# Patient Record
Sex: FEMALE | Race: WHITE | NOT HISPANIC OR LATINO | Employment: PART TIME | URBAN - METROPOLITAN AREA
[De-identification: names, ages, dates, MRNs, and addresses within clinical notes are randomized per-mention and may not be internally consistent; named-entity substitution may affect disease eponyms.]

---

## 2018-07-18 ENCOUNTER — TRANSCRIBE ORDERS (OUTPATIENT)
Dept: ADMINISTRATIVE | Facility: HOSPITAL | Age: 83
End: 2018-07-18

## 2018-07-18 DIAGNOSIS — M79.89 LEFT LEG SWELLING: Primary | ICD-10-CM

## 2018-07-20 ENCOUNTER — HOSPITAL ENCOUNTER (OUTPATIENT)
Dept: CT IMAGING | Facility: HOSPITAL | Age: 83
Discharge: HOME | End: 2018-07-20

## 2018-07-20 ENCOUNTER — HOSPITAL ENCOUNTER (OUTPATIENT)
Dept: CARDIOLOGY | Facility: HOSPITAL | Age: 83
Discharge: HOME | End: 2018-07-20
Admitting: INTERNAL MEDICINE

## 2018-07-20 DIAGNOSIS — M79.89 LEFT LEG SWELLING: ICD-10-CM

## 2018-07-20 LAB
BH CV LOWER VASCULAR LEFT COMMON FEMORAL AUGMENT: NORMAL
BH CV LOWER VASCULAR LEFT COMMON FEMORAL COMPETENT: NORMAL
BH CV LOWER VASCULAR LEFT COMMON FEMORAL COMPRESS: NORMAL
BH CV LOWER VASCULAR LEFT COMMON FEMORAL PHASIC: NORMAL
BH CV LOWER VASCULAR LEFT COMMON FEMORAL SPONT: NORMAL
BH CV LOWER VASCULAR LEFT DISTAL FEMORAL COMPRESS: NORMAL
BH CV LOWER VASCULAR LEFT GASTRONEMIUS COMPRESS: NORMAL
BH CV LOWER VASCULAR LEFT GREATER SAPH AK COMPRESS: NORMAL
BH CV LOWER VASCULAR LEFT GREATER SAPH BK COMPRESS: NORMAL
BH CV LOWER VASCULAR LEFT LESSER SAPH COMPRESS: NORMAL
BH CV LOWER VASCULAR LEFT MID FEMORAL AUGMENT: NORMAL
BH CV LOWER VASCULAR LEFT MID FEMORAL COMPETENT: NORMAL
BH CV LOWER VASCULAR LEFT MID FEMORAL COMPRESS: NORMAL
BH CV LOWER VASCULAR LEFT MID FEMORAL PHASIC: NORMAL
BH CV LOWER VASCULAR LEFT MID FEMORAL SPONT: NORMAL
BH CV LOWER VASCULAR LEFT PERONEAL COMPRESS: NORMAL
BH CV LOWER VASCULAR LEFT POPLITEAL AUGMENT: NORMAL
BH CV LOWER VASCULAR LEFT POPLITEAL COMPETENT: NORMAL
BH CV LOWER VASCULAR LEFT POPLITEAL COMPRESS: NORMAL
BH CV LOWER VASCULAR LEFT POPLITEAL PHASIC: NORMAL
BH CV LOWER VASCULAR LEFT POPLITEAL SPONT: NORMAL
BH CV LOWER VASCULAR LEFT POSTERIOR TIBIAL COMPRESS: NORMAL
BH CV LOWER VASCULAR LEFT PROXIMAL FEMORAL COMPRESS: NORMAL
BH CV LOWER VASCULAR LEFT SAPHENOFEMORAL JUNCTION AUGMENT: NORMAL
BH CV LOWER VASCULAR LEFT SAPHENOFEMORAL JUNCTION COMPETENT: NORMAL
BH CV LOWER VASCULAR LEFT SAPHENOFEMORAL JUNCTION COMPRESS: NORMAL
BH CV LOWER VASCULAR LEFT SAPHENOFEMORAL JUNCTION PHASIC: NORMAL
BH CV LOWER VASCULAR LEFT SAPHENOFEMORAL JUNCTION SPONT: NORMAL
BH CV LOWER VASCULAR RIGHT COMMON FEMORAL AUGMENT: NORMAL
BH CV LOWER VASCULAR RIGHT COMMON FEMORAL COMPETENT: NORMAL
BH CV LOWER VASCULAR RIGHT COMMON FEMORAL COMPRESS: NORMAL
BH CV LOWER VASCULAR RIGHT COMMON FEMORAL PHASIC: NORMAL
BH CV LOWER VASCULAR RIGHT COMMON FEMORAL SPONT: NORMAL
CREAT BLDA-MCNC: 0.8 MG/DL (ref 0.6–1.3)

## 2018-07-20 PROCEDURE — 74177 CT ABD & PELVIS W/CONTRAST: CPT

## 2018-07-20 PROCEDURE — 93971 EXTREMITY STUDY: CPT

## 2018-07-20 PROCEDURE — 82565 ASSAY OF CREATININE: CPT

## 2018-07-20 PROCEDURE — 25010000002 IOPAMIDOL 61 % SOLUTION: Performed by: INTERNAL MEDICINE

## 2018-07-20 RX ADMIN — IOPAMIDOL 85 ML: 612 INJECTION, SOLUTION INTRAVENOUS at 15:24

## 2018-07-26 ENCOUNTER — TRANSCRIBE ORDERS (OUTPATIENT)
Dept: ADMINISTRATIVE | Facility: HOSPITAL | Age: 83
End: 2018-07-26

## 2018-07-26 DIAGNOSIS — N94.9 ADNEXAL CYST: Primary | ICD-10-CM

## 2018-08-01 ENCOUNTER — HOSPITAL ENCOUNTER (OUTPATIENT)
Dept: ULTRASOUND IMAGING | Facility: HOSPITAL | Age: 83
Discharge: HOME | End: 2018-08-01
Admitting: INTERNAL MEDICINE

## 2018-08-01 DIAGNOSIS — N94.9 ADNEXAL CYST: ICD-10-CM

## 2018-08-01 PROCEDURE — 76830 TRANSVAGINAL US NON-OB: CPT

## 2018-08-01 PROCEDURE — 76856 US EXAM PELVIC COMPLETE: CPT

## 2018-08-01 PROCEDURE — 76857 US EXAM PELVIC LIMITED: CPT

## 2018-08-13 ENCOUNTER — TELEPHONE (OUTPATIENT)
Dept: ORTHOPEDIC SURGERY | Facility: CLINIC | Age: 83
End: 2018-08-13

## 2018-08-13 NOTE — TELEPHONE ENCOUNTER
Prev RBB patient (had R Meniscus repair 10/24/14) has had L Leg swelling from ankle up leg x 6-7mo ago (up to a year). Patient saw PCP Carmen Chacon who ordered Venous Doppler (7/20/18), CT (7/20/18) & Pelvic US (8/01/18) - all in [facility].     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     Patient thinks her L ankle / leg swelling is ortho related, but unsure where to go / who to see / what to do... Will RBB see patient or refer to [facility] / ANTOINE? Thanks /srh

## 2018-08-29 ENCOUNTER — OFFICE VISIT (OUTPATIENT)
Dept: ORTHOPEDIC SURGERY | Facility: CLINIC | Age: 83
End: 2018-08-29

## 2018-08-29 VITALS — WEIGHT: 197.2 LBS | BODY MASS INDEX: 33.67 KG/M2 | TEMPERATURE: 99.1 F | HEIGHT: 64 IN

## 2018-08-29 DIAGNOSIS — M25.572 PAIN AND SWELLING OF ANKLE, LEFT: Primary | ICD-10-CM

## 2018-08-29 DIAGNOSIS — M25.472 PAIN AND SWELLING OF ANKLE, LEFT: Primary | ICD-10-CM

## 2018-08-29 DIAGNOSIS — I87.302 STASIS EDEMA OF LEFT LOWER EXTREMITY: ICD-10-CM

## 2018-08-29 PROCEDURE — 99203 OFFICE O/P NEW LOW 30 MIN: CPT | Performed by: ORTHOPAEDIC SURGERY

## 2018-08-29 PROCEDURE — 73610 X-RAY EXAM OF ANKLE: CPT | Performed by: ORTHOPAEDIC SURGERY

## 2018-08-29 RX ORDER — LISINOPRIL 10 MG/1
10 TABLET ORAL DAILY
COMMUNITY
Start: 2018-08-17 | End: 2022-02-10 | Stop reason: SDUPTHER

## 2018-08-29 RX ORDER — METOPROLOL SUCCINATE 25 MG/1
25 TABLET, EXTENDED RELEASE ORAL DAILY
COMMUNITY
Start: 2018-08-23 | End: 2022-02-10 | Stop reason: ALTCHOICE

## 2018-08-29 RX ORDER — SIMVASTATIN 10 MG
10 TABLET ORAL NIGHTLY
COMMUNITY
Start: 2018-07-09

## 2018-08-29 NOTE — PROGRESS NOTES
Patient:  Dex Thakkar is a 84 y.o. female    Chief Complaint/ Reason for Visit:    Chief Complaint   Patient presents with   • Left Ankle - Establish Care, Pain       HPI:  This pleasant lady presents today complaining of a one to 2 year history of burning and stinging pain associated with swelling of her left lower leg, ankle, and foot.  There is no specific injury.  The pain is not focally located in the ankle or in the left knee.  It is diffuse, and is worse after standing and walking.  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    She saw her primary care physician, Dr. Chacon, who ordered a Doppler ultrasound on the left lower extremity on August 1 that was negative for blood clots.      PMH:    Past Medical History:   Diagnosis Date   • Melanoma (CMS/HCC)        PSH:  History reviewed. No pertinent surgical history.    Social Hx:    Social History     Social History   • Marital status:      Spouse name: N/A   • Number of children: N/A   • Years of education: N/A     Occupational History   • Not on file.     Social History Main Topics   • Smoking status: Never Smoker   • Smokeless tobacco: Never Used   • Alcohol use No   • Drug use: No   • Sexual activity: Defer     Other Topics Concern   • Not on file     Social History Narrative   • No narrative on file       Family Hx:    Family History   Problem Relation Age of Onset   • Cancer Mother    • Cancer Father    • Cancer Daughter    • Heart valve disorder Daughter        Meds:    Current Outpatient Prescriptions:   •  lisinopril (PRINIVIL,ZESTRIL) 10 MG tablet, , Disp: , Rfl:   •  metoprolol succinate XL (TOPROL-XL) 25 MG 24 hr tablet, , Disp: , Rfl:   •  simvastatin (ZOCOR) 10 MG tablet, , Disp: , Rfl:     Allergies:  No Known Allergies    ROS:  Review of Systems   Musculoskeletal: Positive for arthralgias.   All other systems reviewed  "and are negative.      Vitals:    08/29/18 1442   Temp: 99.1 °F (37.3 °C)   TempSrc: Temporal Artery    Weight: 89.4 kg (197 lb 3.2 oz)   Height: 161.3 cm (63.5\")     Body mass index is 34.38 kg/m².    Physical Exam    The patient is awake, alert, and oriented ×3.  The patient is in no acute distress.  Breathing is regular and unlabored with a respiratory rate of 12/m.  Extraocular movements and pupillary responses are symmetrically intact. Sclerae are anicteric.   Hearing is within normal limits.  Speech is within normal limits.  There is no jugular venous distention.    She walks with somewhat of a lumbering gait but no pronounced antalgia.    Left lower extremity: The patient does have diffuse edema from the knee, distally.  Sensory exams grossly intact.  She has a 1+ dorsalis pedis pulse in the left foot with a current regular heart rate around 72 bpm.  Her skin is somewhat shiny entirely due to what appears to be a chronic edema or chronic lymphedema.  I don't see any cellulitis, nor can I feel any popliteal lymphadenopathy.  Incidental note is made of a well-healed longitudinal scar on the medial aspect of the proximal left leg, just below the knee, and the patient tells me this was from excision of a melanoma in 1993.  She says she has not had any recurrence or other problems from that, that also noticed that Dr. Chacon \"checks her\" periodically.        Radiology: X-rays: 3 views the patient's left ankle were ordered and reviewed today do assess patient's complaints of left lower leg and ankle discomfort and swelling.  Comparison images were not available.  These images reveal diffuse soft tissue swelling, but I do not see any obvious osseous or articular abnormalities acutely.  Specifically, I don't see any evidence of stress fracture.      Assessment:     Diagnosis Plan   1. Pain and swelling of ankle, left  XR Ankle 3+ View Left    Ambulatory Referral to Vascular Surgery   2. Stasis edema of left lower " extremity  Ambulatory Referral to Vascular Surgery           Plan: I discussed everything with the patient at length.  I explained that I did not think she had a primary orthopedic surgical problem.  She says that she didn't think so either.  I explained that I felt she should see a vascular specialist given the unilateral nature of her swelling.  She agrees.  I will make that referral for her.       Orders Placed This Encounter   Procedures   • XR Ankle 3+ View Left     Order Specific Question:   Reason for Exam:     Answer:   OPNC PER RBB LT. ANKLE   • Ambulatory Referral to Vascular Surgery     Referral Priority:   Routine     Referral Type:   Consultation     Referral Reason:   Specialty Services Required     Referred to Provider:   Frandy Keen MD     Requested Specialty:   Vascular Surgery     Number of Visits Requested:   1

## 2019-01-23 ENCOUNTER — TRANSCRIBE ORDERS (OUTPATIENT)
Dept: ADMINISTRATIVE | Facility: HOSPITAL | Age: 84
End: 2019-01-23

## 2019-01-23 DIAGNOSIS — R10.32 ABDOMINAL PAIN, LEFT LOWER QUADRANT: Primary | ICD-10-CM

## 2019-01-30 ENCOUNTER — HOSPITAL ENCOUNTER (OUTPATIENT)
Dept: CT IMAGING | Facility: HOSPITAL | Age: 84
Discharge: HOME | End: 2019-01-30
Admitting: INTERNAL MEDICINE

## 2019-01-30 DIAGNOSIS — R10.32 ABDOMINAL PAIN, LEFT LOWER QUADRANT: ICD-10-CM

## 2019-01-30 LAB — CREAT BLDA-MCNC: 0.9 MG/DL (ref 0.6–1.3)

## 2019-01-30 PROCEDURE — 82565 ASSAY OF CREATININE: CPT

## 2019-01-30 PROCEDURE — 74177 CT ABD & PELVIS W/CONTRAST: CPT

## 2019-01-30 PROCEDURE — 25010000002 IOPAMIDOL 61 % SOLUTION: Performed by: INTERNAL MEDICINE

## 2019-01-30 RX ADMIN — IOPAMIDOL 85 ML: 612 INJECTION, SOLUTION INTRAVENOUS at 16:16

## 2019-04-09 ENCOUNTER — TRANSCRIBE ORDERS (OUTPATIENT)
Dept: LAB | Facility: HOSPITAL | Age: 84
End: 2019-04-09

## 2019-04-09 ENCOUNTER — HOSPITAL ENCOUNTER (INPATIENT)
Facility: HOSPITAL | Age: 84
LOS: 3 days | Discharge: HOME | End: 2019-04-12
Attending: INTERNAL MEDICINE | Admitting: INTERNAL MEDICINE

## 2019-04-09 ENCOUNTER — HOSPITAL ENCOUNTER (OUTPATIENT)
Dept: CT IMAGING | Facility: HOSPITAL | Age: 84
Discharge: HOME | End: 2019-04-09

## 2019-04-09 ENCOUNTER — LAB (OUTPATIENT)
Dept: LAB | Facility: HOSPITAL | Age: 84
End: 2019-04-09

## 2019-04-09 ENCOUNTER — TRANSCRIBE ORDERS (OUTPATIENT)
Dept: ADMINISTRATIVE | Facility: HOSPITAL | Age: 84
End: 2019-04-09

## 2019-04-09 DIAGNOSIS — R10.32 ABDOMINAL PAIN, LLQ: ICD-10-CM

## 2019-04-09 DIAGNOSIS — R10.32 ABDOMINAL PAIN, LEFT LOWER QUADRANT: Primary | ICD-10-CM

## 2019-04-09 DIAGNOSIS — R10.32 ABDOMINAL PAIN, LLQ: Primary | ICD-10-CM

## 2019-04-09 DIAGNOSIS — R10.32 ABDOMINAL PAIN, LEFT LOWER QUADRANT: ICD-10-CM

## 2019-04-09 PROBLEM — Z98.890 H/O CARDIAC RADIOFREQUENCY ABLATION: Status: ACTIVE | Noted: 2019-04-09

## 2019-04-09 PROBLEM — K57.20 COLONIC DIVERTICULAR ABSCESS: Status: ACTIVE | Noted: 2019-04-09

## 2019-04-09 PROBLEM — K57.92 ACUTE DIVERTICULITIS: Status: ACTIVE | Noted: 2019-04-09

## 2019-04-09 PROBLEM — I10 HYPERTENSION: Status: ACTIVE | Noted: 2019-04-09

## 2019-04-09 LAB
ALBUMIN SERPL-MCNC: 4.2 G/DL (ref 3.5–5.2)
ALBUMIN/GLOB SERPL: 1.2 G/DL
ALP SERPL-CCNC: 88 U/L (ref 39–117)
ALT SERPL W P-5'-P-CCNC: 19 U/L (ref 1–33)
ANION GAP SERPL CALCULATED.3IONS-SCNC: 10.7 MMOL/L
AST SERPL-CCNC: 18 U/L (ref 1–32)
BACTERIA UR QL AUTO: ABNORMAL /HPF
BILIRUB SERPL-MCNC: 0.6 MG/DL (ref 0.2–1.2)
BILIRUB UR QL STRIP: NEGATIVE
BUN BLD-MCNC: 19 MG/DL (ref 8–23)
BUN/CREAT SERPL: 25.3 (ref 7–25)
CALCIUM SPEC-SCNC: 10.2 MG/DL (ref 8.6–10.5)
CHLORIDE SERPL-SCNC: 101 MMOL/L (ref 98–107)
CLARITY UR: CLEAR
CO2 SERPL-SCNC: 28.3 MMOL/L (ref 22–29)
COLOR UR: YELLOW
CREAT BLD-MCNC: 0.75 MG/DL (ref 0.57–1)
DEPRECATED RDW RBC AUTO: 44.8 FL (ref 37–54)
ERYTHROCYTE [DISTWIDTH] IN BLOOD BY AUTOMATED COUNT: 12.9 % (ref 12.3–15.4)
ERYTHROCYTE [SEDIMENTATION RATE] IN BLOOD: 40 MM/HR (ref 0–30)
GFR SERPL CREATININE-BSD FRML MDRD: 73 ML/MIN/1.73
GLOBULIN UR ELPH-MCNC: 3.6 GM/DL
GLUCOSE BLD-MCNC: 116 MG/DL (ref 65–99)
GLUCOSE UR STRIP-MCNC: NEGATIVE MG/DL
HCT VFR BLD AUTO: 42.7 % (ref 34–46.6)
HGB BLD-MCNC: 13.5 G/DL (ref 12–15.9)
HGB UR QL STRIP.AUTO: NEGATIVE
HYALINE CASTS UR QL AUTO: ABNORMAL /LPF
KETONES UR QL STRIP: NEGATIVE
LEUKOCYTE ESTERASE UR QL STRIP.AUTO: ABNORMAL
MCH RBC QN AUTO: 29.9 PG (ref 26.6–33)
MCHC RBC AUTO-ENTMCNC: 31.6 G/DL (ref 31.5–35.7)
MCV RBC AUTO: 94.5 FL (ref 79–97)
NITRITE UR QL STRIP: NEGATIVE
PH UR STRIP.AUTO: 6.5 [PH] (ref 5–8)
PLATELET # BLD AUTO: 235 10*3/MM3 (ref 140–450)
PMV BLD AUTO: 11.9 FL (ref 6–12)
POTASSIUM BLD-SCNC: 4.9 MMOL/L (ref 3.5–5.2)
PROT SERPL-MCNC: 7.8 G/DL (ref 6–8.5)
PROT UR QL STRIP: NEGATIVE
RBC # BLD AUTO: 4.52 10*6/MM3 (ref 3.77–5.28)
RBC # UR: ABNORMAL /HPF
REF LAB TEST METHOD: ABNORMAL
SODIUM BLD-SCNC: 140 MMOL/L (ref 136–145)
SP GR UR STRIP: 1.01 (ref 1–1.03)
SQUAMOUS #/AREA URNS HPF: ABNORMAL /HPF
UROBILINOGEN UR QL STRIP: ABNORMAL
WBC NRBC COR # BLD: 11.69 10*3/MM3 (ref 3.4–10.8)
WBC UR QL AUTO: ABNORMAL /HPF

## 2019-04-09 PROCEDURE — 85652 RBC SED RATE AUTOMATED: CPT

## 2019-04-09 PROCEDURE — 25010000002 IOPAMIDOL 61 % SOLUTION: Performed by: INTERNAL MEDICINE

## 2019-04-09 PROCEDURE — 25010000002 CEFTRIAXONE PER 250 MG: Performed by: INTERNAL MEDICINE

## 2019-04-09 PROCEDURE — 85027 COMPLETE CBC AUTOMATED: CPT

## 2019-04-09 PROCEDURE — 74177 CT ABD & PELVIS W/CONTRAST: CPT

## 2019-04-09 PROCEDURE — 80053 COMPREHEN METABOLIC PANEL: CPT

## 2019-04-09 PROCEDURE — 81001 URINALYSIS AUTO W/SCOPE: CPT

## 2019-04-09 PROCEDURE — 36415 COLL VENOUS BLD VENIPUNCTURE: CPT

## 2019-04-09 RX ORDER — ATORVASTATIN CALCIUM 10 MG/1
10 TABLET, FILM COATED ORAL DAILY
Status: DISCONTINUED | OUTPATIENT
Start: 2019-04-10 | End: 2019-04-12 | Stop reason: HOSPADM

## 2019-04-09 RX ORDER — METOPROLOL SUCCINATE 25 MG/1
25 TABLET, EXTENDED RELEASE ORAL
Status: DISCONTINUED | OUTPATIENT
Start: 2019-04-10 | End: 2019-04-12 | Stop reason: HOSPADM

## 2019-04-09 RX ORDER — SODIUM CHLORIDE 0.9 % (FLUSH) 0.9 %
3 SYRINGE (ML) INJECTION EVERY 12 HOURS SCHEDULED
Status: DISCONTINUED | OUTPATIENT
Start: 2019-04-09 | End: 2019-04-12 | Stop reason: HOSPADM

## 2019-04-09 RX ORDER — CEFTRIAXONE SODIUM 1 G/50ML
1 INJECTION, SOLUTION INTRAVENOUS EVERY 24 HOURS
Status: DISCONTINUED | OUTPATIENT
Start: 2019-04-09 | End: 2019-04-12 | Stop reason: HOSPADM

## 2019-04-09 RX ORDER — SODIUM CHLORIDE 9 MG/ML
75 INJECTION, SOLUTION INTRAVENOUS CONTINUOUS
Status: DISCONTINUED | OUTPATIENT
Start: 2019-04-09 | End: 2019-04-11

## 2019-04-09 RX ORDER — ACETAMINOPHEN 325 MG/1
650 TABLET ORAL EVERY 4 HOURS PRN
Status: DISCONTINUED | OUTPATIENT
Start: 2019-04-09 | End: 2019-04-12 | Stop reason: HOSPADM

## 2019-04-09 RX ORDER — ONDANSETRON 2 MG/ML
4 INJECTION INTRAMUSCULAR; INTRAVENOUS EVERY 6 HOURS PRN
Status: DISCONTINUED | OUTPATIENT
Start: 2019-04-09 | End: 2019-04-12 | Stop reason: HOSPADM

## 2019-04-09 RX ORDER — LISINOPRIL 10 MG/1
10 TABLET ORAL
Status: DISCONTINUED | OUTPATIENT
Start: 2019-04-10 | End: 2019-04-12 | Stop reason: HOSPADM

## 2019-04-09 RX ORDER — SODIUM CHLORIDE 0.9 % (FLUSH) 0.9 %
3-10 SYRINGE (ML) INJECTION AS NEEDED
Status: DISCONTINUED | OUTPATIENT
Start: 2019-04-09 | End: 2019-04-12 | Stop reason: HOSPADM

## 2019-04-09 RX ORDER — MELOXICAM 7.5 MG/1
7.5 TABLET ORAL DAILY
COMMUNITY
End: 2019-12-09

## 2019-04-09 RX ORDER — MORPHINE SULFATE 2 MG/ML
2 INJECTION, SOLUTION INTRAMUSCULAR; INTRAVENOUS EVERY 4 HOURS PRN
Status: DISCONTINUED | OUTPATIENT
Start: 2019-04-09 | End: 2019-04-12 | Stop reason: HOSPADM

## 2019-04-09 RX ADMIN — CEFTRIAXONE SODIUM 1 G: 1 INJECTION, SOLUTION INTRAVENOUS at 23:04

## 2019-04-09 RX ADMIN — ACETAMINOPHEN 650 MG: 325 TABLET, FILM COATED ORAL at 23:09

## 2019-04-09 RX ADMIN — METRONIDAZOLE 500 MG: 500 INJECTION, SOLUTION INTRAVENOUS at 21:47

## 2019-04-09 RX ADMIN — SODIUM CHLORIDE 100 ML/HR: 9 INJECTION, SOLUTION INTRAVENOUS at 21:47

## 2019-04-09 RX ADMIN — IOPAMIDOL 85 ML: 612 INJECTION, SOLUTION INTRAVENOUS at 16:22

## 2019-04-09 NOTE — NURSING NOTE
Patient was in bay 6 Radiology Triage since 1625 today for hold and call CT of Abd and Pelvis.  Dr Chacon phoned here and informed the patient of the need to admit her today.  Room [facility] was called.  Patient was assigned to Room P694.  I phoned report now to Kelly White RN.  Patient taken by wheelchair with transporter to P694 for further care.  Saline lock IV remains in place at left AC.

## 2019-04-10 ENCOUNTER — APPOINTMENT (OUTPATIENT)
Dept: CT IMAGING | Facility: HOSPITAL | Age: 84
End: 2019-04-10

## 2019-04-10 LAB
ALBUMIN SERPL-MCNC: 3.5 G/DL (ref 3.5–5.2)
ALBUMIN/GLOB SERPL: 1.1 G/DL
ALP SERPL-CCNC: 78 U/L (ref 39–117)
ALT SERPL W P-5'-P-CCNC: 15 U/L (ref 1–33)
ANION GAP SERPL CALCULATED.3IONS-SCNC: 9.6 MMOL/L
APTT PPP: 31.7 SECONDS (ref 22.7–35.4)
AST SERPL-CCNC: 14 U/L (ref 1–32)
BASOPHILS # BLD AUTO: 0.05 10*3/MM3 (ref 0–0.2)
BASOPHILS NFR BLD AUTO: 0.9 % (ref 0–1.5)
BILIRUB SERPL-MCNC: 0.6 MG/DL (ref 0.2–1.2)
BUN BLD-MCNC: 16 MG/DL (ref 8–23)
BUN/CREAT SERPL: 23.2 (ref 7–25)
C DIFF TOX GENS STL QL NAA+PROBE: NEGATIVE
CALCIUM SPEC-SCNC: 8.8 MG/DL (ref 8.6–10.5)
CHLORIDE SERPL-SCNC: 105 MMOL/L (ref 98–107)
CO2 SERPL-SCNC: 27.4 MMOL/L (ref 22–29)
CREAT BLD-MCNC: 0.69 MG/DL (ref 0.57–1)
DEPRECATED RDW RBC AUTO: 45 FL (ref 37–54)
EOSINOPHIL # BLD AUTO: 0.17 10*3/MM3 (ref 0–0.4)
EOSINOPHIL NFR BLD AUTO: 2.9 % (ref 0.3–6.2)
ERYTHROCYTE [DISTWIDTH] IN BLOOD BY AUTOMATED COUNT: 13 % (ref 12.3–15.4)
GFR SERPL CREATININE-BSD FRML MDRD: 81 ML/MIN/1.73
GLOBULIN UR ELPH-MCNC: 3.1 GM/DL
GLUCOSE BLD-MCNC: 92 MG/DL (ref 65–99)
HCT VFR BLD AUTO: 37.3 % (ref 34–46.6)
HGB BLD-MCNC: 11.7 G/DL (ref 12–15.9)
IMM GRANULOCYTES # BLD AUTO: 0.03 10*3/MM3 (ref 0–0.05)
IMM GRANULOCYTES NFR BLD AUTO: 0.5 % (ref 0–0.5)
INR PPP: 0.99 (ref 0.9–1.1)
LYMPHOCYTES # BLD AUTO: 1.44 10*3/MM3 (ref 0.7–3.1)
LYMPHOCYTES NFR BLD AUTO: 24.5 % (ref 19.6–45.3)
MCH RBC QN AUTO: 29.5 PG (ref 26.6–33)
MCHC RBC AUTO-ENTMCNC: 31.4 G/DL (ref 31.5–35.7)
MCV RBC AUTO: 94.2 FL (ref 79–97)
MONOCYTES # BLD AUTO: 0.64 10*3/MM3 (ref 0.1–0.9)
MONOCYTES NFR BLD AUTO: 10.9 % (ref 5–12)
NEUTROPHILS # BLD AUTO: 3.54 10*3/MM3 (ref 1.4–7)
NEUTROPHILS NFR BLD AUTO: 60.3 % (ref 42.7–76)
NRBC BLD AUTO-RTO: 0 /100 WBC (ref 0–0)
PLATELET # BLD AUTO: 208 10*3/MM3 (ref 140–450)
PMV BLD AUTO: 12 FL (ref 6–12)
POTASSIUM BLD-SCNC: 4 MMOL/L (ref 3.5–5.2)
PROT SERPL-MCNC: 6.6 G/DL (ref 6–8.5)
PROTHROMBIN TIME: 12.8 SECONDS (ref 11.7–14.2)
RBC # BLD AUTO: 3.96 10*6/MM3 (ref 3.77–5.28)
SODIUM BLD-SCNC: 142 MMOL/L (ref 136–145)
WBC NRBC COR # BLD: 5.87 10*3/MM3 (ref 3.4–10.8)

## 2019-04-10 PROCEDURE — 25010000002 CEFTRIAXONE PER 250 MG: Performed by: INTERNAL MEDICINE

## 2019-04-10 PROCEDURE — 87015 SPECIMEN INFECT AGNT CONCNTJ: CPT | Performed by: SURGERY

## 2019-04-10 PROCEDURE — 85610 PROTHROMBIN TIME: CPT | Performed by: SURGERY

## 2019-04-10 PROCEDURE — 80053 COMPREHEN METABOLIC PANEL: CPT | Performed by: INTERNAL MEDICINE

## 2019-04-10 PROCEDURE — 99222 1ST HOSP IP/OBS MODERATE 55: CPT | Performed by: SURGERY

## 2019-04-10 PROCEDURE — 85730 THROMBOPLASTIN TIME PARTIAL: CPT | Performed by: SURGERY

## 2019-04-10 PROCEDURE — 87070 CULTURE OTHR SPECIMN AEROBIC: CPT | Performed by: SURGERY

## 2019-04-10 PROCEDURE — 87493 C DIFF AMPLIFIED PROBE: CPT | Performed by: INTERNAL MEDICINE

## 2019-04-10 PROCEDURE — 75989 ABSCESS DRAINAGE UNDER X-RAY: CPT

## 2019-04-10 PROCEDURE — 85025 COMPLETE CBC W/AUTO DIFF WBC: CPT | Performed by: INTERNAL MEDICINE

## 2019-04-10 PROCEDURE — 87205 SMEAR GRAM STAIN: CPT | Performed by: SURGERY

## 2019-04-10 PROCEDURE — 0W9G3ZX DRAINAGE OF PERITONEAL CAVITY, PERCUTANEOUS APPROACH, DIAGNOSTIC: ICD-10-PCS | Performed by: RADIOLOGY

## 2019-04-10 RX ORDER — LIDOCAINE HYDROCHLORIDE 10 MG/ML
20 INJECTION, SOLUTION INFILTRATION; PERINEURAL ONCE
Status: COMPLETED | OUTPATIENT
Start: 2019-04-10 | End: 2019-04-10

## 2019-04-10 RX ADMIN — CEFTRIAXONE SODIUM 1 G: 1 INJECTION, SOLUTION INTRAVENOUS at 21:52

## 2019-04-10 RX ADMIN — METRONIDAZOLE 500 MG: 500 INJECTION, SOLUTION INTRAVENOUS at 05:27

## 2019-04-10 RX ADMIN — METRONIDAZOLE 500 MG: 500 INJECTION, SOLUTION INTRAVENOUS at 20:31

## 2019-04-10 RX ADMIN — LIDOCAINE HYDROCHLORIDE 20 ML: 10 INJECTION, SOLUTION INFILTRATION; PERINEURAL at 15:15

## 2019-04-10 RX ADMIN — ATORVASTATIN CALCIUM 10 MG: 10 TABLET, FILM COATED ORAL at 08:32

## 2019-04-10 RX ADMIN — METOPROLOL SUCCINATE 25 MG: 25 TABLET, FILM COATED, EXTENDED RELEASE ORAL at 08:32

## 2019-04-10 RX ADMIN — LISINOPRIL 10 MG: 10 TABLET ORAL at 08:32

## 2019-04-10 RX ADMIN — SODIUM CHLORIDE 100 ML/HR: 9 INJECTION, SOLUTION INTRAVENOUS at 12:31

## 2019-04-10 RX ADMIN — METRONIDAZOLE 500 MG: 500 INJECTION, SOLUTION INTRAVENOUS at 15:32

## 2019-04-10 RX ADMIN — ACETAMINOPHEN 650 MG: 325 TABLET, FILM COATED ORAL at 08:44

## 2019-04-10 RX ADMIN — ACETAMINOPHEN 650 MG: 325 TABLET, FILM COATED ORAL at 18:05

## 2019-04-10 NOTE — CONSULTS
"SURGERY  Date of Visit: 04/10/19  Date of Admission:4/9/2019  6:43 PM     Reason for Consult/Admission: Diverticulitis with abscess    Patient's Chief Complaint: Left lower quadrant pain    HPI    Patient is a delightful, very functional 85 y.o. 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  She is been on 3 rounds of antibiotics, seemingly Cipro and Flagyl as well as Augmentin.  Each time she will get better for short while and then she will have resurgence.  Is not accompanied by fever, nausea, or diarrhea.  She is having \"squiggly\" stools that are worsened with each episode.  She denies any bloody stools.  She has had 3 CT scans, one actually back in last July, one in January, and one yesterday 4/9/2019 as she had one done as an outpatient with call back.  That showed a loculated fluid collection in the left lower quadrant felt to be an abscess and involvement with the inflammatory process in the left ovary, consistent with diverticulitis.    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  She recalls that her most recent colonoscopy was 5 years ago, but I opened the old record system, and it appears that her last one was actually 10 years ago when she had sigmoid diverticulosis without a polyp.  She did have an EGD 6 years ago.        She has been started on ceftriaxone and Flagyl since admission, with her white blood cell count going from 11.69 yesterday to 5.87 this morning, but with a concomitant dilution with hemoglobin changing from 13.5-11.7.    Past Medical History:   Diagnosis Date   • H/O cardiac radiofrequency ablation    • Hypertension    • Melanoma (CMS/HCC)    • Murmur      Past Surgical History:   Procedure Laterality Date   • CHOLECYSTECTOMY     • COLONOSCOPY      5 YRS AGO   • HYSTERECTOMY       Family History   Problem Relation Age of Onset   • Cancer " Mother    • Cancer Father    • Cancer Daughter    • Heart valve disorder Daughter      Social History     Socioeconomic History   • Marital status:      Spouse name: Not on file   • Number of children: Not on file   • Years of education: Not on file   • Highest education level: Not on file   Tobacco Use   • Smoking status: Former Smoker   • Smokeless tobacco: Never Used   • Tobacco comment: QUIT 30  YRS AGO   Substance and Sexual Activity   • Alcohol use: Yes     Comment: OCCASIONAL   • Drug use: No   • Sexual activity: Defer         Current Facility-Administered Medications:   •  acetaminophen (TYLENOL) tablet 650 mg, 650 mg, Oral, Q4H PRN, Adolfo Schultz MD, 650 mg at 04/10/19 0844  •  atorvastatin (LIPITOR) tablet 10 mg, 10 mg, Oral, Daily, Adolfo Schultz MD, 10 mg at 04/10/19 0832  •  cefTRIAXone (ROCEPHIN) IVPB 1 g, 1 g, Intravenous, Q24H, Adolfo Schultz MD, Last Rate: 100 mL/hr at 04/09/19 2304, 1 g at 04/09/19 2304  •  lisinopril (PRINIVIL,ZESTRIL) tablet 10 mg, 10 mg, Oral, Q24H, Adolfo Schultz MD, 10 mg at 04/10/19 0832  •  metoprolol succinate XL (TOPROL-XL) 24 hr tablet 25 mg, 25 mg, Oral, Q24H, Adolfo Schultz MD, 25 mg at 04/10/19 0832  •  metroNIDAZOLE (FLAGYL) IVPB 500 mg, 500 mg, Intravenous, Q8H, Adolfo Schultz MD, 500 mg at 04/10/19 0527  •  morphine injection 2 mg, 2 mg, Intravenous, Q4H PRN, Adolfo Schultz MD  •  ondansetron (ZOFRAN) injection 4 mg, 4 mg, Intravenous, Q6H PRN, Adolfo Schultz MD  •  sodium chloride 0.9 % flush 3 mL, 3 mL, Intravenous, Q12H, Adolfo Schultz MD  •  sodium chloride 0.9 % flush 3-10 mL, 3-10 mL, Intravenous, PRN, Adolfo Schultz MD  •  sodium chloride 0.9 % infusion, 100 mL/hr, Intravenous, Continuous, Adolfo Schultz MD, Last Rate: 100 mL/hr at 04/09/19 2147, 100 mL/hr at 04/09/19 2147    No Known Allergies    Preventative Medicine  Colonoscopy: 2009    Review of Systems   Constitutional: Positive for appetite change. Negative for fever.   Gastrointestinal: Positive for  "abdominal distention and abdominal pain. Negative for anal bleeding, blood in stool, diarrhea, nausea, rectal pain and vomiting. Constipation: change in stools.   All other systems reviewed and are negative.      Vitals:    04/09/19 1845 04/09/19 2154 04/10/19 0545   BP: 164/80 (!) 181/75 132/59   BP Location: Left arm Right arm Right arm   Patient Position: Lying Lying Lying   Pulse: 67 65 71   Resp: 18 18 18   Temp: 97.5 °F (36.4 °C) 97.1 °F (36.2 °C) 97 °F (36.1 °C)   TempSrc: Oral Oral Oral   SpO2: 100% 98% 94%   Weight: 86.6 kg (191 lb)     Height: 162.6 cm (64\")         PHYSICAL EXAM:    /59 (BP Location: Right arm, Patient Position: Lying)   Pulse 71   Temp 97 °F (36.1 °C) (Oral)   Resp 18   Ht 162.6 cm (64\")   Wt 86.6 kg (191 lb)   SpO2 94%   BMI 32.79 kg/m²   Body mass index is 32.79 kg/m².    Constitutional: well developed, well nourished, located on [address]  Eyes: sclerae nonicteric, conjunctivae not injected   ENMT: Hearing intact, trachea midline, thyroid without masses  CVS: RRR, no murmur, peripheral edema not present  Respiratory: CTA, normal respiratory effort   Gastrointestinal: no hepatosplenomegaly, abdomen soft, nontender except in the left lower quadrant, with mild guarding, abdominal hernia not detected, incisional scar lower midline from OBGYN surgery  Genitourinary: inguinal hernia not detected  Musculoskeletal: gait normal, muscle mass normal, certainly more agile than expected at 85 years old  Skin: warm and dry, no rashes visible  Neurological: awake and alert, seems to have reasonable capacity for understanding for medical decision making  Psychiatric: appears to have reasonable judgement, pleasant, more alert and engaging in routine 85-year-old  Lymphatics: no cervical adenopathy    Radiographic Findings: CT with an abscess that appears to have a small window but not large enough for drain.  I phoned Dr. Phillips the interventional radiologist and we discussed and I think as " he does that it suitable for aspiration but not drain placement    Lab Findings: As listed above    IMPRESSION:  · Recalcitrant diverticulitis now with an abscess.  She has had 3 rounds without success and I think that she is going to have to have surgery  · Advanced age but with very functional status  · Involvement of the left ovary with the diverticular process    PLAN:  · CT-guided aspiration of the fluid collection on the left.  I like to make sure that we do not have organisms that we are not treating effectively.  I would like to proceed this is an elective resection.  · Full liquid diet after CT scan today.  If she tolerates this and improves then I would plan to send her home on a low residue diet for 1 week, regular diet for 1 week, then high-fiber diet indefinitely  · With last colonoscopy 10 years ago, unfortunately I think we will have to repeat that prior to surgery  · Can delay surgery until the end of the school year which would be 4-6 weeks and ideal timing for an elective surgery  · ERAS protocol at the time of surgery, with reduced narcotic use, entereg use, quick feeding, with anticipated discharge on postoperative day 2.  Would annalise for a diverting ostomy as a possibility, but indicated to her that I thought there was little possibility of the need for this.    Renata Ray MD  04/10/19  12:47 PM

## 2019-04-10 NOTE — PLAN OF CARE
Problem: Patient Care Overview  Goal: Plan of Care Review  Outcome: Ongoing (interventions implemented as appropriate)   04/10/19 1574   Coping/Psychosocial   Plan of Care Reviewed With patient   Plan of Care Review   Progress no change   OTHER   Outcome Summary Was not vomiting or having diarrhea. She was having worsening pain on admission but only wanted tylenol. She did seem comfortable after that. Steady on her feet. IVFs started and flagyl and rocephin started. We do need a stool specimen and pt is aware but no stools since admission.B/P sl elevated but afebrile and VS stable.      Goal: Individualization and Mutuality  Outcome: Ongoing (interventions implemented as appropriate)    Goal: Discharge Needs Assessment  Outcome: Ongoing (interventions implemented as appropriate)    Goal: Interprofessional Rounds/Family Conf  Outcome: Ongoing (interventions implemented as appropriate)      Problem: Infection, Risk/Actual (Adult)  Goal: Identify Related Risk Factors and Signs and Symptoms  Outcome: Outcome(s) achieved Date Met: 04/10/19    Goal: Infection Prevention/Resolution  Outcome: Ongoing (interventions implemented as appropriate)      Problem: Pain, Acute (Adult)  Goal: Identify Related Risk Factors and Signs and Symptoms  Outcome: Outcome(s) achieved Date Met: 04/10/19    Goal: Acceptable Pain Control/Comfort Level  Outcome: Ongoing (interventions implemented as appropriate)

## 2019-04-10 NOTE — H&P
Internal medicine history and physical  INTERNAL MEDICINE   [facility]       Patient Identification:  Name: Dex Thakkar  Age: 85 y.o.  Sex: female  :  1934  MRN: [id number]                   Primary Care Physician: Carmen Chacon MD                                   Chief Complaint: Woke up this morning with severe lower abdominal left-sided abdominal pain reminded her as if she is developing diverticulitis again.    History of Present Illness:   Patient is 85-year-old female who has been battling with urinary tract infection and acute diverticulitis since last November.  She had a prolonged episode of diverticulitis that required 3 rounds of antibiotic therapy that she finished in mid March.  Each round of antibiotics for was for 10 days and consisted of 2 antibiotics one she was taking twice a day and when she is taking 3 times a day.  She does not know the name of these antibiotics but does remember getting metallic taste in her mouth.  According to her whole month of March she was symptom-free and was not having lower abdominal pain along with urinary symptoms or any change in her bowel habits.  In that background she also recalls having 3 CT scans done since last November including the one that was done earlier today.  Patient called her primary care physician Dr. Carmen Chacon about symptoms of significant abdominal pain that she woke up with and was promptly seen at her primary care physician's office.  According to Dr. Chacon her examination was very concerning for recurrence of diverticulitis and given her history she is sent her for CT scan of the abdomen and pelvis which was performed later in the evening.  Reports were called to Dr. Chacon that patient has acute diverticulitis in the sigmoid area where she had the diverticulitis before but the possibility of abscess adjacent to the sigmoid diverticulitis and under the left ovary.  A concern for diverticular abscess was raised.   Patient was requested to be admitted directly from the radiology department for IV antibiotics and further care.  Patient recalls having regular colonoscopies for screening by Dr. Ray with the last procedure was performed 5 years ago.  Patient denies much change in her appetite denies any fever or chills.  She also does not recall any changes in her bowel movements until today when she had 4 bowel movements which were not really loose but not regular bowel movements that she was accustomed to.  She denies any blood in the stool.      Past Medical History:  Past Medical History:   Diagnosis Date   • H/O cardiac radiofrequency ablation    • Hypertension    • Melanoma (CMS/HCC)    • Murmur      Past Surgical History:  Past Surgical History:   Procedure Laterality Date   • CHOLECYSTECTOMY     • COLONOSCOPY      5 YRS AGO   • HYSTERECTOMY        Home Meds:  Medications Prior to Admission   Medication Sig Dispense Refill Last Dose   • lisinopril (PRINIVIL,ZESTRIL) 10 MG tablet    4/9/2019 at Unknown time   • meloxicam (MOBIC) 7.5 MG tablet Take 7.5 mg by mouth Daily.   4/8/2019 at Unknown time   • metoprolol succinate XL (TOPROL-XL) 25 MG 24 hr tablet    4/9/2019 at Unknown time   • simvastatin (ZOCOR) 10 MG tablet    4/8/2019 at Unknown time     Current Meds:     Current Facility-Administered Medications:   •  cefTRIAXone (ROCEPHIN) IVPB 1 g, 1 g, Intravenous, Q24H, Adolfo Schultz MD  •  metroNIDAZOLE (FLAGYL) IVPB 500 mg, 500 mg, Intravenous, Q8H, Adolfo Schultz MD  •  morphine injection 2 mg, 2 mg, Intravenous, Q4H PRN, Adolfo Schultz MD  •  ondansetron (ZOFRAN) injection 4 mg, 4 mg, Intravenous, Q6H PRN, Adolfo Schultz MD  •  sodium chloride 0.9 % infusion, 100 mL/hr, Intravenous, Continuous, Adolfo Schultz MD  Allergies:  No Known Allergies  Social History:   Social History     Tobacco Use   • Smoking status: Former Smoker   • Smokeless tobacco: Never Used   • Tobacco comment: QUIT 30  YRS AGO   Substance Use Topics  "  • Alcohol use: Yes     Comment: OCCASIONAL      Family History:  Family History   Problem Relation Age of Onset   • Cancer Mother    • Cancer Father    • Cancer Daughter    • Heart valve disorder Daughter           Review of Systems  See history of present illness and past medical history.   Constitutional: Remarkable for no fever or chills  Cardiovascular: Remarkable for no shortness of breath orthopnea or PND  Respiratory: Remarkable for no cough or congestion  GI: Remarkable for abdominal pain and multiple bowel movements.  She has preserved appetite.  Denies any nausea or vomiting.  : Remarkable for history of urinary tract infection in November last year but currently denies any specific frequency or urgency.  Musculoskeletal: Remarkable for no new joint aches and pain  Neurological: Remarkable for no loss of consciousness or continence.      Vitals:   /80 (BP Location: Left arm, Patient Position: Lying)   Pulse 67   Temp 97.5 °F (36.4 °C) (Oral)   Resp 18   Ht 162.6 cm (64\")   Wt 86.6 kg (191 lb)   SpO2 100%   BMI 32.79 kg/m²   I/O: No intake or output data in the 24 hours ending 04/09/19 2121  Exam:  General Appearance:    Alert, cooperative, no distress, appears stated age   Head:    Normocephalic, without obvious abnormality, atraumatic   Eyes:    PERRL, conjunctiva/corneas clear, EOM's intact, both eyes   Ears:    Normal external ear canals, both ears   Nose:   Nares normal, septum midline, mucosa normal, no drainage    or sinus tenderness   Throat:   Lips, tongue, gums normal; oral mucosa pink and moist   Neck:   Supple, symmetrical, trachea midline, no adenopathy;     thyroid:  no enlargement/tenderness/nodules; no carotid    bruit or JVD   Back:     Symmetric, no curvature, ROM normal, no CVA tenderness   Lungs:     Clear to auscultation bilaterally, respirations unlabored   Chest Wall:    No tenderness or deformity    Heart:    Regular rate and rhythm, S1 and S2 normal, no murmur, rub  "  or gallop   Abdomen:    Overall soft abdomen with no obvious guarding and rigidity.  Her tenderness is more in the suprapubic and slightly left of it rather than classical left lower quadrant tenderness.  There is no rebound noted.   Extremities:   Extremities normal, atraumatic, no cyanosis or edema   Pulses:   Pulses palpable in all extremities; symmetric all extremities   Skin:   Skin color normal, Skin is warm and dry,  no rashes or palpable lesions   Neurologic:   CNII-XII intact, motor strength grossly intact, sensation grossly intact to light touch, no focal deficits noted       Data Review:      I reviewed the patient's new clinical results.  Results from last 7 days   Lab Units 04/09/19  1424   WBC 10*3/mm3 11.69*   HEMOGLOBIN g/dL 13.5   PLATELETS 10*3/mm3 235     Results from last 7 days   Lab Units 04/09/19  1424   SODIUM mmol/L 140   POTASSIUM mmol/L 4.9   CHLORIDE mmol/L 101   CO2 mmol/L 28.3   BUN mg/dL 19   CREATININE mg/dL 0.75   CALCIUM mg/dL 10.2   GLUCOSE mg/dL 116*     Ct Abdomen Pelvis With Contrast    Result Date: 4/9/2019  1.  Acute sigmoid diverticulitis in a similar distribution to that seen on 01/30/2019. Of note, the left ovary abuts this segment of inflamed sigmoid colon with a bilobed cystic lesion, in appearance which has remained grossly stable since 07/20/2018. However, there has been interval development of a new partially loculated fluid collection along the inferior aspect of the left ovary which also abuts the inflamed portion of the sigmoid colon as well concerning for developing abscess. A few foci of air along the inferior aspect of the sigmoid colon may be extraluminal as well and concerning for perforation. 2.  Other chronic findings as above.  The above findings were discussed with Dr. Chacon by telephone by Groge Howell MD at 5:10 PM on 04/09/2019   .         Assessment:  Active Hospital Problems    Diagnosis POA   • **Colonic diverticular abscess [K57.20] Unknown   •  Acute diverticulitis [K57.92] Unknown   • Hypertension [I10] Unknown   • H/O cardiac radiofrequency ablation [Z98.890] Not Applicable   • Stasis edema of left lower extremity [I87.302] Yes       Medical decision making:  Acute recurrent sigmoid diverticulitis with possible diverticular abscess versus ovarian abscess-plan is to admit the patient keep her n.p.o. except for clears and medications and start her on IV Rocephin and Flagyl.  Consult general surgery in this case Dr. Ray who has seen her before.  Decision for percutaneous drainage of the abscess would be deferred to general surgery service.  Hypertension-continue her antihypertensive regimen and avoid hypotensive episodes.  Dyslipidemia-continue her statins.  History of cardiac radiofrequency ablation for SVT currently does not show any evidence of cardiac decompensation plan is to monitor.  History of recurrent urinary tract infection-check urinalysis.    Adolfo Schultz MD   4/9/2019  9:21 PM  Much of this encounter note is an electronic transcription/translation of spoken language to printed text. The electronic translation of spoken language may permit erroneous, or at times, nonsensical words or phrases to be inadvertently transcribed; Although I have reviewed the note for such errors, some may still exist

## 2019-04-10 NOTE — PROGRESS NOTES
Discharge Planning Assessment  [facility]     Patient Name: Dex Thakkar  MRN: [id number]  Today's Date: 4/10/2019    Admit Date: 4/9/2019    Discharge Needs Assessment     Row Name 04/10/19 7584       Living Environment    Lives With  spouse    Current Living Arrangements  home/apartment/condo    Primary Care Provided by  self    Provides Primary Care For  no one    Family Caregiver if Needed  spouse;child(kenny), adult    Family Caregiver Names  --  Reed, son Dave    Quality of Family Relationships  supportive    Able to Return to Prior Arrangements  yes       Resource/Environmental Concerns    Resource/Environmental Concerns  none    Transportation Concerns  car, none       Transition Planning    Patient/Family Anticipates Transition to  home with family    Patient/Family Anticipated Services at Transition  none    Transportation Anticipated  family or friend will provide       Discharge Needs Assessment    Readmission Within the Last 30 Days  no previous admission in last 30 days    Concerns to be Addressed  no discharge needs identified;denies needs/concerns at this time;adjustment to diagnosis/illness    Equipment Currently Used at Home  none    Anticipated Changes Related to Illness  none    Equipment Needed After Discharge  none        Discharge Plan     Row Name 04/10/19 4271       Plan    Plan  Home w/o needs     Patient/Family in Agreement with Plan  yes    Plan Comments  I met with pt and her son Dave Thakkar ([phone]) just as he was leaving.  Pt confirmed the address, PCP and pharmacy are correct.  Pt said she can afford her Rx but said she does not have a good drug plan and is concerned about the cost of the Rx at discharge, I gave her a list of the free meds at [facility], pt said one of the medication Lipitor is a med she takes. 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  Pt said she uses no DME.  Pt said she does not have a HCS or Medical POA.  Pt said she plans to return home at discharge and does not anticipate any needs.       Alley Anne RN     Demographic Summary     Row Name 04/10/19 1604       General Information    Admission Type  inpatient    Arrived From  home    Referral Source  admission list    Reason for Consult  discharge planning    Preferred Language  English     Used During This Interaction  no        Functional Status     Row Name 04/10/19 1603       Functional Status, IADL    Medications  independent    Meal Preparation  independent    Housekeeping  independent    Laundry  independent    Shopping  independent     Patient Forms     Row Name 04/10/19 1603       Patient Forms    Provider Choice List  Delivered    Delivered to  Patient    Method of delivery  In person            Alley Anne, RN

## 2019-04-10 NOTE — PLAN OF CARE
Problem: Patient Care Overview  Goal: Plan of Care Review  Outcome: Ongoing (interventions implemented as appropriate)   04/10/19 1912   Coping/Psychosocial   Plan of Care Reviewed With patient   Plan of Care Review   Progress improving   OTHER   Outcome Summary No nausea or vomitting this shift, soft formed stool collected, negative for c-diff, decreased abdominal pain, abcess drainage by radiology, vss, afebrile, IV antibiotics given, tolerating full liquid diet.     Goal: Individualization and Mutuality  Outcome: Ongoing (interventions implemented as appropriate)    Goal: Discharge Needs Assessment  Outcome: Ongoing (interventions implemented as appropriate)    Goal: Interprofessional Rounds/Family Conf  Outcome: Ongoing (interventions implemented as appropriate)      Problem: Infection, Risk/Actual (Adult)  Goal: Infection Prevention/Resolution  Outcome: Ongoing (interventions implemented as appropriate)      Problem: Pain, Acute (Adult)  Goal: Acceptable Pain Control/Comfort Level  Outcome: Ongoing (interventions implemented as appropriate)

## 2019-04-11 ENCOUNTER — PREP FOR SURGERY (OUTPATIENT)
Dept: OTHER | Facility: HOSPITAL | Age: 84
End: 2019-04-11

## 2019-04-11 ENCOUNTER — TELEPHONE (OUTPATIENT)
Dept: SURGERY | Facility: CLINIC | Age: 84
End: 2019-04-11

## 2019-04-11 ENCOUNTER — HOSPITAL ENCOUNTER (OUTPATIENT)
Facility: HOSPITAL | Age: 84
Setting detail: HOSPITAL OUTPATIENT SURGERY
End: 2019-04-11
Attending: SURGERY | Admitting: SURGERY

## 2019-04-11 DIAGNOSIS — R10.32 LEFT LOWER QUADRANT PAIN: Primary | ICD-10-CM

## 2019-04-11 PROCEDURE — 25010000002 CEFTRIAXONE PER 250 MG: Performed by: INTERNAL MEDICINE

## 2019-04-11 PROCEDURE — 99232 SBSQ HOSP IP/OBS MODERATE 35: CPT | Performed by: SURGERY

## 2019-04-11 RX ORDER — METRONIDAZOLE 500 MG/1
500 TABLET ORAL EVERY 8 HOURS SCHEDULED
Status: DISCONTINUED | OUTPATIENT
Start: 2019-04-11 | End: 2019-04-12 | Stop reason: HOSPADM

## 2019-04-11 RX ORDER — SODIUM CHLORIDE 0.9 % (FLUSH) 0.9 %
3 SYRINGE (ML) INJECTION EVERY 12 HOURS SCHEDULED
Status: CANCELLED | OUTPATIENT
Start: 2019-05-07

## 2019-04-11 RX ORDER — SODIUM CHLORIDE 0.9 % (FLUSH) 0.9 %
1-10 SYRINGE (ML) INJECTION AS NEEDED
Status: CANCELLED | OUTPATIENT
Start: 2019-05-07

## 2019-04-11 RX ADMIN — METRONIDAZOLE 500 MG: 500 TABLET, FILM COATED ORAL at 21:58

## 2019-04-11 RX ADMIN — METRONIDAZOLE 500 MG: 500 INJECTION, SOLUTION INTRAVENOUS at 04:59

## 2019-04-11 RX ADMIN — METRONIDAZOLE 500 MG: 500 TABLET, FILM COATED ORAL at 16:43

## 2019-04-11 RX ADMIN — LISINOPRIL 10 MG: 10 TABLET ORAL at 08:58

## 2019-04-11 RX ADMIN — CEFTRIAXONE SODIUM 1 G: 1 INJECTION, SOLUTION INTRAVENOUS at 20:25

## 2019-04-11 RX ADMIN — SODIUM CHLORIDE 75 ML/HR: 9 INJECTION, SOLUTION INTRAVENOUS at 05:00

## 2019-04-11 RX ADMIN — ACETAMINOPHEN 650 MG: 325 TABLET, FILM COATED ORAL at 02:13

## 2019-04-11 RX ADMIN — SODIUM CHLORIDE, PRESERVATIVE FREE 3 ML: 5 INJECTION INTRAVENOUS at 20:28

## 2019-04-11 RX ADMIN — METOPROLOL SUCCINATE 25 MG: 25 TABLET, FILM COATED, EXTENDED RELEASE ORAL at 08:58

## 2019-04-11 RX ADMIN — ATORVASTATIN CALCIUM 10 MG: 10 TABLET, FILM COATED ORAL at 08:58

## 2019-04-11 NOTE — PROGRESS NOTES
"General Surgery  Dex Thakkar  1934  04/11/19    CC:  \"i'm better\"      Reason for consult/admit: recalcitrant diverticulitis    HPI:  Had a alexandra but with an empty feeling and sensing that she needs some food with substance.  She is starting to get worried that this could be something bad, recalling her daughter's diagnosis of gallbladder cancer in an advanced stage with death last year.  She is anxious now to proceed with at least, the c scope.  I clarified with her today that it has been 10 years, having seen that after i reviewed in the old system.  She thought she had one 5 years ago.    No fevers.  Bowel movement yesterday \"good one\" and c diff negative.    Diet:  Full liquid    Temp:  [97.2 °F (36.2 °C)-98.3 °F (36.8 °C)] 97.3 °F (36.3 °C)  Heart Rate:  [66-73] 69  Resp:  [16-20] 16  BP: (140-167)/(63-71) 151/71    Physical Exam   Constitutional: She appears well-developed and well-nourished.   HENT:   Head: Normocephalic.   Neck: Neck supple.   Cardiovascular: Normal rate.   Pulmonary/Chest: Effort normal. No stridor. She has no wheezes.   Abdominal: Soft. She exhibits distension. She exhibits no mass. Tenderness: mild. There is no rebound and no guarding. No hernia.   Neurological: She is alert.   Skin: Skin is warm.   Psychiatric: She has a normal mood and affect.     Results from last 7 days   Lab Units 04/10/19  0646   WBC 10*3/mm3 5.87   HEMOGLOBIN g/dL 11.7*   HEMATOCRIT % 37.3   PLATELETS 10*3/mm3 208         Results from last 7 days   Lab Units 04/10/19  0646 04/09/19  1424   WBC 10*3/mm3 5.87 11.69*   HEMOGLOBIN g/dL 11.7* 13.5   HEMATOCRIT % 37.3 42.7   PLATELETS 10*3/mm3 208 235     Results from last 7 days   Lab Units 04/10/19  0646 04/09/19  1424   SODIUM mmol/L 142 140   POTASSIUM mmol/L 4.0 4.9   CHLORIDE mmol/L 105 101   CO2 mmol/L 27.4 28.3   BUN mg/dL 16 19   CREATININE mg/dL 0.69 0.75   CALCIUM mg/dL 8.8 10.2   BILIRUBIN mg/dL 0.6 0.6   ALK PHOS U/L 78 88   ALT (SGPT) U/L 15 19 "   AST (SGOT) U/L 14 18   GLUCOSE mg/dL 92 116*     C diff negative.    Assessment:  · Recalcitrant diverticulitis now with cystic mass aspirated with improvement of pain.  Now i'm beginning to wonder if some of her symptoms were from the cyst.    · Left lower quadrant collection, S/P CT guided drainage yesterday, with gross appearance serous, gram stain no organisms.  Perhaps related to ovarian cyst.  · Advanced age but with very functional status  · Involvement of the left ovary with the diverticular process    Plan  · Get labs from Dr Chacon to see if WBC was elevated at prior episodes., particularly since pain is gone now after aspiration.  · Advance diet  · Anticipate discharge tomorrow with abx, since we won't know (augmentin), on low residue.  · C scope in 3-4 weeks.  · Decision on surgery later.  Will change our plans, now with improvement in symptoms and may await another episode prior to considering surgery.      Renata Ray MD  04/11/19

## 2019-04-11 NOTE — TELEPHONE ENCOUNTER
----- Message from Renata Ray MD sent at 4/11/2019 11:32 AM EDT -----  Regarding: OR scheduling  Call her cell (she's in the hospital) to schedule a cscope in about 3 - 4 weeks.

## 2019-04-11 NOTE — PLAN OF CARE
Problem: Patient Care Overview  Goal: Plan of Care Review  Outcome: Ongoing (interventions implemented as appropriate)   04/11/19 0806   Coping/Psychosocial   Plan of Care Reviewed With patient   Plan of Care Review   Progress improving   OTHER   Outcome Summary Having some loose stools tonight. H/Atreated with tylenol and it did help her h/a tonight. No c/o nausea. Bandaid dry and intact to abdomen     Goal: Individualization and Mutuality  Outcome: Ongoing (interventions implemented as appropriate)    Goal: Discharge Needs Assessment  Outcome: Ongoing (interventions implemented as appropriate)    Goal: Interprofessional Rounds/Family Conf  Outcome: Ongoing (interventions implemented as appropriate)      Problem: Infection, Risk/Actual (Adult)  Goal: Infection Prevention/Resolution  Outcome: Ongoing (interventions implemented as appropriate)      Problem: Pain, Acute (Adult)  Goal: Acceptable Pain Control/Comfort Level  Outcome: Ongoing (interventions implemented as appropriate)

## 2019-04-11 NOTE — PROGRESS NOTES
" LOS: 2 days     Name: Dex Thakkar  Age: 85 y.o.  Sex: female  :  1934  MRN: [id number]         Primary Care Physician: Carmen Chacon MD    Subjective   Subjective  No new complaints.  Denies abdominal pain but does admit to a mild amount of cramping.  Bowel movements are loose but not watery.  No nausea or vomiting.  Denies fevers or chills.    Objective   Vital Signs  Temp:  [97.2 °F (36.2 °C)-98.3 °F (36.8 °C)] 97.3 °F (36.3 °C)  Heart Rate:  [66-73] 69  Resp:  [16-20] 16  BP: (140-167)/(63-71) 151/71  Body mass index is 32.79 kg/m².    Objective:  General Appearance:  Comfortable and in no acute distress.    Vital signs: (most recent): Blood pressure 151/71, pulse 69, temperature 97.3 °F (36.3 °C), temperature source Oral, resp. rate 16, height 162.6 cm (64\"), weight 86.6 kg (191 lb), SpO2 97 %.    Lungs:  Normal effort and normal respiratory rate.    Heart: Normal rate.  Regular rhythm.    Abdomen: Abdomen is soft.  Bowel sounds are normal.   There is no abdominal tenderness.     Extremities: There is no dependent edema or local swelling.    Neurological: Patient is alert and oriented to person, place and time.    Skin:  Warm and dry.              Results Review:       I reviewed the patient's new clinical results.    Results from last 7 days   Lab Units 04/10/19  0646 19  1424   WBC 10*3/mm3 5.87 11.69*   HEMOGLOBIN g/dL 11.7* 13.5   PLATELETS 10*3/mm3 208 235     Results from last 7 days   Lab Units 04/10/19  0646 19  1424   SODIUM mmol/L 142 140   POTASSIUM mmol/L 4.0 4.9   CHLORIDE mmol/L 105 101   CO2 mmol/L 27.4 28.3   BUN mg/dL 16 19   CREATININE mg/dL 0.69 0.75   CALCIUM mg/dL 8.8 10.2   GLUCOSE mg/dL 92 116*     Results from last 7 days   Lab Units 04/10/19  1248   INR  0.99     Scheduled Meds:     atorvastatin 10 mg Oral Daily   ceftriaxone 1 g Intravenous Q24H   lisinopril 10 mg Oral Q24H   metoprolol succinate XL 25 mg Oral Q24H   metroNIDAZOLE 500 mg Intravenous Q8H   sodium " chloride 3 mL Intravenous Q12H     PRN Meds:   •  acetaminophen  •  Morphine  •  ondansetron  •  sodium chloride  Continuous Infusions:    sodium chloride 75 mL/hr Last Rate: 75 mL/hr (04/11/19 0500)       Assessment/Plan   Active Hospital Problems    Diagnosis  POA   • **Colonic diverticular abscess [K57.20]  Unknown   • Acute diverticulitis [K57.92]  Unknown   • Hypertension [I10]  Unknown   • H/O cardiac radiofrequency ablation [Z98.890]  Not Applicable   • Stasis edema of left lower extremity [I87.302]  Yes      Resolved Hospital Problems   No resolved problems to display.       Assessment & Plan    85 y.o. female recurrent diverticulitis with possible abscess     · Continue antibiotics.  Await fluid culture results  · Appreciate input from general surgery.  · C. difficile negative  · disposition to be determined  · discussed with patient and nursing staff.      Sergio Thurman MD  [facility]  04/11/19  9:41 AM

## 2019-04-11 NOTE — PLAN OF CARE
Problem: Patient Care Overview  Goal: Plan of Care Review  Outcome: Ongoing (interventions implemented as appropriate)   04/11/19 1848   Coping/Psychosocial   Plan of Care Reviewed With patient   OTHER   Outcome Summary Pt denies pain, no c/o nausea, loose stool x4, advanced to regular diet and tolerated well, saline locked, bandaid dry and intact to abdomen.       04/11/19 1848   Coping/Psychosocial   Plan of Care Reviewed With patient   Plan of Care Review   Progress improving   OTHER   Outcome Summary Pt denies pain, no c/o nausea, loose stool x4, advanced to regular diet and tolerated well, saline locked, bandaid dry and intact to abdomen.      Goal: Individualization and Mutuality  Outcome: Ongoing (interventions implemented as appropriate)    Goal: Discharge Needs Assessment  Outcome: Ongoing (interventions implemented as appropriate)    Goal: Interprofessional Rounds/Family Conf  Outcome: Ongoing (interventions implemented as appropriate)      Problem: Infection, Risk/Actual (Adult)  Goal: Infection Prevention/Resolution  Outcome: Ongoing (interventions implemented as appropriate)      Problem: Pain, Acute (Adult)  Goal: Acceptable Pain Control/Comfort Level  Outcome: Ongoing (interventions implemented as appropriate)

## 2019-04-12 VITALS
OXYGEN SATURATION: 93 % | SYSTOLIC BLOOD PRESSURE: 147 MMHG | HEIGHT: 64 IN | DIASTOLIC BLOOD PRESSURE: 66 MMHG | WEIGHT: 190.92 LBS | RESPIRATION RATE: 16 BRPM | BODY MASS INDEX: 32.59 KG/M2 | HEART RATE: 83 BPM | TEMPERATURE: 97.5 F

## 2019-04-12 PROCEDURE — 99231 SBSQ HOSP IP/OBS SF/LOW 25: CPT | Performed by: SURGERY

## 2019-04-12 RX ORDER — AMOXICILLIN AND CLAVULANATE POTASSIUM 500; 125 MG/1; MG/1
1 TABLET, FILM COATED ORAL 2 TIMES DAILY
Qty: 14 TABLET | Refills: 0 | Status: SHIPPED | OUTPATIENT
Start: 2019-04-12 | End: 2019-04-24

## 2019-04-12 RX ADMIN — METOPROLOL SUCCINATE 25 MG: 25 TABLET, FILM COATED, EXTENDED RELEASE ORAL at 08:05

## 2019-04-12 RX ADMIN — ATORVASTATIN CALCIUM 10 MG: 10 TABLET, FILM COATED ORAL at 08:05

## 2019-04-12 RX ADMIN — LISINOPRIL 10 MG: 10 TABLET ORAL at 08:05

## 2019-04-12 RX ADMIN — SODIUM CHLORIDE, PRESERVATIVE FREE 3 ML: 5 INJECTION INTRAVENOUS at 08:06

## 2019-04-12 RX ADMIN — METRONIDAZOLE 500 MG: 500 TABLET, FILM COATED ORAL at 13:32

## 2019-04-12 RX ADMIN — METRONIDAZOLE 500 MG: 500 TABLET, FILM COATED ORAL at 05:42

## 2019-04-12 NOTE — PROGRESS NOTES
"General Surgery  Dex DOZIER Thakkar  1934  04/12/19    CC:  \"i'm better\"      Reason for consult/admit: recalcitrant diverticulitis    HPI:  Had a alexandra but with a good bowel movement, tolerance of diet and general well feeling.  Ready for discharge.  Nursing still hasn't given her the diet info i requested 2 days ago.  Apparently consulted nutrition to do rather than just printing out a dietary example.      She is begninning to get worried that this could be something bad, recalling her daughter's diagnosis of gallbladder cancer in an advanced stage with death last year.  She is anxious now to proceed with at least, the c scope.  I clarified with her today that it has been 10 years, having seen that after i reviewed in the old system.  She thought she had one 5 years ago.    No fevers.  Bowel movement yesterday \"good one\" and c diff negative.    Diet:  regular    Temp:  [97.2 °F (36.2 °C)-98.4 °F (36.9 °C)] 97.5 °F (36.4 °C)  Heart Rate:  [67-97] 83  Resp:  [16] 16  BP: (141-154)/(66-86) 147/66    Physical Exam   Constitutional: She appears well-developed and well-nourished.   HENT:   Head: Normocephalic.   Neck: Neck supple.   Cardiovascular: Normal rate.   Pulmonary/Chest: Effort normal. No stridor. She has no wheezes.   Abdominal: Soft. She exhibits distension. She exhibits no mass. Tenderness: mild. There is no rebound and no guarding. No hernia.   Neurological: She is alert.   Skin: Skin is warm.   Psychiatric: She has a normal mood and affect.     Results from last 7 days   Lab Units 04/10/19  0646   WBC 10*3/mm3 5.87   HEMOGLOBIN g/dL 11.7*   HEMATOCRIT % 37.3   PLATELETS 10*3/mm3 208         Results from last 7 days   Lab Units 04/10/19  0646 04/09/19  1424   WBC 10*3/mm3 5.87 11.69*   HEMOGLOBIN g/dL 11.7* 13.5   HEMATOCRIT % 37.3 42.7   PLATELETS 10*3/mm3 208 235     Results from last 7 days   Lab Units 04/10/19  0646 04/09/19  1424   SODIUM mmol/L 142 140   POTASSIUM mmol/L 4.0 4.9   CHLORIDE mmol/L " 105 101   CO2 mmol/L 27.4 28.3   BUN mg/dL 16 19   CREATININE mg/dL 0.69 0.75   CALCIUM mg/dL 8.8 10.2   BILIRUBIN mg/dL 0.6 0.6   ALK PHOS U/L 78 88   ALT (SGPT) U/L 15 19   AST (SGOT) U/L 14 18   GLUCOSE mg/dL 92 116*     C diff negative.  Culture from aspiration negative.    Assessment:  · Recalcitrant diverticulitis now with cystic mass aspirated with improvement of pain.  Now i'm beginning to wonder if some of her symptoms were from the cyst.    · Left lower quadrant collection, S/P CT guided drainage yesterday, with gross appearance serous, gram stain no organisms.  Perhaps related to ovarian cyst.  · Advanced age but with very functional status  · Involvement of the left ovary with the diverticular process    Plan  · Labs from Dr Chacon showed mildly elevated WBC .  · Anticipate discharge today with abx, (augmentin), on low residue.  · C scope in 3-4 weeks.  Set up for May 6  · Decision on surgery later.  Will change our plans, now with improvement in symptoms and may await another episode prior to considering surgery.      Renata Ray MD  04/12/19

## 2019-04-12 NOTE — CONSULTS
"Adult Nutrition  Assessment/PES    Patient Name:  Dex Thakkar  YOB: 1934  MRN: [id number]  Admit Date:  4/9/2019    Assessment Date:  4/12/2019    Comments:  D/E consult. Provided pt with handout for GI soft/Low residue diet as well as handout for IBD nutrition. Discussed some things to keep in mind when following the diet and to continue diet until physician advices to begin incorporating higher foods back in. Pt receptive and understanding. Will continue to follow.     Reason for Assessment     Row Name 04/12/19 1332          Reason for Assessment    Reason For Assessment  physician consult     Diagnosis  gastrointestinal disease     Identified At Risk by Screening Criteria  need for education         Nutrition/Diet History     Row Name 04/12/19 1335          Nutrition/Diet History    Typical Food/Fluid Intake  Diet education with handout provided for GI soft diet along with nutrition for IBD     Factors Affecting Nutritional Intake  abdominal distension;abdominal pain;altered gastrointestinal function;diarrhea         Anthropometrics     Row Name 04/12/19 1337          Anthropometrics    Height Method  measured     Height  162.6 cm (64.02\")     Current Weight Method  measured     Weight  86.6 kg (190 lb 14.7 oz)        Ideal Body Weight (IBW)    Ideal Body Weight (IBW) (kg)  55.04     % Ideal Body Weight  157.34        Body Mass Index (BMI)    BMI (kg/m2)  32.82     BMI Assessment  BMI 30-34.9: obesity grade I                                        Labs/Tests/Procedures/Meds     Row Name 04/12/19 1337          Labs/Procedures/Meds    Lab Results Reviewed  reviewed        Diagnostic Tests/Procedures    Diagnostic Test/Procedure Reviewed  reviewed, pertinent     Diagnostic Test/Procedures Comments  abd. CT         Medications    Pertinent Medications Comments  IV Recephin, Flagyl         Physical Findings     Row Name 04/12/19 1340          Physical Findings    Overall Physical Appearance  " "overweight     Gastrointestinal  abdominal distension     Skin  other (see comments) L abd. puncture site         Estimated/Assessed Needs     Row Name 04/12/19 1340 04/12/19 1337       Calculation Measurements    Weight Used For Calculations  86.6 kg (190 lb 14.7 oz)  --    Height  --  162.6 cm (64.02\")       Estimated/Assessed Needs    Additional Documentation  Caswell-St. Jeor Equation (Group)  --       KCAL/KG                      20 Kcal/Kg (kcal)  1732  --    25 Kcal/Kg (kcal)  2165  --    30 Kcal/Kg (kcal)  2598  --    35 Kcal/Kg (kcal)  3031  --                         Caswell-St. Jeor Equation    RMR (Caswell-St. Jeor Equation)  1296.25  --       Fluid Requirements    Johanna-Hailey Method (over 20 kg)  3232  --        Nutrition Prescription Ordered     Row Name 04/12/19 1340          Nutrition Prescription PO    Current PO Diet  Regular     Fluid Consistency  Thin     Common Modifiers  GI Soft/[place]         Evaluation of Received Nutrient/Fluid Intake     Row Name 04/12/19 1340 04/12/19 1337       Calculation Measurements    Weight Used For Calculations  86.6 kg (190 lb 14.7 oz)  --    Height  --  162.6 cm (64.02\")        Evaluation of Prescribed Nutrient/Fluid Intake     Row Name 04/12/19 1340 04/12/19 1337       Calculation Measurements    Weight Used For Calculations  86.6 kg (190 lb 14.7 oz)  --    Height  --  162.6 cm (64.02\")            Problem/Interventions:  Problem 1     Row Name 04/12/19 1341          Nutrition Diagnoses Problem 1    Problem 1  Altered GI Function     Etiology (related to)  Medical Diagnosis     Gastrointestinal  Diverticulitis;Other (comment) Bowel abcess     Signs/Symptoms (evidenced by)  Report/Observation     Reported GI Symptoms  GI distress;Diarrhea                 Intervention Goal     Row Name 04/12/19 1344          Intervention Goal    General  Maintain nutrition;Provide information regarding MNT for treatment/condition     PO  Maintain intake     Weight  No significant " weight loss         Nutrition Intervention     Row Name 04/12/19 1345          Nutrition Intervention    RD/Tech Action  Interview for preference;Follow Tx progress;Care plan reviewd           Education/Evaluation     Row Name 04/12/19 1345          Education    Education  Provided education regarding     Provided education regarding  Diet rationale        Monitor/Evaluation    Monitor  PO intake;Weight;Skin status;Symptoms     Education Follow-up  Reinforce PRN           Electronically signed by:  Mary Ceja MS,RD,LD  04/12/19 1:46 PM

## 2019-04-12 NOTE — PROGRESS NOTES
Discharge Planning Assessment  [facility]     Patient Name: Dex Thakkar  MRN: [id number]  Today's Date: 4/12/2019    Admit Date: 4/9/2019    Discharge Needs Assessment    No documentation.       Discharge Plan     Row Name 04/12/19 1251       Plan    Final Discharge Disposition Code  01 - home or self-care    Row Name 04/12/19 0197       Plan    Plan  Home w/o needs    Plan Comments  IMM Letter reviewed with pt, she signed and a copy was given to her.  Pt said she feels much better and is going home today, she said she does not anticipate any discharge needs.    Alley Anne RN       Expected Discharge Date and Time     Expected Discharge Date Expected Discharge Time    Apr 12, 2019        Alley Anne, RN

## 2019-04-12 NOTE — PROGRESS NOTES
Discharge Planning Assessment  [facility]     Patient Name: Dex Thakkar  MRN: [id number]  Today's Date: 4/12/2019    Admit Date: 4/9/2019    Discharge Needs Assessment    No documentation.       Discharge Plan     Row Name 04/12/19 1158       Plan    Plan  Home w/o needs    Plan Comments  IMM Letter reviewed with pt, she signed and a copy was given to her.  Pt said she feels much better and is going home today, she said she does not anticipate any discharge needs.    Alley Anne, RN

## 2019-04-12 NOTE — DISCHARGE SUMMARY
Date of Admission: 4/9/2019  Date of Discharge:  4/12/2019  Primary Care Physician: Carmen Chacon MD     Discharge Diagnosis:  Active Hospital Problems    Diagnosis  POA   • **Colonic diverticular abscess [K57.20]  Unknown   • Acute diverticulitis [K57.92]  Unknown   • Hypertension [I10]  Unknown   • H/O cardiac radiofrequency ablation [Z98.890]  Not Applicable   • Stasis edema of left lower extremity [I87.302]  Yes      Resolved Hospital Problems   No resolved problems to display.       DETAILS OF HOSPITAL STAY     Pertinent Test Results and Procedures Performed  CT scan of the abdomen and pelvis:  1.  Acute sigmoid diverticulitis in a similar distribution to that seen  on 01/30/2019. Of note, the left ovary abuts this segment of inflamed  sigmoid colon with a bilobed cystic lesion, in appearance which has  remained grossly stable since 07/20/2018. However, there has been  interval development of a new partially loculated fluid collection along  the inferior aspect of the left ovary which also abuts the inflamed  portion of the sigmoid colon as well concerning for developing abscess.  A few foci of air along the inferior aspect of the sigmoid colon may be  extraluminal as well and concerning for perforation.  2.  Other chronic findings as above.    The patient underwent CT-guided aspiration of diverticular abscess with culture negative.    C. difficile toxin PCR negative    Hospital Course  This is a 85-year-old female who presented to the emergency room with left lower quadrant abdominal pain similar to previous episodes of diverticulitis.  Please see H&P for full details of admission.  She had a CT scan that showed diverticulitis with abscess.  General surgery was consulted and the patient was placed on Rocephin and Flagyl.  She underwent CT-guided drainage of the abscess.  Cultures from this were negative.  She has responded well to the IV antibiotics with rapid improvement.  General surgery will follow her  up in 2 weeks and are planning to perform a colonoscopy on May 6.  No immediate plans for surgery noted at this time and given her rapid improvement it is likely that we would wait for another recurrence of diverticulitis prior to subjecting her to surgery.  General surgery has requested the patient be placed on Augmentin upon discharge as described below.  At this point she is medically stable and feels much better.  She is anxious for discharge and will be released home today.    Physical Exam at Discharge:  General: No acute distress, AAOx3  HEENT: EOMI, PERRL  Cardiovascular: +s1 and s2, RRR  Lungs: No rhonchi or wheezing  Abdomen: soft, nontender    Consults:   Consults     Date and Time Order Name Status Description    4/10/2019 0230 Inpatient General Surgery Consult Completed             Condition on Discharge: Stable, improved    Discharge Disposition  Home or Self Care    Discharge Medications     Discharge Medications      New Medications      Instructions Start Date   amoxicillin-clavulanate 500-125 MG per tablet  Commonly known as:  AUGMENTIN   1 tablet, Oral, 2 Times Daily         Continue These Medications      Instructions Start Date   lisinopril 10 MG tablet  Commonly known as:  PRINIVIL,ZESTRIL   No dose, route, or frequency recorded.      meloxicam 7.5 MG tablet  Commonly known as:  MOBIC   7.5 mg, Oral, Daily      metoprolol succinate XL 25 MG 24 hr tablet  Commonly known as:  TOPROL-XL   No dose, route, or frequency recorded.      simvastatin 10 MG tablet  Commonly known as:  ZOCOR   No dose, route, or frequency recorded.             Discharge Diet:   Diet Instructions     Diet: Regular; Thin      Discharge Diet:  Regular    Fluid Consistency:  Thin          Activity at Discharge:   Activity Instructions     Activity as Tolerated            Follow-up Appointments  Future Appointments   Date Time Provider Department Center   4/18/2019  2:20 PM Bud Muhammad MD [facility] [address] None     Additional  Instructions for the Follow-ups that You Need to Schedule     Discharge Follow-up with PCP   As directed       Currently Documented PCP:    Carmen Chacon MD    PCP Phone Number:    [phone]     Follow Up Details:  1 week         Discharge Follow-up with Specified Provider: Dr. Ray in 2 weeks   As directed      To:  Dr. Ray in 2 weeks               Test Results Pending at Discharge   Order Current Status    Body Fluid Culture - Body Fluid, Peritoneum Preliminary result          I have examined and discussed discharge planning with the patient today.     Sergio Thurman MD  04/12/19  12:44 PM    Time: Discharge greater than 30 min

## 2019-04-12 NOTE — PLAN OF CARE
Problem: Patient Care Overview  Goal: Plan of Care Review  Outcome: Ongoing (interventions implemented as appropriate)   04/12/19 0609   Plan of Care Review   Progress improving   OTHER   Outcome Summary no c/o, walked, vdg, abd bandaid intact, possible d/c today     Goal: Discharge Needs Assessment  Outcome: Ongoing (interventions implemented as appropriate)    Goal: Interprofessional Rounds/Family Conf  Outcome: Ongoing (interventions implemented as appropriate)      Problem: Infection, Risk/Actual (Adult)  Goal: Infection Prevention/Resolution  Outcome: Ongoing (interventions implemented as appropriate)      Problem: Pain, Acute (Adult)  Goal: Acceptable Pain Control/Comfort Level  Outcome: Ongoing (interventions implemented as appropriate)

## 2019-04-13 LAB
BACTERIA FLD CULT: NORMAL
GRAM STN SPEC: NORMAL
GRAM STN SPEC: NORMAL

## 2019-04-16 ENCOUNTER — TELEPHONE (OUTPATIENT)
Dept: SURGERY | Facility: CLINIC | Age: 84
End: 2019-04-16

## 2019-04-16 NOTE — TELEPHONE ENCOUNTER
Pt was instructed to call if she had any bowel issues. She states that every time she eats she has very loose bowel movements. She did say that she doesn't feel bad but does have a slight left quad pain(very light pain though) and her stomach grumbles a lot through out the day.

## 2019-04-18 ENCOUNTER — OFFICE VISIT (OUTPATIENT)
Dept: ORTHOPEDIC SURGERY | Facility: CLINIC | Age: 84
End: 2019-04-18

## 2019-04-18 VITALS — WEIGHT: 190.6 LBS | TEMPERATURE: 98.6 F | HEIGHT: 62 IN | BODY MASS INDEX: 35.07 KG/M2

## 2019-04-18 DIAGNOSIS — M25.561 ACUTE PAIN OF RIGHT KNEE: Primary | ICD-10-CM

## 2019-04-18 PROCEDURE — 73562 X-RAY EXAM OF KNEE 3: CPT | Performed by: ORTHOPAEDIC SURGERY

## 2019-04-18 PROCEDURE — 99213 OFFICE O/P EST LOW 20 MIN: CPT | Performed by: ORTHOPAEDIC SURGERY

## 2019-04-18 NOTE — PROGRESS NOTES
"Patient: Dex Thakkar  YOB: 1934 85 y.o. female  Medical Record Number: [id number]    Chief Complaints:   Chief Complaint   Patient presents with   • Right Knee - Pain, Establish Care       History of Present Illness:Dex Thakkar is a 85 y.o. female who presents for follow-up of right knee pain intermittent in nature she was recently hospitalized for some GI issues she was in bed for a week or so and her knee actually feels somewhat better since swelling down her activities.  In general she has a moderate ache about the medial aspect of the knee severe at times.    Allergies: No Known Allergies    Medications:   Current Outpatient Medications   Medication Sig Dispense Refill   • amoxicillin-clavulanate (AUGMENTIN) 500-125 MG per tablet Take 1 tablet by mouth 2 (Two) Times a Day. 14 tablet 0   • lisinopril (PRINIVIL,ZESTRIL) 10 MG tablet      • meloxicam (MOBIC) 7.5 MG tablet Take 7.5 mg by mouth Daily.     • metoprolol succinate XL (TOPROL-XL) 25 MG 24 hr tablet      • simvastatin (ZOCOR) 10 MG tablet        No current facility-administered medications for this visit.          The following portions of the patient's history were reviewed and updated as appropriate: allergies, current medications, past family history, past medical history, past social history, past surgical history and problem list.    Review of Systems:   A 14 point review of systems was performed. All systems negative except pertinent positives/negative listed in HPI above    Physical Exam:   Vitals:    04/18/19 1510   Temp: 98.6 °F (37 °C)   TempSrc: Temporal   Weight: 86.5 kg (190 lb 9.6 oz)   Height: 157.5 cm (62\")       General: A and O x 3, ASA, NAD    SCLERA:    Normal    DENTITION:   Normal  Knee:  right    ALIGNMENT:     Varus  ,   Patella  tracks  midline    GAIT:    Antalgic    SKIN:    No abnormality    RANGE OF MOTION:   5  -  120   DEG    STRENGTH:   4  / 5    LIGAMENTS:    No varus / valgus instability.   Negative  " Lachman.    MENISCUS:     Negative   Nanda       DISTAL PULSES:    Paplable    DISTAL SENSATION :   Intact    LYMPHATICS:     No   lymphadenopathy    OTHER:          - Positive   effusion      - Crepitance with ROM       Radiology:  Xrays 3views right knee (ap,lateral, sunrise) were ordered and reviewed for evaluation of knee pain demonstratingmoderate joint space narrowing - medial joint with cyst formation  todays xrays were compared to previous xrays and show new findings as described above    Assessment/Plan:  Right knee varus osteoarthritis moderately severe.  She is on antibiotics for GI issues and said to have a colonoscopy soon.  After she has recovered appropriately from that she would likely call back for cortisone injection into the knee.  For now she will continue on quad strengthening exercises

## 2019-04-24 ENCOUNTER — OFFICE VISIT (OUTPATIENT)
Dept: SURGERY | Facility: CLINIC | Age: 84
End: 2019-04-24

## 2019-04-24 VITALS — HEIGHT: 62 IN | WEIGHT: 191.8 LBS | OXYGEN SATURATION: 98 % | HEART RATE: 67 BPM | BODY MASS INDEX: 35.3 KG/M2

## 2019-04-24 DIAGNOSIS — K57.92 ACUTE DIVERTICULITIS: Primary | ICD-10-CM

## 2019-04-24 PROCEDURE — 99213 OFFICE O/P EST LOW 20 MIN: CPT | Performed by: SURGERY

## 2019-04-24 NOTE — PROGRESS NOTES
SURGERY     Patient's Chief Complaint: Left lower quadrant pain     HPI     Patient is a delightful, very functional 85 y.o. 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  She had been on 3 rounds of antibiotics, seemingly Cipro and Flagyl as well as Augmentin each time with only a temporal response.    She had 3 CT scans, one actually back last July, one in January, and one 4/9/2019 with a loculated fluid collection in the left lower quadrant felt to be an abscess and involvement with the inflammatory process in the left ovary, consistent with diverticulitis.  She was admitted and ultimately, the fluid aspirated by CT with serous appearance, culture negative, with resolution of symptoms.  Thus i began to wonder if her symptoms were not based on an ovarian cyst, which had been seen on prior CTs.     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  Her last one was 10 years ago when she had sigmoid diverticulosis without a polyp.  She did have an EGD 6 years ago.         She was treated with ceftriaxone and flagyl with  Discharge on augmentin, which was completed last week.  she is having no symptoms whatsoever, without pain or nausea and tolerance of food.  She reiterates that her pain has been gone since the aspiration.     Medical History        Past Medical History:   Diagnosis Date   • H/O cardiac radiofrequency ablation     • Hypertension     • Melanoma (CMS/HCC)     • Murmur           Surgical History         Past Surgical History:   Procedure Laterality Date   • CHOLECYSTECTOMY       • COLONOSCOPY         5 YRS AGO   • HYSTERECTOMY                   Family History   Problem Relation Age of Onset   • Cancer Mother     • Cancer Father     • Cancer Daughter     • Heart valve disorder Daughter        Social History   Social History             Socioeconomic History   • Marital status:        Spouse name: Not on file   • Number of children: Not on file   • Years of education: Not on file   • Highest education level: Not on file   Tobacco Use   • Smoking status: Former Smoker   • Smokeless tobacco: Never Used   • Tobacco comment: QUIT 30  YRS AGO   Substance and Sexual Activity   • Alcohol use: Yes       Comment: OCCASIONAL   • Drug use: No   • Sexual activity: Defer               Current Facility-Administered Medications:   •  acetaminophen (TYLENOL) tablet 650 mg, 650 mg, Oral, Q4H PRN, Adolfo Schultz MD, 650 mg at 04/10/19 0844  •  atorvastatin (LIPITOR) tablet 10 mg, 10 mg, Oral, Daily, Adolfo Schultz MD, 10 mg at 04/10/19 0832  •  cefTRIAXone (ROCEPHIN) IVPB 1 g, 1 g, Intravenous, Q24H, Adolfo Schultz MD, Last Rate: 100 mL/hr at 04/09/19 2304, 1 g at 04/09/19 2304  •  lisinopril (PRINIVIL,ZESTRIL) tablet 10 mg, 10 mg, Oral, Q24H, Adolfo Schultz MD, 10 mg at 04/10/19 0832  •  metoprolol succinate XL (TOPROL-XL) 24 hr tablet 25 mg, 25 mg, Oral, Q24H, Adolfo Schultz MD, 25 mg at 04/10/19 0832  •  metroNIDAZOLE (FLAGYL) IVPB 500 mg, 500 mg, Intravenous, Q8H, Adolfo Schultz MD, 500 mg at 04/10/19 0527  •  morphine injection 2 mg, 2 mg, Intravenous, Q4H PRN, Adolfo Schultz MD  •  ondansetron (ZOFRAN) injection 4 mg, 4 mg, Intravenous, Q6H PRN, Adolfo Schultz MD  •  sodium chloride 0.9 % flush 3 mL, 3 mL, Intravenous, Q12H, Adolfo Schultz MD  •  sodium chloride 0.9 % flush 3-10 mL, 3-10 mL, Intravenous, PRN, Adolfo Schultz MD  •  sodium chloride 0.9 % infusion, 100 mL/hr, Intravenous, Continuous, Adolfo Schultz MD, Last Rate: 100 mL/hr at 04/09/19 2147, 100 mL/hr at 04/09/19 2147     No Known Allergies     Preventative Medicine  Colonoscopy: 2009     Review of Systems   Constitutional: Positive for appetite change. Negative for fever.   Gastrointestinal: Positive for abdominal distention and abdominal pain. Negative for anal bleeding, blood in stool, diarrhea,  "nausea, rectal pain and vomiting. Constipation: change in stools.   All other systems reviewed and are negative.        Vitals         Vitals:     04/09/19 1845 04/09/19 2154 04/10/19 0545   BP: 164/80 (!) 181/75 132/59   BP Location: Left arm Right arm Right arm   Patient Position: Lying Lying Lying   Pulse: 67 65 71   Resp: 18 18 18   Temp: 97.5 °F (36.4 °C) 97.1 °F (36.2 °C) 97 °F (36.1 °C)   TempSrc: Oral Oral Oral   SpO2: 100% 98% 94%   Weight: 86.6 kg (191 lb)       Height: 162.6 cm (64\")                PHYSICAL EXAM:     /59 (BP Location: Right arm, Patient Position: Lying)   Pulse 71   Temp 97 °F (36.1 °C) (Oral)   Resp 18   Ht 162.6 cm (64\")   Wt 86.6 kg (191 lb)   SpO2 94%   BMI 32.79 kg/m²   Body mass index is 32.79 kg/m².     Constitutional: well developed, well nourished, located on [address]  Eyes: sclerae nonicteric, conjunctivae not injected   ENMT: Hearing intact, trachea midline, thyroid without masses  CVS: RRR, no murmur, peripheral edema not present  Respiratory: CTA, normal respiratory effort   Gastrointestinal: no hepatosplenomegaly, abdomen soft, nontender except in the left lower quadrant, with mild guarding, abdominal hernia not detected, incisional scar lower midline from OBGYN surgery  Genitourinary: inguinal hernia not detected  Musculoskeletal: gait normal, muscle mass normal, certainly more agile than expected at 85 years old  Skin: warm and dry, no rashes visible  Neurological: awake and alert, seems to have reasonable capacity for understanding for medical decision making  Psychiatric: appears to have reasonable judgement, pleasant, more alert and engaging in routine 85-year-old  Lymphatics: no cervical adenopathy     Radiographic Findings: CT with an abscess that appears to have a small window but not large enough for drain.  I phoned Dr. Phillips the interventional radiologist and we discussed and I think as he does that it suitable for aspiration but not drain " placement     Lab Findings: As listed above     IMPRESSION:  · Recalcitrant diverticulitis vs ovarian cyst as source of left lower quadrant pain, now resolved.    · Advanced age but with very functional status  · Involvement of the left ovary with the diverticular process  · History of tubulovillous adenoma     PLAN:  · C scope May 6.  She is due.  · Hold on consideration of surgery, particularly in the context of her improvement with aspiration, and the absence of pus.  · Should we proceed, will still consider ERAS, etc.  Would annalise for a diverting ostomy as a possibility, but indicated to her that I thought there was little possibility of the need for this  · High fiber diet now indefinitely.     Renata Ray MD  4:10 PM

## 2019-05-01 ENCOUNTER — TELEPHONE (OUTPATIENT)
Dept: SURGERY | Facility: CLINIC | Age: 84
End: 2019-05-01

## 2019-05-01 ENCOUNTER — HOSPITAL ENCOUNTER (EMERGENCY)
Facility: HOSPITAL | Age: 84
Discharge: HOME | End: 2019-05-01
Attending: EMERGENCY MEDICINE | Admitting: EMERGENCY MEDICINE

## 2019-05-01 VITALS
BODY MASS INDEX: 33.66 KG/M2 | TEMPERATURE: 99 F | HEIGHT: 63 IN | DIASTOLIC BLOOD PRESSURE: 83 MMHG | OXYGEN SATURATION: 99 % | HEART RATE: 69 BPM | WEIGHT: 190 LBS | RESPIRATION RATE: 16 BRPM | SYSTOLIC BLOOD PRESSURE: 188 MMHG

## 2019-05-01 DIAGNOSIS — M54.42 LEFT-SIDED LOW BACK PAIN WITH LEFT-SIDED SCIATICA, UNSPECIFIED CHRONICITY: Primary | ICD-10-CM

## 2019-05-01 PROCEDURE — 99283 EMERGENCY DEPT VISIT LOW MDM: CPT

## 2019-05-01 RX ORDER — ACETAMINOPHEN AND CODEINE PHOSPHATE 300; 30 MG/1; MG/1
1 TABLET ORAL EVERY 6 HOURS PRN
Qty: 20 TABLET | Refills: 0 | Status: SHIPPED | OUTPATIENT
Start: 2019-05-01 | End: 2019-12-09

## 2019-05-01 RX ORDER — CYCLOBENZAPRINE HCL 10 MG
10 TABLET ORAL 3 TIMES DAILY PRN
Qty: 30 TABLET | Refills: 0 | Status: SHIPPED | OUTPATIENT
Start: 2019-05-01 | End: 2019-12-09

## 2019-05-01 NOTE — TELEPHONE ENCOUNTER
Pt having very bad hip problems and would like to post pone c-scope for 5/6. She has r/s to 6/10 with hopes she will feel better by then

## 2019-05-01 NOTE — ED PROVIDER NOTES
EMERGENCY DEPARTMENT ENCOUNTER    CHIEF COMPLAINT  Chief Complaint: back pain  History given by: patient  History limited by: nothing  Room Number: 01/01  PMD: Carmen Chacon MD      HPI:  Pt is a 85 y.o. female who presents complaining of left lower back pain that began three days ago. It radiates into her left buttock and posterior upper thigh. Pt has a h/x of sciatica and states that the pain feels similar. She has been taking tylenol,  icing the area, and using a tens unit after work with minimal relief.She denies injury or trauma to the area.     Duration: three days  Onset: gradual  Timing: constant  Location: left lower back  Radiation: to buttock and upper left posterior thigh.  Quality: pain  Intensity/Severity: moderate  Progression: worsening  Associated Symptoms: none  Aggravating Factors: exertion, movement  Alleviating Factors: rest  Previous Episodes: Pt has a h/x of sciatica and states that the pain feels similar.   Treatment before arrival: She has been taking tylenol,  icing the area, and using a tens unit after work with minimal relief.    PAST MEDICAL HISTORY  Active Ambulatory Problems     Diagnosis Date Noted   • Stasis edema of left lower extremity 08/29/2018   • Pain and swelling of ankle, left 08/29/2018   • Acute diverticulitis 04/09/2019   • Colonic diverticular abscess 04/09/2019   • Hypertension 04/09/2019   • H/O cardiac radiofrequency ablation 04/09/2019   • Left lower quadrant pain 04/11/2019     Resolved Ambulatory Problems     Diagnosis Date Noted   • No Resolved Ambulatory Problems     Past Medical History:   Diagnosis Date   • Diverticulitis    • H/O cardiac radiofrequency ablation    • Hyperlipidemia    • Hypertension    • Melanoma (CMS/HCC)    • Murmur        PAST SURGICAL HISTORY  Past Surgical History:   Procedure Laterality Date   • CARDIAC ABLATION N/A 07/17/2017    Dr. Leigh   • CHOLECYSTECTOMY N/A    • COLONOSCOPY N/A 10/15/2004    Sigmoid diverticulosis, smal rectal  polyp, internal hemorrhoids-Dr. Renata Ray   • COLONOSCOPY N/A 12/14/2009    Sigmoid diverticulosis-Dr. Renata Ray   • ENDOSCOPY N/A 10/07/2013    Gastric ulcerations x10, ulcerative esophagitis, small hiatal hernia-Dr. Renata Ray   • HYSTERECTOMY     • KNEE ARTHROSCOPY W/ PARTIAL MEDIAL MENISCECTOMY Right 10/24/2014    Dr. Bud Muhammad       FAMILY HISTORY  Family History   Problem Relation Age of Onset   • Brain cancer Mother    • Cancer Father    • Cancer Daughter    • Heart valve disorder Daughter    • Breast cancer Sister        SOCIAL HISTORY  Social History     Socioeconomic History   • Marital status:      Spouse name: Not on file   • Number of children: Not on file   • Years of education: Not on file   • Highest education level: Not on file   Tobacco Use   • Smoking status: Former Smoker   • Smokeless tobacco: Never Used   • Tobacco comment: QUIT 30  YRS AGO   Substance and Sexual Activity   • Alcohol use: Yes     Comment: OCCASIONAL   • Drug use: No   • Sexual activity: Defer       ALLERGIES  Patient has no known allergies.    REVIEW OF SYSTEMS  Review of Systems   Constitutional: Negative for fever.   Respiratory: Negative for shortness of breath.    Cardiovascular: Negative for chest pain.   Musculoskeletal: Positive for back pain (left lower back pain).   All other systems reviewed and are negative.      PHYSICAL EXAM  ED Triage Vitals [05/01/19 1308]   Temp Heart Rate Resp BP SpO2   99 °F (37.2 °C) 80 18 -- 98 %      Temp src Heart Rate Source Patient Position BP Location FiO2 (%)   Tympanic Monitor -- -- --       Physical Exam   Constitutional: She is oriented to person, place, and time. No distress.   Eyes: EOM are normal.   Neck: Normal range of motion.   Cardiovascular: Normal rate and regular rhythm.   Pulmonary/Chest: Effort normal and breath sounds normal. No respiratory distress.   Musculoskeletal:   Mild tenderness over left SI joint. Plantar flexion/extension, knee  flexion/extension, hip flexion/extension intact.   Neurological: She is alert and oriented to person, place, and time. She has normal sensation and normal strength.   Skin: Skin is warm and dry.   Psychiatric: Affect normal.   Nursing note and vitals reviewed.          PROCEDURES  Procedures      PROGRESS AND CONSULTS     1336  Informed Pt that I do not feel imaging is warranted at this time. Discussed plans to discharge Pt with an rx for tylenol 3. She states that she has done well with flexeril in the past and I will also write her for that. Instructed her to follow up with her PCP if symptoms persist. She may need steroids in the future. Pt understands and agrees to all plans. All questions answered.       MEDICAL DECISION MAKING  Results were reviewed/discussed with the patient and they were also made aware of online access. Pt also made aware that some labs, such as cultures, will not be resulted during ER visit and follow up with PMD is necessary.     MDM  Number of Diagnoses or Management Options     Amount and/or Complexity of Data Reviewed  Decide to obtain previous medical records or to obtain history from someone other than the patient: yes  Review and summarize past medical records: yes (Pt was admitted 4/9/19-4/12/19 for a diverticular abscess.)           DIAGNOSIS  Final diagnoses:   Left-sided low back pain with left-sided sciatica, unspecified chronicity       DISPOSITION  DISCHARGE    Patient discharged in stable condition.    Reviewed implications of results, diagnosis, meds, responsibility to follow up, warning signs and symptoms of possible worsening, potential complications and reasons to return to ER, including new or worsening symptoms.    Patient/Family voiced understanding of above instructions.    Discussed plan for discharge, as there is no emergent indication for admission. Patient referred to primary care provider for BP management due to today's BP. Pt/family is agreeable and  understands need for follow up and repeat testing.  Pt is aware that discharge does not mean that nothing is wrong but it indicates no emergency is present that requires admission and they must continue care with follow-up as given below or physician of their choice.     FOLLOW-UP  Carmen Chacon MD  [address]  [facility] [id number] [phone]    Schedule an appointment as soon as possible for a visit       [facility]  [address] [address] [phone]    As needed, If symptoms worsen         Medication List      New Prescriptions    acetaminophen-codeine 300-30 MG per tablet  Commonly known as:  TYLENOL #3  Take 1 tablet by mouth Every 6 (Six) Hours As Needed for Moderate Pain .     cyclobenzaprine 10 MG tablet  Commonly known as:  FLEXERIL  Take 1 tablet by mouth 3 (Three) Times a Day As Needed for Muscle Spasms.              Latest Documented Vital Signs:  As of 1:38 PM  BP- (!) 205/96 HR- 80 Temp- 99 °F (37.2 °C) (Tympanic) O2 sat- 98%    --  Documentation assistance provided by raven Gillespie for Dr. Adams.  Information recorded by the scribe was done at my direction and has been verified and validated by me.        Melinda Gillespie  05/01/19 9422       Jon Adams MD  05/01/19 4740

## 2019-05-06 ENCOUNTER — APPOINTMENT (OUTPATIENT)
Dept: CT IMAGING | Facility: HOSPITAL | Age: 84
End: 2019-05-06

## 2019-05-06 ENCOUNTER — HOSPITAL ENCOUNTER (INPATIENT)
Facility: HOSPITAL | Age: 84
LOS: 4 days | Discharge: HOME | End: 2019-05-10
Attending: EMERGENCY MEDICINE | Admitting: SURGERY

## 2019-05-06 ENCOUNTER — TELEPHONE (OUTPATIENT)
Dept: SURGERY | Facility: CLINIC | Age: 84
End: 2019-05-06

## 2019-05-06 DIAGNOSIS — K62.5 RECTAL BLEEDING: ICD-10-CM

## 2019-05-06 DIAGNOSIS — R10.32 LEFT LOWER QUADRANT PAIN: ICD-10-CM

## 2019-05-06 DIAGNOSIS — K57.21 DIVERTICULITIS OF LARGE INTESTINE WITH ABSCESS WITH BLEEDING: Primary | ICD-10-CM

## 2019-05-06 DIAGNOSIS — K57.20 COLONIC DIVERTICULAR ABSCESS: ICD-10-CM

## 2019-05-06 DIAGNOSIS — K59.00 CONSTIPATION, UNSPECIFIED CONSTIPATION TYPE: ICD-10-CM

## 2019-05-06 PROBLEM — R33.9 URINARY RETENTION: Status: ACTIVE | Noted: 2019-05-06

## 2019-05-06 LAB
ALBUMIN SERPL-MCNC: 3.9 G/DL (ref 3.5–5.2)
ALBUMIN/GLOB SERPL: 1.2 G/DL
ALP SERPL-CCNC: 104 U/L (ref 39–117)
ALT SERPL W P-5'-P-CCNC: 76 U/L (ref 1–33)
ANION GAP SERPL CALCULATED.3IONS-SCNC: 9.2 MMOL/L
AST SERPL-CCNC: 65 U/L (ref 1–32)
BACTERIA UR QL AUTO: ABNORMAL /HPF
BASOPHILS # BLD AUTO: 0.05 10*3/MM3 (ref 0–0.2)
BASOPHILS NFR BLD AUTO: 0.5 % (ref 0–1.5)
BILIRUB SERPL-MCNC: 0.5 MG/DL (ref 0.2–1.2)
BILIRUB UR QL STRIP: NEGATIVE
BUN BLD-MCNC: 20 MG/DL (ref 8–23)
BUN/CREAT SERPL: 24.7 (ref 7–25)
CALCIUM SPEC-SCNC: 9.5 MG/DL (ref 8.6–10.5)
CHLORIDE SERPL-SCNC: 98 MMOL/L (ref 98–107)
CLARITY UR: CLEAR
CO2 SERPL-SCNC: 24.8 MMOL/L (ref 22–29)
COLOR UR: YELLOW
CREAT BLD-MCNC: 0.81 MG/DL (ref 0.57–1)
DEPRECATED RDW RBC AUTO: 45 FL (ref 37–54)
EOSINOPHIL # BLD AUTO: 0.03 10*3/MM3 (ref 0–0.4)
EOSINOPHIL NFR BLD AUTO: 0.3 % (ref 0.3–6.2)
ERYTHROCYTE [DISTWIDTH] IN BLOOD BY AUTOMATED COUNT: 13.2 % (ref 12.3–15.4)
GFR SERPL CREATININE-BSD FRML MDRD: 67 ML/MIN/1.73
GLOBULIN UR ELPH-MCNC: 3.2 GM/DL
GLUCOSE BLD-MCNC: 105 MG/DL (ref 65–99)
GLUCOSE UR STRIP-MCNC: NEGATIVE MG/DL
HCT VFR BLD AUTO: 42.9 % (ref 34–46.6)
HEMOCCULT STL QL: POSITIVE
HGB BLD-MCNC: 13.5 G/DL (ref 12–15.9)
HGB UR QL STRIP.AUTO: NEGATIVE
HOLD SPECIMEN: NORMAL
HOLD SPECIMEN: NORMAL
HYALINE CASTS UR QL AUTO: ABNORMAL /LPF
IMM GRANULOCYTES # BLD AUTO: 0.04 10*3/MM3 (ref 0–0.05)
IMM GRANULOCYTES NFR BLD AUTO: 0.4 % (ref 0–0.5)
KETONES UR QL STRIP: NEGATIVE
LEUKOCYTE ESTERASE UR QL STRIP.AUTO: ABNORMAL
LYMPHOCYTES # BLD AUTO: 0.86 10*3/MM3 (ref 0.7–3.1)
LYMPHOCYTES NFR BLD AUTO: 8.2 % (ref 19.6–45.3)
MCH RBC QN AUTO: 29.4 PG (ref 26.6–33)
MCHC RBC AUTO-ENTMCNC: 31.5 G/DL (ref 31.5–35.7)
MCV RBC AUTO: 93.5 FL (ref 79–97)
MONOCYTES # BLD AUTO: 0.55 10*3/MM3 (ref 0.1–0.9)
MONOCYTES NFR BLD AUTO: 5.2 % (ref 5–12)
NEUTROPHILS # BLD AUTO: 9.01 10*3/MM3 (ref 1.7–7)
NEUTROPHILS NFR BLD AUTO: 85.4 % (ref 42.7–76)
NITRITE UR QL STRIP: NEGATIVE
NRBC BLD AUTO-RTO: 0 /100 WBC (ref 0–0.2)
PH UR STRIP.AUTO: 6.5 [PH] (ref 5–8)
PLATELET # BLD AUTO: 201 10*3/MM3 (ref 140–450)
PMV BLD AUTO: 11.6 FL (ref 6–12)
POTASSIUM BLD-SCNC: 4.3 MMOL/L (ref 3.5–5.2)
PROT SERPL-MCNC: 7.1 G/DL (ref 6–8.5)
PROT UR QL STRIP: NEGATIVE
RBC # BLD AUTO: 4.59 10*6/MM3 (ref 3.77–5.28)
RBC # UR: ABNORMAL /HPF
REF LAB TEST METHOD: ABNORMAL
SODIUM BLD-SCNC: 132 MMOL/L (ref 136–145)
SP GR UR STRIP: 1.01 (ref 1–1.03)
SQUAMOUS #/AREA URNS HPF: ABNORMAL /HPF
UROBILINOGEN UR QL STRIP: ABNORMAL
WBC NRBC COR # BLD: 10.54 10*3/MM3 (ref 3.4–10.8)
WBC UR QL AUTO: ABNORMAL /HPF
WHOLE BLOOD HOLD SPECIMEN: NORMAL
WHOLE BLOOD HOLD SPECIMEN: NORMAL

## 2019-05-06 PROCEDURE — 25010000002 PIPERACILLIN SOD-TAZOBACTAM PER 1 G: Performed by: INTERNAL MEDICINE

## 2019-05-06 PROCEDURE — 25010000002 ONDANSETRON PER 1 MG: Performed by: NURSE PRACTITIONER

## 2019-05-06 PROCEDURE — 25010000002 PIPERACILLIN SOD-TAZOBACTAM PER 1 G: Performed by: NURSE PRACTITIONER

## 2019-05-06 PROCEDURE — 99222 1ST HOSP IP/OBS MODERATE 55: CPT | Performed by: SURGERY

## 2019-05-06 PROCEDURE — 51702 INSERT TEMP BLADDER CATH: CPT

## 2019-05-06 PROCEDURE — 85025 COMPLETE CBC W/AUTO DIFF WBC: CPT | Performed by: NURSE PRACTITIONER

## 2019-05-06 PROCEDURE — 80053 COMPREHEN METABOLIC PANEL: CPT | Performed by: NURSE PRACTITIONER

## 2019-05-06 PROCEDURE — 81001 URINALYSIS AUTO W/SCOPE: CPT | Performed by: NURSE PRACTITIONER

## 2019-05-06 PROCEDURE — 74177 CT ABD & PELVIS W/CONTRAST: CPT

## 2019-05-06 PROCEDURE — 99285 EMERGENCY DEPT VISIT HI MDM: CPT

## 2019-05-06 PROCEDURE — 82272 OCCULT BLD FECES 1-3 TESTS: CPT | Performed by: NURSE PRACTITIONER

## 2019-05-06 PROCEDURE — 25010000002 MORPHINE PER 10 MG: Performed by: NURSE PRACTITIONER

## 2019-05-06 PROCEDURE — 0 IOPAMIDOL PER 1 ML: Performed by: NURSE PRACTITIONER

## 2019-05-06 RX ORDER — ACETAMINOPHEN 325 MG/1
650 TABLET ORAL EVERY 4 HOURS PRN
Status: DISCONTINUED | OUTPATIENT
Start: 2019-05-06 | End: 2019-05-10 | Stop reason: HOSPADM

## 2019-05-06 RX ORDER — MORPHINE SULFATE 2 MG/ML
2 INJECTION, SOLUTION INTRAMUSCULAR; INTRAVENOUS ONCE
Status: COMPLETED | OUTPATIENT
Start: 2019-05-06 | End: 2019-05-06

## 2019-05-06 RX ORDER — NITROGLYCERIN 0.4 MG/1
0.4 TABLET SUBLINGUAL
Status: DISCONTINUED | OUTPATIENT
Start: 2019-05-06 | End: 2019-05-10 | Stop reason: HOSPADM

## 2019-05-06 RX ORDER — ONDANSETRON 2 MG/ML
4 INJECTION INTRAMUSCULAR; INTRAVENOUS EVERY 6 HOURS PRN
Status: DISCONTINUED | OUTPATIENT
Start: 2019-05-06 | End: 2019-05-10 | Stop reason: HOSPADM

## 2019-05-06 RX ORDER — NALOXONE HCL 0.4 MG/ML
0.4 VIAL (ML) INJECTION
Status: DISCONTINUED | OUTPATIENT
Start: 2019-05-06 | End: 2019-05-10 | Stop reason: HOSPADM

## 2019-05-06 RX ORDER — ONDANSETRON 2 MG/ML
4 INJECTION INTRAMUSCULAR; INTRAVENOUS ONCE
Status: COMPLETED | OUTPATIENT
Start: 2019-05-06 | End: 2019-05-06

## 2019-05-06 RX ORDER — CYCLOBENZAPRINE HCL 10 MG
10 TABLET ORAL 3 TIMES DAILY PRN
Status: DISCONTINUED | OUTPATIENT
Start: 2019-05-06 | End: 2019-05-10 | Stop reason: HOSPADM

## 2019-05-06 RX ORDER — SODIUM CHLORIDE 0.9 % (FLUSH) 0.9 %
3 SYRINGE (ML) INJECTION EVERY 12 HOURS SCHEDULED
Status: DISCONTINUED | OUTPATIENT
Start: 2019-05-06 | End: 2019-05-10 | Stop reason: HOSPADM

## 2019-05-06 RX ORDER — SODIUM CHLORIDE 0.9 % (FLUSH) 0.9 %
10 SYRINGE (ML) INJECTION AS NEEDED
Status: DISCONTINUED | OUTPATIENT
Start: 2019-05-06 | End: 2019-05-10 | Stop reason: HOSPADM

## 2019-05-06 RX ORDER — POLYETHYLENE GLYCOL 3350 17 G/17G
17 POWDER, FOR SOLUTION ORAL 2 TIMES DAILY
Status: DISCONTINUED | OUTPATIENT
Start: 2019-05-06 | End: 2019-05-10 | Stop reason: HOSPADM

## 2019-05-06 RX ORDER — ATORVASTATIN CALCIUM 10 MG/1
10 TABLET, FILM COATED ORAL DAILY
Status: DISCONTINUED | OUTPATIENT
Start: 2019-05-07 | End: 2019-05-10 | Stop reason: HOSPADM

## 2019-05-06 RX ORDER — LISINOPRIL 10 MG/1
10 TABLET ORAL DAILY
Status: DISCONTINUED | OUTPATIENT
Start: 2019-05-07 | End: 2019-05-10 | Stop reason: HOSPADM

## 2019-05-06 RX ORDER — ACETAMINOPHEN AND CODEINE PHOSPHATE 300; 30 MG/1; MG/1
1 TABLET ORAL EVERY 6 HOURS PRN
Status: DISCONTINUED | OUTPATIENT
Start: 2019-05-06 | End: 2019-05-10 | Stop reason: HOSPADM

## 2019-05-06 RX ORDER — MELOXICAM 7.5 MG/1
7.5 TABLET ORAL DAILY
Status: DISCONTINUED | OUTPATIENT
Start: 2019-05-07 | End: 2019-05-10 | Stop reason: HOSPADM

## 2019-05-06 RX ORDER — MORPHINE SULFATE 2 MG/ML
1 INJECTION, SOLUTION INTRAMUSCULAR; INTRAVENOUS EVERY 4 HOURS PRN
Status: DISCONTINUED | OUTPATIENT
Start: 2019-05-06 | End: 2019-05-10 | Stop reason: HOSPADM

## 2019-05-06 RX ORDER — SODIUM CHLORIDE 0.9 % (FLUSH) 0.9 %
3-10 SYRINGE (ML) INJECTION AS NEEDED
Status: DISCONTINUED | OUTPATIENT
Start: 2019-05-06 | End: 2019-05-10 | Stop reason: HOSPADM

## 2019-05-06 RX ORDER — METOPROLOL SUCCINATE 25 MG/1
25 TABLET, EXTENDED RELEASE ORAL DAILY
Status: DISCONTINUED | OUTPATIENT
Start: 2019-05-07 | End: 2019-05-10 | Stop reason: HOSPADM

## 2019-05-06 RX ADMIN — TAZOBACTAM SODIUM AND PIPERACILLIN SODIUM 3.38 G: 375; 3 INJECTION, SOLUTION INTRAVENOUS at 16:07

## 2019-05-06 RX ADMIN — SODIUM CHLORIDE, PRESERVATIVE FREE 3 ML: 5 INJECTION INTRAVENOUS at 22:42

## 2019-05-06 RX ADMIN — CYCLOBENZAPRINE 10 MG: 10 TABLET, FILM COATED ORAL at 22:47

## 2019-05-06 RX ADMIN — TAZOBACTAM SODIUM AND PIPERACILLIN SODIUM 3.38 G: 375; 3 INJECTION, SOLUTION INTRAVENOUS at 22:41

## 2019-05-06 RX ADMIN — SODIUM CHLORIDE 500 ML: 9 INJECTION, SOLUTION INTRAVENOUS at 12:38

## 2019-05-06 RX ADMIN — MORPHINE SULFATE 2 MG: 2 INJECTION, SOLUTION INTRAMUSCULAR; INTRAVENOUS at 12:43

## 2019-05-06 RX ADMIN — POLYETHYLENE GLYCOL 3350 17 G: 17 POWDER, FOR SOLUTION ORAL at 22:40

## 2019-05-06 RX ADMIN — IOPAMIDOL 85 ML: 755 INJECTION, SOLUTION INTRAVENOUS at 13:56

## 2019-05-06 RX ADMIN — ACETAMINOPHEN 650 MG: 325 TABLET, FILM COATED ORAL at 22:41

## 2019-05-06 RX ADMIN — ONDANSETRON 4 MG: 2 INJECTION INTRAMUSCULAR; INTRAVENOUS at 12:43

## 2019-05-06 NOTE — TELEPHONE ENCOUNTER
ADDEND  It's difficult to tell if this is from a primary GI issue, or has developed, maybe because of the pain that she is experiencing from her back pain.  She was seen in the ER on 5/1 for back pain and that can constipate people.  If her pain worsens, she may have to come to the ER, just to make sure.  Renata Ray MD  05/06/19  Pt had delayed her c-scope for today to June due to other health issues. She was put on muscle relaxer to help with her pain(for other health issues) She now states that she has not had bowel movement in 3 days. Has tried dulcolax to help but it has not. Also says her last bowel movement did have blood in in it. Thinks she may need to go to ER but wanted me to send you a message first.

## 2019-05-06 NOTE — ED PROVIDER NOTES
Pt is a 85 y.o. female who presents to the ED complaining of constipation for 3 days. Pt states today she noticed some rectal bleeding with loose brown liquid-like stool this morning. Pt states she was started on Flexeril and Acetaminophen-codeine 5 days ago for sciatic pain.    On exam,  Constitutional: NAD  Cardiovascular: heart is RRR  Pulmonary: lungs CTAB  Abdomen: tenderness to LLQ abd. No guarding or rebound  Neurological: Awake, alert    Labs and imaging reviewed.   Hemoccult positive; CT abd/pelvis shows diverticulitis of colon with adjacent fluid collection.  Possible fistula.    Plan: Admit for further monitoring and treatment      MD ATTESTATION NOTE    The MARVIN and I have discussed this patient's history, physical exam, and treatment plan.  I have reviewed the documentation and personally had a face to face interaction with the patient. I affirm the documentation and agree with the treatment and plan.  The attached note describes my personal findings.      Documentation assistance provided by raven Mark for Dr. Rader. Information recorded by the scribe was done at my direction and has been verified and validated by me.             Luis Mark  05/06/19 1509       Tyrone Rader MD  05/06/19 2019

## 2019-05-06 NOTE — ED NOTES
Pt currently on bedside commode trying to defecate. Call light in reach.     Dickson Lyon  05/06/19 8492

## 2019-05-06 NOTE — ED PROVIDER NOTES
EMERGENCY DEPARTMENT ENCOUNTER    Room Number:  E664/1  Date seen:  5/6/2019  Time seen: 12:15 PM  PCP: Carmen Chacon MD    HPI:  Chief complaint: Constipation  Context:Dex Thakkar is a 85 y.o. female who presents to the ED with c/o constipation for 3 days. Pt states noticing some rectal bleeding today with some loose, leaking brown liquid. Pt's last normal BM was 3 days ago. Pt denies fever, vomiting, dizziness and passing clots.She states she tried a suppository with no relief. Pt states she has had recent sciatica pain for which she was placed on pain medication. She thinks that is what initiated the constipation.    Onset: gradual  Location:rectum  Radiation: none stated  Duration: 3 days   Timing: constant  Character:constipation  Aggravating Factors: none stated  Alleviating Factors: none stated  Severity: moderate    ALLERGIES  Patient has no known allergies.    PAST MEDICAL HISTORY  Active Ambulatory Problems     Diagnosis Date Noted   • Stasis edema of left lower extremity 08/29/2018   • Pain and swelling of ankle, left 08/29/2018   • Acute diverticulitis 04/09/2019   • Colonic diverticular abscess 04/09/2019   • Hypertension 04/09/2019   • H/O cardiac radiofrequency ablation 04/09/2019   • Left lower quadrant pain 04/11/2019     Resolved Ambulatory Problems     Diagnosis Date Noted   • No Resolved Ambulatory Problems     Past Medical History:   Diagnosis Date   • Diverticulitis    • H/O cardiac radiofrequency ablation    • Hyperlipidemia    • Hypertension    • Melanoma (CMS/HCC)    • Murmur        PAST SURGICAL HISTORY  Past Surgical History:   Procedure Laterality Date   • CARDIAC ABLATION N/A 07/17/2017    Dr. Leigh   • CHOLECYSTECTOMY N/A    • COLONOSCOPY N/A 10/15/2004    Sigmoid diverticulosis, smal rectal polyp, internal hemorrhoids-Dr. Renata Ray   • COLONOSCOPY N/A 12/14/2009    Sigmoid diverticulosis-Dr. Renata Ray   • ENDOSCOPY N/A 10/07/2013    Gastric ulcerations x10, ulcerative  esophagitis, small hiatal hernia-Dr. Renata Ray   • HYSTERECTOMY     • KNEE ARTHROSCOPY W/ PARTIAL MEDIAL MENISCECTOMY Right 10/24/2014    Dr. Bud Muhammad       FAMILY HISTORY  Family History   Problem Relation Age of Onset   • Brain cancer Mother    • Cancer Father    • Cancer Daughter    • Heart valve disorder Daughter    • Breast cancer Sister        SOCIAL HISTORY  Social History     Socioeconomic History   • Marital status:      Spouse name: Not on file   • Number of children: Not on file   • Years of education: Not on file   • Highest education level: Not on file   Tobacco Use   • Smoking status: Former Smoker   • Smokeless tobacco: Never Used   • Tobacco comment: QUIT 30  YRS AGO   Substance and Sexual Activity   • Alcohol use: Yes     Comment: OCCASIONAL   • Drug use: No   • Sexual activity: Defer       REVIEW OF SYSTEMS  Review of Systems   Constitutional: Negative for fever.   HENT: Negative for sore throat.    Eyes: Negative.    Respiratory: Negative for cough and shortness of breath.    Cardiovascular: Negative for chest pain.   Gastrointestinal: Positive for blood in stool and constipation. Negative for abdominal pain, diarrhea and vomiting.   Genitourinary: Negative for dysuria.   Musculoskeletal: Negative for neck pain.   Skin: Negative for rash.   Neurological: Negative for dizziness, weakness, numbness and headaches.   Hematological: Negative.    Psychiatric/Behavioral: Negative.    All other systems reviewed and are negative.      PHYSICAL EXAM  ED Triage Vitals [05/06/19 1202]   Temp Heart Rate Resp BP SpO2   98.7 °F (37.1 °C) 102 18 -- 97 %      Temp src Heart Rate Source Patient Position BP Location FiO2 (%)   Tympanic Monitor -- -- --     Physical Exam   Constitutional: She is oriented to person, place, and time. No distress.   HENT:   Head: Normocephalic and atraumatic.   Eyes: EOM are normal. Pupils are equal, round, and reactive to light.   Neck: Normal range of motion. Neck supple.    Cardiovascular: Normal rate, regular rhythm and normal heart sounds.   Pulmonary/Chest: Effort normal and breath sounds normal. No respiratory distress.   Abdominal: Soft. There is tenderness (lower). There is no rebound and no guarding.   Large amount of hard formed stool in distal rectum with liquid brown stool that seems to be leaking around the impaction.     Genitourinary: Rectal exam shows external hemorrhoid (w/out thrombosis).   Musculoskeletal: Normal range of motion. She exhibits no edema.   Neurological: She is alert and oriented to person, place, and time. She has normal sensation and normal strength.   Skin: Skin is warm and dry. No rash noted.   Psychiatric: Mood and affect normal.   Nursing note and vitals reviewed.      LAB RESULTS  Recent Results (from the past 24 hour(s))   Comprehensive Metabolic Panel    Collection Time: 05/06/19 12:35 PM   Result Value Ref Range    Glucose 105 (H) 65 - 99 mg/dL    BUN 20 8 - 23 mg/dL    Creatinine 0.81 0.57 - 1.00 mg/dL    Sodium 132 (L) 136 - 145 mmol/L    Potassium 4.3 3.5 - 5.2 mmol/L    Chloride 98 98 - 107 mmol/L    CO2 24.8 22.0 - 29.0 mmol/L    Calcium 9.5 8.6 - 10.5 mg/dL    Total Protein 7.1 6.0 - 8.5 g/dL    Albumin 3.90 3.50 - 5.20 g/dL    ALT (SGPT) 76 (H) 1 - 33 U/L    AST (SGOT) 65 (H) 1 - 32 U/L    Alkaline Phosphatase 104 39 - 117 U/L    Total Bilirubin 0.5 0.2 - 1.2 mg/dL    eGFR Non African Amer 67 >60 mL/min/1.73    Globulin 3.2 gm/dL    A/G Ratio 1.2 g/dL    BUN/Creatinine Ratio 24.7 7.0 - 25.0    Anion Gap 9.2 mmol/L   CBC Auto Differential    Collection Time: 05/06/19 12:35 PM   Result Value Ref Range    WBC 10.54 3.40 - 10.80 10*3/mm3    RBC 4.59 3.77 - 5.28 10*6/mm3    Hemoglobin 13.5 12.0 - 15.9 g/dL    Hematocrit 42.9 34.0 - 46.6 %    MCV 93.5 79.0 - 97.0 fL    MCH 29.4 26.6 - 33.0 pg    MCHC 31.5 31.5 - 35.7 g/dL    RDW 13.2 12.3 - 15.4 %    RDW-SD 45.0 37.0 - 54.0 fl    MPV 11.6 6.0 - 12.0 fL    Platelets 201 140 - 450 10*3/mm3     Neutrophil % 85.4 (H) 42.7 - 76.0 %    Lymphocyte % 8.2 (L) 19.6 - 45.3 %    Monocyte % 5.2 5.0 - 12.0 %    Eosinophil % 0.3 0.3 - 6.2 %    Basophil % 0.5 0.0 - 1.5 %    Immature Grans % 0.4 0.0 - 0.5 %    Neutrophils, Absolute 9.01 (H) 1.70 - 7.00 10*3/mm3    Lymphocytes, Absolute 0.86 0.70 - 3.10 10*3/mm3    Monocytes, Absolute 0.55 0.10 - 0.90 10*3/mm3    Eosinophils, Absolute 0.03 0.00 - 0.40 10*3/mm3    Basophils, Absolute 0.05 0.00 - 0.20 10*3/mm3    Immature Grans, Absolute 0.04 0.00 - 0.05 10*3/mm3    nRBC 0.0 0.0 - 0.2 /100 WBC   Light Blue Top    Collection Time: 05/06/19 12:35 PM   Result Value Ref Range    Extra Tube hold for add-on    Green Top (Gel)    Collection Time: 05/06/19 12:35 PM   Result Value Ref Range    Extra Tube Hold for add-ons.    Lavender Top    Collection Time: 05/06/19 12:35 PM   Result Value Ref Range    Extra Tube hold for add-on    Gold Top - SST    Collection Time: 05/06/19 12:35 PM   Result Value Ref Range    Extra Tube Hold for add-ons.    Occult Blood X 1, Stool - Stool, Per Rectum    Collection Time: 05/06/19 12:36 PM   Result Value Ref Range    Fecal Occult Blood Positive (A) Negative   Urinalysis With Microscopic If Indicated (No Culture) - Urine, Catheter    Collection Time: 05/06/19  1:19 PM   Result Value Ref Range    Color, UA Yellow Yellow, Straw    Appearance, UA Clear Clear    pH, UA 6.5 5.0 - 8.0    Specific Gravity, UA 1.015 1.005 - 1.030    Glucose, UA Negative Negative    Ketones, UA Negative Negative    Bilirubin, UA Negative Negative    Blood, UA Negative Negative    Protein, UA Negative Negative    Leuk Esterase, UA Trace (A) Negative    Nitrite, UA Negative Negative    Urobilinogen, UA 0.2 E.U./dL 0.2 - 1.0 E.U./dL   Urinalysis, Microscopic Only - Urine, Catheter    Collection Time: 05/06/19  1:19 PM   Result Value Ref Range    RBC, UA 0-2 None Seen, 0-2 /HPF    WBC, UA 6-12 (A) None Seen, 0-2 /HPF    Bacteria, UA 1+ (A) None Seen /HPF    Squamous Epithelial  Cells, UA 0-2 None Seen, 0-2 /HPF    Hyaline Casts, UA None Seen None Seen /LPF    Methodology Automated Microscopy        I ordered the above labs and reviewed the results    RADIOLOGY  CT Abdomen Pelvis With Contrast   Final Result   Interval recurrence of a fluid collection adjacent to the   sigmoid colon with numerous diverticula, some colon wall thickening, and   what appears to be some new gas in the segment of colon wall measuring   about 24 mm long. Lastly, there appears to be a fistulous tract from the   colon to the abscess cavity. Other chronic changes appear stable.       I called the findings to Patricia Velasquez in the emergency department at   2:37 PM.       This report was finalized on 5/6/2019 4:03 PM by Dr. Fabian Amaral M.D.              I ordered the above noted radiological studies and reviewed the images on the PACS system.     MEDICATIONS GIVEN IN ER  Medications   sodium chloride 0.9 % flush 10 mL (not administered)   polyethylene glycol (MIRALAX) powder 17 g (not administered)   sodium chloride 0.9 % bolus 500 mL (0 mL Intravenous Stopped 5/6/19 1419)   morphine injection 2 mg (2 mg Intravenous Given 5/6/19 1243)   ondansetron (ZOFRAN) injection 4 mg (4 mg Intravenous Given 5/6/19 1243)   iopamidol (ISOVUE-370) 76 % injection 100 mL (85 mL Intravenous Given 5/6/19 1356)   piperacillin-tazobactam (ZOSYN) 3.375 g in iso-osmotic dextrose 50 ml (premix) (0 g Intravenous Stopped 5/6/19 1639)       PROGRESS AND CONSULTS    Progress Notes:       1223 Morphine ordered. Zofran ordered. CT Abd Pelvis ordered. Labs ordered. Soap suds enema ordered.    1236 Reviewed pt's history and workup with Dr. Rader.  After a bedside evaluation; Dr Rader agrees with the plan of care.    1329 UA ordered.    1438 Call made to [facility]. Call made to Surgery.    1536 Zosyn ordered.    1547 Discussed pt w/Dr. Ray (Surgery) who agrees to consult pt.     1601 Discussed pt w/ Dr. Schultz who agrees to admit pt to med  "surg.     1612 Rechecked pt. Pt is resting and vitals are stable. Discussed w/pt results of CT Abd Pelvis showed abnormal findings. Plan is to admit pt for further evaluation. Pt understands and agrees with the plan. All questions were answered.    Disposition vitals:  BP (!) 189/88   Pulse 87   Temp 98.7 °F (37.1 °C) (Tympanic)   Resp 18   Ht 160 cm (63\")   SpO2 99%   BMI 33.66 kg/m²       DIAGNOSIS  Final diagnoses:   Constipation, unspecified constipation type   Rectal bleeding   Diverticulitis of large intestine with abscess with bleeding     ADMISSION    Discussed treatment plan and reason for admission with pt/family and admitting physician.  Pt/family voiced understanding of the plan for admission for further testing/treatment as needed.     Documentation assistance provided by raven Baxter for SHAZIA Morton.  Information recorded by the raven was done at my direction and has been verified and validated by me.         Sahil Mills  05/06/19 1632       Patricia Velasquez APRN  05/06/19 1956    "

## 2019-05-06 NOTE — CONSULTS
SURGERY     Patient's Chief Complaint: Left lower quadrant pain     HPI     Patient is a delightful, very functional 85 y.o. female who represents with left lower quadrant pain.  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  She originally came to the hospital with what appeared to be recalcitrant diverticulitis last month, going on for 4 months.  She had been on 3 rounds of antibiotics, seemingly Cipro and Flagyl as well as Augmentin each time with only a temporal response.    She had 3 CT scans, one actually back last July, one in January, and one 4/9/2019 with a loculated fluid collection in the left lower quadrant felt to be an abscess and involvement with the inflammatory process in the left ovary, consistent with diverticulitis.  She was admitted and ultimately, the fluid aspirated by CT with serous appearance, culture negative, with resolution of symptoms.  Thus i began to wonder if her symptoms were not based on an ovarian cyst, which had been seen on prior CTs.     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  Her last one was 10 years ago when she had sigmoid diverticulosis without a polyp.  She did have an EGD 6 years ago.         She was treated with ceftriaxone and flagyl with discharge on augmentin, which was resulted in complete resolution of her symtpoms.  Thus we opted not to proceed with surgery, which we had agreed would be needed while she was in the hospital.    Last week she developed left buttocks pain and came to the ER with diagnosis of sciatica. She began with narcotics and muscle relaxants and of course, was fairly inactive/immobile.   Since then she had become constipated and thus came to the ER.  CT showed a fecal impaction, which has begun to resolve in the ER, with liquid coming out and now stool oozing out.  She also has urinary retention and has required a raphael, that being secondary to the fecal impaction.  i've been asked to  see due to another fluid collection and concern for a fistula.      Her pain is better but her abdomen  and she has some mild nausea.     Medical History           Past Medical History:   Diagnosis Date   • H/O cardiac radiofrequency ablation     • Hypertension     • Melanoma (CMS/HCC)     • Murmur           Surgical History             Past Surgical History:   Procedure Laterality Date   • CHOLECYSTECTOMY       • COLONOSCOPY         5 YRS AGO   • HYSTERECTOMY                       Family History   Problem Relation Age of Onset   • Cancer Mother     • Cancer Father     • Cancer Daughter     • Heart valve disorder Daughter        Social History   Social History                Socioeconomic History   • Marital status:        Spouse name: Not on file   • Number of children: Not on file   • Years of education: Not on file   • Highest education level: Not on file   Tobacco Use   • Smoking status: Former Smoker   • Smokeless tobacco: Never Used   • Tobacco comment: QUIT 30  YRS AGO   Substance and Sexual Activity   • Alcohol use: Yes       Comment: OCCASIONAL   • Drug use: No   • Sexual activity: Defer               Current Facility-Administered Medications:   •  acetaminophen (TYLENOL) tablet 650 mg, 650 mg, Oral, Q4H PRN, Adolfo Schultz MD, 650 mg at 04/10/19 0844  •  atorvastatin (LIPITOR) tablet 10 mg, 10 mg, Oral, Daily, Adolfo Schultz MD, 10 mg at 04/10/19 0832  •  cefTRIAXone (ROCEPHIN) IVPB 1 g, 1 g, Intravenous, Q24H, Adolfo Schultz MD, Last Rate: 100 mL/hr at 04/09/19 2304, 1 g at 04/09/19 2304  •  lisinopril (PRINIVIL,ZESTRIL) tablet 10 mg, 10 mg, Oral, Q24H, Adolfo Schultz MD, 10 mg at 04/10/19 0832  •  metoprolol succinate XL (TOPROL-XL) 24 hr tablet 25 mg, 25 mg, Oral, Q24H, Adolfo Schultz MD, 25 mg at 04/10/19 0832  •  metroNIDAZOLE (FLAGYL) IVPB 500 mg, 500 mg, Intravenous, Q8H, Adolfo Schultz MD, 500 mg at 04/10/19 0527  •  morphine injection 2 mg, 2 mg, Intravenous, Q4H PRN, Adolfo Schultz  "MD  •  ondansetron (ZOFRAN) injection 4 mg, 4 mg, Intravenous, Q6H PRN, Adolfo Schultz MD  •  sodium chloride 0.9 % flush 3 mL, 3 mL, Intravenous, Q12H, Adolfo Schultz MD  •  sodium chloride 0.9 % flush 3-10 mL, 3-10 mL, Intravenous, PRN, Adolfo Schultz MD  •  sodium chloride 0.9 % infusion, 100 mL/hr, Intravenous, Continuous, Adolfo Schultz MD, Last Rate: 100 mL/hr at 04/09/19 2147, 100 mL/hr at 04/09/19 2147     No Known Allergies     Preventative Medicine  Colonoscopy: 2009     Review of Systems   Constitutional: Positive for appetite change. Negative for fever.   Gastrointestinal: Positive for abdominal distention and abdominal pain. Negative for anal bleeding, blood in stool, diarrhea, nausea, rectal pain and vomiting. Constipation: change in stools.   All other systems reviewed and are negative.        Vitals             Vitals:     04/09/19 1845 04/09/19 2154 04/10/19 0545   BP: 164/80 (!) 181/75 132/59   BP Location: Left arm Right arm Right arm   Patient Position: Lying Lying Lying   Pulse: 67 65 71   Resp: 18 18 18   Temp: 97.5 °F (36.4 °C) 97.1 °F (36.2 °C) 97 °F (36.1 °C)   TempSrc: Oral Oral Oral   SpO2: 100% 98% 94%   Weight: 86.6 kg (191 lb)       Height: 162.6 cm (64\")                PHYSICAL EXAM:     /59 (BP Location: Right arm, Patient Position: Lying)   Pulse 71   Temp 97 °F (36.1 °C) (Oral)   Resp 18   Ht 162.6 cm (64\")   Wt 86.6 kg (191 lb)   SpO2 94%   BMI 32.79 kg/m²   Body mass index is 32.79 kg/m².     Constitutional: well developed, well nourished, located in ER  Eyes: sclerae nonicteric, conjunctivae not injected   ENMT: Hearing intact, trachea midline, thyroid without masses  CVS: RRR, no murmur, peripheral edema not present  Respiratory: CTA, normal respiratory effort   Gastrointestinal: no hepatosplenomegaly, abdomen soft, nontender except in the left lower quadrant, with mild guarding, abdominal hernia not detected, incisional scar lower midline from OBGYN " surgery  Genitourinary: inguinal hernia not detected  Musculoskeletal: gait normal, muscle mass normal, certainly more agile than expected at 85 years old  Skin: warm and dry, no rashes visible  Neurological: awake and alert, seems to have reasonable capacity for understanding for medical decision making  Psychiatric: appears to have reasonable judgement, pleasant, more alert and engaging in routine 85-year-old  Lymphatics: no cervical adenopathy     Radiographic Findings: CT with a collection and a lot of stool.  I think this could be drained percutaneously again if needed.     Lab Findings: WBC 10     IMPRESSION:  · Fecal impaction secondary to pain, immobility and narcotics.  This is resolving now.  · Urinary retention secondary to the fecal impaction.  Wu placed.  · Recalcitrant diverticulitis ?, S/P drainage last month with serous fluid, culture negative.  Now with recurrent pain.      · Advanced age but with very functional status  · Involvement of the left ovary with the diverticular process  · History of tubulovillous adenoma     PLAN:  · Treatment of impaction and retention first.  · Consideration of repeat aspiration, based on her symptoms.   · Reconsideration of surgery electively, after this clears..  Previously, we had hoped to be able to do so on an elective basis to allow ERAS, etc.   · IV abx advisable since we are uncertain  · Will allow clears.     Renata Ray MD  05/06/19  7:02 PM

## 2019-05-06 NOTE — PROGRESS NOTES
"Clinical Pharmacy Services: Medication History    Dex Thakkar is a 85 y.o. female presenting to [facility] for   Chief Complaint   Patient presents with   • Constipation     pt reports \"i've had an impactment for 3 days\".   • Rectal Bleeding     this morning.       She  has a past medical history of Diverticulitis, H/O cardiac radiofrequency ablation, Hyperlipidemia, Hypertension, Melanoma (CMS/HCC), and Murmur.    Allergies as of 05/06/2019   • (No Known Allergies)       Medication information was obtained from: Patient  Pharmacy and Phone Number: [facility] [phone]    Prior to Admission Medications     Prescriptions Last Dose Informant Patient Reported? Taking?    acetaminophen-codeine (TYLENOL #3) 300-30 MG per tablet 5/5/2019 Self No Yes    Take 1 tablet by mouth Every 6 (Six) Hours As Needed for Moderate Pain .    cyclobenzaprine (FLEXERIL) 10 MG tablet 5/5/2019 Self No Yes    Take 1 tablet by mouth 3 (Three) Times a Day As Needed for Muscle Spasms.    lisinopril (PRINIVIL,ZESTRIL) 10 MG tablet 5/5/2019 Self Yes Yes    Take 10 mg by mouth Daily.    meloxicam (MOBIC) 7.5 MG tablet 5/5/2019 Self Yes Yes    Take 7.5 mg by mouth Daily.    metoprolol succinate XL (TOPROL-XL) 25 MG 24 hr tablet 5/5/2019 Self Yes Yes    Take 25 mg by mouth Daily.    simvastatin (ZOCOR) 10 MG tablet 5/5/2019 Self Yes Yes    Take 10 mg by mouth Every Night.            Medication notes: None    This medication list is complete to the best of my knowledge as of 5/6/2019    Please call if questions.    Uyen Momin, Medication History Technician  5/6/2019 4:39 PM      "

## 2019-05-07 LAB
ALBUMIN SERPL-MCNC: 3.4 G/DL (ref 3.5–5.2)
ALBUMIN/GLOB SERPL: 1.3 G/DL
ALP SERPL-CCNC: 98 U/L (ref 39–117)
ALT SERPL W P-5'-P-CCNC: 117 U/L (ref 1–33)
ANION GAP SERPL CALCULATED.3IONS-SCNC: 12.2 MMOL/L
AST SERPL-CCNC: 89 U/L (ref 1–32)
BASOPHILS # BLD AUTO: 0.03 10*3/MM3 (ref 0–0.2)
BASOPHILS NFR BLD AUTO: 0.3 % (ref 0–1.5)
BILIRUB SERPL-MCNC: 1 MG/DL (ref 0.2–1.2)
BUN BLD-MCNC: 16 MG/DL (ref 8–23)
BUN/CREAT SERPL: 21.3 (ref 7–25)
CALCIUM SPEC-SCNC: 9 MG/DL (ref 8.6–10.5)
CHLORIDE SERPL-SCNC: 104 MMOL/L (ref 98–107)
CO2 SERPL-SCNC: 24.8 MMOL/L (ref 22–29)
CREAT BLD-MCNC: 0.75 MG/DL (ref 0.57–1)
D-LACTATE SERPL-SCNC: 0.8 MMOL/L (ref 0.5–2)
DEPRECATED RDW RBC AUTO: 44.6 FL (ref 37–54)
EOSINOPHIL # BLD AUTO: 0.05 10*3/MM3 (ref 0–0.4)
EOSINOPHIL NFR BLD AUTO: 0.5 % (ref 0.3–6.2)
ERYTHROCYTE [DISTWIDTH] IN BLOOD BY AUTOMATED COUNT: 13.2 % (ref 12.3–15.4)
GFR SERPL CREATININE-BSD FRML MDRD: 73 ML/MIN/1.73
GLOBULIN UR ELPH-MCNC: 2.7 GM/DL
GLUCOSE BLD-MCNC: 93 MG/DL (ref 65–99)
HCT VFR BLD AUTO: 39.8 % (ref 34–46.6)
HGB BLD-MCNC: 13 G/DL (ref 12–15.9)
IMM GRANULOCYTES # BLD AUTO: 0.05 10*3/MM3 (ref 0–0.05)
IMM GRANULOCYTES NFR BLD AUTO: 0.5 % (ref 0–0.5)
LYMPHOCYTES # BLD AUTO: 1.56 10*3/MM3 (ref 0.7–3.1)
LYMPHOCYTES NFR BLD AUTO: 15.5 % (ref 19.6–45.3)
MCH RBC QN AUTO: 29.9 PG (ref 26.6–33)
MCHC RBC AUTO-ENTMCNC: 32.7 G/DL (ref 31.5–35.7)
MCV RBC AUTO: 91.5 FL (ref 79–97)
MONOCYTES # BLD AUTO: 0.85 10*3/MM3 (ref 0.1–0.9)
MONOCYTES NFR BLD AUTO: 8.4 % (ref 5–12)
NEUTROPHILS # BLD AUTO: 7.52 10*3/MM3 (ref 1.7–7)
NEUTROPHILS NFR BLD AUTO: 74.8 % (ref 42.7–76)
NRBC BLD AUTO-RTO: 0 /100 WBC (ref 0–0.2)
PLATELET # BLD AUTO: 202 10*3/MM3 (ref 140–450)
PMV BLD AUTO: 11.9 FL (ref 6–12)
POTASSIUM BLD-SCNC: 3.5 MMOL/L (ref 3.5–5.2)
PROT SERPL-MCNC: 6.1 G/DL (ref 6–8.5)
RBC # BLD AUTO: 4.35 10*6/MM3 (ref 3.77–5.28)
SODIUM BLD-SCNC: 141 MMOL/L (ref 136–145)
WBC NRBC COR # BLD: 10.06 10*3/MM3 (ref 3.4–10.8)

## 2019-05-07 PROCEDURE — 25010000002 PIPERACILLIN SOD-TAZOBACTAM PER 1 G: Performed by: INTERNAL MEDICINE

## 2019-05-07 PROCEDURE — 80053 COMPREHEN METABOLIC PANEL: CPT | Performed by: INTERNAL MEDICINE

## 2019-05-07 PROCEDURE — 85025 COMPLETE CBC W/AUTO DIFF WBC: CPT | Performed by: INTERNAL MEDICINE

## 2019-05-07 PROCEDURE — 83605 ASSAY OF LACTIC ACID: CPT | Performed by: INTERNAL MEDICINE

## 2019-05-07 PROCEDURE — 99232 SBSQ HOSP IP/OBS MODERATE 35: CPT | Performed by: SURGERY

## 2019-05-07 RX ORDER — POLYETHYLENE GLYCOL 3350, SODIUM CHLORIDE, POTASSIUM CHLORIDE, SODIUM BICARBONATE, AND SODIUM SULFATE 240; 5.84; 2.98; 6.72; 22.72 G/4L; G/4L; G/4L; G/4L; G/4L
2000 POWDER, FOR SOLUTION ORAL EVERY 8 HOURS SCHEDULED
Status: DISPENSED | OUTPATIENT
Start: 2019-05-08 | End: 2019-05-08

## 2019-05-07 RX ADMIN — ATORVASTATIN CALCIUM 10 MG: 10 TABLET, FILM COATED ORAL at 08:52

## 2019-05-07 RX ADMIN — POLYETHYLENE GLYCOL 3350 17 G: 17 POWDER, FOR SOLUTION ORAL at 08:52

## 2019-05-07 RX ADMIN — TAZOBACTAM SODIUM AND PIPERACILLIN SODIUM 3.38 G: 375; 3 INJECTION, SOLUTION INTRAVENOUS at 12:10

## 2019-05-07 RX ADMIN — POLYETHYLENE GLYCOL 3350 17 G: 17 POWDER, FOR SOLUTION ORAL at 20:14

## 2019-05-07 RX ADMIN — TAZOBACTAM SODIUM AND PIPERACILLIN SODIUM 3.38 G: 375; 3 INJECTION, SOLUTION INTRAVENOUS at 04:11

## 2019-05-07 RX ADMIN — LISINOPRIL 10 MG: 10 TABLET ORAL at 08:52

## 2019-05-07 RX ADMIN — TAZOBACTAM SODIUM AND PIPERACILLIN SODIUM 3.38 G: 375; 3 INJECTION, SOLUTION INTRAVENOUS at 20:14

## 2019-05-07 RX ADMIN — MELOXICAM 7.5 MG: 7.5 TABLET ORAL at 08:52

## 2019-05-07 RX ADMIN — METOPROLOL SUCCINATE 25 MG: 25 TABLET, FILM COATED, EXTENDED RELEASE ORAL at 08:52

## 2019-05-07 RX ADMIN — SODIUM CHLORIDE, PRESERVATIVE FREE 3 ML: 5 INJECTION INTRAVENOUS at 10:11

## 2019-05-07 NOTE — PROGRESS NOTES
Continued Stay Note  [facility]     Patient Name: Dex Thakkar  MRN: [id number]  Today's Date: 5/7/2019    Admit Date: 5/6/2019    Discharge Plan     Row Name 05/07/19 1011       Plan    Plan  Plan home with spouse.   HORTENICA Ledbetter RN    Patient/Family in Agreement with Plan  yes    Plan Comments  FACE SHEET VERIFIED/ IM LETTER SIGNED.  Spoke to pt at bedside.  Pt's PCP is Dr. Carmen Chacon.  Pt lives in a single story house with spouse   ( Reed Thakkar [phone]) and son  ( Dave Thakkar  [phone]).   Pt is independent with ADL's.  Pt gets prescriptions at [facility]  ([facility]  Pt denies any issues affording medications.  Pt is not current with HH.  Pt has not been in SNF.  Pt denies any discharge needs.  Plan home.  HORTENCIA Ledbetter RN        Discharge Codes    No documentation.             Makenna Ledbetter RN

## 2019-05-07 NOTE — PROGRESS NOTES
"General Surgery  Dex Thakkar  1934  05/07/19    CC:  \"i'm finally feeling better\"      Reason for consult/admit: abdominal pain    HPI:  Patient still with back pain.  Medicine managing that.  It is in her left hip and buttock.  She denies any abdominal pain whatsoever until she had the initiation of the narcotics.  Now with no abdominal pain.  She is having both oozing of stool and \"an explosion\" this morning.    Diet:  Clear liquids    Temp:  [97.5 °F (36.4 °C)-98.7 °F (37.1 °C)] 97.5 °F (36.4 °C)  Heart Rate:  [] 80  Resp:  [16-18] 16  BP: (117-189)/(69-93) 117/86    Physical Exam   Constitutional: She appears well-developed and well-nourished.   Eyes: No scleral icterus.   Cardiovascular: Normal rate.   Pulmonary/Chest: Effort normal and breath sounds normal.   Abdominal: Soft. She exhibits distension. Tenderness: minimal.   Neurological: She is alert.   Skin: Skin is warm.   Psychiatric: She has a normal mood and affect.     Results from last 7 days   Lab Units 05/07/19  0558   WBC 10*3/mm3 10.06   HEMOGLOBIN g/dL 13.0   HEMATOCRIT % 39.8   PLATELETS 10*3/mm3 202         Results from last 7 days   Lab Units 05/07/19  0558 05/06/19  1235   WBC 10*3/mm3 10.06 10.54   HEMOGLOBIN g/dL 13.0 13.5   HEMATOCRIT % 39.8 42.9   PLATELETS 10*3/mm3 202 201     Results from last 7 days   Lab Units 05/06/19  1235   SODIUM mmol/L 132*   POTASSIUM mmol/L 4.3   CHLORIDE mmol/L 98   CO2 mmol/L 24.8   BUN mg/dL 20   CREATININE mg/dL 0.81   CALCIUM mg/dL 9.5   BILIRUBIN mg/dL 0.5   ALK PHOS U/L 104   ALT (SGPT) U/L 76*   AST (SGOT) U/L 65*   GLUCOSE mg/dL 105*     IMPRESSION:  · Fecal impaction secondary to pain, immobility and narcotics.  This is resolving now.  · Urinary retention secondary to the fecal impaction.  Wu placed.  · Recalcitrant diverticulitis ?, S/P drainage last month with serous fluid, culture negative.  Most recent CT worrisome for a fistula, but i'm not convinced.        · Advanced age but with " very functional status  · Involvement of the left ovary with the diverticular process  · History of tubulovillous adenoma.  C scope was cancelled.     PLAN:  · Treatment of impaction and retention first.  · C scope while here if we can get on the schedule.  · Consideration of repeat aspiration, based on her symptoms.   · Reconsideration of surgery electively, after this clears..  Previously, we had hoped to be able to do so on an elective basis to allow ERAS, etc.   Couldn't do so now with recent narcotic use.  Proceeding with surgery at this admission would increase her risk of DVT, pneumonia, UTI (especially with recent urinary retention), wound infection.  · IV abx advisable since we are uncertain as to the etiology  · Allow clears.  Bid miralax.    Renata Ray MD  05/07/19

## 2019-05-07 NOTE — NURSING NOTE
"Pt arrived in stretcher with daughter present, pt anxious, states, \"im going\". Pt transferred to bsc immediately with brown watery stool running down her legs, incontinent of bm. Pt sat in bsc, pushing , trying to have BM, attempted to help pt break stool digitally as shes not able to pass it , reports has hemorrhoids and it hurts.  Noted thick watery bm in pot with some drops of blood before checking digitally, , finally after 5-6mins sitting in commode pt, finally pass a large soft brown bm, and pt was very relieved. transsferred to bed and positioned for comfort, vs obtained.  "

## 2019-05-07 NOTE — PLAN OF CARE
Problem: Patient Care Overview  Goal: Plan of Care Review  Outcome: Ongoing (interventions implemented as appropriate)   05/07/19 6900   Coping/Psychosocial   Plan of Care Reviewed With patient   Plan of Care Review   Progress no change   OTHER   Outcome Summary Pt sleeping comfortably at present, received flexeril and tylenol for c/o sciatic pain to left hip,both are effective. Pt had large soft brown bm  few minutes after arriving to the room , abd soft and nontender, reports feeling much releif after having bm. inital IV zosyn given tolerated well. NAD, VSS.      Goal: Individualization and Mutuality  Outcome: Ongoing (interventions implemented as appropriate)    Goal: Discharge Needs Assessment  Outcome: Ongoing (interventions implemented as appropriate)    Goal: Interprofessional Rounds/Family Conf  Outcome: Ongoing (interventions implemented as appropriate)      Problem: Constipation (Adult)  Goal: Identify Related Risk Factors and Signs and Symptoms  Outcome: Ongoing (interventions implemented as appropriate)    Goal: Effective Bowel Elimination  Outcome: Ongoing (interventions implemented as appropriate)    Goal: Comfort  Outcome: Ongoing (interventions implemented as appropriate)      Problem: Pain, Chronic (Adult)  Goal: Identify Related Risk Factors and Signs and Symptoms  Outcome: Ongoing (interventions implemented as appropriate)    Goal: Acceptable Pain/Comfort Level and Functional Ability  Outcome: Ongoing (interventions implemented as appropriate)

## 2019-05-07 NOTE — PROGRESS NOTES
Discharge Planning Assessment  [facility]     Patient Name: Dex Thakkar  MRN: [id number]  Today's Date: 5/7/2019    Admit Date: 5/6/2019    Discharge Needs Assessment     Row Name 05/07/19 1007       Living Environment    Lives With  child(kenny), adult;spouse    Name(s) of Who Lives With Patient  Spouse   ( Reed Thakkar [phone]) and son  ( Dave Thakkar  [phone])    Current Living Arrangements  home/apartment/condo    Primary Care Provided by  self    Provides Primary Care For  no one    Family Caregiver if Needed  spouse    Family Caregiver Names  Spouse   ( Reed Thakkar [phone])     Quality of Family Relationships  involved;supportive    Able to Return to Prior Arrangements  yes    Living Arrangement Comments  Pt lives in a single story house with spouse   ( Reed Thakkar [phone]) and son  ( Dave Thakkar  [phone]).       Resource/Environmental Concerns    Resource/Environmental Concerns  none    Transportation Concerns  car, none       Transition Planning    Patient/Family Anticipates Transition to  home with family    Patient/Family Anticipated Services at Transition  none    Transportation Anticipated  car, drives self;family or friend will provide       Discharge Needs Assessment    Concerns to be Addressed  denies needs/concerns at this time    Equipment Currently Used at Home  none    Anticipated Changes Related to Illness  none    Equipment Needed After Discharge  none    Offered/Gave Vendor List  no        Discharge Plan     Row Name 05/07/19 1011       Plan    Plan  Plan home with spouse.   HORTENCIA Ledbetter RN    Patient/Family in Agreement with Plan  yes    Plan Comments  FACE SHEET VERIFIED/ IM LETTER SIGNED.  Spoke to pt at bedside.  Pt's PCP is Dr. Carmen Chacon.  Pt lives in a single story house with spouse   ( Reed Thakkar [phone]) and son  ( Dave Thakkar  [phone]).   Pt is independent with ADL's.  [facility]  Pt denies any issues  affording medications.  Pt is not current with HH.  Pt has not been in SNF.  Pt denies any discharge needs.  Plan home.  HORTENCIA Ledbetter RN        Destination      No service coordination in this encounter.      Durable Medical Equipment      No service coordination in this encounter.      Dialysis/Infusion      No service coordination in this encounter.      Home Medical Care      No service coordination in this encounter.      Therapy      No service coordination in this encounter.      Community Resources      No service coordination in this encounter.          Demographic Summary     Row Name 05/07/19 1007       General Information    Admission Type  inpatient    Arrived From  emergency department    Required Notices Provided  Important Message from Medicare    Referral Source  admission list    Reason for Consult  discharge planning    Preferred Language  English     Used During This Interaction  no        Functional Status     Row Name 05/07/19 1007       Functional Status    Usual Activity Tolerance  good    Current Activity Tolerance  good       Functional Status, IADL    Medications  independent    Meal Preparation  independent    Housekeeping  independent    Laundry  independent    Shopping  independent       Mental Status    General Appearance WDL  WDL       Mental Status Summary    Recent Changes in Mental Status/Cognitive Functioning  no changes        Psychosocial    No documentation.       Abuse/Neglect    No documentation.       Legal    No documentation.       Substance Abuse    No documentation.       Patient Forms    No documentation.           Makenna Ledbetter, RN

## 2019-05-07 NOTE — PROGRESS NOTES
"    DAILY PROGRESS NOTE  [facility]    Patient Identification:  Name: Dex Thakkar  Age: 85 y.o.  Sex: female  :  1934  MRN: [id number]         Primary Care Physician: Carmen Chacon MD    Subjective:  Interval History: No new complaints per patient.  She states she has had multiple BMs and is passing gas with no issues of nausea and vomiting or having any abdominal pains today    Objective: No family at bedside.  Case discussed in multidisciplinary rounds    Scheduled Meds:  atorvastatin 10 mg Oral Daily   lisinopril 10 mg Oral Daily   meloxicam 7.5 mg Oral Daily   metoprolol succinate XL 25 mg Oral Daily   piperacillin-tazobactam 3.375 g Intravenous Q8H   polyethylene glycol 17 g Oral BID   [START ON 2019] polyethylene glycol with electrolytes 2,000 mL Oral Q8H   sodium chloride 3 mL Intravenous Q12H     Continuous Infusions:     Vital signs in last 24 hours:  Temp:  [97.5 °F (36.4 °C)-98.2 °F (36.8 °C)] 98.2 °F (36.8 °C)  Heart Rate:  [80-96] 82  Resp:  [16-18] 16  BP: (117-189)/(69-93) 162/69    Intake/Output:    Intake/Output Summary (Last 24 hours) at 2019 1503  Last data filed at 2019 1337  Gross per 24 hour   Intake 960 ml   Output 2800 ml   Net -1840 ml       Exam:  /69 (BP Location: Left arm, Patient Position: Lying)   Pulse 82   Temp 98.2 °F (36.8 °C) (Oral)   Resp 16   Ht 160 cm (63\")   Wt 85.5 kg (188 lb 8 oz)   SpO2 94%   BMI 33.39 kg/m²     General Appearance:    Alert, cooperative, no distress, AAOx3, nontoxic and does not appear to be in any pain, interactive and conversational                         Throat:   Lips, tongue, gums normal; oral mucosa pink and moist                           Neck:   Supple, symmetrical, trachea midline, no JVD                         Lungs:    Clear to auscultation bilaterally, respirations unlabored                          Heart:    Regular rate and rhythm, S1 and S2 normal                  Abdomen:     Soft, " non-tender, bowel sounds active, no masses                 Extremities:   No cyanosis or edema                             Data Review:  Labs in chart were reviewed.    Assessment:  Active Hospital Problems    Diagnosis  POA   • **Diverticulitis of large intestine with abscess with bleeding [K57.21]  Yes   • Urinary retention [R33.9]  Unknown   • Left lower quadrant pain [R10.32]  Yes   • Colonic diverticular abscess [K57.20]  Yes   • Hypertension [I10]  Yes      Resolved Hospital Problems   No resolved problems to display.       Plan:  Urinary retention should resolve with resolution of fecal impaction   -DC Wu and monitor bladder scans    Hypertension controlled    Do not like the idea of an 85-year-old patient taken NSAIDs on a daily basis -we will ultimately defer to prescribing physician in outpatient setting to address for the long-term effect    Colonoscopy pending per Dr. Ray -discussed the case with her and thoroughly appreciate her assuming care as attending physician.      Edi Chery MD  5/7/2019  3:03 PM

## 2019-05-07 NOTE — PLAN OF CARE
Problem: Patient Care Overview  Goal: Plan of Care Review  Outcome: Ongoing (interventions implemented as appropriate)   05/07/19 9487   Coping/Psychosocial   Plan of Care Reviewed With patient   Plan of Care Review   Progress improving   OTHER   Outcome Summary Pt continues to improve, due to void, bm x2 no other complications       Problem: Constipation (Adult)  Goal: Comfort  Outcome: Ongoing (interventions implemented as appropriate)      Problem: Pain, Chronic (Adult)  Goal: Acceptable Pain/Comfort Level and Functional Ability  Outcome: Ongoing (interventions implemented as appropriate)

## 2019-05-07 NOTE — H&P
Internal medicine history and physical  INTERNAL MEDICINE   [facility]       Patient Identification:  Name: Dex Thakakr  Age: 85 y.o.  Sex: female  :  1934  MRN: [id number]                   Primary Care Physician: Carmen Chacon MD                                   Chief Complaint: Abdominal discomfort with constipation for the last 3 days associated with rectal bleeding    History of Present Illness:   Patient is 85-year-old female who was hospitalized from 2019 through 2019 from her primary care physician's office when CT scan revealed chronic diverticular abscess with acute diverticulitis.  Patient was treated with IV antibiotics and was discharged on oral Augmentin.  Patient was seen by general surgery service.  Patient underwent CT-guided drainage of the abscess.  Drainage fluid cultures were negative.  Patient was eventually discharged on oral Augmentin with improvement in her symptoms.  Since her discharge on 2019 she had follow-up visits with orthopedic surgeon for acute arthritis of the right knee as well as with Dr. Ray on 2019 and assessment was made whether she had a recalcitrant diverticulitis or ruptured ovarian cyst as a source of left lower quadrant pain and it was considered to be resolved at the time of the visit on 2019.  She was scheduled to have a colonoscopy on May 6 and it was discussed that holding on surgery would be the way to go as she has improved with aspiration and lack of pus found on the aspirated fluid.  Patient then subsequently presented to the emergency room with severe back pain that started on 2019.  The pain was in her back and radiating into her left buttock and posterior upper thigh area.  She  was diagnosed with left-sided low back pain with left-sided sciatica unspecified duration and was given Tylenol with codeine and Flexeril.  She was given instruction to follow-up with her primary care physician.  According  to patient since her initiating the Tylenol 3 and Flexeril she started developing constipation and lack of bowel movement.  She says that she had a last bowel movement 3 days ago and except for tiredness she feels otherwise okay.  Past Medical History:  Past Medical History:   Diagnosis Date   • Diverticulitis    • H/O cardiac radiofrequency ablation    • Hyperlipidemia    • Hypertension    • Melanoma (CMS/HCC)    • Murmur      Past Surgical History:  Past Surgical History:   Procedure Laterality Date   • CARDIAC ABLATION N/A 07/17/2017    Dr. Leigh   • CHOLECYSTECTOMY N/A    • COLONOSCOPY N/A 10/15/2004    Sigmoid diverticulosis, smal rectal polyp, internal hemorrhoids-Dr. Renata Ray   • COLONOSCOPY N/A 12/14/2009    Sigmoid diverticulosis-Dr. Renata Ray   • ENDOSCOPY N/A 10/07/2013    Gastric ulcerations x10, ulcerative esophagitis, small hiatal hernia-Dr. Renata Ray   • HYSTERECTOMY     • KNEE ARTHROSCOPY W/ PARTIAL MEDIAL MENISCECTOMY Right 10/24/2014    Dr. Bud Muhammad      Home Meds:  Medications Prior to Admission   Medication Sig Dispense Refill Last Dose   • acetaminophen-codeine (TYLENOL #3) 300-30 MG per tablet Take 1 tablet by mouth Every 6 (Six) Hours As Needed for Moderate Pain . 20 tablet 0 5/5/2019   • cyclobenzaprine (FLEXERIL) 10 MG tablet Take 1 tablet by mouth 3 (Three) Times a Day As Needed for Muscle Spasms. 30 tablet 0 5/5/2019   • lisinopril (PRINIVIL,ZESTRIL) 10 MG tablet Take 10 mg by mouth Daily.   5/5/2019   • meloxicam (MOBIC) 7.5 MG tablet Take 7.5 mg by mouth Daily.   5/5/2019   • metoprolol succinate XL (TOPROL-XL) 25 MG 24 hr tablet Take 25 mg by mouth Daily.   5/5/2019   • simvastatin (ZOCOR) 10 MG tablet Take 10 mg by mouth Every Night.   5/5/2019     Current Meds:     Current Facility-Administered Medications:   •  polyethylene glycol (MIRALAX) powder 17 g, 17 g, Oral, BID, Renata Ray MD  •  [COMPLETED] Insert peripheral IV, , , Once **AND** sodium chloride 0.9 %  "flush 10 mL, 10 mL, Intravenous, PRN, Patricia Velasquez, SHAZIA  Allergies:  No Known Allergies  Social History:   Social History     Tobacco Use   • Smoking status: Former Smoker   • Smokeless tobacco: Never Used   • Tobacco comment: QUIT 30  YRS AGO   Substance Use Topics   • Alcohol use: Yes     Comment: OCCASIONAL      Family History:  Family History   Problem Relation Age of Onset   • Brain cancer Mother    • Cancer Father    • Cancer Daughter    • Heart valve disorder Daughter    • Breast cancer Sister           Review of Systems  See history of present illness and past medical history.   Constitutional: Negative for fever.   HENT: Negative for sore throat.    Eyes: Negative.    Respiratory: Negative for cough and shortness of breath.    Cardiovascular: Negative for chest pain.   Gastrointestinal: Positive for blood in stool and constipation. Negative for abdominal pain, diarrhea and vomiting.   Genitourinary: Negative for dysuria.   Musculoskeletal: Negative for neck pain.   Skin: Negative for rash.   Neurological: Negative for dizziness, weakness, numbness and headaches.   Hematological: Negative.    Psychiatric/Behavioral: Negative.    All other systems reviewed and are negative.        Vitals:   BP (!) 189/88   Pulse 89   Temp 98.1 °F (36.7 °C) (Oral)   Resp 18   Ht 160 cm (63\")   Wt 85.5 kg (188 lb 8 oz)   SpO2 98%   BMI 33.39 kg/m²   I/O:     Intake/Output Summary (Last 24 hours) at 5/6/2019 2113  Last data filed at 5/6/2019 2107  Gross per 24 hour   Intake 600 ml   Output 2100 ml   Net -1500 ml     Exam:  General Appearance:    Alert, cooperative, no distress, appears stated age   Head:    Normocephalic, without obvious abnormality, atraumatic   Eyes:    PERRL, conjunctiva/corneas clear, EOM's intact, both eyes   Ears:    Normal external ear canals, both ears   Nose:   Nares normal, septum midline, mucosa normal, no drainage    or sinus tenderness   Throat:   Lips, tongue, gums normal; oral " mucosa pink and moist   Neck:   Supple, symmetrical, trachea midline, no adenopathy;     thyroid:  no enlargement/tenderness/nodules; no carotid    bruit or JVD   Back:     Symmetric, no curvature, ROM normal, no CVA tenderness   Lungs:     Clear to auscultation bilaterally, respirations unlabored   Chest Wall:    No tenderness or deformity    Heart:    Regular rate and rhythm, S1 and S2 normal, no murmur, rub   or gallop   Abdomen:     Soft, non-tender, bowel sounds active all four quadrants,        Extremities:   Extremities normal, atraumatic, no cyanosis or edema   Pulses:   Pulses palpable in all extremities; symmetric all extremities   Skin:   Skin color normal, Skin is warm and dry,  no rashes or palpable lesions   Neurologic:   CNII-XII intact, motor strength grossly intact, sensation grossly intact to light touch, no focal deficits noted       Data Review:      I reviewed the patient's new clinical results.  Results from last 7 days   Lab Units 05/06/19  1235   WBC 10*3/mm3 10.54   HEMOGLOBIN g/dL 13.5   PLATELETS 10*3/mm3 201     Results from last 7 days   Lab Units 05/06/19  1235   SODIUM mmol/L 132*   POTASSIUM mmol/L 4.3   CHLORIDE mmol/L 98   CO2 mmol/L 24.8   BUN mg/dL 20   CREATININE mg/dL 0.81   CALCIUM mg/dL 9.5   GLUCOSE mg/dL 105*     Xr Knee 3 View Right    Result Date: 4/18/2019  Ordering physician's impression is located in the Encounter Note dated 04/18/19. X-ray performed in the DR room.     Ct Abdomen Pelvis With Contrast    Result Date: 5/6/2019  Interval recurrence of a fluid collection adjacent to the sigmoid colon with numerous diverticula, some colon wall thickening, and what appears to be some new gas in the segment of colon wall measuring about 24 mm long. Lastly, there appears to be a fistulous tract from the colon to the abscess cavity. Other chronic changes appear stable.  I called the findings to Patricia Velasquez in the emergency department at 2:37 PM.  This report was finalized on  5/6/2019 4:03 PM by Dr. Fabian Amaral M.D.      Ct Abdomen Pelvis With Contrast    Result Date: 4/10/2019  1.  Acute sigmoid diverticulitis in a similar distribution to that seen on 01/30/2019. Of note, the left ovary abuts this segment of inflamed sigmoid colon with a bilobed cystic lesion, in appearance which has remained grossly stable since 07/20/2018. However, there has been interval development of a new partially loculated fluid collection along the inferior aspect of the left ovary which also abuts the inflamed portion of the sigmoid colon as well concerning for developing abscess. A few foci of air along the inferior aspect of the sigmoid colon may be extraluminal as well and concerning for perforation. 2.  Other chronic findings as above.  The above findings were discussed with Dr. Chacon by telephone by Gorge Howell MD at 5:10 PM on 04/09/2019   .  This report was finalized on 4/10/2019 5:16 PM by Dr. Gorge Howell M.D.      Ct Guided Abscess Drain Peritoneal    Result Date: 4/10/2019  Satisfactory uneventful CT-guided fluid collection aspiration.   Radiation dose reduction techniques were utilized, including automated exposure control and exposure modulation based on body size.  This report was finalized on 4/10/2019 3:03 PM by Dr. Bradley Cole MD.        Assessment:  Active Hospital Problems    Diagnosis POA   • **Diverticulitis of large intestine with abscess with bleeding [K57.21] Yes   • Urinary retention [R33.9] Unknown   • Left lower quadrant pain [R10.32] Yes     Added automatically from request for surgery [id number]     • Colonic diverticular abscess [K57.20] Yes   • Hypertension [I10] Yes       Medical decision making:  · Recalcitrant diverticulitis history of abscess status post percutaneous drainage with likely recurrence recurrence.  Plan is to admit the patient continue with IV antibiotics as recommended by general surgery service and general surgery evaluation.  Decision for  intervention including percutaneous drainage versus exploratory laparotomy and eventual surgical intervention versus colonoscopy per general surgery service.  · Hypertension-continue antihypertensive regimen  · History of urinary retention likely reactive-plan is to check serial bladder scans and straight catheterization as needed.    Adolfo Schultz MD   5/6/2019  9:13 PM  Much of this encounter note is an electronic transcription/translation of spoken language to printed text. The electronic translation of spoken language may permit erroneous, or at times, nonsensical words or phrases to be inadvertently transcribed; Although I have reviewed the note for such errors, some may still exist

## 2019-05-08 ENCOUNTER — APPOINTMENT (OUTPATIENT)
Dept: CT IMAGING | Facility: HOSPITAL | Age: 84
End: 2019-05-08

## 2019-05-08 ENCOUNTER — PREP FOR SURGERY (OUTPATIENT)
Dept: OTHER | Facility: HOSPITAL | Age: 84
End: 2019-05-08

## 2019-05-08 DIAGNOSIS — K57.21 DIVERTICULITIS OF LARGE INTESTINE WITH ABSCESS WITH BLEEDING: Primary | ICD-10-CM

## 2019-05-08 LAB
APTT PPP: 31.6 SECONDS (ref 22.7–35.4)
INR PPP: 1.07 (ref 0.9–1.1)
PLATELET # BLD AUTO: 180 10*3/MM3 (ref 140–450)
PROTHROMBIN TIME: 13.6 SECONDS (ref 11.7–14.2)

## 2019-05-08 PROCEDURE — 87205 SMEAR GRAM STAIN: CPT | Performed by: SURGERY

## 2019-05-08 PROCEDURE — 87070 CULTURE OTHR SPECIMN AEROBIC: CPT | Performed by: SURGERY

## 2019-05-08 PROCEDURE — 99232 SBSQ HOSP IP/OBS MODERATE 35: CPT | Performed by: SURGERY

## 2019-05-08 PROCEDURE — 87015 SPECIMEN INFECT AGNT CONCNTJ: CPT | Performed by: SURGERY

## 2019-05-08 PROCEDURE — 85730 THROMBOPLASTIN TIME PARTIAL: CPT | Performed by: SURGERY

## 2019-05-08 PROCEDURE — 75989 ABSCESS DRAINAGE UNDER X-RAY: CPT

## 2019-05-08 PROCEDURE — 0D9N3ZZ DRAINAGE OF SIGMOID COLON, PERCUTANEOUS APPROACH: ICD-10-PCS | Performed by: RADIOLOGY

## 2019-05-08 PROCEDURE — 85049 AUTOMATED PLATELET COUNT: CPT | Performed by: SURGERY

## 2019-05-08 PROCEDURE — 25010000002 PIPERACILLIN SOD-TAZOBACTAM PER 1 G: Performed by: INTERNAL MEDICINE

## 2019-05-08 PROCEDURE — 85610 PROTHROMBIN TIME: CPT | Performed by: SURGERY

## 2019-05-08 PROCEDURE — 25010000002 CEFTRIAXONE PER 250 MG: Performed by: SURGERY

## 2019-05-08 RX ORDER — ALVIMOPAN 12 MG/1
12 CAPSULE ORAL ONCE
Status: CANCELLED | OUTPATIENT
Start: 2019-05-15 | End: 2019-05-15

## 2019-05-08 RX ORDER — SODIUM CHLORIDE 0.9 % (FLUSH) 0.9 %
1-10 SYRINGE (ML) INJECTION AS NEEDED
Status: DISCONTINUED | OUTPATIENT
Start: 2019-05-08 | End: 2019-05-09

## 2019-05-08 RX ORDER — CHLORHEXIDINE GLUCONATE 4 G/100ML
SOLUTION TOPICAL 2 TIMES DAILY
Qty: 236 ML | Refills: 0 | Status: SHIPPED | OUTPATIENT
Start: 2019-05-08 | End: 2019-06-03

## 2019-05-08 RX ORDER — SODIUM CHLORIDE 0.9 % (FLUSH) 0.9 %
3 SYRINGE (ML) INJECTION EVERY 12 HOURS SCHEDULED
Status: DISCONTINUED | OUTPATIENT
Start: 2019-05-08 | End: 2019-05-09

## 2019-05-08 RX ORDER — CEFTRIAXONE SODIUM 1 G/50ML
1 INJECTION, SOLUTION INTRAVENOUS EVERY 24 HOURS
Status: COMPLETED | OUTPATIENT
Start: 2019-05-08 | End: 2019-05-10

## 2019-05-08 RX ORDER — GABAPENTIN 300 MG/1
300 CAPSULE ORAL 3 TIMES DAILY
Status: CANCELLED | OUTPATIENT
Start: 2019-05-15

## 2019-05-08 RX ORDER — ACETAMINOPHEN 325 MG/1
325 TABLET ORAL EVERY 6 HOURS PRN
Status: DISCONTINUED | OUTPATIENT
Start: 2019-05-08 | End: 2019-05-10 | Stop reason: HOSPADM

## 2019-05-08 RX ORDER — ACETAMINOPHEN 500 MG
1000 TABLET ORAL EVERY 6 HOURS
Status: CANCELLED | OUTPATIENT
Start: 2019-05-15

## 2019-05-08 RX ORDER — SCOLOPAMINE TRANSDERMAL SYSTEM 1 MG/1
1 PATCH, EXTENDED RELEASE TRANSDERMAL CONTINUOUS
Status: CANCELLED | OUTPATIENT
Start: 2019-05-15 | End: 2019-05-18

## 2019-05-08 RX ORDER — LIDOCAINE HYDROCHLORIDE 10 MG/ML
20 INJECTION, SOLUTION INFILTRATION; PERINEURAL ONCE
Status: COMPLETED | OUTPATIENT
Start: 2019-05-08 | End: 2019-05-08

## 2019-05-08 RX ADMIN — SODIUM CHLORIDE, PRESERVATIVE FREE 3 ML: 5 INJECTION INTRAVENOUS at 21:00

## 2019-05-08 RX ADMIN — ATORVASTATIN CALCIUM 10 MG: 10 TABLET, FILM COATED ORAL at 08:51

## 2019-05-08 RX ADMIN — ACETAMINOPHEN 650 MG: 325 TABLET, FILM COATED ORAL at 14:27

## 2019-05-08 RX ADMIN — MINERAL OIL, PETROLATUM, PHENYLEPHRINE HCL: 2.5; 140; 749 OINTMENT TOPICAL at 21:24

## 2019-05-08 RX ADMIN — MELOXICAM 7.5 MG: 7.5 TABLET ORAL at 08:51

## 2019-05-08 RX ADMIN — LIDOCAINE HYDROCHLORIDE 20 ML: 10 INJECTION, SOLUTION INFILTRATION; PERINEURAL at 12:23

## 2019-05-08 RX ADMIN — TAZOBACTAM SODIUM AND PIPERACILLIN SODIUM 3.38 G: 375; 3 INJECTION, SOLUTION INTRAVENOUS at 03:38

## 2019-05-08 RX ADMIN — POLYETHYLENE GLYCOL 3350 17 G: 17 POWDER, FOR SOLUTION ORAL at 22:34

## 2019-05-08 RX ADMIN — METOPROLOL SUCCINATE 25 MG: 25 TABLET, FILM COATED, EXTENDED RELEASE ORAL at 08:51

## 2019-05-08 RX ADMIN — SODIUM CHLORIDE, PRESERVATIVE FREE 3 ML: 5 INJECTION INTRAVENOUS at 08:15

## 2019-05-08 RX ADMIN — SODIUM CHLORIDE, PRESERVATIVE FREE 3 ML: 5 INJECTION INTRAVENOUS at 14:45

## 2019-05-08 RX ADMIN — LISINOPRIL 10 MG: 10 TABLET ORAL at 08:51

## 2019-05-08 RX ADMIN — POLYETHYLENE GLYCOL-3350 AND ELECTROLYTES WITH FLAVOR PACK 2000 ML: 240; 5.84; 2.98; 6.72; 22.72 POWDER, FOR SOLUTION ORAL at 05:46

## 2019-05-08 RX ADMIN — CEFTRIAXONE SODIUM 1 G: 1 INJECTION, SOLUTION INTRAVENOUS at 10:51

## 2019-05-08 NOTE — PROGRESS NOTES
"General Surgery  Dex Thakkar  1934  05/08/19    CC:  \"i'm feeling better but still pretty sore.\"      Reason for consult/admit: abdominal pain    HPI:  Patient still with hemorrhoid pain.  Back pain much better.  She is having lower abdominal soreness.  She is having bowel movements, now watery and squirty since cathartics.      Diet:  Clear liquids    Temp:  [97.5 °F (36.4 °C)-98.2 °F (36.8 °C)] 97.9 °F (36.6 °C)  Heart Rate:  [72-82] 72  Resp:  [16] 16  BP: (151-176)/(69-83) 157/76    Physical Exam   Constitutional: She appears well-developed and well-nourished.   Eyes: No scleral icterus.   Cardiovascular: Normal rate.   Pulmonary/Chest: Effort normal and breath sounds normal.   Abdominal: Soft. She exhibits distension. Tenderness: minimal.   Neurological: She is alert.   Skin: Skin is warm.   Psychiatric: She has a normal mood and affect.     Results from last 7 days   Lab Units 05/07/19  0558   WBC 10*3/mm3 10.06   HEMOGLOBIN g/dL 13.0   HEMATOCRIT % 39.8   PLATELETS 10*3/mm3 202         Results from last 7 days   Lab Units 05/07/19  0558 05/06/19  1235   WBC 10*3/mm3 10.06 10.54   HEMOGLOBIN g/dL 13.0 13.5   HEMATOCRIT % 39.8 42.9   PLATELETS 10*3/mm3 202 201     Results from last 7 days   Lab Units 05/07/19  1025 05/06/19  1235   SODIUM mmol/L 141 132*   POTASSIUM mmol/L 3.5 4.3   CHLORIDE mmol/L 104 98   CO2 mmol/L 24.8 24.8   BUN mg/dL 16 20   CREATININE mg/dL 0.75 0.81   CALCIUM mg/dL 9.0 9.5   BILIRUBIN mg/dL 1.0 0.5   ALK PHOS U/L 98 104   ALT (SGPT) U/L 117* 76*   AST (SGOT) U/L 89* 65*   GLUCOSE mg/dL 93 105*     IMPRESSION:  · Fecal impaction secondary to pain, immobility and narcotics.  This is resolving now.  · Urinary retention secondary to the fecal impaction.  Wu placed.  · Recalcitrant diverticulitis ?, S/P drainage last month with serous fluid, culture negative.  Most recent CT worrisome for a fistula, but i'm not convinced.        · Advanced age but with very functional " status  · Involvement of the left ovary with the diverticular process  · History of tubulovillous adenoma.  C scope was cancelled.  · LFT's elevating     PLAN:  · C scope tomorrow.  · Change abx to ceftriaxone daily.  DC zosyn.  · Repeat aspiration via CT..   · Reconsideration of surgery electively, after this clears..  Previously, we had hoped to be able to do so on an elective basis to allow ERAS, etc.   Couldn't do so now with recent narcotic use.  Proceeding with surgery at this admission would increase her risk of DVT, pneumonia, UTI (especially with recent urinary retention), wound infection.  Have tentatively reserved next Wednesday.  · IV abx advisable since we are uncertain as to the etiology  · Allow clears.  Bid miralax.    Renata Ray MD  05/08/19

## 2019-05-08 NOTE — PLAN OF CARE
Problem: Patient Care Overview  Goal: Plan of Care Review  Outcome: Ongoing (interventions implemented as appropriate)   05/08/19 0251   Coping/Psychosocial   Plan of Care Reviewed With patient   Plan of Care Review   Progress improving   OTHER   Outcome Summary pt resting in bed quietly. c/o sciatic nerve pain at times. relief with repositioning and pillows. pt voiding well. multiple bms this shift. will continue to monitor.      Goal: Individualization and Mutuality  Outcome: Ongoing (interventions implemented as appropriate)    Goal: Discharge Needs Assessment  Outcome: Ongoing (interventions implemented as appropriate)      Problem: Constipation (Adult)  Goal: Identify Related Risk Factors and Signs and Symptoms  Outcome: Outcome(s) achieved Date Met: 05/08/19    Goal: Effective Bowel Elimination  Outcome: Ongoing (interventions implemented as appropriate)    Goal: Comfort  Outcome: Ongoing (interventions implemented as appropriate)      Problem: Pain, Chronic (Adult)  Goal: Identify Related Risk Factors and Signs and Symptoms  Outcome: Outcome(s) achieved Date Met: 05/08/19    Goal: Acceptable Pain/Comfort Level and Functional Ability  Outcome: Ongoing (interventions implemented as appropriate)

## 2019-05-09 ENCOUNTER — ANESTHESIA EVENT (OUTPATIENT)
Dept: GASTROENTEROLOGY | Facility: HOSPITAL | Age: 84
End: 2019-05-09

## 2019-05-09 ENCOUNTER — ANESTHESIA (OUTPATIENT)
Dept: GASTROENTEROLOGY | Facility: HOSPITAL | Age: 84
End: 2019-05-09

## 2019-05-09 LAB
ALBUMIN SERPL-MCNC: 3.4 G/DL (ref 3.5–5.2)
ALBUMIN/GLOB SERPL: 1.2 G/DL
ALP SERPL-CCNC: 90 U/L (ref 39–117)
ALT SERPL W P-5'-P-CCNC: 68 U/L (ref 1–33)
ANION GAP SERPL CALCULATED.3IONS-SCNC: 11 MMOL/L
AST SERPL-CCNC: 34 U/L (ref 1–32)
BASOPHILS # BLD AUTO: 0.04 10*3/MM3 (ref 0–0.2)
BASOPHILS NFR BLD AUTO: 0.4 % (ref 0–1.5)
BILIRUB SERPL-MCNC: 0.7 MG/DL (ref 0.2–1.2)
BUN BLD-MCNC: 10 MG/DL (ref 8–23)
BUN/CREAT SERPL: 16.9 (ref 7–25)
CALCIUM SPEC-SCNC: 8.8 MG/DL (ref 8.6–10.5)
CHLORIDE SERPL-SCNC: 105 MMOL/L (ref 98–107)
CO2 SERPL-SCNC: 26 MMOL/L (ref 22–29)
CREAT BLD-MCNC: 0.59 MG/DL (ref 0.57–1)
DEPRECATED RDW RBC AUTO: 45.3 FL (ref 37–54)
EOSINOPHIL # BLD AUTO: 0.22 10*3/MM3 (ref 0–0.4)
EOSINOPHIL NFR BLD AUTO: 2.3 % (ref 0.3–6.2)
ERYTHROCYTE [DISTWIDTH] IN BLOOD BY AUTOMATED COUNT: 13.2 % (ref 12.3–15.4)
GFR SERPL CREATININE-BSD FRML MDRD: 97 ML/MIN/1.73
GLOBULIN UR ELPH-MCNC: 2.9 GM/DL
GLUCOSE BLD-MCNC: 92 MG/DL (ref 65–99)
HCT VFR BLD AUTO: 37.5 % (ref 34–46.6)
HGB BLD-MCNC: 12.1 G/DL (ref 12–15.9)
IMM GRANULOCYTES # BLD AUTO: 0.04 10*3/MM3 (ref 0–0.05)
IMM GRANULOCYTES NFR BLD AUTO: 0.4 % (ref 0–0.5)
LYMPHOCYTES # BLD AUTO: 1.35 10*3/MM3 (ref 0.7–3.1)
LYMPHOCYTES NFR BLD AUTO: 14.2 % (ref 19.6–45.3)
MCH RBC QN AUTO: 30 PG (ref 26.6–33)
MCHC RBC AUTO-ENTMCNC: 32.3 G/DL (ref 31.5–35.7)
MCV RBC AUTO: 93.1 FL (ref 79–97)
MONOCYTES # BLD AUTO: 0.84 10*3/MM3 (ref 0.1–0.9)
MONOCYTES NFR BLD AUTO: 8.8 % (ref 5–12)
NEUTROPHILS # BLD AUTO: 7.05 10*3/MM3 (ref 1.7–7)
NEUTROPHILS NFR BLD AUTO: 73.9 % (ref 42.7–76)
NRBC BLD AUTO-RTO: 0 /100 WBC (ref 0–0.2)
PLATELET # BLD AUTO: 186 10*3/MM3 (ref 140–450)
PMV BLD AUTO: 11.4 FL (ref 6–12)
POTASSIUM BLD-SCNC: 3.6 MMOL/L (ref 3.5–5.2)
PROT SERPL-MCNC: 6.3 G/DL (ref 6–8.5)
RBC # BLD AUTO: 4.03 10*6/MM3 (ref 3.77–5.28)
SODIUM BLD-SCNC: 142 MMOL/L (ref 136–145)
WBC NRBC COR # BLD: 9.54 10*3/MM3 (ref 3.4–10.8)

## 2019-05-09 PROCEDURE — 25010000002 ONDANSETRON PER 1 MG: Performed by: INTERNAL MEDICINE

## 2019-05-09 PROCEDURE — 88305 TISSUE EXAM BY PATHOLOGIST: CPT | Performed by: SURGERY

## 2019-05-09 PROCEDURE — 88341 IMHCHEM/IMCYTCHM EA ADD ANTB: CPT

## 2019-05-09 PROCEDURE — 80053 COMPREHEN METABOLIC PANEL: CPT | Performed by: SURGERY

## 2019-05-09 PROCEDURE — 88342 IMHCHEM/IMCYTCHM 1ST ANTB: CPT | Performed by: SURGERY

## 2019-05-09 PROCEDURE — 45380 COLONOSCOPY AND BIOPSY: CPT | Performed by: SURGERY

## 2019-05-09 PROCEDURE — 0DBE8ZX EXCISION OF LARGE INTESTINE, VIA NATURAL OR ARTIFICIAL OPENING ENDOSCOPIC, DIAGNOSTIC: ICD-10-PCS | Performed by: SURGERY

## 2019-05-09 PROCEDURE — 25010000002 PROPOFOL 10 MG/ML EMULSION: Performed by: ANESTHESIOLOGY

## 2019-05-09 PROCEDURE — 85025 COMPLETE CBC W/AUTO DIFF WBC: CPT | Performed by: SURGERY

## 2019-05-09 PROCEDURE — 25010000002 GLUCAGON (RDNA) PER 1 MG: Performed by: SURGERY

## 2019-05-09 PROCEDURE — 25010000002 CEFTRIAXONE PER 250 MG: Performed by: SURGERY

## 2019-05-09 RX ORDER — SODIUM CHLORIDE 9 MG/ML
30 INJECTION, SOLUTION INTRAVENOUS CONTINUOUS PRN
Status: DISCONTINUED | OUTPATIENT
Start: 2019-05-09 | End: 2019-05-10 | Stop reason: HOSPADM

## 2019-05-09 RX ORDER — LIDOCAINE HYDROCHLORIDE 20 MG/ML
INJECTION, SOLUTION INFILTRATION; PERINEURAL AS NEEDED
Status: DISCONTINUED | OUTPATIENT
Start: 2019-05-09 | End: 2019-05-09 | Stop reason: SURG

## 2019-05-09 RX ORDER — PROPOFOL 10 MG/ML
VIAL (ML) INTRAVENOUS CONTINUOUS PRN
Status: DISCONTINUED | OUTPATIENT
Start: 2019-05-09 | End: 2019-05-09 | Stop reason: SURG

## 2019-05-09 RX ORDER — PROPOFOL 10 MG/ML
VIAL (ML) INTRAVENOUS AS NEEDED
Status: DISCONTINUED | OUTPATIENT
Start: 2019-05-09 | End: 2019-05-09 | Stop reason: SURG

## 2019-05-09 RX ADMIN — POLYETHYLENE GLYCOL 3350 17 G: 17 POWDER, FOR SOLUTION ORAL at 20:46

## 2019-05-09 RX ADMIN — METOPROLOL SUCCINATE 25 MG: 25 TABLET, FILM COATED, EXTENDED RELEASE ORAL at 08:19

## 2019-05-09 RX ADMIN — CEFTRIAXONE SODIUM 1 G: 1 INJECTION, SOLUTION INTRAVENOUS at 08:19

## 2019-05-09 RX ADMIN — PROPOFOL 60 MG: 10 INJECTION, EMULSION INTRAVENOUS at 15:29

## 2019-05-09 RX ADMIN — PROPOFOL 200 MCG/KG/MIN: 10 INJECTION, EMULSION INTRAVENOUS at 15:29

## 2019-05-09 RX ADMIN — SODIUM CHLORIDE: 9 INJECTION, SOLUTION INTRAVENOUS at 15:25

## 2019-05-09 RX ADMIN — ONDANSETRON 4 MG: 2 INJECTION INTRAMUSCULAR; INTRAVENOUS at 01:13

## 2019-05-09 RX ADMIN — LIDOCAINE HYDROCHLORIDE 50 MG: 20 INJECTION, SOLUTION INFILTRATION; PERINEURAL at 15:29

## 2019-05-09 RX ADMIN — MELOXICAM 7.5 MG: 7.5 TABLET ORAL at 08:19

## 2019-05-09 RX ADMIN — ATORVASTATIN CALCIUM 10 MG: 10 TABLET, FILM COATED ORAL at 08:19

## 2019-05-09 RX ADMIN — SODIUM CHLORIDE, PRESERVATIVE FREE 3 ML: 5 INJECTION INTRAVENOUS at 20:53

## 2019-05-09 RX ADMIN — SODIUM CHLORIDE, PRESERVATIVE FREE 3 ML: 5 INJECTION INTRAVENOUS at 18:15

## 2019-05-09 RX ADMIN — POLYETHYLENE GLYCOL 3350 17 G: 17 POWDER, FOR SOLUTION ORAL at 08:19

## 2019-05-09 RX ADMIN — SODIUM CHLORIDE, PRESERVATIVE FREE 3 ML: 5 INJECTION INTRAVENOUS at 09:00

## 2019-05-09 RX ADMIN — LISINOPRIL 10 MG: 10 TABLET ORAL at 08:19

## 2019-05-09 NOTE — PLAN OF CARE
Problem: Patient Care Overview  Goal: Plan of Care Review  Outcome: Ongoing (interventions implemented as appropriate)   05/09/19 0403   Coping/Psychosocial   Plan of Care Reviewed With patient   Plan of Care Review   Progress no change   OTHER   Outcome Summary bowel prep of golytely started this shift, pt tolerated for the most part until about after 2300, pt c/o nausea, iv zofran given and was effective. pt has had x4 bm , last one , water still brown but water is thinning, nad, abd nontender, puncture site to llq with bandiad intact, no c/o voiced.      Goal: Individualization and Mutuality  Outcome: Ongoing (interventions implemented as appropriate)    Goal: Discharge Needs Assessment  Outcome: Ongoing (interventions implemented as appropriate)    Goal: Interprofessional Rounds/Family Conf  Outcome: Ongoing (interventions implemented as appropriate)      Problem: Constipation (Adult)  Goal: Effective Bowel Elimination  Outcome: Ongoing (interventions implemented as appropriate)    Goal: Comfort  Outcome: Ongoing (interventions implemented as appropriate)      Problem: Pain, Chronic (Adult)  Goal: Acceptable Pain/Comfort Level and Functional Ability  Outcome: Ongoing (interventions implemented as appropriate)      Problem: Skin Injury Risk (Adult)  Goal: Identify Related Risk Factors and Signs and Symptoms  Outcome: Ongoing (interventions implemented as appropriate)    Goal: Skin Health and Integrity  Outcome: Ongoing (interventions implemented as appropriate)

## 2019-05-09 NOTE — ANESTHESIA PREPROCEDURE EVALUATION
Anesthesia Evaluation     Patient summary reviewed   NPO Solid Status: > 6 hours  NPO Liquid Status: > 8 hours           Airway   Mallampati: II  TM distance: >3 FB  Neck ROM: full  No difficulty expected  Dental    (+) partials    Pulmonary     breath sounds clear to auscultation  Cardiovascular   Exercise tolerance: good (4-7 METS)    Rhythm: regular  Rate: normal    (+) murmur,       Neuro/Psych  GI/Hepatic/Renal/Endo      Musculoskeletal     Abdominal    Substance History      OB/GYN          Other                        Anesthesia Plan    ASA 2     MAC     intravenous induction   Anesthetic plan, all risks, benefits, and alternatives have been provided, discussed and informed consent has been obtained with: patient.

## 2019-05-09 NOTE — OP NOTE
Colonoscopy Procedure Note  Dex Thakkar  1934  Date of Procedure: 05/09/19    Pre-operative Diagnosis:    · Recurrent diverticulitis  · Heme + stools  · Recent fecal impaction    Post-operative Diagnosis:  · Rectal ulcer oriented circumferentially, about 3 cm wide by 4 mm long, at 10 cm  · Irritation of the rectum with thin ulcerations at the anorectal junction perhaps reflective of fissue  · Stricture at about 18 cm with erythema, suggestive of diverticulitis.  Second stricture, less notable, higher, at about 25-30 cm.  · Otherwise normal.    Procedure: Colonoscopy with biopsy     Findings/Treatments:   · As above  · Need to switch to pediatric scope to negotiate the stricture.       Recommendations:   · Colonoscopy based on operative findings.  · Sitz baths and decrease miralax with patient describing squirts of bowel movements.  · Continue forward with plans for surgery next wednesday  · Keep a copy of the photographs of the procedure given to you today for possible need for reference in the future.    Surgeon: Flor    Anesthetic: MAC per No responsible provider has been recorded for the case.    Indications:  As above     Scope Withdrawal Time:  7 minutes  47 seconds    Procedure Details     MAC anesthesia was induced.  The 180 Colonoscopy was inserted blindly into the rectum and advanced to the cecum, with relative ease after i negotiated the sigmoid.  Here there were strictures and inflammation that required a change to the pediatric scope and some torquing to traverse.  Thereafter, i proceded without need for pressure, lift, or turning.    Cecum was identified by the appendiceal orifice and the ileocecal valve and photographed for documentation.      Prep quality was very good but with still green translucent stool on the right.  A careful inspection was made as the scope was withdrawn, including a retroflexed view of the rectum; there was no suggestion of presence of angiodysplasias, but there was  so much erythema in the area of the stricturing and inflammation that it was difficult to tell if this was a primary colitis vs diverticulitis, favoring the latter.  Her rectum was inflamed as well with a rectal ulcer that i suspect was due to her fecal impaction that began after narcotics were started for his sciatica.  Biopsies were taken with the cold biopsy forceps.    Renata Ray MD  05/09/19  4:09 PM

## 2019-05-09 NOTE — ANESTHESIA POSTPROCEDURE EVALUATION
"Patient: Dex Thakkar    Procedure Summary     Date:  05/09/19 Room / Location:  [facility] ENDOSCOPY 1 /  [facility]    Anesthesia Start:  1528 Anesthesia Stop:  1600    Procedure:  COLONOSCOPY to cecum with cold biopsies (N/A ) Diagnosis:       Left lower quadrant pain      (Left lower quadrant pain [R10.32])    Surgeon:  Renata Ray MD Provider:  Macy Valle MD    Anesthesia Type:  MAC ASA Status:  2          Anesthesia Type: MAC  Last vitals  BP   (!) 186/81 (05/09/19 1444)   Temp   36.8 °C (98.2 °F) (05/09/19 1444)   Pulse   80 (05/09/19 1444)   Resp   11 (05/09/19 1444)     SpO2   96 % (05/09/19 1444)     Post Anesthesia Care and Evaluation    Patient location during evaluation: PHASE II  Patient participation: complete - patient participated  Level of consciousness: awake  Pain management: adequate  Airway patency: patent  Anesthetic complications: No anesthetic complications    Cardiovascular status: acceptable  Respiratory status: acceptable  Hydration status: acceptable    Comments: BP (!) 186/81 (BP Location: Left arm, Patient Position: Lying)   Pulse 80   Temp 36.8 °C (98.2 °F) (Oral)   Resp 11   Ht 160 cm (63\")   Wt 86.2 kg (190 lb)   SpO2 96%   BMI 33.66 kg/m²       "

## 2019-05-10 VITALS
BODY MASS INDEX: 33.66 KG/M2 | HEART RATE: 80 BPM | OXYGEN SATURATION: 93 % | TEMPERATURE: 97.6 F | RESPIRATION RATE: 18 BRPM | SYSTOLIC BLOOD PRESSURE: 151 MMHG | DIASTOLIC BLOOD PRESSURE: 79 MMHG | WEIGHT: 190 LBS | HEIGHT: 63 IN

## 2019-05-10 PROBLEM — K56.699 DIVERTICULAR STRICTURE: Status: ACTIVE | Noted: 2019-04-09

## 2019-05-10 PROBLEM — K62.6 RECTAL ULCER: Status: ACTIVE | Noted: 2019-05-10

## 2019-05-10 PROCEDURE — 99238 HOSP IP/OBS DSCHRG MGMT 30/<: CPT | Performed by: SURGERY

## 2019-05-10 PROCEDURE — 25010000002 CEFTRIAXONE PER 250 MG: Performed by: SURGERY

## 2019-05-10 RX ORDER — AMOXICILLIN AND CLAVULANATE POTASSIUM 500; 125 MG/1; MG/1
1 TABLET, FILM COATED ORAL EVERY 12 HOURS SCHEDULED
Qty: 4 TABLET | Refills: 0 | Status: SHIPPED | OUTPATIENT
Start: 2019-05-10 | End: 2019-05-12

## 2019-05-10 RX ORDER — AMOXICILLIN AND CLAVULANATE POTASSIUM 500; 125 MG/1; MG/1
1 TABLET, FILM COATED ORAL EVERY 12 HOURS SCHEDULED
Status: DISCONTINUED | OUTPATIENT
Start: 2019-05-10 | End: 2019-05-10 | Stop reason: HOSPADM

## 2019-05-10 RX ADMIN — POLYETHYLENE GLYCOL 3350 17 G: 17 POWDER, FOR SOLUTION ORAL at 08:20

## 2019-05-10 RX ADMIN — CEFTRIAXONE SODIUM 1 G: 1 INJECTION, SOLUTION INTRAVENOUS at 08:21

## 2019-05-10 RX ADMIN — SODIUM CHLORIDE, PRESERVATIVE FREE 3 ML: 5 INJECTION INTRAVENOUS at 08:21

## 2019-05-10 RX ADMIN — LISINOPRIL 10 MG: 10 TABLET ORAL at 08:20

## 2019-05-10 RX ADMIN — MELOXICAM 7.5 MG: 7.5 TABLET ORAL at 08:20

## 2019-05-10 RX ADMIN — ATORVASTATIN CALCIUM 10 MG: 10 TABLET, FILM COATED ORAL at 08:20

## 2019-05-10 RX ADMIN — METOPROLOL SUCCINATE 25 MG: 25 TABLET, FILM COATED, EXTENDED RELEASE ORAL at 08:20

## 2019-05-10 NOTE — PROGRESS NOTES
Continued Stay Note  [facility]     Patient Name: Dex Thakkar  MRN: [id number]  Today's Date: 5/10/2019    Admit Date: 5/6/2019    Discharge Plan     Row Name 05/10/19 1214       Plan    Plan  Plan home with spouse.  HORTENCIA Ledbetter RN    Patient/Family in Agreement with Plan  yes    Plan Comments  Spoke with pt at bedside.  Pt denies any discharge needs.  Plan home with spouse.   HORTENCIA Ledbetter RN        Discharge Codes    No documentation.       Expected Discharge Date and Time     Expected Discharge Date Expected Discharge Time    May 10, 2019             Makenna Ledbetter RN

## 2019-05-10 NOTE — PROGRESS NOTES
Case Management Discharge Note    Final Note: Pt discharged home.  HORTENCIA Ledbetter RN    Destination      No service has been selected for the patient.      Durable Medical Equipment      No service has been selected for the patient.      Dialysis/Infusion      No service has been selected for the patient.      Home Medical Care      No service has been selected for the patient.      Therapy      No service has been selected for the patient.      Community Resources      No service has been selected for the patient.        Transportation Services  Other: Other(Private Auto)    Final Discharge Disposition Code: 01 - home or self-care

## 2019-05-10 NOTE — DISCHARGE SUMMARY
Discharge Summary    Patient name: Dex Thakkar    Medical record number: [id number]    Admission date: 5/6/2019  Discharge date:  5/10/85266/10/2019    Attending physician: Dr. Renata Ray    Primary care physician: Carmen Chacon MD    Consulting physician(s):  Dr Chery    Primary Diagnoses:    · Fecal impaction secondary to pain and narcotics    Secondary Diagnoses:     · Serous pelvic collection  · HTN  · Urinary retention  · Diverticulitis with stricture  · Rectal ulcer  · Anal fissure     Procedure/Proc Date:      · C scope: 5/9/2019    Hospital Course:   The patient is a very pleasant 85 y.o. female that was admitted to the hospital on 5/6/2019 with fecal impaction, almost certainly from initiation of narcotics week prior for back pain.  She was started on abx with a history of recurrent diverticulitis with fluid in the pelvis and a suspected fistula on CT.    She improved and c scope was carried out with above findings.  She had a raphael for urinary retention that resolved with elimination of the impaction.  We will plan for surgery next week for the stricture.      Discharge medications:      Your medication list      START taking these medications      Instructions Last Dose Given Next Dose Due   amoxicillin-clavulanate 500-125 MG per tablet  Commonly known as:  AUGMENTIN      Take 1 tablet by mouth Every 12 (Twelve) Hours for 4 doses.       chlorhexidine 4 % external liquid  Commonly known as:  HIBICLENS      Apply  topically to the appropriate area as directed 2 (Two) Times a Day. Shower With Hibiclens Solution Twice The Day Before Surgery          CONTINUE taking these medications      Instructions Last Dose Given Next Dose Due   acetaminophen-codeine 300-30 MG per tablet  Commonly known as:  TYLENOL #3      Take 1 tablet by mouth Every 6 (Six) Hours As Needed for Moderate Pain .       cyclobenzaprine 10 MG tablet  Commonly known as:  FLEXERIL      Take 1 tablet by mouth 3 (Three) Times a Day As  Needed for Muscle Spasms.       lisinopril 10 MG tablet  Commonly known as:  PRINIVIL,ZESTRIL      Take 10 mg by mouth Daily.       meloxicam 7.5 MG tablet  Commonly known as:  MOBIC      Take 7.5 mg by mouth Daily.       metoprolol succinate XL 25 MG 24 hr tablet  Commonly known as:  TOPROL-XL      Take 25 mg by mouth Daily.       simvastatin 10 MG tablet  Commonly known as:  ZOCOR      Take 10 mg by mouth Every Night.             Where to Get Your Medications      [facility] [place], [place] [address] AT [facility]  & [facility] [address] - [phone]  - [phone] [place] [address]    Phone:  [phone]   · amoxicillin-clavulanate 500-125 MG per tablet     You can get these medications from any pharmacy    Bring a paper prescription for each of these medications  · chlorhexidine 4 % external liquid         Discharge diet:  · Low residue    Recommendations:    · surgery        Renata Ray MD  Office Number: [phone].

## 2019-05-10 NOTE — PLAN OF CARE
Problem: Patient Care Overview  Goal: Plan of Care Review  Outcome: Ongoing (interventions implemented as appropriate)   05/10/19 0318   Coping/Psychosocial   Plan of Care Reviewed With patient   Plan of Care Review   Progress improving   OTHER   Outcome Summary No c/o pain. Rested well. Sitz bath complete during shift. Medications administered per orders. VSS. No s/s of distress at this time. Will continue to monitor.     Goal: Individualization and Mutuality  Outcome: Ongoing (interventions implemented as appropriate)    Goal: Discharge Needs Assessment  Outcome: Ongoing (interventions implemented as appropriate)    Goal: Interprofessional Rounds/Family Conf  Outcome: Ongoing (interventions implemented as appropriate)      Problem: Constipation (Adult)  Goal: Effective Bowel Elimination  Outcome: Ongoing (interventions implemented as appropriate)    Goal: Comfort  Outcome: Ongoing (interventions implemented as appropriate)      Problem: Pain, Chronic (Adult)  Goal: Acceptable Pain/Comfort Level and Functional Ability  Outcome: Ongoing (interventions implemented as appropriate)      Problem: Skin Injury Risk (Adult)  Goal: Identify Related Risk Factors and Signs and Symptoms  Outcome: Outcome(s) achieved Date Met: 05/10/19    Goal: Skin Health and Integrity  Outcome: Ongoing (interventions implemented as appropriate)

## 2019-05-10 NOTE — PROGRESS NOTES
"General Surgery  Dex Thakkar  1934  05/10/19    CC:  \"i'm feeling better but still pretty sore.\"      Reason for consult/admit: abdominal pain    HPI:  Patient still with fissure pain, but better with sitz bath  Back pain much better.  She is having lower abdominal soreness.  She is having bowel movements, now watery and squirty since cathartics.      Diet:  Regular diet    Temp:  [97.6 °F (36.4 °C)-98.2 °F (36.8 °C)] 97.6 °F (36.4 °C)  Heart Rate:  [77-97] 80  Resp:  [11-18] 18  BP: (119-186)/(69-85) 151/79    Physical Exam   Constitutional: She appears well-developed and well-nourished.   Eyes: No scleral icterus.   Cardiovascular: Normal rate.   Pulmonary/Chest: Effort normal and breath sounds normal.   Abdominal: Soft. She exhibits distension. Tenderness: minimal.   Neurological: She is alert.   Skin: Skin is warm.   Psychiatric: She has a normal mood and affect.     Results from last 7 days   Lab Units 05/09/19  0559   WBC 10*3/mm3 9.54   HEMOGLOBIN g/dL 12.1   HEMATOCRIT % 37.5   PLATELETS 10*3/mm3 186         Results from last 7 days   Lab Units 05/09/19  0559 05/08/19  1024 05/07/19  0558 05/06/19  1235   WBC 10*3/mm3 9.54  --  10.06 10.54   HEMOGLOBIN g/dL 12.1  --  13.0 13.5   HEMATOCRIT % 37.5  --  39.8 42.9   PLATELETS 10*3/mm3 186 180 202 201     Results from last 7 days   Lab Units 05/09/19  0559 05/07/19  1025 05/06/19  1235   SODIUM mmol/L 142 141 132*   POTASSIUM mmol/L 3.6 3.5 4.3   CHLORIDE mmol/L 105 104 98   CO2 mmol/L 26.0 24.8 24.8   BUN mg/dL 10 16 20   CREATININE mg/dL 0.59 0.75 0.81   CALCIUM mg/dL 8.8 9.0 9.5   BILIRUBIN mg/dL 0.7 1.0 0.5   ALK PHOS U/L 90 98 104   ALT (SGPT) U/L 68* 117* 76*   AST (SGOT) U/L 34* 89* 65*   GLUCOSE mg/dL 92 93 105*     IMPRESSION:  · Fecal impaction secondary to pain, immobility and narcotics.  This has resolved..  · Urinary retention secondary to the fecal impaction. resolved  · Recalcitrant diverticulitis with stricture. S/P drainage last month with " serous fluid, culture negative.  Most recent CT worrisome for a fistula, but i'm not convinced.       · Advanced age but with very functional status  · Involvement of the left ovary with the diverticular process  · History of tubulovillous adenoma.    · LFT's elevating.  Better since zosyn DC'd     PLAN:  · Change abx to augmentin.  2 more days  · Scheduling of surgery electively, after this clears..  Previously, we had hoped to be able to do so on an elective basis to allow ERAS, etc.   Couldn't do so now with recent narcotic use.  Proceeding with surgery at this admission would have put her at ncrease her risk of DVT, pneumonia, UTI (especially with recent urinary retention), wound infection.  Have tentatively reserved next Wednesday.  · Discharge today.    Renata Ray MD  05/10/19

## 2019-05-11 ENCOUNTER — READMISSION MANAGEMENT (OUTPATIENT)
Dept: CALL CENTER | Facility: HOSPITAL | Age: 84
End: 2019-05-11

## 2019-05-11 LAB
BACTERIA FLD CULT: NORMAL
GRAM STN SPEC: NORMAL

## 2019-05-11 NOTE — OUTREACH NOTE
Prep Survey      Responses   Facility patient discharged from?  [facility]   Is patient eligible?  Yes   Discharge diagnosis  Diverticulitis of large intestine with abscess with bleeding   Does the patient have one of the following disease processes/diagnoses(primary or secondary)?  Other   Does the patient have Home health ordered?  No   Is there a DME ordered?  No   Prep survey completed?  Yes          Tess Nesbitt RN

## 2019-05-13 ENCOUNTER — READMISSION MANAGEMENT (OUTPATIENT)
Dept: CALL CENTER | Facility: HOSPITAL | Age: 84
End: 2019-05-13

## 2019-05-13 NOTE — OUTREACH NOTE
Medical Week 1 Survey      Responses   Facility patient discharged from?  [facility]   Does the patient have one of the following disease processes/diagnoses(primary or secondary)?  Other   Is there a successful TCM telephone encounter documented?  No   Week 1 attempt successful?  Yes   Call start time  1145   Call end time  1148   Discharge diagnosis  Diverticulitis of large intestine with abscess with bleeding   Meds reviewed with patient/caregiver?  Yes   Is the patient having any side effects they believe may be caused by any medication additions or changes?  No   Does the patient have all medications ordered at discharge?  Yes   Is the patient taking all medications as directed (includes completed medication regime)?  N/A   Medication comments  Hibiclens ordered for pre-op surgical skin prep   Comments regarding appointments  colon surgery scheduled for 5/15/19   Does the patient have a primary care provider?   Yes   Does the patient have an appointment with their PCP within 7 days of discharge?  N/A   Has the patient kept scheduled appointments due by today?  N/A   Has home health visited the patient within 72 hours of discharge?  N/A   Psychosocial issues?  No   Did the patient receive a copy of their discharge instructions?  Yes   Nursing interventions  Reviewed instructions with patient   What is the patient's perception of their health status since discharge?  Improving   Is the patient/caregiver able to teach back signs and symptoms related to disease process for when to call PCP?  Yes   Is the patient/caregiver able to teach back signs and symptoms related to disease process for when to call 911?  Yes   Is the patient/caregiver able to teach back the hierarchy of who to call/visit for symptoms/problems? PCP, Specialist, Home health nurse, Urgent Care, ED, 911  Yes   Week 1 call completed?  Yes          Dasha Francisco RN

## 2019-05-14 LAB
CYTO UR: NORMAL
LAB AP CASE REPORT: NORMAL
Lab: NORMAL
PATH REPORT.ADDENDUM SPEC: NORMAL
PATH REPORT.FINAL DX SPEC: NORMAL
PATH REPORT.GROSS SPEC: NORMAL

## 2019-05-15 ENCOUNTER — HOSPITAL ENCOUNTER (INPATIENT)
Facility: HOSPITAL | Age: 84
LOS: 3 days | Discharge: HOME | End: 2019-05-18
Attending: SURGERY | Admitting: SURGERY

## 2019-05-15 ENCOUNTER — ANESTHESIA EVENT (OUTPATIENT)
Dept: PERIOP | Facility: HOSPITAL | Age: 84
End: 2019-05-15

## 2019-05-15 ENCOUNTER — ANESTHESIA (OUTPATIENT)
Dept: PERIOP | Facility: HOSPITAL | Age: 84
End: 2019-05-15

## 2019-05-15 DIAGNOSIS — K57.21 DIVERTICULITIS OF LARGE INTESTINE WITH ABSCESS WITH BLEEDING: ICD-10-CM

## 2019-05-15 PROBLEM — K57.92 DIVERTICULITIS: Status: ACTIVE | Noted: 2019-05-15

## 2019-05-15 LAB
ABO GROUP BLD: NORMAL
ANION GAP SERPL CALCULATED.3IONS-SCNC: 13.5 MMOL/L
BASOPHILS # BLD AUTO: 0.05 10*3/MM3 (ref 0–0.2)
BASOPHILS NFR BLD AUTO: 0.3 % (ref 0–1.5)
BLD GP AB SCN SERPL QL: NEGATIVE
BUN BLD-MCNC: 9 MG/DL (ref 8–23)
BUN/CREAT SERPL: 11.1 (ref 7–25)
CALCIUM SPEC-SCNC: 9.2 MG/DL (ref 8.6–10.5)
CHLORIDE SERPL-SCNC: 99 MMOL/L (ref 98–107)
CO2 SERPL-SCNC: 23.5 MMOL/L (ref 22–29)
CREAT BLD-MCNC: 0.81 MG/DL (ref 0.57–1)
DEPRECATED RDW RBC AUTO: 45.1 FL (ref 37–54)
EOSINOPHIL # BLD AUTO: 0 10*3/MM3 (ref 0–0.4)
EOSINOPHIL NFR BLD AUTO: 0 % (ref 0.3–6.2)
ERYTHROCYTE [DISTWIDTH] IN BLOOD BY AUTOMATED COUNT: 13.1 % (ref 12.3–15.4)
GFR SERPL CREATININE-BSD FRML MDRD: 67 ML/MIN/1.73
GLUCOSE BLD-MCNC: 162 MG/DL (ref 65–99)
HCT VFR BLD AUTO: 42.6 % (ref 34–46.6)
HGB BLD-MCNC: 13.4 G/DL (ref 12–15.9)
IMM GRANULOCYTES # BLD AUTO: 0.05 10*3/MM3 (ref 0–0.05)
IMM GRANULOCYTES NFR BLD AUTO: 0.3 % (ref 0–0.5)
LYMPHOCYTES # BLD AUTO: 1.02 10*3/MM3 (ref 0.7–3.1)
LYMPHOCYTES NFR BLD AUTO: 6.4 % (ref 19.6–45.3)
MCH RBC QN AUTO: 29.4 PG (ref 26.6–33)
MCHC RBC AUTO-ENTMCNC: 31.5 G/DL (ref 31.5–35.7)
MCV RBC AUTO: 93.4 FL (ref 79–97)
MONOCYTES # BLD AUTO: 0.94 10*3/MM3 (ref 0.1–0.9)
MONOCYTES NFR BLD AUTO: 5.9 % (ref 5–12)
NEUTROPHILS # BLD AUTO: 13.87 10*3/MM3 (ref 1.7–7)
NEUTROPHILS NFR BLD AUTO: 87.1 % (ref 42.7–76)
NRBC BLD AUTO-RTO: 0 /100 WBC (ref 0–0.2)
PLATELET # BLD AUTO: 252 10*3/MM3 (ref 140–450)
PMV BLD AUTO: 12.2 FL (ref 6–12)
POTASSIUM BLD-SCNC: 3.6 MMOL/L (ref 3.5–5.2)
RBC # BLD AUTO: 4.56 10*6/MM3 (ref 3.77–5.28)
RH BLD: POSITIVE
SODIUM BLD-SCNC: 136 MMOL/L (ref 136–145)
T&S EXPIRATION DATE: NORMAL
WBC NRBC COR # BLD: 15.93 10*3/MM3 (ref 3.4–10.8)

## 2019-05-15 PROCEDURE — 25010000002 LIDOCAINE PER 10 MG: Performed by: NURSE ANESTHETIST, CERTIFIED REGISTERED

## 2019-05-15 PROCEDURE — 0DBN4ZZ EXCISION OF SIGMOID COLON, PERCUTANEOUS ENDOSCOPIC APPROACH: ICD-10-PCS | Performed by: SURGERY

## 2019-05-15 PROCEDURE — 44213 LAP MOBIL SPLENIC FL ADD-ON: CPT | Performed by: SURGERY

## 2019-05-15 PROCEDURE — 85025 COMPLETE CBC W/AUTO DIFF WBC: CPT | Performed by: SURGERY

## 2019-05-15 PROCEDURE — 87205 SMEAR GRAM STAIN: CPT | Performed by: SURGERY

## 2019-05-15 PROCEDURE — 25010000002 MAGNESIUM SULFATE PER 500 MG OF MAGNESIUM: Performed by: NURSE ANESTHETIST, CERTIFIED REGISTERED

## 2019-05-15 PROCEDURE — 86850 RBC ANTIBODY SCREEN: CPT | Performed by: SURGERY

## 2019-05-15 PROCEDURE — 44213 LAP MOBIL SPLENIC FL ADD-ON: CPT | Performed by: PHYSICIAN ASSISTANT

## 2019-05-15 PROCEDURE — 0UT14ZZ RESECTION OF LEFT OVARY, PERCUTANEOUS ENDOSCOPIC APPROACH: ICD-10-PCS | Performed by: SURGERY

## 2019-05-15 PROCEDURE — 25010000002 HYDROMORPHONE PER 4 MG: Performed by: NURSE ANESTHETIST, CERTIFIED REGISTERED

## 2019-05-15 PROCEDURE — 86900 BLOOD TYPING SEROLOGIC ABO: CPT | Performed by: SURGERY

## 2019-05-15 PROCEDURE — 25010000002 ALBUMIN HUMAN 5% PER 50 ML: Performed by: NURSE ANESTHETIST, CERTIFIED REGISTERED

## 2019-05-15 PROCEDURE — 25010000002 HYDRALAZINE PER 20 MG: Performed by: ANESTHESIOLOGY

## 2019-05-15 PROCEDURE — 58661 LAPAROSCOPY REMOVE ADNEXA: CPT | Performed by: SURGERY

## 2019-05-15 PROCEDURE — 58661 LAPAROSCOPY REMOVE ADNEXA: CPT | Performed by: PHYSICIAN ASSISTANT

## 2019-05-15 PROCEDURE — 25010000002 DEXAMETHASONE PER 1 MG: Performed by: NURSE ANESTHETIST, CERTIFIED REGISTERED

## 2019-05-15 PROCEDURE — 25010000003 BUPIVACAINE LIPOSOME 1.3 % SUSPENSION: Performed by: ANESTHESIOLOGY

## 2019-05-15 PROCEDURE — 25010000002 MIDAZOLAM PER 1 MG: Performed by: ANESTHESIOLOGY

## 2019-05-15 PROCEDURE — P9041 ALBUMIN (HUMAN),5%, 50ML: HCPCS | Performed by: NURSE ANESTHETIST, CERTIFIED REGISTERED

## 2019-05-15 PROCEDURE — 0DBP4ZZ EXCISION OF RECTUM, PERCUTANEOUS ENDOSCOPIC APPROACH: ICD-10-PCS | Performed by: SURGERY

## 2019-05-15 PROCEDURE — 25010000002 HYDRALAZINE PER 20 MG

## 2019-05-15 PROCEDURE — 88307 TISSUE EXAM BY PATHOLOGIST: CPT | Performed by: SURGERY

## 2019-05-15 PROCEDURE — 44207 L COLECTOMY/COLOPROCTOSTOMY: CPT | Performed by: SURGERY

## 2019-05-15 PROCEDURE — 25010000003 CEFOXITIN PER 1 G: Performed by: SURGERY

## 2019-05-15 PROCEDURE — 86901 BLOOD TYPING SEROLOGIC RH(D): CPT | Performed by: SURGERY

## 2019-05-15 PROCEDURE — 0DBM4ZZ EXCISION OF DESCENDING COLON, PERCUTANEOUS ENDOSCOPIC APPROACH: ICD-10-PCS | Performed by: SURGERY

## 2019-05-15 PROCEDURE — 80048 BASIC METABOLIC PNL TOTAL CA: CPT | Performed by: SURGERY

## 2019-05-15 PROCEDURE — 0UT64ZZ RESECTION OF LEFT FALLOPIAN TUBE, PERCUTANEOUS ENDOSCOPIC APPROACH: ICD-10-PCS | Performed by: SURGERY

## 2019-05-15 PROCEDURE — 25010000002 PROPOFOL 10 MG/ML EMULSION: Performed by: NURSE ANESTHETIST, CERTIFIED REGISTERED

## 2019-05-15 PROCEDURE — 87070 CULTURE OTHR SPECIMN AEROBIC: CPT | Performed by: SURGERY

## 2019-05-15 PROCEDURE — 0DJD8ZZ INSPECTION OF LOWER INTESTINAL TRACT, VIA NATURAL OR ARTIFICIAL OPENING ENDOSCOPIC: ICD-10-PCS | Performed by: SURGERY

## 2019-05-15 PROCEDURE — 44207 L COLECTOMY/COLOPROCTOSTOMY: CPT | Performed by: PHYSICIAN ASSISTANT

## 2019-05-15 PROCEDURE — 25010000002 NEOSTIGMINE PER 0.5 MG: Performed by: NURSE ANESTHETIST, CERTIFIED REGISTERED

## 2019-05-15 PROCEDURE — 25010000002 PHENYLEPHRINE PER 1 ML: Performed by: NURSE ANESTHETIST, CERTIFIED REGISTERED

## 2019-05-15 PROCEDURE — 25010000002 ONDANSETRON PER 1 MG: Performed by: NURSE ANESTHETIST, CERTIFIED REGISTERED

## 2019-05-15 PROCEDURE — C9290 INJ, BUPIVACAINE LIPOSOME: HCPCS | Performed by: ANESTHESIOLOGY

## 2019-05-15 PROCEDURE — 87075 CULTR BACTERIA EXCEPT BLOOD: CPT | Performed by: SURGERY

## 2019-05-15 DEVICE — CLIP LIGAT VASC HORIZON TI MD/LG GRN 6CT: Type: IMPLANTABLE DEVICE | Site: ABDOMEN | Status: FUNCTIONAL

## 2019-05-15 DEVICE — ENDOSCOPIC LINEAR CUTTER RELOADS GRAY 2.0MM, 6 ROWS
Type: IMPLANTABLE DEVICE | Site: ABDOMEN | Status: FUNCTIONAL
Brand: ECHELON ENDOPATH

## 2019-05-15 DEVICE — ENDOPATH ECHELON ENDOSCOPIC LINEAR CUTTER RELOADS, BLUE, 60MM
Type: IMPLANTABLE DEVICE | Site: ABDOMEN | Status: FUNCTIONAL
Brand: ECHELON ENDOPATH

## 2019-05-15 RX ORDER — HYDROMORPHONE HYDROCHLORIDE 1 MG/ML
0.25 INJECTION, SOLUTION INTRAMUSCULAR; INTRAVENOUS; SUBCUTANEOUS
Status: DISCONTINUED | OUTPATIENT
Start: 2019-05-15 | End: 2019-05-18 | Stop reason: HOSPADM

## 2019-05-15 RX ORDER — SCOLOPAMINE TRANSDERMAL SYSTEM 1 MG/1
1 PATCH, EXTENDED RELEASE TRANSDERMAL CONTINUOUS
Status: DISCONTINUED | OUTPATIENT
Start: 2019-05-15 | End: 2019-05-17

## 2019-05-15 RX ORDER — MIDAZOLAM HYDROCHLORIDE 1 MG/ML
1 INJECTION INTRAMUSCULAR; INTRAVENOUS
Status: DISCONTINUED | OUTPATIENT
Start: 2019-05-15 | End: 2019-05-15 | Stop reason: HOSPADM

## 2019-05-15 RX ORDER — METOPROLOL SUCCINATE 25 MG/1
25 TABLET, EXTENDED RELEASE ORAL DAILY
Status: DISCONTINUED | OUTPATIENT
Start: 2019-05-16 | End: 2019-05-18 | Stop reason: HOSPADM

## 2019-05-15 RX ORDER — SODIUM CHLORIDE, SODIUM LACTATE, POTASSIUM CHLORIDE, CALCIUM CHLORIDE 600; 310; 30; 20 MG/100ML; MG/100ML; MG/100ML; MG/100ML
INJECTION, SOLUTION INTRAVENOUS CONTINUOUS PRN
Status: DISCONTINUED | OUTPATIENT
Start: 2019-05-15 | End: 2019-05-15 | Stop reason: SURG

## 2019-05-15 RX ORDER — ALVIMOPAN 12 MG/1
12 CAPSULE ORAL 2 TIMES DAILY
Status: DISCONTINUED | OUTPATIENT
Start: 2019-05-16 | End: 2019-05-17

## 2019-05-15 RX ORDER — HYDROMORPHONE HYDROCHLORIDE 1 MG/ML
0.25 INJECTION, SOLUTION INTRAMUSCULAR; INTRAVENOUS; SUBCUTANEOUS
Status: DISCONTINUED | OUTPATIENT
Start: 2019-05-15 | End: 2019-05-15 | Stop reason: HOSPADM

## 2019-05-15 RX ORDER — ROCURONIUM BROMIDE 10 MG/ML
INJECTION, SOLUTION INTRAVENOUS AS NEEDED
Status: DISCONTINUED | OUTPATIENT
Start: 2019-05-15 | End: 2019-05-15 | Stop reason: SURG

## 2019-05-15 RX ORDER — ALVIMOPAN 12 MG/1
12 CAPSULE ORAL ONCE
Status: COMPLETED | OUTPATIENT
Start: 2019-05-15 | End: 2019-05-15

## 2019-05-15 RX ORDER — MAGNESIUM SULFATE HEPTAHYDRATE 500 MG/ML
INJECTION, SOLUTION INTRAMUSCULAR; INTRAVENOUS AS NEEDED
Status: DISCONTINUED | OUTPATIENT
Start: 2019-05-15 | End: 2019-05-15 | Stop reason: SURG

## 2019-05-15 RX ORDER — CYCLOBENZAPRINE HCL 10 MG
10 TABLET ORAL 3 TIMES DAILY PRN
Status: DISCONTINUED | OUTPATIENT
Start: 2019-05-15 | End: 2019-05-18 | Stop reason: HOSPADM

## 2019-05-15 RX ORDER — GABAPENTIN 300 MG/1
300 CAPSULE ORAL 3 TIMES DAILY
Status: DISCONTINUED | OUTPATIENT
Start: 2019-05-15 | End: 2019-05-15 | Stop reason: HOSPADM

## 2019-05-15 RX ORDER — DEXAMETHASONE SODIUM PHOSPHATE 4 MG/ML
INJECTION, SOLUTION INTRA-ARTICULAR; INTRALESIONAL; INTRAMUSCULAR; INTRAVENOUS; SOFT TISSUE AS NEEDED
Status: DISCONTINUED | OUTPATIENT
Start: 2019-05-15 | End: 2019-05-15 | Stop reason: SURG

## 2019-05-15 RX ORDER — BUPIVACAINE HYDROCHLORIDE 2.5 MG/ML
INJECTION, SOLUTION EPIDURAL; INFILTRATION; INTRACAUDAL
Status: COMPLETED | OUTPATIENT
Start: 2019-05-15 | End: 2019-05-15

## 2019-05-15 RX ORDER — LIDOCAINE HYDROCHLORIDE 10 MG/ML
0.5 INJECTION, SOLUTION EPIDURAL; INFILTRATION; INTRACAUDAL; PERINEURAL ONCE AS NEEDED
Status: DISCONTINUED | OUTPATIENT
Start: 2019-05-15 | End: 2019-05-15 | Stop reason: HOSPADM

## 2019-05-15 RX ORDER — LIDOCAINE HYDROCHLORIDE ANHYDROUS AND DEXTROSE MONOHYDRATE 5; 400 G/100ML; MG/100ML
INJECTION, SOLUTION INTRAVENOUS CONTINUOUS PRN
Status: DISCONTINUED | OUTPATIENT
Start: 2019-05-15 | End: 2019-05-15 | Stop reason: SURG

## 2019-05-15 RX ORDER — KETAMINE HYDROCHLORIDE 50 MG/ML
INJECTION, SOLUTION, CONCENTRATE INTRAMUSCULAR; INTRAVENOUS AS NEEDED
Status: DISCONTINUED | OUTPATIENT
Start: 2019-05-15 | End: 2019-05-15 | Stop reason: SURG

## 2019-05-15 RX ORDER — MIDAZOLAM HYDROCHLORIDE 1 MG/ML
2 INJECTION INTRAMUSCULAR; INTRAVENOUS
Status: DISCONTINUED | OUTPATIENT
Start: 2019-05-15 | End: 2019-05-15 | Stop reason: HOSPADM

## 2019-05-15 RX ORDER — SODIUM CHLORIDE, SODIUM LACTATE, POTASSIUM CHLORIDE, CALCIUM CHLORIDE 600; 310; 30; 20 MG/100ML; MG/100ML; MG/100ML; MG/100ML
9 INJECTION, SOLUTION INTRAVENOUS CONTINUOUS
Status: DISCONTINUED | OUTPATIENT
Start: 2019-05-15 | End: 2019-05-15

## 2019-05-15 RX ORDER — GABAPENTIN 300 MG/1
300 CAPSULE ORAL 3 TIMES DAILY
Status: DISCONTINUED | OUTPATIENT
Start: 2019-05-15 | End: 2019-05-16

## 2019-05-15 RX ORDER — LIDOCAINE HYDROCHLORIDE 20 MG/ML
INJECTION, SOLUTION INFILTRATION; PERINEURAL AS NEEDED
Status: DISCONTINUED | OUTPATIENT
Start: 2019-05-15 | End: 2019-05-15 | Stop reason: SURG

## 2019-05-15 RX ORDER — ACETAMINOPHEN 500 MG
1000 TABLET ORAL EVERY 6 HOURS
Status: COMPLETED | OUTPATIENT
Start: 2019-05-15 | End: 2019-05-17

## 2019-05-15 RX ORDER — LISINOPRIL 10 MG/1
10 TABLET ORAL DAILY
Status: DISCONTINUED | OUTPATIENT
Start: 2019-05-16 | End: 2019-05-18 | Stop reason: HOSPADM

## 2019-05-15 RX ORDER — SODIUM CHLORIDE 0.9 % (FLUSH) 0.9 %
1-10 SYRINGE (ML) INJECTION AS NEEDED
Status: DISCONTINUED | OUTPATIENT
Start: 2019-05-15 | End: 2019-05-15 | Stop reason: HOSPADM

## 2019-05-15 RX ORDER — NALOXONE HCL 0.4 MG/ML
0.4 VIAL (ML) INJECTION
Status: DISCONTINUED | OUTPATIENT
Start: 2019-05-15 | End: 2019-05-15 | Stop reason: HOSPADM

## 2019-05-15 RX ORDER — PROPOFOL 10 MG/ML
VIAL (ML) INTRAVENOUS AS NEEDED
Status: DISCONTINUED | OUTPATIENT
Start: 2019-05-15 | End: 2019-05-15 | Stop reason: SURG

## 2019-05-15 RX ORDER — HYDRALAZINE HYDROCHLORIDE 20 MG/ML
5 INJECTION INTRAMUSCULAR; INTRAVENOUS
Status: DISCONTINUED | OUTPATIENT
Start: 2019-05-15 | End: 2019-05-15 | Stop reason: HOSPADM

## 2019-05-15 RX ORDER — LIDOCAINE HYDROCHLORIDE 40 MG/ML
SOLUTION TOPICAL AS NEEDED
Status: DISCONTINUED | OUTPATIENT
Start: 2019-05-15 | End: 2019-05-15 | Stop reason: SURG

## 2019-05-15 RX ORDER — ALBUMIN, HUMAN INJ 5% 5 %
SOLUTION INTRAVENOUS CONTINUOUS PRN
Status: DISCONTINUED | OUTPATIENT
Start: 2019-05-15 | End: 2019-05-15 | Stop reason: SURG

## 2019-05-15 RX ORDER — BUPIVACAINE HYDROCHLORIDE AND EPINEPHRINE 5; 5 MG/ML; UG/ML
INJECTION, SOLUTION PERINEURAL AS NEEDED
Status: DISCONTINUED | OUTPATIENT
Start: 2019-05-15 | End: 2019-05-15 | Stop reason: HOSPADM

## 2019-05-15 RX ORDER — FENTANYL CITRATE 50 UG/ML
50 INJECTION, SOLUTION INTRAMUSCULAR; INTRAVENOUS
Status: DISCONTINUED | OUTPATIENT
Start: 2019-05-15 | End: 2019-05-15 | Stop reason: HOSPADM

## 2019-05-15 RX ORDER — ACETAMINOPHEN 500 MG
1000 TABLET ORAL EVERY 6 HOURS
Status: DISCONTINUED | OUTPATIENT
Start: 2019-05-15 | End: 2019-05-15 | Stop reason: HOSPADM

## 2019-05-15 RX ORDER — ONDANSETRON 2 MG/ML
INJECTION INTRAMUSCULAR; INTRAVENOUS AS NEEDED
Status: DISCONTINUED | OUTPATIENT
Start: 2019-05-15 | End: 2019-05-15 | Stop reason: SURG

## 2019-05-15 RX ORDER — NALOXONE HCL 0.4 MG/ML
0.4 VIAL (ML) INJECTION
Status: DISCONTINUED | OUTPATIENT
Start: 2019-05-15 | End: 2019-05-18 | Stop reason: HOSPADM

## 2019-05-15 RX ORDER — MELOXICAM 7.5 MG/1
7.5 TABLET ORAL DAILY
Status: DISCONTINUED | OUTPATIENT
Start: 2019-05-16 | End: 2019-05-18 | Stop reason: HOSPADM

## 2019-05-15 RX ORDER — GLYCOPYRROLATE 0.2 MG/ML
INJECTION INTRAMUSCULAR; INTRAVENOUS AS NEEDED
Status: DISCONTINUED | OUTPATIENT
Start: 2019-05-15 | End: 2019-05-15 | Stop reason: SURG

## 2019-05-15 RX ORDER — SODIUM CHLORIDE 9 MG/ML
INJECTION, SOLUTION INTRAVENOUS AS NEEDED
Status: DISCONTINUED | OUTPATIENT
Start: 2019-05-15 | End: 2019-05-15 | Stop reason: HOSPADM

## 2019-05-15 RX ORDER — FAMOTIDINE 10 MG/ML
20 INJECTION, SOLUTION INTRAVENOUS ONCE
Status: COMPLETED | OUTPATIENT
Start: 2019-05-15 | End: 2019-05-15

## 2019-05-15 RX ORDER — MAGNESIUM HYDROXIDE 1200 MG/15ML
LIQUID ORAL AS NEEDED
Status: DISCONTINUED | OUTPATIENT
Start: 2019-05-15 | End: 2019-05-15 | Stop reason: HOSPADM

## 2019-05-15 RX ORDER — HYDRALAZINE HYDROCHLORIDE 20 MG/ML
INJECTION INTRAMUSCULAR; INTRAVENOUS
Status: COMPLETED
Start: 2019-05-15 | End: 2019-05-15

## 2019-05-15 RX ORDER — OXYCODONE HYDROCHLORIDE 5 MG/1
5 TABLET ORAL EVERY 4 HOURS PRN
Status: DISCONTINUED | OUTPATIENT
Start: 2019-05-15 | End: 2019-05-18 | Stop reason: HOSPADM

## 2019-05-15 RX ORDER — ATORVASTATIN CALCIUM 10 MG/1
10 TABLET, FILM COATED ORAL DAILY
Status: DISCONTINUED | OUTPATIENT
Start: 2019-05-16 | End: 2019-05-18 | Stop reason: HOSPADM

## 2019-05-15 RX ORDER — DEXTROSE MONOHYDRATE, SODIUM CHLORIDE, SODIUM LACTATE, POTASSIUM CHLORIDE, CALCIUM CHLORIDE 5; 600; 310; 179; 20 G/100ML; MG/100ML; MG/100ML; MG/100ML; MG/100ML
75 INJECTION, SOLUTION INTRAVENOUS CONTINUOUS
Status: DISCONTINUED | OUTPATIENT
Start: 2019-05-15 | End: 2019-05-17

## 2019-05-15 RX ADMIN — LIDOCAINE HYDROCHLORIDE 1 EACH: 40 SOLUTION TOPICAL at 12:45

## 2019-05-15 RX ADMIN — DEXAMETHASONE SODIUM PHOSPHATE 6 MG: 4 INJECTION INTRA-ARTICULAR; INTRALESIONAL; INTRAMUSCULAR; INTRAVENOUS; SOFT TISSUE at 12:48

## 2019-05-15 RX ADMIN — SODIUM CHLORIDE, POTASSIUM CHLORIDE, SODIUM LACTATE AND CALCIUM CHLORIDE: 600; 310; 30; 20 INJECTION, SOLUTION INTRAVENOUS at 14:36

## 2019-05-15 RX ADMIN — CEFOXITIN 2 G: 10 INJECTION, POWDER, FOR SOLUTION INTRAVENOUS at 22:06

## 2019-05-15 RX ADMIN — PHENYLEPHRINE HYDROCHLORIDE 100 MCG: 10 INJECTION INTRAVENOUS at 13:11

## 2019-05-15 RX ADMIN — GABAPENTIN 300 MG: 300 CAPSULE ORAL at 11:45

## 2019-05-15 RX ADMIN — PHENYLEPHRINE HYDROCHLORIDE 100 MCG: 10 INJECTION INTRAVENOUS at 13:24

## 2019-05-15 RX ADMIN — PHENYLEPHRINE HYDROCHLORIDE 100 MCG: 10 INJECTION INTRAVENOUS at 14:18

## 2019-05-15 RX ADMIN — PHENYLEPHRINE HYDROCHLORIDE 100 MCG: 10 INJECTION INTRAVENOUS at 16:59

## 2019-05-15 RX ADMIN — POTASSIUM CHLORIDE, SODIUM CHLORIDE, CALCIUM CHLORIDE, SODIUM LACTATE, AND DEXTROSE MONOHYDRATE 75 ML/HR: 1.79; 6; .2; 3.1; 5 INJECTION, SOLUTION INTRAVENOUS at 22:06

## 2019-05-15 RX ADMIN — ACETAMINOPHEN 1000 MG: 500 TABLET, FILM COATED ORAL at 22:06

## 2019-05-15 RX ADMIN — BUPIVACAINE HYDROCHLORIDE 10 ML: 2.5 INJECTION, SOLUTION EPIDURAL; INFILTRATION; INTRACAUDAL; PERINEURAL at 12:45

## 2019-05-15 RX ADMIN — ALVIMOPAN 12 MG: 12 CAPSULE ORAL at 11:45

## 2019-05-15 RX ADMIN — PHENYLEPHRINE HYDROCHLORIDE 100 MCG: 10 INJECTION INTRAVENOUS at 16:56

## 2019-05-15 RX ADMIN — MIDAZOLAM 1 MG: 1 INJECTION INTRAMUSCULAR; INTRAVENOUS at 12:23

## 2019-05-15 RX ADMIN — ONDANSETRON 4 MG: 2 INJECTION INTRAMUSCULAR; INTRAVENOUS at 17:14

## 2019-05-15 RX ADMIN — PHENYLEPHRINE HYDROCHLORIDE 100 MCG: 10 INJECTION INTRAVENOUS at 15:09

## 2019-05-15 RX ADMIN — LIDOCAINE HYDROCHLORIDE ANHYDROUS AND DEXTROSE MONOHYDRATE 2 MG/MIN: .4; 5 INJECTION, SOLUTION INTRAVENOUS at 12:55

## 2019-05-15 RX ADMIN — HYDRALAZINE HYDROCHLORIDE 5 MG: 20 INJECTION INTRAMUSCULAR; INTRAVENOUS at 18:45

## 2019-05-15 RX ADMIN — NEOSTIGMINE METHYLSULFATE 3 MG: 1 INJECTION INTRAMUSCULAR; INTRAVENOUS; SUBCUTANEOUS at 17:10

## 2019-05-15 RX ADMIN — GABAPENTIN 300 MG: 300 CAPSULE ORAL at 22:05

## 2019-05-15 RX ADMIN — KETAMINE HYDROCHLORIDE 10 MG: 50 INJECTION, SOLUTION INTRAMUSCULAR; INTRAVENOUS at 14:47

## 2019-05-15 RX ADMIN — GLYCOPYRROLATE 0.4 MG: 0.2 INJECTION INTRAMUSCULAR; INTRAVENOUS at 17:10

## 2019-05-15 RX ADMIN — ROCURONIUM BROMIDE 20 MG: 10 INJECTION INTRAVENOUS at 14:59

## 2019-05-15 RX ADMIN — HYDROMORPHONE HYDROCHLORIDE 0.25 MG: 1 INJECTION, SOLUTION INTRAMUSCULAR; INTRAVENOUS; SUBCUTANEOUS at 18:15

## 2019-05-15 RX ADMIN — ROCURONIUM BROMIDE 10 MG: 10 INJECTION INTRAVENOUS at 15:59

## 2019-05-15 RX ADMIN — LIDOCAINE HYDROCHLORIDE 80 MG: 20 INJECTION, SOLUTION INFILTRATION; PERINEURAL at 12:43

## 2019-05-15 RX ADMIN — ROCURONIUM BROMIDE 30 MG: 10 INJECTION INTRAVENOUS at 12:43

## 2019-05-15 RX ADMIN — GLYCOPYRROLATE 0.2 MG: 0.2 INJECTION INTRAMUSCULAR; INTRAVENOUS at 13:26

## 2019-05-15 RX ADMIN — SODIUM CHLORIDE, POTASSIUM CHLORIDE, SODIUM LACTATE AND CALCIUM CHLORIDE: 600; 310; 30; 20 INJECTION, SOLUTION INTRAVENOUS at 12:00

## 2019-05-15 RX ADMIN — PROPOFOL 150 MG: 10 INJECTION, EMULSION INTRAVENOUS at 12:43

## 2019-05-15 RX ADMIN — SODIUM CHLORIDE, POTASSIUM CHLORIDE, SODIUM LACTATE AND CALCIUM CHLORIDE 9 ML/HR: 600; 310; 30; 20 INJECTION, SOLUTION INTRAVENOUS at 12:23

## 2019-05-15 RX ADMIN — BUPIVACAINE 20 ML: 13.3 INJECTION, SUSPENSION, LIPOSOMAL INFILTRATION at 12:45

## 2019-05-15 RX ADMIN — FAMOTIDINE 20 MG: 10 INJECTION INTRAVENOUS at 12:23

## 2019-05-15 RX ADMIN — PHENYLEPHRINE HYDROCHLORIDE 100 MCG: 10 INJECTION INTRAVENOUS at 13:00

## 2019-05-15 RX ADMIN — SCOPALAMINE 1 PATCH: 1 PATCH, EXTENDED RELEASE TRANSDERMAL at 11:45

## 2019-05-15 RX ADMIN — HYDRALAZINE HYDROCHLORIDE 5 MG: 20 INJECTION INTRAMUSCULAR; INTRAVENOUS at 18:35

## 2019-05-15 RX ADMIN — KETAMINE HYDROCHLORIDE 10 MG: 50 INJECTION, SOLUTION INTRAMUSCULAR; INTRAVENOUS at 13:46

## 2019-05-15 RX ADMIN — KETAMINE HYDROCHLORIDE 40 MG: 50 INJECTION, SOLUTION INTRAMUSCULAR; INTRAVENOUS at 12:47

## 2019-05-15 RX ADMIN — ALBUMIN HUMAN: 0.05 INJECTION, SOLUTION INTRAVENOUS at 15:31

## 2019-05-15 RX ADMIN — MAGNESIUM SULFATE HEPTAHYDRATE 2 G: 500 INJECTION, SOLUTION INTRAMUSCULAR; INTRAVENOUS at 12:50

## 2019-05-15 RX ADMIN — ACETAMINOPHEN 1000 MG: 500 TABLET, FILM COATED ORAL at 11:45

## 2019-05-15 NOTE — ANESTHESIA PROCEDURE NOTES
Peripheral Block    Pre-sedation assessment completed: 5/15/2019 12:45 PM    Patient reassessed immediately prior to procedure    Patient location during procedure: OR  Start time: 5/15/2019 12:45 PM  Stop time: 5/15/2019 12:55 PM  Reason for block: at surgeon's request and post-op pain management  Performed by  Anesthesiologist: Christopher Lai MD  Preanesthetic Checklist  Completed: patient identified, site marked, surgical consent, pre-op evaluation, timeout performed, IV checked, risks and benefits discussed and monitors and equipment checked  Prep:  Sterile barriers:cap, gloves, gown, mask and sterile barriers  Prep: ChloraPrep  Patient monitoring: blood pressure monitoring, continuous pulse oximetry and EKG  Procedure  Sedation:yes    Guidance:ultrasound guided  ULTRASOUND INTERPRETATION. Using ultrasound guidance a 21 G gauge needle was placed in close proximity to the nerve, at which point, under ultrasound guidance anesthetic was injected in the area of the nerve and spread of the anesthesia was seen on ultrasound in close proximity thereto.  There were no abnormalities seen on ultrasound; a digital image was taken; and the patient tolerated the procedure with no complications. Images:still images obtained    Laterality:Bilateral  Block Type:TAP  Injection Technique:single-shot  Needle Type:echogenic  Needle Gauge:21 G    Medications Used: bupivacaine PF (MARCAINE) 0.25 % injection, 10 mL  bupivacaine liposome (EXPAREL) 1.3 % injection, 20 mL  Post Assessment  Injection Assessment: negative aspiration for heme, no paresthesia on injection and incremental injection  Patient Tolerance:comfortable throughout block  Complications:no

## 2019-05-15 NOTE — ANESTHESIA PROCEDURE NOTES
Airway  Urgency: elective    Date/Time: 5/15/2019 12:45 PM  Airway not difficult    General Information and Staff    Patient location during procedure: OR  Anesthesiologist: Roberto Carlos Fernandez MD  CRNA: Jon Garza CRNA    Indications and Patient Condition  Indications for airway management: airway protection    Preoxygenated: yes  MILS not maintained throughout  Mask difficulty assessment: 1 - vent by mask    Final Airway Details  Final airway type: endotracheal airway      Successful airway: ETT  Cuffed: yes   Successful intubation technique: direct laryngoscopy  Facilitating devices/methods: anterior pressure/BURP  Endotracheal tube insertion site: oral  Blade: Jose F  Blade size: 3  ETT size (mm): 7.0  Cormack-Lehane Classification: grade I - full view of glottis  Placement verified by: chest auscultation   Cuff volume (mL): 6  Measured from: lips  ETT to lips (cm): 20  Number of attempts at approach: 1    Additional Comments  Pre O2, SIAI

## 2019-05-15 NOTE — ANESTHESIA POSTPROCEDURE EVALUATION
Patient: Dex Thakkar    Procedure Summary     Date:  05/15/19 Room / Location:  [facility] OR 06 / [facility] MAIN OR    Anesthesia Start:  1234 Anesthesia Stop:  1731    Procedure:  laparoscopic low anterior colon resection with splenic flexure mobilization, left salpingo oophorectomy, drainage of peritoneal abscess (N/A Abdomen) Diagnosis:       Diverticulitis of large intestine with abscess with bleeding      (Diverticulitis of large intestine with abscess with bleeding [K57.21])    Surgeon:  Renata Ray MD Provider:  Roberto Carlos Fernandez MD    Anesthesia Type:  general ASA Status:  3          Anesthesia Type: general  Last vitals  BP   (!) 183/80 (05/15/19 1845)   Temp   36.6 °C (97.8 °F) (05/15/19 1830)   Pulse   58 (05/15/19 1845)   Resp   18 (05/15/19 1845)     SpO2   100 % (05/15/19 1845)     Post Anesthesia Care and Evaluation    Patient location during evaluation: bedside  Level of consciousness: awake  Pain management: inadequate (more narcotics are ordered)  Airway patency: patent  Anesthetic complications: No anesthetic complications    Cardiovascular status: acceptable  Respiratory status: acceptable  Hydration status: acceptable    Comments: BP (!) 183/80   Pulse 58   Temp 36.6 °C (97.8 °F) (Oral)   Resp 18   Wt 83.9 kg (185 lb)   SpO2 100%   BMI 32.77 kg/m²

## 2019-05-16 LAB
ANION GAP SERPL CALCULATED.3IONS-SCNC: 12.9 MMOL/L
BASOPHILS # BLD AUTO: 0.01 10*3/MM3 (ref 0–0.2)
BASOPHILS NFR BLD AUTO: 0.1 % (ref 0–1.5)
BUN BLD-MCNC: 10 MG/DL (ref 8–23)
BUN/CREAT SERPL: 13 (ref 7–25)
CALCIUM SPEC-SCNC: 8.8 MG/DL (ref 8.6–10.5)
CHLORIDE SERPL-SCNC: 101 MMOL/L (ref 98–107)
CO2 SERPL-SCNC: 22.1 MMOL/L (ref 22–29)
CREAT BLD-MCNC: 0.77 MG/DL (ref 0.57–1)
DEPRECATED RDW RBC AUTO: 42.7 FL (ref 37–54)
EOSINOPHIL # BLD AUTO: 0 10*3/MM3 (ref 0–0.4)
EOSINOPHIL NFR BLD AUTO: 0 % (ref 0.3–6.2)
ERYTHROCYTE [DISTWIDTH] IN BLOOD BY AUTOMATED COUNT: 13 % (ref 12.3–15.4)
GFR SERPL CREATININE-BSD FRML MDRD: 71 ML/MIN/1.73
GLUCOSE BLD-MCNC: 138 MG/DL (ref 65–99)
HCT VFR BLD AUTO: 35.9 % (ref 34–46.6)
HGB BLD-MCNC: 11.8 G/DL (ref 12–15.9)
IMM GRANULOCYTES # BLD AUTO: 0.05 10*3/MM3 (ref 0–0.05)
IMM GRANULOCYTES NFR BLD AUTO: 0.4 % (ref 0–0.5)
LYMPHOCYTES # BLD AUTO: 1.91 10*3/MM3 (ref 0.7–3.1)
LYMPHOCYTES NFR BLD AUTO: 15.3 % (ref 19.6–45.3)
MCH RBC QN AUTO: 30.1 PG (ref 26.6–33)
MCHC RBC AUTO-ENTMCNC: 32.9 G/DL (ref 31.5–35.7)
MCV RBC AUTO: 91.6 FL (ref 79–97)
MONOCYTES # BLD AUTO: 1.14 10*3/MM3 (ref 0.1–0.9)
MONOCYTES NFR BLD AUTO: 9.1 % (ref 5–12)
NEUTROPHILS # BLD AUTO: 9.38 10*3/MM3 (ref 1.7–7)
NEUTROPHILS NFR BLD AUTO: 75.1 % (ref 42.7–76)
NRBC BLD AUTO-RTO: 0 /100 WBC (ref 0–0.2)
PLATELET # BLD AUTO: 248 10*3/MM3 (ref 140–450)
PMV BLD AUTO: 12.5 FL (ref 6–12)
POTASSIUM BLD-SCNC: 3.9 MMOL/L (ref 3.5–5.2)
RBC # BLD AUTO: 3.92 10*6/MM3 (ref 3.77–5.28)
SODIUM BLD-SCNC: 136 MMOL/L (ref 136–145)
WBC NRBC COR # BLD: 12.49 10*3/MM3 (ref 3.4–10.8)

## 2019-05-16 PROCEDURE — 25010000002 ENOXAPARIN PER 10 MG: Performed by: SURGERY

## 2019-05-16 PROCEDURE — 80048 BASIC METABOLIC PNL TOTAL CA: CPT | Performed by: SURGERY

## 2019-05-16 PROCEDURE — 25010000003 CEFOXITIN PER 1 G: Performed by: SURGERY

## 2019-05-16 PROCEDURE — 85025 COMPLETE CBC W/AUTO DIFF WBC: CPT | Performed by: SURGERY

## 2019-05-16 PROCEDURE — 99024 POSTOP FOLLOW-UP VISIT: CPT | Performed by: SURGERY

## 2019-05-16 RX ORDER — GABAPENTIN 100 MG/1
100 CAPSULE ORAL 3 TIMES DAILY
Status: DISCONTINUED | OUTPATIENT
Start: 2019-05-16 | End: 2019-05-17

## 2019-05-16 RX ADMIN — ENOXAPARIN SODIUM 40 MG: 40 INJECTION SUBCUTANEOUS at 08:17

## 2019-05-16 RX ADMIN — ACETAMINOPHEN 1000 MG: 500 TABLET, FILM COATED ORAL at 08:17

## 2019-05-16 RX ADMIN — GABAPENTIN 300 MG: 300 CAPSULE ORAL at 08:17

## 2019-05-16 RX ADMIN — ATORVASTATIN CALCIUM 10 MG: 10 TABLET, FILM COATED ORAL at 08:18

## 2019-05-16 RX ADMIN — LISINOPRIL 10 MG: 10 TABLET ORAL at 08:24

## 2019-05-16 RX ADMIN — MELOXICAM 7.5 MG: 7.5 TABLET ORAL at 08:17

## 2019-05-16 RX ADMIN — ALVIMOPAN 12 MG: 12 CAPSULE ORAL at 08:17

## 2019-05-16 RX ADMIN — ACETAMINOPHEN 1000 MG: 500 TABLET, FILM COATED ORAL at 20:17

## 2019-05-16 RX ADMIN — CEFOXITIN 2 G: 10 INJECTION, POWDER, FOR SOLUTION INTRAVENOUS at 04:11

## 2019-05-16 RX ADMIN — METOPROLOL SUCCINATE 25 MG: 25 TABLET, FILM COATED, EXTENDED RELEASE ORAL at 08:18

## 2019-05-16 RX ADMIN — ACETAMINOPHEN 1000 MG: 500 TABLET, FILM COATED ORAL at 15:33

## 2019-05-16 RX ADMIN — CEFOXITIN 2 G: 10 INJECTION, POWDER, FOR SOLUTION INTRAVENOUS at 20:17

## 2019-05-16 RX ADMIN — GABAPENTIN 100 MG: 100 CAPSULE ORAL at 15:33

## 2019-05-16 RX ADMIN — CYCLOBENZAPRINE 10 MG: 10 TABLET, FILM COATED ORAL at 15:36

## 2019-05-16 RX ADMIN — ACETAMINOPHEN 1000 MG: 500 TABLET, FILM COATED ORAL at 04:11

## 2019-05-16 RX ADMIN — GABAPENTIN 100 MG: 100 CAPSULE ORAL at 20:17

## 2019-05-16 RX ADMIN — POTASSIUM CHLORIDE, SODIUM CHLORIDE, CALCIUM CHLORIDE, SODIUM LACTATE, AND DEXTROSE MONOHYDRATE 75 ML/HR: 1.79; 6; .2; 3.1; 5 INJECTION, SOLUTION INTRAVENOUS at 13:17

## 2019-05-16 RX ADMIN — CEFOXITIN 2 G: 10 INJECTION, POWDER, FOR SOLUTION INTRAVENOUS at 12:02

## 2019-05-16 RX ADMIN — ALVIMOPAN 12 MG: 12 CAPSULE ORAL at 20:17

## 2019-05-17 LAB
ANION GAP SERPL CALCULATED.3IONS-SCNC: 7.9 MMOL/L
BUN BLD-MCNC: 11 MG/DL (ref 8–23)
BUN/CREAT SERPL: 14.5 (ref 7–25)
CALCIUM SPEC-SCNC: 8.7 MG/DL (ref 8.6–10.5)
CHLORIDE SERPL-SCNC: 105 MMOL/L (ref 98–107)
CO2 SERPL-SCNC: 27.1 MMOL/L (ref 22–29)
CREAT BLD-MCNC: 0.76 MG/DL (ref 0.57–1)
CYTO UR: NORMAL
GFR SERPL CREATININE-BSD FRML MDRD: 72 ML/MIN/1.73
GLUCOSE BLD-MCNC: 92 MG/DL (ref 65–99)
LAB AP CASE REPORT: NORMAL
PATH REPORT.FINAL DX SPEC: NORMAL
PATH REPORT.GROSS SPEC: NORMAL
POTASSIUM BLD-SCNC: 4.7 MMOL/L (ref 3.5–5.2)
SODIUM BLD-SCNC: 140 MMOL/L (ref 136–145)

## 2019-05-17 PROCEDURE — 99024 POSTOP FOLLOW-UP VISIT: CPT | Performed by: SURGERY

## 2019-05-17 PROCEDURE — 25010000003 CEFOXITIN PER 1 G: Performed by: SURGERY

## 2019-05-17 PROCEDURE — 80048 BASIC METABOLIC PNL TOTAL CA: CPT | Performed by: SURGERY

## 2019-05-17 PROCEDURE — 25010000002 ENOXAPARIN PER 10 MG: Performed by: SURGERY

## 2019-05-17 RX ADMIN — ENOXAPARIN SODIUM 40 MG: 40 INJECTION SUBCUTANEOUS at 09:02

## 2019-05-17 RX ADMIN — GABAPENTIN 100 MG: 100 CAPSULE ORAL at 09:02

## 2019-05-17 RX ADMIN — ACETAMINOPHEN 1000 MG: 500 TABLET, FILM COATED ORAL at 09:02

## 2019-05-17 RX ADMIN — LISINOPRIL 10 MG: 10 TABLET ORAL at 09:02

## 2019-05-17 RX ADMIN — ACETAMINOPHEN 1000 MG: 500 TABLET, FILM COATED ORAL at 02:32

## 2019-05-17 RX ADMIN — MELOXICAM 7.5 MG: 7.5 TABLET ORAL at 09:16

## 2019-05-17 RX ADMIN — ACETAMINOPHEN 1000 MG: 500 TABLET, FILM COATED ORAL at 17:58

## 2019-05-17 RX ADMIN — CEFOXITIN 2 G: 10 INJECTION, POWDER, FOR SOLUTION INTRAVENOUS at 05:23

## 2019-05-17 RX ADMIN — CEFOXITIN 2 G: 10 INJECTION, POWDER, FOR SOLUTION INTRAVENOUS at 21:01

## 2019-05-17 RX ADMIN — CEFOXITIN 2 G: 10 INJECTION, POWDER, FOR SOLUTION INTRAVENOUS at 12:30

## 2019-05-17 RX ADMIN — METOPROLOL SUCCINATE 25 MG: 25 TABLET, FILM COATED, EXTENDED RELEASE ORAL at 09:02

## 2019-05-17 RX ADMIN — ATORVASTATIN CALCIUM 10 MG: 10 TABLET, FILM COATED ORAL at 09:02

## 2019-05-18 VITALS
DIASTOLIC BLOOD PRESSURE: 75 MMHG | SYSTOLIC BLOOD PRESSURE: 155 MMHG | WEIGHT: 184.97 LBS | HEART RATE: 74 BPM | TEMPERATURE: 97.4 F | RESPIRATION RATE: 18 BRPM | BODY MASS INDEX: 32.77 KG/M2 | OXYGEN SATURATION: 96 % | HEIGHT: 63 IN

## 2019-05-18 LAB
BACTERIA SPEC AEROBE CULT: NORMAL
BACTERIA SPEC ANAEROBE CULT: ABNORMAL
GRAM STN SPEC: NORMAL
GRAM STN SPEC: NORMAL

## 2019-05-18 PROCEDURE — 25010000003 CEFOXITIN PER 1 G: Performed by: SURGERY

## 2019-05-18 PROCEDURE — 99024 POSTOP FOLLOW-UP VISIT: CPT | Performed by: SURGERY

## 2019-05-18 PROCEDURE — 25010000002 ENOXAPARIN PER 10 MG: Performed by: SURGERY

## 2019-05-18 RX ORDER — OXYCODONE HYDROCHLORIDE 5 MG/1
5 TABLET ORAL EVERY 4 HOURS PRN
Qty: 20 TABLET | Refills: 0 | Status: SHIPPED | OUTPATIENT
Start: 2019-05-18 | End: 2019-06-03

## 2019-05-18 RX ADMIN — MELOXICAM 7.5 MG: 7.5 TABLET ORAL at 08:27

## 2019-05-18 RX ADMIN — ENOXAPARIN SODIUM 40 MG: 40 INJECTION SUBCUTANEOUS at 08:28

## 2019-05-18 RX ADMIN — LISINOPRIL 10 MG: 10 TABLET ORAL at 08:27

## 2019-05-18 RX ADMIN — CEFOXITIN 2 G: 10 INJECTION, POWDER, FOR SOLUTION INTRAVENOUS at 04:55

## 2019-05-18 RX ADMIN — ATORVASTATIN CALCIUM 10 MG: 10 TABLET, FILM COATED ORAL at 08:27

## 2019-05-18 RX ADMIN — METOPROLOL SUCCINATE 25 MG: 25 TABLET, FILM COATED, EXTENDED RELEASE ORAL at 08:27

## 2019-05-19 ENCOUNTER — READMISSION MANAGEMENT (OUTPATIENT)
Dept: CALL CENTER | Facility: HOSPITAL | Age: 84
End: 2019-05-19

## 2019-05-19 NOTE — OUTREACH NOTE
Prep Survey      Responses   Facility patient discharged from?  [place]   Is patient eligible?  Yes   Discharge diagnosis  Laparoscopic low anterior colon resection,   Laparoscopic splenic flexure mobilization,    Laparoscopic left salpingo-oopherectomy   Does the patient have one of the following disease processes/diagnoses(primary or secondary)?  General Surgery   Does the patient have Home health ordered?  No   Is there a DME ordered?  No   Prep survey completed?  Yes          Geno Calderon RN

## 2019-05-21 ENCOUNTER — READMISSION MANAGEMENT (OUTPATIENT)
Dept: CALL CENTER | Facility: HOSPITAL | Age: 84
End: 2019-05-21

## 2019-05-21 NOTE — OUTREACH NOTE
General Surgery Week 1 Survey      Responses   Facility patient discharged from?  [place]   Does the patient have one of the following disease processes/diagnoses(primary or secondary)?  General Surgery   Is there a successful TCM telephone encounter documented?  No   Week 1 attempt successful?  No   Unsuccessful attempts  Attempt 1          Annika Harris RN

## 2019-05-23 ENCOUNTER — READMISSION MANAGEMENT (OUTPATIENT)
Dept: CALL CENTER | Facility: HOSPITAL | Age: 84
End: 2019-05-23

## 2019-05-23 NOTE — OUTREACH NOTE
General Surgery Week 1 Survey      Responses   Facility patient discharged from?  [place]   Does the patient have one of the following disease processes/diagnoses(primary or secondary)?  General Surgery   Is there a successful TCM telephone encounter documented?  No   Week 1 attempt successful?  No   Unsuccessful attempts  Attempt 2          Juliane Rudd RN

## 2019-05-25 ENCOUNTER — READMISSION MANAGEMENT (OUTPATIENT)
Dept: CALL CENTER | Facility: HOSPITAL | Age: 84
End: 2019-05-25

## 2019-05-25 NOTE — OUTREACH NOTE
General Surgery Week 2 Survey      Responses   Facility patient discharged from?  [place]   Does the patient have one of the following disease processes/diagnoses(primary or secondary)?  General Surgery   Week 2 attempt successful?  Yes   Call start time  1618   Call end time  1623   Discharge diagnosis  Laparoscopic low anterior colon resection,   Laparoscopic splenic flexure mobilization,    Laparoscopic left salpingo-oopherectomy   Meds reviewed with patient/caregiver?  Yes   Is the patient having any side effects they believe may be caused by any medication additions or changes?  No   Does the patient have all medications related to this admission filled (includes all antibiotics, pain medications, etc.)  Yes   Is the patient taking all medications as directed (includes completed medication regime)?  Yes   Does the patient have a follow up appointment scheduled with their surgeon?  Yes   Has the patient kept scheduled appointments due by today?  Yes   Has home health visited the patient within 72 hours of discharge?  N/A   Psychosocial issues?  No   Did the patient receive a copy of their discharge instructions?  Yes   Nursing interventions  Reviewed instructions with patient   What is the patient's perception of their health status since discharge?  Improving   Nursing interventions  Nurse provided patient education   Is the patient /caregiver able to teach back basic post-op care?  Take showers only when approved by MD-sponge bathe until then, No tub bath, swimming, or hot tub until instructed by MD, Lifting as instructed by MD in discharge instructions, Keep incision areas clean,dry and protected, Drive as instructed by MD in discharge instructions   Is the patient/caregiver able to teach back signs and symptoms of incisional infection?  Increased redness, swelling or pain at the incisonal site, Increased drainage or bleeding   Is the patient/caregiver able to teach back steps to recovery at home?  Set  small, achievable goals for return to baseline health, Rest and rebuild strength, gradually increase activity   Is the patient/caregiver able to teach back the hierarchy of who to call/visit for symptoms/problems? PCP, Specialist, Home health nurse, Urgent Care, ED, 911  Yes   Week 2 call completed?  Yes          Efrem Lawrence RN

## 2019-06-03 ENCOUNTER — OFFICE VISIT (OUTPATIENT)
Dept: SURGERY | Facility: CLINIC | Age: 84
End: 2019-06-03

## 2019-06-03 DIAGNOSIS — K62.6 RECTAL ULCER: ICD-10-CM

## 2019-06-03 DIAGNOSIS — K57.92 ACUTE DIVERTICULITIS: Primary | ICD-10-CM

## 2019-06-03 PROBLEM — K57.21 DIVERTICULITIS OF LARGE INTESTINE WITH ABSCESS WITH BLEEDING: Status: RESOLVED | Noted: 2019-05-06 | Resolved: 2019-06-03

## 2019-06-03 PROBLEM — K56.699 DIVERTICULAR STRICTURE: Status: RESOLVED | Noted: 2019-04-09 | Resolved: 2019-06-03

## 2019-06-03 PROCEDURE — 99024 POSTOP FOLLOW-UP VISIT: CPT | Performed by: SURGERY

## 2019-06-03 NOTE — PROGRESS NOTES
SURGERY: AALIYAH  Post op Visit  Dex Thakkar  06/03/19    Mrs. Thakkar presents today after having undergone Surgery 5/15/2019, of a laparoscopic low anterior colon resection, left salpingo-oophorectomy, drainage of abscess, with splenic flexure mobilization..  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  She also had an absence of the marginal artery which required a more extensive surgery than initially planned.  Her pathology showed a benign ovarian mass, and her diverticulitis.    Postoperatively she did well though she did have some difficulty with peristalsis at first.  She was discharged fairly quickly however and now comes in the office just looking fabulous.  I removed her drain today.  I will see her back in 6 months, as I think we may need to do a flexible sigmoidoscopy to evaluate the rectal ulcer that she had before surgery.    Renata Ray MD  6/3/2019

## 2019-06-04 ENCOUNTER — READMISSION MANAGEMENT (OUTPATIENT)
Dept: CALL CENTER | Facility: HOSPITAL | Age: 84
End: 2019-06-04

## 2019-06-04 NOTE — OUTREACH NOTE
General Surgery Week 3 Survey      Responses   Facility patient discharged from?  [place]   Does the patient have one of the following disease processes/diagnoses(primary or secondary)?  General Surgery   Week 3 attempt successful?  Yes   Call start time  1505   Call end time  1507   Discharge diagnosis  Laparoscopic low anterior colon resection,   Laparoscopic splenic flexure mobilization,    Laparoscopic left salpingo-oopherectomy   Meds reviewed with patient/caregiver?  Yes   Is the patient taking all medications as directed (includes completed medication regime)?  Yes   Does the patient have a follow up appointment scheduled with their surgeon?  Yes   Has the patient kept scheduled appointments due by today?  Yes   Comments  Had Post op appt with Dr. Ray yesterday   Did the patient receive a copy of their discharge instructions?  Yes   Nursing interventions  Reviewed instructions with patient   What is the patient's perception of their health status since discharge?  Improving   Nursing interventions  Nurse provided patient education   Is the patient /caregiver able to teach back basic post-op care?  Keep incision areas clean,dry and protected   Is the patient/caregiver able to teach back signs and symptoms of incisional infection?  Increased redness, swelling or pain at the incisonal site, Increased drainage or bleeding, Pus or odor from incision, Incisional warmth, Fever   Is the patient/caregiver able to teach back steps to recovery at home?  Set small, achievable goals for return to baseline health, Rest and rebuild strength, gradually increase activity, Eat a well-balance diet   Is the patient/caregiver able to teach back the hierarchy of who to call/visit for symptoms/problems? PCP, Specialist, Home health nurse, Urgent Care, ED, 911  Yes   Additional teach back comments  Pt says she is doing really well, no questions or concerns.   Week 3 call completed?  Yes          Enma You RN

## 2019-06-12 ENCOUNTER — READMISSION MANAGEMENT (OUTPATIENT)
Dept: CALL CENTER | Facility: HOSPITAL | Age: 84
End: 2019-06-12

## 2019-06-12 NOTE — OUTREACH NOTE
General Surgery Week 4 Survey      Responses   Facility patient discharged from?  [place]   Does the patient have one of the following disease processes/diagnoses(primary or secondary)?  General Surgery   Week 4 attempt successful?  Yes   Call start time  1653   Call end time  1654   Discharge diagnosis  Laparoscopic low anterior colon resection,   Laparoscopic splenic flexure mobilization,    Laparoscopic left salpingo-oopherectomy   Is patient permission given to speak with other caregiver?  Yes   List who call center can speak with     Is the patient taking all medications as directed (includes completed medication regime)?  Yes   Has the patient kept scheduled appointments due by today?  Yes   Psychosocial issues?  No   Comments  doing great per pt.    What is the patient's perception of their health status since discharge?  Improving   Nursing interventions  Nurse provided patient education   Is the patient/caregiver able to teach back the hierarchy of who to call/visit for symptoms/problems? PCP, Specialist, Home health nurse, Urgent Care, ED, 911  Yes   Week 4 call completed?  Yes   Would the patient like one additional call?  No   Graduated  Yes   Did the patient feel the follow up calls were helpful during their recovery period?  Yes   Was the number of calls appropriate?  Yes          Annika Harris RN

## 2019-07-11 ENCOUNTER — OFFICE VISIT (OUTPATIENT)
Dept: ORTHOPEDIC SURGERY | Facility: CLINIC | Age: 84
End: 2019-07-11

## 2019-07-11 VITALS — HEIGHT: 63 IN | BODY MASS INDEX: 32.78 KG/M2 | WEIGHT: 185 LBS | TEMPERATURE: 97.7 F

## 2019-07-11 DIAGNOSIS — M17.11 PRIMARY OSTEOARTHRITIS OF RIGHT KNEE: Primary | ICD-10-CM

## 2019-07-11 PROCEDURE — 20610 DRAIN/INJ JOINT/BURSA W/O US: CPT | Performed by: ORTHOPAEDIC SURGERY

## 2019-07-11 RX ORDER — METHYLPREDNISOLONE ACETATE 80 MG/ML
80 INJECTION, SUSPENSION INTRA-ARTICULAR; INTRALESIONAL; INTRAMUSCULAR; SOFT TISSUE
Status: COMPLETED | OUTPATIENT
Start: 2019-07-11 | End: 2019-07-11

## 2019-07-11 RX ADMIN — METHYLPREDNISOLONE ACETATE 80 MG: 80 INJECTION, SUSPENSION INTRA-ARTICULAR; INTRALESIONAL; INTRAMUSCULAR; SOFT TISSUE at 16:13

## 2019-07-11 NOTE — PROGRESS NOTES
7/11/2019    Dex Thakkar is here today for worsening knee pain. Pt has undergone injection of the knee in the past with good resolution of symptoms. Pt is requesting a repeat injection.     KNEE Injection Procedure Note:    Large Joint Arthrocentesis: R knee  Date/Time: 7/11/2019 4:13 PM  Consent given by: patient  Site marked: site marked  Timeout: Immediately prior to procedure a time out was called to verify the correct patient, procedure, equipment, support staff and site/side marked as required   Supporting Documentation  Indications: pain and joint swelling   Procedure Details  Location: knee - R knee  Preparation: Patient was prepped and draped in the usual sterile fashion  Needle size: 22 G (21)  Approach: anterolateral  Medications administered: 80 mg methylPREDNISolone acetate 80 MG/ML; 4 mL lidocaine (cardiac)  Patient tolerance: patient tolerated the procedure well with no immediate complications          At the conclusion of the injection I discussed the importance of continued quad strengthening exercises on a daily basis. I will see the patient back if the symptoms should fail to improve or worsen.    Keyonna Dumont MA  7/11/2019

## 2019-12-07 NOTE — PROGRESS NOTES
SURGERY  Dex Thakkar   1934  12/09/19    Chief Complaint:  Follow up of diverticulitis.    HPI    Mrs. Thakkar presents today after having undergone Surgery 5/15/2019, of a laparoscopic low anterior colon resection, left salpingo-oophorectomy, drainage of abscess, with splenic flexure mobilization..  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  She also had an absence of the marginal artery which required a more extensive surgery than initially planned.  Her pathology showed a benign ovarian mass, and her diverticulitis.    Postoperatively she did well though she did have some difficulty with peristalsis at first.  She was discharged fairly quickly however and now comes in the office just looking fabulous.  She is here today because I wanted her to have another flexible sigmoidoscopy at 6 months to evaluate the area of the prior rectal ulcer.  This was seen on her preoperative colonoscopy which was done 5/9/2019, were she was noted to have a rectal oral sore, oriented circumferentially about 3 cm wide by 4 mm long at 10 cm.  There are also ulcerations at the anorectal junction, perhaps reflective of a fissure or recent rectal impaction    Today she says she still has some difficulties with evacuation and has hemorrhoids.  Despite that she is having pretty good bowel movements, 3 times a day about finger size without any blood and no pelvic pain.      Past Medical History:   Diagnosis Date   • Diverticulitis    • H/O cardiac radiofrequency ablation    • Hyperlipidemia    • Hypertension    • Melanoma (CMS/HCC)    • Murmur      Past Surgical History:   Procedure Laterality Date   • CARDIAC ABLATION N/A 07/17/2017    Dr. Leigh   • CHOLECYSTECTOMY N/A    • COLON RESECTION N/A 5/15/2019     Procedure: laparoscopic low anterior colon resection with splenic flexure mobilization, left salpingo oophorectomy, drainage of peritoneal abscess;  Surgeon: Renata Ray MD;  Location: [facility] MAIN OR;  Service: General   • COLONOSCOPY N/A 10/15/2004    Sigmoid diverticulosis, smal rectal polyp, internal hemorrhoids-Dr. Renata Ray   • COLONOSCOPY N/A 12/14/2009    Sigmoid diverticulosis-Dr. Renata Ray   • COLONOSCOPY N/A 5/9/2019    Procedure: COLONOSCOPY to cecum with cold biopsies;  Surgeon: Renata Ray MD;  Location: [facility] ENDOSCOPY;  Service: General   • ENDOSCOPY N/A 10/07/2013    Gastric ulcerations x10, ulcerative esophagitis, small hiatal hernia-Dr. Renata Ray   • HYSTERECTOMY     • KNEE ARTHROSCOPY W/ PARTIAL MEDIAL MENISCECTOMY Right 10/24/2014    Dr. Bud Muhammad     Family History   Problem Relation Age of Onset   • Brain cancer Mother    • Cancer Father    • Cancer Daughter    • Heart valve disorder Daughter    • Breast cancer Sister    • Malig Hyperthermia Neg Hx      Social History     Socioeconomic History   • Marital status:      Spouse name: Not on file   • Number of children: Not on file   • Years of education: Not on file   • Highest education level: Not on file   Tobacco Use   • Smoking status: Former Smoker   • Smokeless tobacco: Never Used   • Tobacco comment: QUIT 30  YRS AGO   Substance and Sexual Activity   • Alcohol use: Yes     Comment: OCCASIONAL   • Drug use: No   • Sexual activity: Defer         Current Outpatient Medications:   •  lisinopril (PRINIVIL,ZESTRIL) 10 MG tablet, Take 10 mg by mouth Daily., Disp: , Rfl:   •  metoprolol succinate XL (TOPROL-XL) 25 MG 24 hr tablet, Take 25 mg by mouth Daily., Disp: , Rfl:   •  simvastatin (ZOCOR) 10 MG tablet, Take 10 mg by mouth Every Night., Disp: , Rfl:     No Known Allergies  Review of Systems  Positive for occasional difficulty with evacuating  All other systems reviewed and negative.    Vitals:    12/09/19 1412  "  Pulse: 67   SpO2: 96%   Weight: 87.1 kg (192 lb)   Height: 160 cm (63\")       PHYSICAL EXAM:    Pulse 67   Ht 160 cm (63\")   Wt 87.1 kg (192 lb)   SpO2 96%   BMI 34.01 kg/m²   Body mass index is 34.01 kg/m².    Constitutional: well developed, well nourished, appears very healthy, younger than stated age  Eyes: sclera nonicteric, conjunctiva not injected   ENMT: Hearing intact, trachea midline, dentitia in good order  CVS: Atrial fibrillation, no murmur, peripheral edema not present  Respiratory: CTA, normal respiratory effort   Gastrointestinal: no hepatosplenomegaly, abdomen soft, nontender, abdominal hernia not detected  Genitourinary: inguinal hernia not detected  Musculoskeletal: gait normal, muscle mass normal  Skin: warm and dry, no rashes visible  Neurological: awake and alert, seems to have reasonable capacity for understanding for medical decision making  Psychiatric: appears to have reasonable judgement, pleasant  Lymphatics: no cervical adenopathy    Radiographic/Lab Findings: None    Pamphlet reviewed: None    IMPRESSION:  · History of diverticulitis with a stricture, status post resection with an abscess  · History of rectal ulcer, with some mild symptoms remaining  · History of urinary retention, suspected due to fecal impaction, which may have contributed to rectal ulcer as well    PLAN:  · Flexible sigmoidoscopy.  I think we need to evaluate this rectal ulcer and make sure that this is healed up.  · 2 enemas in preparation  · We will do after the first of the year    Renata Ray MD  12/09/19  6:40 PM  "

## 2019-12-09 ENCOUNTER — OFFICE VISIT (OUTPATIENT)
Dept: SURGERY | Facility: CLINIC | Age: 84
End: 2019-12-09

## 2019-12-09 VITALS — HEIGHT: 63 IN | HEART RATE: 67 BPM | BODY MASS INDEX: 34.02 KG/M2 | WEIGHT: 192 LBS | OXYGEN SATURATION: 96 %

## 2019-12-09 DIAGNOSIS — K62.6 RECTAL ULCER: Primary | ICD-10-CM

## 2019-12-09 PROCEDURE — 99213 OFFICE O/P EST LOW 20 MIN: CPT | Performed by: SURGERY

## 2020-01-20 ENCOUNTER — ANESTHESIA EVENT (OUTPATIENT)
Dept: GASTROENTEROLOGY | Facility: HOSPITAL | Age: 85
End: 2020-01-20

## 2020-01-20 ENCOUNTER — HOSPITAL ENCOUNTER (OUTPATIENT)
Facility: HOSPITAL | Age: 85
Setting detail: HOSPITAL OUTPATIENT SURGERY
Discharge: HOME | End: 2020-01-20
Attending: SURGERY | Admitting: SURGERY

## 2020-01-20 ENCOUNTER — PREP FOR SURGERY (OUTPATIENT)
Dept: OTHER | Facility: HOSPITAL | Age: 85
End: 2020-01-20

## 2020-01-20 ENCOUNTER — ANESTHESIA (OUTPATIENT)
Dept: GASTROENTEROLOGY | Facility: HOSPITAL | Age: 85
End: 2020-01-20

## 2020-01-20 VITALS
DIASTOLIC BLOOD PRESSURE: 72 MMHG | WEIGHT: 194 LBS | OXYGEN SATURATION: 97 % | TEMPERATURE: 98 F | BODY MASS INDEX: 33.12 KG/M2 | HEIGHT: 64 IN | SYSTOLIC BLOOD PRESSURE: 152 MMHG | HEART RATE: 65 BPM | RESPIRATION RATE: 16 BRPM

## 2020-01-20 DIAGNOSIS — K62.6 RECTAL ULCER: ICD-10-CM

## 2020-01-20 DIAGNOSIS — K91.30 ANASTOMOTIC STRICTURE OF COLORECTAL REGION: Primary | ICD-10-CM

## 2020-01-20 PROCEDURE — 45340 SIG W/TNDSC BALLOON DILATION: CPT | Performed by: SURGERY

## 2020-01-20 PROCEDURE — 88305 TISSUE EXAM BY PATHOLOGIST: CPT | Performed by: SURGERY

## 2020-01-20 PROCEDURE — 25010000002 PROPOFOL 10 MG/ML EMULSION: Performed by: NURSE ANESTHETIST, CERTIFIED REGISTERED

## 2020-01-20 PROCEDURE — C1726 CATH, BAL DIL, NON-VASCULAR: HCPCS | Performed by: SURGERY

## 2020-01-20 RX ORDER — PROPOFOL 10 MG/ML
VIAL (ML) INTRAVENOUS AS NEEDED
Status: DISCONTINUED | OUTPATIENT
Start: 2020-01-20 | End: 2020-01-20 | Stop reason: SURG

## 2020-01-20 RX ORDER — LIDOCAINE HYDROCHLORIDE 20 MG/ML
INJECTION, SOLUTION INFILTRATION; PERINEURAL AS NEEDED
Status: DISCONTINUED | OUTPATIENT
Start: 2020-01-20 | End: 2020-01-20 | Stop reason: SURG

## 2020-01-20 RX ORDER — ONDANSETRON 2 MG/ML
4 INJECTION INTRAMUSCULAR; INTRAVENOUS ONCE AS NEEDED
Status: DISCONTINUED | OUTPATIENT
Start: 2020-01-20 | End: 2020-01-20 | Stop reason: HOSPADM

## 2020-01-20 RX ORDER — PROPOFOL 10 MG/ML
VIAL (ML) INTRAVENOUS CONTINUOUS PRN
Status: DISCONTINUED | OUTPATIENT
Start: 2020-01-20 | End: 2020-01-20 | Stop reason: SURG

## 2020-01-20 RX ORDER — SODIUM CHLORIDE, SODIUM LACTATE, POTASSIUM CHLORIDE, CALCIUM CHLORIDE 600; 310; 30; 20 MG/100ML; MG/100ML; MG/100ML; MG/100ML
1000 INJECTION, SOLUTION INTRAVENOUS CONTINUOUS
Status: DISCONTINUED | OUTPATIENT
Start: 2020-01-20 | End: 2020-01-20 | Stop reason: HOSPADM

## 2020-01-20 RX ADMIN — LIDOCAINE HYDROCHLORIDE 40 MG: 20 INJECTION, SOLUTION INFILTRATION; PERINEURAL at 09:15

## 2020-01-20 RX ADMIN — PROPOFOL 50 MG: 10 INJECTION, EMULSION INTRAVENOUS at 09:16

## 2020-01-20 RX ADMIN — PROPOFOL 200 MCG/KG/MIN: 10 INJECTION, EMULSION INTRAVENOUS at 09:16

## 2020-01-20 RX ADMIN — PROPOFOL 50 MG: 10 INJECTION, EMULSION INTRAVENOUS at 09:18

## 2020-01-20 RX ADMIN — SODIUM CHLORIDE, POTASSIUM CHLORIDE, SODIUM LACTATE AND CALCIUM CHLORIDE 1000 ML: 600; 310; 30; 20 INJECTION, SOLUTION INTRAVENOUS at 08:59

## 2020-01-20 NOTE — ANESTHESIA POSTPROCEDURE EVALUATION
Patient: Dex Thakkar    Procedure Summary     Date:  01/20/20 Room / Location:   [facility] /  [facility]    Anesthesia Start:  0911 Anesthesia Stop:  0930    Procedure:  FLEXIBLE SIGMOIDOSCOPY WITH BIOPSY, balloon dilation (N/A ) Diagnosis:       Rectal ulcer      (Rectal ulcer [K62.6])    Surgeon:  Renata Ray MD Provider:  Michelle Bailey MD    Anesthesia Type:  MAC ASA Status:  3          Anesthesia Type: MAC    Vitals  Vitals Value Taken Time   /72 1/20/2020  9:50 AM   Temp     Pulse 65 1/20/2020  9:50 AM   Resp 16 1/20/2020  9:50 AM   SpO2 97 % 1/20/2020  9:50 AM           Post Anesthesia Care and Evaluation    Patient location during evaluation: PACU  Patient participation: complete - patient participated  Level of consciousness: awake and alert  Pain management: adequate  Airway patency: patent  Anesthetic complications: No anesthetic complications  PONV Status: none  Cardiovascular status: acceptable  Respiratory status: acceptable  Hydration status: acceptable

## 2020-01-20 NOTE — OP NOTE
Sigmoidoscopy Procedure Note  Dex Thakkar  1934  Date of Procedure: 01/20/20    Pre-operative Diagnosis:    · rectal ulcer 5/2019 oriented circumferentially, about 3 cm wide, 4mm long at 10 cm, the thin ulcerations at the anorectal junction.  · History of fecal impaction, likely from her recurrent diverticulitis.    Post-operative Diagnosis:  · Anastomotic stricture, with benign appearing nodule, likely reactive, 10 mm opening     Procedure: Flexible Sigmoidoscopy with biopsy and balloon dilatation     Findings/Treatments:   · Dilated up to 18 mm with balloon       Recommendations:   · Flexible Sigmoidoscopy in 6 months with possible dilatation.  · C scope in 10 years  · Keep a copy of the photographs of the procedure given to you today for possible need for reference in the future.    Surgeon: Flor    Anesthetic: MAC per Michelle Bailey MD    Indications:  As above     Procedure Details     MAC anesthesia was induced.  The 180 Colonoscopy was inserted blindly into the rectum and advanced to the anastomosis, where there was a stricture and nodule, with relative ease.  It was judged to be about 10 mm as it would not allow the scope to traverse.   Area in question was identified and photographed for documentation.      Prep quality was good.  Balloon was inserted thru the opening and increased in size from 15 to 18 mm, held for 1 minute.  Scope could then advance.  The nodule was biopsied with the cold biopsy forceps.  There was no residual ulcer.      Renata Ray MD  01/20/20  9:29 AM

## 2020-01-20 NOTE — DISCHARGE INSTRUCTIONS
For the next 24 hours patient needs to be with a responsible adult.    For 24 hours DO NOT drive, operate machinery, appliances, drink alcohol, make important decisions or sign legal documents.    Start with a light or bland diet and advance to regular diet as tolerated.    Follow recommendations on procedure report provided by your doctor.    Call Dr Ray for problems [phone] and for biopsy results in 7-10 days    Problems may include but not limited to: large amounts of bleeding, trouble breathing, repeated vomiting, severe unrelieved pain, fever or chills.

## 2020-01-20 NOTE — ANESTHESIA PREPROCEDURE EVALUATION
Anesthesia Evaluation     Patient summary reviewed and Nursing notes reviewed   no history of anesthetic complications:  NPO Solid Status: > 8 hours  NPO Liquid Status: > 2 hours           Airway   Mallampati: II  TM distance: >3 FB  Neck ROM: full  No difficulty expected  Dental - normal exam     Pulmonary - negative pulmonary ROS and normal exam    breath sounds clear to auscultation  Cardiovascular     Rhythm: regular  Rate: normal    (+) hypertension 2 medications or greater, valvular problems/murmurs murmur and AS, dysrhythmias Atrial Fib, murmur, hyperlipidemia,  carotid artery disease carotid bilateral    ROS comment: Hx ablation    Neuro/Psych- negative ROS  GI/Hepatic/Renal/Endo - negative ROS     Musculoskeletal (-) negative ROS    Abdominal  - normal exam   Substance History - negative use     OB/GYN negative ob/gyn ROS         Other      history of cancer remission                  Anesthesia Plan    ASA 3     MAC     intravenous induction     Anesthetic plan, all risks, benefits, and alternatives have been provided, discussed and informed consent has been obtained with: patient.

## 2020-01-20 NOTE — H&P
Cc: Endoscopy Visit    HPI: 85 y.o. female here for flex sigmoidoscopy to follow up on a rectal ulcer detected on c scope in 5/2019 at which time the patient had a fecal impaction, likely from her recurrent diverticulitis.  The ulcer was oriented circumferentially, about 3 cm wide, 4mm long at 10 cm, the thin ulcerations at the anorectal junction.      Past Medical History:   Diagnosis Date   • A-fib (CMS/HCC)    • Diverticulitis    • H/O cardiac radiofrequency ablation    • Hyperlipidemia    • Hypertension    • Melanoma (CMS/HCC)    • Murmur        Past Surgical History:   Procedure Laterality Date   • CARDIAC ABLATION N/A 07/17/2017    Dr. Leigh   • CHOLECYSTECTOMY N/A    • COLON RESECTION N/A 5/15/2019    Procedure: laparoscopic low anterior colon resection with splenic flexure mobilization, left salpingo oophorectomy, drainage of peritoneal abscess;  Surgeon: Renata Ray MD;  Location: [facility] MAIN OR;  Service: General   • COLONOSCOPY N/A 10/15/2004    Sigmoid diverticulosis, smal rectal polyp, internal hemorrhoids-Dr. Renata Ray   • COLONOSCOPY N/A 12/14/2009    Sigmoid diverticulosis-Dr. eRnata Ray   • COLONOSCOPY N/A 5/9/2019    Procedure: COLONOSCOPY to cecum with cold biopsies;  Surgeon: Renata Ray MD;  Location: [facility] ENDOSCOPY;  Service: General   • ENDOSCOPY N/A 10/07/2013    Gastric ulcerations x10, ulcerative esophagitis, small hiatal hernia-Dr. Renata Ray   • HYSTERECTOMY     • KNEE ARTHROSCOPY W/ PARTIAL MEDIAL MENISCECTOMY Right 10/24/2014    Dr. Bud Muhammad       has No Known Allergies.       Medication List      ASK your doctor about these medications    lisinopril 10 MG tablet  Commonly known as:  PRINIVIL,ZESTRIL     metoprolol succinate XL 25 MG 24 hr tablet  Commonly known as:  TOPROL-XL     simvastatin 10 MG tablet  Commonly known as:  ZOCOR          Family History   Problem Relation Age of Onset   • Brain cancer Mother    • Cancer Father    • Cancer Daughter    • Heart  valve disorder Daughter    • Breast cancer Sister    • Malig Hyperthermia Neg Hx        Social History     Socioeconomic History   • Marital status:      Spouse name: Not on file   • Number of children: Not on file   • Years of education: Not on file   • Highest education level: Not on file   Tobacco Use   • Smoking status: Former Smoker   • Smokeless tobacco: Never Used   • Tobacco comment: QUIT 30  YRS AGO   Substance and Sexual Activity   • Alcohol use: Yes     Comment: OCCASIONAL   • Drug use: No   • Sexual activity: Defer       Vitals:    01/20/20 0849   BP: 177/76   Pulse: 66   Resp: 16   Temp: 98 °F (36.7 °C)   SpO2: 96%       Body mass index is 33.3 kg/m².    Physical Exam    General: No acute distress  Lungs: No labored breathing, Pulse oximetry on room air is 96%.  Heart: RRR  Abdo: Soft  Mental:  Awake, alert, and oriented    Imp:     · rectal ulcer 5/2019 oriented circumferentially, about 3 cm wide, 4mm long at 10 cm, the thin ulcerations at the anorectal junction.  · History of fecal impaction, likely from her recurrent diverticulitis.       Plan:  · Flex sig    Renata Ray MD  9:15 AM

## 2020-01-21 LAB
CYTO UR: NORMAL
LAB AP CASE REPORT: NORMAL
PATH REPORT.FINAL DX SPEC: NORMAL
PATH REPORT.GROSS SPEC: NORMAL

## 2020-06-04 PROBLEM — K91.30 ANASTOMOTIC STRICTURE OF COLORECTAL REGION: Status: ACTIVE | Noted: 2020-06-04

## 2020-07-13 ENCOUNTER — TRANSCRIBE ORDERS (OUTPATIENT)
Dept: SLEEP MEDICINE | Facility: HOSPITAL | Age: 85
End: 2020-07-13

## 2020-07-13 DIAGNOSIS — Z01.818 OTHER SPECIFIED PRE-OPERATIVE EXAMINATION: Primary | ICD-10-CM

## 2020-07-17 ENCOUNTER — LAB (OUTPATIENT)
Dept: LAB | Facility: HOSPITAL | Age: 85
End: 2020-07-17

## 2020-07-17 DIAGNOSIS — Z01.818 OTHER SPECIFIED PRE-OPERATIVE EXAMINATION: ICD-10-CM

## 2020-07-17 PROCEDURE — C9803 HOPD COVID-19 SPEC COLLECT: HCPCS

## 2020-07-17 PROCEDURE — U0004 COV-19 TEST NON-CDC HGH THRU: HCPCS

## 2020-07-18 LAB
REF LAB TEST METHOD: NORMAL
SARS-COV-2 RNA RESP QL NAA+PROBE: NOT DETECTED

## 2020-07-20 ENCOUNTER — ANESTHESIA (OUTPATIENT)
Dept: GASTROENTEROLOGY | Facility: HOSPITAL | Age: 85
End: 2020-07-20

## 2020-07-20 ENCOUNTER — ANESTHESIA EVENT (OUTPATIENT)
Dept: GASTROENTEROLOGY | Facility: HOSPITAL | Age: 85
End: 2020-07-20

## 2020-07-20 ENCOUNTER — HOSPITAL ENCOUNTER (OUTPATIENT)
Facility: HOSPITAL | Age: 85
Setting detail: HOSPITAL OUTPATIENT SURGERY
Discharge: HOME | End: 2020-07-20
Attending: SURGERY | Admitting: SURGERY

## 2020-07-20 VITALS
RESPIRATION RATE: 12 BRPM | HEART RATE: 58 BPM | OXYGEN SATURATION: 100 % | DIASTOLIC BLOOD PRESSURE: 78 MMHG | HEIGHT: 64 IN | TEMPERATURE: 98.2 F | BODY MASS INDEX: 33.12 KG/M2 | SYSTOLIC BLOOD PRESSURE: 161 MMHG | WEIGHT: 194 LBS

## 2020-07-20 DIAGNOSIS — K91.30 ANASTOMOTIC STRICTURE OF COLORECTAL REGION: ICD-10-CM

## 2020-07-20 PROCEDURE — 88305 TISSUE EXAM BY PATHOLOGIST: CPT | Performed by: SURGERY

## 2020-07-20 PROCEDURE — 45331 SIGMOIDOSCOPY AND BIOPSY: CPT | Performed by: SURGERY

## 2020-07-20 PROCEDURE — 25010000002 PROPOFOL 10 MG/ML EMULSION: Performed by: ANESTHESIOLOGY

## 2020-07-20 RX ORDER — SODIUM CHLORIDE 0.9 % (FLUSH) 0.9 %
10 SYRINGE (ML) INJECTION AS NEEDED
Status: DISCONTINUED | OUTPATIENT
Start: 2020-07-20 | End: 2020-07-20 | Stop reason: HOSPADM

## 2020-07-20 RX ORDER — PROPOFOL 10 MG/ML
VIAL (ML) INTRAVENOUS AS NEEDED
Status: DISCONTINUED | OUTPATIENT
Start: 2020-07-20 | End: 2020-07-20 | Stop reason: SURG

## 2020-07-20 RX ORDER — LIDOCAINE HYDROCHLORIDE 20 MG/ML
INJECTION, SOLUTION INFILTRATION; PERINEURAL AS NEEDED
Status: DISCONTINUED | OUTPATIENT
Start: 2020-07-20 | End: 2020-07-20 | Stop reason: SURG

## 2020-07-20 RX ORDER — SODIUM CHLORIDE, SODIUM LACTATE, POTASSIUM CHLORIDE, CALCIUM CHLORIDE 600; 310; 30; 20 MG/100ML; MG/100ML; MG/100ML; MG/100ML
30 INJECTION, SOLUTION INTRAVENOUS CONTINUOUS PRN
Status: DISCONTINUED | OUTPATIENT
Start: 2020-07-20 | End: 2020-07-20 | Stop reason: HOSPADM

## 2020-07-20 RX ADMIN — LIDOCAINE HYDROCHLORIDE 100 MG: 20 INJECTION, SOLUTION INFILTRATION; PERINEURAL at 09:37

## 2020-07-20 RX ADMIN — PROPOFOL 100 MG: 10 INJECTION, EMULSION INTRAVENOUS at 09:37

## 2020-07-20 RX ADMIN — SODIUM CHLORIDE, POTASSIUM CHLORIDE, SODIUM LACTATE AND CALCIUM CHLORIDE 30 ML/HR: 600; 310; 30; 20 INJECTION, SOLUTION INTRAVENOUS at 09:12

## 2020-07-20 NOTE — H&P
Cc: Endoscopy Visit    HPI: 86 y.o. female here for flex sig with possible dilatation having a history of a rectal ulcer and a resection for diverticulitis, with anastomotic stricture found 1/20/20.  It was dilated to 18 mm with a balloon.    Past Medical History:   Diagnosis Date   • A-fib (CMS/HCC)    • Diverticulitis    • H/O cardiac radiofrequency ablation    • Hyperlipidemia    • Hypertension    • Melanoma (CMS/HCC)    • Murmur        Past Surgical History:   Procedure Laterality Date   • CARDIAC ABLATION N/A 07/17/2017    Dr. Leigh   • CHOLECYSTECTOMY N/A    • COLON RESECTION N/A 5/15/2019    Procedure: laparoscopic low anterior colon resection with splenic flexure mobilization, left salpingo oophorectomy, drainage of peritoneal abscess;  Surgeon: Renata Ray MD;  Location: [facility] MAIN OR;  Service: General   • COLONOSCOPY N/A 10/15/2004    Sigmoid diverticulosis, smal rectal polyp, internal hemorrhoids-Dr. Renata Ray   • COLONOSCOPY N/A 12/14/2009    Sigmoid diverticulosis-Dr. Renata Ray   • COLONOSCOPY N/A 5/9/2019    Procedure: COLONOSCOPY to cecum with cold biopsies;  Surgeon: Renata Ray MD;  Location: [facility] ENDOSCOPY;  Service: General   • ENDOSCOPY N/A 10/07/2013    Gastric ulcerations x10, ulcerative esophagitis, small hiatal hernia-Dr. Renata Ray   • HYSTERECTOMY     • KNEE ARTHROSCOPY W/ PARTIAL MEDIAL MENISCECTOMY Right 10/24/2014    Dr. Bud Muhammad   • SIGMOIDOSCOPY N/A 1/20/2020    Procedure: FLEXIBLE SIGMOIDOSCOPY WITH BIOPSY, balloon dilation;  Surgeon: Renata Ray MD;  [facility];  Service: General   • SKIN CANCER EXCISION Left     Melanoma left leg       has No Known Allergies.       Medication List      ASK your doctor about these medications    lisinopril 10 MG tablet  Commonly known as:  PRINIVIL,ZESTRIL     metoprolol succinate XL 25 MG 24 hr tablet  Commonly known as:  TOPROL-XL     simvastatin 10 MG tablet  Commonly known as:  ZOCOR          Family  History   Problem Relation Age of Onset   • Brain cancer Mother    • Cancer Father    • Cancer Daughter    • Heart valve disorder Daughter    • Breast cancer Sister    • Malig Hyperthermia Neg Hx        Social History     Socioeconomic History   • Marital status:      Spouse name: Not on file   • Number of children: Not on file   • Years of education: Not on file   • Highest education level: Not on file   Tobacco Use   • Smoking status: Former Smoker     Types: Cigarettes     Last attempt to quit: 1984     Years since quittin.0   • Smokeless tobacco: Never Used   • Tobacco comment: QUIT 30  YRS AGO   Substance and Sexual Activity   • Alcohol use: Yes     Alcohol/week: 1.0 standard drinks     Types: 1 Glasses of wine per week     Comment: OCCASIONAL   • Drug use: No   • Sexual activity: Defer       Vitals:    20 0859   BP: 180/76   Pulse: 60   Resp: 20   Temp: 98.6 °F (37 °C)   SpO2: 97%       Body mass index is 33.3 kg/m².    Physical Exam    General: No acute distress  Lungs: No labored breathing, Pulse oximetry on room air is 97%.  Heart: RRR  Abdo: Soft  Mental:  Awake, alert, and oriented    Imp:     · Anastomotic stricture     Plan:  · Flex sig    Renata Ray MD  9:37 AM

## 2020-07-20 NOTE — ANESTHESIA POSTPROCEDURE EVALUATION
"Patient: Dex Thakkar    Procedure Summary     Date:  07/20/20 Room / Location:  [facility] ENDOSCOPY 4 /  [facility]    Anesthesia Start:  0935 Anesthesia Stop:  0948    Procedure:  FLEXIBLE SIGMOIDOSCOPY with bx (N/A ) Diagnosis:       Anastomotic stricture of colorectal region      (Anastomotic stricture of colorectal region [K91.30])    Surgeon:  Renata Ray MD Provider:  Fabian Bonilla MD    Anesthesia Type:  MAC ASA Status:  3          Anesthesia Type: MAC    Vitals  Vitals Value Taken Time   /78 7/20/2020 10:08 AM   Temp 36.8 °C (98.2 °F) 7/20/2020  9:49 AM   Pulse 58 7/20/2020 10:08 AM   Resp 12 7/20/2020 10:08 AM   SpO2 100 % 7/20/2020 10:08 AM           Post Anesthesia Care and Evaluation    Patient location during evaluation: bedside  Patient participation: complete - patient participated  Level of consciousness: awake and alert  Pain management: adequate  Airway patency: patent  Anesthetic complications: No anesthetic complications    Cardiovascular status: acceptable  Respiratory status: acceptable  Hydration status: acceptable    Comments: /78   Pulse 58   Temp 36.8 °C (98.2 °F) (Oral)   Resp 12   Ht 162.6 cm (64\")   Wt 88 kg (194 lb)   SpO2 100%   BMI 33.30 kg/m²       "

## 2020-07-20 NOTE — OP NOTE
Sigmoidoscopy Procedure Note  Dex Thakkar  1934  Date of Procedure: 07/20/20    Pre-operative Diagnosis:    · Anastomotic stricture dilated from 10 mm to 18 mm 1/2020.  · rectal ulcer 5/2019 oriented circumferentially, about 3 cm wide, 4mm long at 10 cm, the thin ulcerations at the anorectal junction.  · History of fecal impaction, likely from her recurrent diverticulitis.    Post-operative Diagnosis:  · Mild anastomotic stricture, about 1.9 cm  · Anastomotic nodule  · Rectal ulcer healed..    Procedure: Flexible Sigmoidoscopy with biopsy       Recommendations:   · Flexible Sigmoidoscopy 6 months  · C scope 10 years  · Keep a copy of the photographs of the procedure given to you today for possible need for reference in the future.    Surgeon: Flor    Anesthetic: MAC per Fabian Bonilla MD    Indications:  As above     Procedure Details     MAC anesthesia was induced.  The 180 Colonoscopy was inserted blindly into the rectum and advanced to the sigmoid, with relative ease.    Area in question was identified and photographed for documentation.  There were visible staples and a somewhat offset anastomosis but with the stricture widely patent.  I did not feel that a balloon would offer any improvement as it appears to be improving on it's own now.  I biopsied a small nodule with cold biopsy forceps.    Renata Ray MD  07/20/20  9:48 AM

## 2020-07-20 NOTE — ANESTHESIA PREPROCEDURE EVALUATION
Anesthesia Evaluation     Patient summary reviewed and Nursing notes reviewed   no history of anesthetic complications:  NPO Solid Status: > 8 hours  NPO Liquid Status: > 2 hours           Airway   Mallampati: II  TM distance: >3 FB  Neck ROM: full  No difficulty expected  Dental - normal exam     Pulmonary - negative pulmonary ROS and normal exam    breath sounds clear to auscultation  Cardiovascular     Rhythm: regular  Rate: normal    (+) hypertension 2 medications or greater, valvular problems/murmurs murmur and AS, dysrhythmias Atrial Fib, murmur, hyperlipidemia,  carotid artery disease carotid bilateral    ROS comment: Hx ablation    Neuro/Psych- negative ROS  GI/Hepatic/Renal/Endo    (+) obesity,       Musculoskeletal (-) negative ROS    Abdominal  - normal exam   Substance History - negative use     OB/GYN negative ob/gyn ROS         Other      history of cancer remission                      Anesthesia Plan    ASA 3     MAC     intravenous induction     Anesthetic plan, all risks, benefits, and alternatives have been provided, discussed and informed consent has been obtained with: patient.

## 2020-07-21 LAB
CYTO UR: NORMAL
LAB AP CASE REPORT: NORMAL
LAB AP DIAGNOSIS COMMENT: NORMAL
PATH REPORT.FINAL DX SPEC: NORMAL
PATH REPORT.GROSS SPEC: NORMAL

## 2020-07-21 NOTE — PROGRESS NOTES
Jennifer (endoscopy liaison),    Please call patient to inform them of these pathology findings (CAPITALIZED) and recommendations (CAPITALIZED) that have been added to my operative note.  Please ensure that any pamphlets that were to be given to the patient at the hospital were received.     Please make sure a letter is sent to the patient, recall method entered into the computer and the HIM tab updated as far as need for endoscopy follow up.    Thanks  Dr Ray    Sigmoidoscopy Procedure Note  Dex TASIA Thakkar  1934  Date of Procedure: 07/20/20     Pre-operative Diagnosis:    · Anastomotic stricture dilated from 10 mm to 18 mm 1/2020.  · rectal ulcer 5/2019 oriented circumferentially, about 3 cm wide, 4mm long at 10 cm, the thin ulcerations at the anorectal junction.  · History of fecal impaction, likely from her recurrent diverticulitis.     Post-operative Diagnosis:  · Mild anastomotic stricture, about 1.9 cm  · Anastomotic nodule;  BIOPSIED AND BENIGN  · Rectal ulcer healed..     Procedure: Flexible Sigmoidoscopy with biopsy    Recommendations:   · Flexible Sigmoidoscopy 6 months;  YES.  · C scope 10 years  · Keep a copy of the photographs of the procedure given to you today for possible need for reference in the future.

## 2020-07-23 ENCOUNTER — TELEPHONE (OUTPATIENT)
Dept: SURGERY | Facility: CLINIC | Age: 85
End: 2020-07-23

## 2020-07-23 NOTE — TELEPHONE ENCOUNTER
----- Message from Renata Ray MD sent at 7/21/2020  2:37 PM EDT -----  Jennifer (endoscopy liaison),    Please call patient to inform them of these pathology findings (CAPITALIZED) and recommendations (CAPITALIZED) that have been added to my operative note.  Please ensure that any pamphlets that were to be given to the patient at the hospital were received.     Please make sure a letter is sent to the patient, recall method entered into the computer and the HIM tab updated as far as need for endoscopy follow up.    Thanks  Dr Ray    Sigmoidoscopy Procedure Note  Dieterhamiltoncarter TASIA Thakkar  1934  Date of Procedure: 07/20/20     Pre-operative Diagnosis:    · Anastomotic stricture dilated from 10 mm to 18 mm 1/2020.  · rectal ulcer 5/2019 oriented circumferentially, about 3 cm wide, 4mm long at 10 cm, the thin ulcerations at the anorectal junction.  · History of fecal impaction, likely from her recurrent diverticulitis.     Post-operative Diagnosis:  · Mild anastomotic stricture, about 1.9 cm  · Anastomotic nodule;  BIOPSIED AND BENIGN  · Rectal ulcer healed..     Procedure: Flexible Sigmoidoscopy with biopsy                                        Recommendations:   · Flexible Sigmoidoscopy 6 months;  YES.  · C scope 10 years  · Keep a copy of the photographs of the procedure given to you today for possible need for reference in the future.

## 2020-11-03 ENCOUNTER — TELEPHONE (OUTPATIENT)
Dept: SURGERY | Facility: CLINIC | Age: 85
End: 2020-11-03

## 2020-11-03 DIAGNOSIS — K91.30 ANASTOMOTIC STRICTURE OF COLORECTAL REGION: Primary | ICD-10-CM

## 2020-12-09 ENCOUNTER — TELEPHONE (OUTPATIENT)
Dept: SURGERY | Facility: CLINIC | Age: 85
End: 2020-12-09

## 2021-03-09 DIAGNOSIS — Z23 IMMUNIZATION DUE: ICD-10-CM

## 2021-04-19 ENCOUNTER — APPOINTMENT (OUTPATIENT)
Dept: CARDIOLOGY | Facility: HOSPITAL | Age: 86
End: 2021-04-19

## 2021-04-19 ENCOUNTER — HOSPITAL ENCOUNTER (EMERGENCY)
Facility: HOSPITAL | Age: 86
Discharge: HOME | End: 2021-04-19
Attending: EMERGENCY MEDICINE | Admitting: EMERGENCY MEDICINE

## 2021-04-19 ENCOUNTER — APPOINTMENT (OUTPATIENT)
Dept: GENERAL RADIOLOGY | Facility: HOSPITAL | Age: 86
End: 2021-04-19

## 2021-04-19 VITALS
SYSTOLIC BLOOD PRESSURE: 176 MMHG | DIASTOLIC BLOOD PRESSURE: 71 MMHG | TEMPERATURE: 97.9 F | RESPIRATION RATE: 20 BRPM | HEART RATE: 68 BPM | OXYGEN SATURATION: 98 %

## 2021-04-19 DIAGNOSIS — I87.8 VENOUS STASIS: ICD-10-CM

## 2021-04-19 DIAGNOSIS — R60.0 PEDAL EDEMA: Primary | ICD-10-CM

## 2021-04-19 LAB
ALBUMIN SERPL-MCNC: 4.1 G/DL (ref 3.5–5.2)
ALBUMIN/GLOB SERPL: 1.3 G/DL
ALP SERPL-CCNC: 87 U/L (ref 39–117)
ALT SERPL W P-5'-P-CCNC: 22 U/L (ref 1–33)
ANION GAP SERPL CALCULATED.3IONS-SCNC: 7.6 MMOL/L (ref 5–15)
AST SERPL-CCNC: 25 U/L (ref 1–32)
BASOPHILS # BLD AUTO: 0.05 10*3/MM3 (ref 0–0.2)
BASOPHILS NFR BLD AUTO: 0.7 % (ref 0–1.5)
BH CV LOWER VASCULAR LEFT COMMON FEMORAL AUGMENT: NORMAL
BH CV LOWER VASCULAR LEFT COMMON FEMORAL COMPETENT: NORMAL
BH CV LOWER VASCULAR LEFT COMMON FEMORAL COMPRESS: NORMAL
BH CV LOWER VASCULAR LEFT COMMON FEMORAL PHASIC: NORMAL
BH CV LOWER VASCULAR LEFT COMMON FEMORAL SPONT: NORMAL
BH CV LOWER VASCULAR LEFT DISTAL FEMORAL COMPRESS: NORMAL
BH CV LOWER VASCULAR LEFT GASTRONEMIUS COMPRESS: NORMAL
BH CV LOWER VASCULAR LEFT GREATER SAPH AK COMPRESS: NORMAL
BH CV LOWER VASCULAR LEFT GREATER SAPH BK COMPRESS: NORMAL
BH CV LOWER VASCULAR LEFT LESSER SAPH COMPRESS: NORMAL
BH CV LOWER VASCULAR LEFT MID FEMORAL AUGMENT: NORMAL
BH CV LOWER VASCULAR LEFT MID FEMORAL COMPETENT: NORMAL
BH CV LOWER VASCULAR LEFT MID FEMORAL COMPRESS: NORMAL
BH CV LOWER VASCULAR LEFT MID FEMORAL PHASIC: NORMAL
BH CV LOWER VASCULAR LEFT MID FEMORAL SPONT: NORMAL
BH CV LOWER VASCULAR LEFT PERONEAL COMPRESS: NORMAL
BH CV LOWER VASCULAR LEFT POPLITEAL AUGMENT: NORMAL
BH CV LOWER VASCULAR LEFT POPLITEAL COMPETENT: NORMAL
BH CV LOWER VASCULAR LEFT POPLITEAL COMPRESS: NORMAL
BH CV LOWER VASCULAR LEFT POPLITEAL PHASIC: NORMAL
BH CV LOWER VASCULAR LEFT POPLITEAL SPONT: NORMAL
BH CV LOWER VASCULAR LEFT POSTERIOR TIBIAL COMPRESS: NORMAL
BH CV LOWER VASCULAR LEFT PROFUNDA FEMORAL COMPRESS: NORMAL
BH CV LOWER VASCULAR LEFT PROXIMAL FEMORAL COMPRESS: NORMAL
BH CV LOWER VASCULAR LEFT SAPHENOFEMORAL JUNCTION COMPRESS: NORMAL
BH CV LOWER VASCULAR RIGHT COMMON FEMORAL AUGMENT: NORMAL
BH CV LOWER VASCULAR RIGHT COMMON FEMORAL COMPETENT: NORMAL
BH CV LOWER VASCULAR RIGHT COMMON FEMORAL COMPRESS: NORMAL
BH CV LOWER VASCULAR RIGHT COMMON FEMORAL PHASIC: NORMAL
BH CV LOWER VASCULAR RIGHT COMMON FEMORAL SPONT: NORMAL
BH CV LOWER VASCULAR RIGHT DISTAL FEMORAL COMPRESS: NORMAL
BH CV LOWER VASCULAR RIGHT GASTRONEMIUS COMPRESS: NORMAL
BH CV LOWER VASCULAR RIGHT GREATER SAPH AK COMPRESS: NORMAL
BH CV LOWER VASCULAR RIGHT GREATER SAPH BK COMPRESS: NORMAL
BH CV LOWER VASCULAR RIGHT LESSER SAPH COMPRESS: NORMAL
BH CV LOWER VASCULAR RIGHT MID FEMORAL AUGMENT: NORMAL
BH CV LOWER VASCULAR RIGHT MID FEMORAL COMPETENT: NORMAL
BH CV LOWER VASCULAR RIGHT MID FEMORAL COMPRESS: NORMAL
BH CV LOWER VASCULAR RIGHT MID FEMORAL PHASIC: NORMAL
BH CV LOWER VASCULAR RIGHT MID FEMORAL SPONT: NORMAL
BH CV LOWER VASCULAR RIGHT PERONEAL COMPRESS: NORMAL
BH CV LOWER VASCULAR RIGHT POPLITEAL AUGMENT: NORMAL
BH CV LOWER VASCULAR RIGHT POPLITEAL COMPETENT: NORMAL
BH CV LOWER VASCULAR RIGHT POPLITEAL COMPRESS: NORMAL
BH CV LOWER VASCULAR RIGHT POPLITEAL PHASIC: NORMAL
BH CV LOWER VASCULAR RIGHT POPLITEAL SPONT: NORMAL
BH CV LOWER VASCULAR RIGHT POSTERIOR TIBIAL COMPRESS: NORMAL
BH CV LOWER VASCULAR RIGHT PROFUNDA FEMORAL COMPRESS: NORMAL
BH CV LOWER VASCULAR RIGHT PROXIMAL FEMORAL COMPRESS: NORMAL
BH CV LOWER VASCULAR RIGHT SAPHENOFEMORAL JUNCTION COMPRESS: NORMAL
BILIRUB SERPL-MCNC: 0.7 MG/DL (ref 0–1.2)
BUN SERPL-MCNC: 15 MG/DL (ref 8–23)
BUN/CREAT SERPL: 18.5 (ref 7–25)
CALCIUM SPEC-SCNC: 9.6 MG/DL (ref 8.6–10.5)
CHLORIDE SERPL-SCNC: 104 MMOL/L (ref 98–107)
CO2 SERPL-SCNC: 28.4 MMOL/L (ref 22–29)
CREAT SERPL-MCNC: 0.81 MG/DL (ref 0.57–1)
DEPRECATED RDW RBC AUTO: 40.4 FL (ref 37–54)
EOSINOPHIL # BLD AUTO: 0.15 10*3/MM3 (ref 0–0.4)
EOSINOPHIL NFR BLD AUTO: 2.1 % (ref 0.3–6.2)
ERYTHROCYTE [DISTWIDTH] IN BLOOD BY AUTOMATED COUNT: 12.6 % (ref 12.3–15.4)
GFR SERPL CREATININE-BSD FRML MDRD: 67 ML/MIN/1.73
GLOBULIN UR ELPH-MCNC: 3.2 GM/DL
GLUCOSE SERPL-MCNC: 91 MG/DL (ref 65–99)
HCT VFR BLD AUTO: 40.5 % (ref 34–46.6)
HGB BLD-MCNC: 13.9 G/DL (ref 12–15.9)
IMM GRANULOCYTES # BLD AUTO: 0.02 10*3/MM3 (ref 0–0.05)
IMM GRANULOCYTES NFR BLD AUTO: 0.3 % (ref 0–0.5)
LYMPHOCYTES # BLD AUTO: 2.22 10*3/MM3 (ref 0.7–3.1)
LYMPHOCYTES NFR BLD AUTO: 30.9 % (ref 19.6–45.3)
MCH RBC QN AUTO: 31.2 PG (ref 26.6–33)
MCHC RBC AUTO-ENTMCNC: 34.3 G/DL (ref 31.5–35.7)
MCV RBC AUTO: 90.8 FL (ref 79–97)
MONOCYTES # BLD AUTO: 0.77 10*3/MM3 (ref 0.1–0.9)
MONOCYTES NFR BLD AUTO: 10.7 % (ref 5–12)
NEUTROPHILS NFR BLD AUTO: 3.98 10*3/MM3 (ref 1.7–7)
NEUTROPHILS NFR BLD AUTO: 55.3 % (ref 42.7–76)
NRBC BLD AUTO-RTO: 0 /100 WBC (ref 0–0.2)
NT-PROBNP SERPL-MCNC: 577.5 PG/ML (ref 0–1800)
PLATELET # BLD AUTO: 180 10*3/MM3 (ref 140–450)
PMV BLD AUTO: 12.1 FL (ref 6–12)
POTASSIUM SERPL-SCNC: 4.5 MMOL/L (ref 3.5–5.2)
PROT SERPL-MCNC: 7.3 G/DL (ref 6–8.5)
RBC # BLD AUTO: 4.46 10*6/MM3 (ref 3.77–5.28)
SODIUM SERPL-SCNC: 140 MMOL/L (ref 136–145)
TROPONIN T SERPL-MCNC: <0.01 NG/ML (ref 0–0.03)
WBC # BLD AUTO: 7.19 10*3/MM3 (ref 3.4–10.8)

## 2021-04-19 PROCEDURE — 93010 ELECTROCARDIOGRAM REPORT: CPT | Performed by: INTERNAL MEDICINE

## 2021-04-19 PROCEDURE — 80053 COMPREHEN METABOLIC PANEL: CPT | Performed by: EMERGENCY MEDICINE

## 2021-04-19 PROCEDURE — 93970 EXTREMITY STUDY: CPT

## 2021-04-19 PROCEDURE — 83880 ASSAY OF NATRIURETIC PEPTIDE: CPT | Performed by: EMERGENCY MEDICINE

## 2021-04-19 PROCEDURE — 84484 ASSAY OF TROPONIN QUANT: CPT | Performed by: EMERGENCY MEDICINE

## 2021-04-19 PROCEDURE — 99283 EMERGENCY DEPT VISIT LOW MDM: CPT

## 2021-04-19 PROCEDURE — 93005 ELECTROCARDIOGRAM TRACING: CPT | Performed by: EMERGENCY MEDICINE

## 2021-04-19 PROCEDURE — 71045 X-RAY EXAM CHEST 1 VIEW: CPT

## 2021-04-19 PROCEDURE — 85025 COMPLETE CBC W/AUTO DIFF WBC: CPT | Performed by: EMERGENCY MEDICINE

## 2021-04-19 RX ORDER — CLONIDINE HYDROCHLORIDE 0.1 MG/1
0.1 TABLET ORAL ONCE
Status: COMPLETED | OUTPATIENT
Start: 2021-04-19 | End: 2021-04-19

## 2021-04-19 RX ORDER — SODIUM CHLORIDE 0.9 % (FLUSH) 0.9 %
10 SYRINGE (ML) INJECTION AS NEEDED
Status: DISCONTINUED | OUTPATIENT
Start: 2021-04-19 | End: 2021-04-19 | Stop reason: HOSPADM

## 2021-04-19 RX ADMIN — CLONIDINE HYDROCHLORIDE 0.1 MG: 0.1 TABLET ORAL at 21:09

## 2021-04-19 NOTE — PROGRESS NOTES
BLE Venous doppler study complete. Preliminary negative for DVT and SVT called to [facility]

## 2021-04-19 NOTE — ED TRIAGE NOTES
"Patient to er with c/o increasing in swelling in bilateral legs.\" patient stated this started over a month ago and getting worse.\" patient reported no injury to cause the swelling. Patient reported no blood thinners at this time. Patient reported swelling is worse in left lower leg.  Patient has mask on in triage along with staff.   "

## 2021-04-19 NOTE — ED PROVIDER NOTES
EMERGENCY DEPARTMENT ENCOUNTER    Room Number:  15/15  Date of encounter:  4/19/2021  PCP: Carmen Chacon MD  Historian: Patient      HPI:  Chief Complaint: Leg swelling  A complete HPI/ROS/PMH/PSH/SH/FH are unobtainable due to: Nothing    Context: Dex Thakkar is a 87 y.o. female who presents to the ED c/o moderate severity bilateral leg swelling for the last several days.  Patient said it is a burning type pain, it hurts to walk, and nothing really makes it better or worse.    Patient has a well-documented history of venous stasis in the lower extremities, and follows with Dr. Manas Goldsmith.  She states that she is aware that she is supposed to be wearing compression stockings, but is reluctant to because it is uncomfortable.    Patient has no chest pain or shortness of breath, and she was also concerned because her blood pressure was elevated.      PAST MEDICAL HISTORY  Active Ambulatory Problems     Diagnosis Date Noted   • Stasis edema of left lower extremity 08/29/2018   • Pain and swelling of ankle, left 08/29/2018   • Hypertension 04/09/2019   • H/O cardiac radiofrequency ablation 04/09/2019   • Left lower quadrant pain 04/11/2019   • Urinary retention 05/06/2019   • Rectal ulcer 05/10/2019   • Anastomotic stricture of colorectal region 06/04/2020     Resolved Ambulatory Problems     Diagnosis Date Noted   • Acute diverticulitis 04/09/2019   • Diverticular stricture (CMS/HCC) 04/09/2019   • Diverticulitis of large intestine with abscess with bleeding 05/06/2019     Past Medical History:   Diagnosis Date   • A-fib (CMS/HCC)    • Diverticulitis    • Hyperlipidemia    • Melanoma (CMS/HCC)    • Murmur          PAST SURGICAL HISTORY  Past Surgical History:   Procedure Laterality Date   • CARDIAC ABLATION N/A 07/17/2017    Dr. Leigh   • CHOLECYSTECTOMY N/A    • COLON RESECTION N/A 5/15/2019    Procedure: laparoscopic low anterior colon resection with splenic flexure mobilization, left salpingo oophorectomy,  drainage of peritoneal abscess;  Surgeon: Renata Ray MD;  Location: [facility] MAIN OR;  Service: General   • COLONOSCOPY N/A 10/15/2004    Sigmoid diverticulosis, smal rectal polyp, internal hemorrhoids-Dr. Renata Ray   • COLONOSCOPY N/A 2009    Sigmoid diverticulosis-Dr. Renata Ray   • COLONOSCOPY N/A 2019    Procedure: COLONOSCOPY to cecum with cold biopsies;  Surgeon: Renata Ray MD;  [facility];  Service: General   • ENDOSCOPY N/A 10/07/2013    Gastric ulcerations x10, ulcerative esophagitis, small hiatal hernia-Dr. Renata Ray   • HYSTERECTOMY     • KNEE ARTHROSCOPY W/ PARTIAL MEDIAL MENISCECTOMY Right 10/24/2014    Dr. Bud Muhammad   • SIGMOIDOSCOPY N/A 2020    Procedure: FLEXIBLE SIGMOIDOSCOPY WITH BIOPSY, balloon dilation;  Surgeon: Renata Ray MD;  [facility];  Service: General   • SIGMOIDOSCOPY N/A 2020    Procedure: FLEXIBLE SIGMOIDOSCOPY with bx;  Surgeon: Renata Ray MD;  Location: [facility] ENDOSCOPY;  Service: General;  Laterality: N/A;  pre-history of a rectal ulcer and a resection for diverticulitis, with anastomotic stricture found 20  post-anastamotic nodule, mild stricture       • SKIN CANCER EXCISION Left     Melanoma left leg         FAMILY HISTORY  Family History   Problem Relation Age of Onset   • Brain cancer Mother    • Cancer Father    • Cancer Daughter    • Heart valve disorder Daughter    • Breast cancer Sister    • Malig Hyperthermia Neg Hx          SOCIAL HISTORY  Social History     Socioeconomic History   • Marital status:      Spouse name: Not on file   • Number of children: Not on file   • Years of education: Not on file   • Highest education level: Not on file   Tobacco Use   • Smoking status: Former Smoker     Types: Cigarettes     Quit date: 1984     Years since quittin.7   • Smokeless tobacco: Never Used   • Tobacco comment: QUIT 30  YRS AGO   Substance and Sexual Activity   • Alcohol use:  Yes     Alcohol/week: 1.0 standard drinks     Types: 1 Glasses of wine per week     Comment: OCCASIONAL   • Drug use: No   • Sexual activity: Defer         ALLERGIES  Patient has no known allergies.        REVIEW OF SYSTEMS  Review of Systems     All systems reviewed and negative except for those discussed in HPI.       PHYSICAL EXAM    I have reviewed the triage vital signs and nursing notes.    ED Triage Vitals   Temp Heart Rate Resp BP SpO2   04/19/21 1400 04/19/21 1400 04/19/21 1400 04/19/21 1420 04/19/21 1400   97.9 °F (36.6 °C) 78 20 (!) 216/100 98 %      Temp src Heart Rate Source Patient Position BP Location FiO2 (%)   04/19/21 1400 -- -- -- --   Tympanic           Physical Exam  GENERAL: not distressed  HENT: nares patent  EYES: no scleral icterus  CV: regular rhythm, regular rate  RESPIRATORY: normal effort  ABDOMEN: soft  MUSCULOSKELETAL: no deformity, 1+ pitting edema to the lower extremities bilateral.  There is no calf tenderness, distal pulses are symmetric and intact.  The skin on the lower half of the lower legs are slightly erythematous bilaterally but not warm or indurated.  This appears to be more of a chronic stasis type dermatitis  NEURO: alert, moves all extremities, follows commands  SKIN: warm, dry        LAB RESULTS  Recent Results (from the past 24 hour(s))   Duplex Venous Lower Extremity - Bilateral CAR    Collection Time: 04/19/21  3:51 PM   Result Value Ref Range    Right Common Femoral Spont Y     Right Common Femoral Phasic Y     Right Common Femoral Augment Y     Right Common Femoral Competent Y     Right Common Femoral Compress C     Right Saphenofemoral Junction Compress C     Right Profunda Femoral Compress C     Right Proximal Femoral Compress C     Right Mid Femoral Spont Y     Right Mid Femoral Phasic Y     Right Mid Femoral Augment Y     Right Mid Femoral Competent Y     Right Mid Femoral Compress C     Right Distal Femoral Compress C     Right Popliteal Spont Y     Right  Popliteal Phasic Y     Right Popliteal Augment Y     Right Popliteal Competent Y     Right Popliteal Compress C     Right Posterior Tibial Compress C     Right Peroneal Compress C     Right GastronemiusSoleal Compress C     Right Greater Saph AK Compress C     Right Greater Saph BK Compress C     Right Lesser Saph Compress C     Left Common Femoral Spont Y     Left Common Femoral Phasic Y     Left Common Femoral Augment Y     Left Common Femoral Competent Y     Left Common Femoral Compress C     Left Saphenofemoral Junction Compress C     Left Profunda Femoral Compress C     Left Proximal Femoral Compress C     Left Mid Femoral Spont Y     Left Mid Femoral Phasic Y     Left Mid Femoral Augment Y     Left Mid Femoral Competent Y     Left Mid Femoral Compress C     Left Distal Femoral Compress C     Left Popliteal Spont Y     Left Popliteal Phasic Y     Left Popliteal Augment Y     Left Popliteal Competent Y     Left Popliteal Compress C     Left Posterior Tibial Compress C     Left Peroneal Compress C     Left GastronemiusSoleal Compress C     Left Greater Saph AK Compress C     Left Greater Saph BK Compress C     Left Lesser Saph Compress C    ECG 12 Lead    Collection Time: 04/19/21  7:38 PM   Result Value Ref Range    QT Interval 425 ms   Comprehensive Metabolic Panel    Collection Time: 04/19/21  7:57 PM    Specimen: Blood   Result Value Ref Range    Glucose 91 65 - 99 mg/dL    BUN 15 8 - 23 mg/dL    Creatinine 0.81 0.57 - 1.00 mg/dL    Sodium 140 136 - 145 mmol/L    Potassium 4.5 3.5 - 5.2 mmol/L    Chloride 104 98 - 107 mmol/L    CO2 28.4 22.0 - 29.0 mmol/L    Calcium 9.6 8.6 - 10.5 mg/dL    Total Protein 7.3 6.0 - 8.5 g/dL    Albumin 4.10 3.50 - 5.20 g/dL    ALT (SGPT) 22 1 - 33 U/L    AST (SGOT) 25 1 - 32 U/L    Alkaline Phosphatase 87 39 - 117 U/L    Total Bilirubin 0.7 0.0 - 1.2 mg/dL    eGFR Non African Amer 67 >60 mL/min/1.73    Globulin 3.2 gm/dL    A/G Ratio 1.3 g/dL    BUN/Creatinine Ratio 18.5 7.0 -  25.0    Anion Gap 7.6 5.0 - 15.0 mmol/L   BNP    Collection Time: 04/19/21  7:57 PM    Specimen: Blood   Result Value Ref Range    proBNP 577.5 0.0-1,800.0 pg/mL   Troponin    Collection Time: 04/19/21  7:57 PM    Specimen: Blood   Result Value Ref Range    Troponin T <0.010 0.000 - 0.030 ng/mL   CBC Auto Differential    Collection Time: 04/19/21  7:57 PM    Specimen: Blood   Result Value Ref Range    WBC 7.19 3.40 - 10.80 10*3/mm3    RBC 4.46 3.77 - 5.28 10*6/mm3    Hemoglobin 13.9 12.0 - 15.9 g/dL    Hematocrit 40.5 34.0 - 46.6 %    MCV 90.8 79.0 - 97.0 fL    MCH 31.2 26.6 - 33.0 pg    MCHC 34.3 31.5 - 35.7 g/dL    RDW 12.6 12.3 - 15.4 %    RDW-SD 40.4 37.0 - 54.0 fl    MPV 12.1 (H) 6.0 - 12.0 fL    Platelets 180 140 - 450 10*3/mm3    Neutrophil % 55.3 42.7 - 76.0 %    Lymphocyte % 30.9 19.6 - 45.3 %    Monocyte % 10.7 5.0 - 12.0 %    Eosinophil % 2.1 0.3 - 6.2 %    Basophil % 0.7 0.0 - 1.5 %    Immature Grans % 0.3 0.0 - 0.5 %    Neutrophils, Absolute 3.98 1.70 - 7.00 10*3/mm3    Lymphocytes, Absolute 2.22 0.70 - 3.10 10*3/mm3    Monocytes, Absolute 0.77 0.10 - 0.90 10*3/mm3    Eosinophils, Absolute 0.15 0.00 - 0.40 10*3/mm3    Basophils, Absolute 0.05 0.00 - 0.20 10*3/mm3    Immature Grans, Absolute 0.02 0.00 - 0.05 10*3/mm3    nRBC 0.0 0.0 - 0.2 /100 WBC       Ordered the above labs and independently reviewed the results.        RADIOLOGY  XR Chest 1 View    Result Date: 4/19/2021  XR CHEST 1 VW-  HISTORY: Female who is 87 years-old,  short of breath  TECHNIQUE: Frontal view of the chest  COMPARISON:  image from CT from 12/14/2015  FINDINGS: The heart size is borderline. Aorta is calcified. Mild prominence of vascular and interstitial markings. No focal pulmonary consolidation, pleural effusion, or pneumothorax. No acute osseous process.      No focal pulmonary consolidation. Borderline heart size with mild prominence of vascular and interstitial markings.  This report was finalized on 4/19/2021 8:43 PM by  Dr. Agus Porter M.D.      Duplex Venous Lower Extremity - Bilateral CAR    Result Date: 4/19/2021  · Normal bilateral lower extremity venous duplex scan.        I ordered the above noted radiological studies. Reviewed by me and discussed with radiologist.  See dictation for official radiology interpretation.      PROCEDURES    Procedures      MEDICATIONS GIVEN IN ER    Medications   sodium chloride 0.9 % flush 10 mL (has no administration in time range)   cloNIDine (CATAPRES) tablet 0.1 mg (0.1 mg Oral Given 4/19/21 2109)         PROGRESS, DATA ANALYSIS, CONSULTS, AND MEDICAL DECISION MAKING    All labs have been independently reviewed by me.  All radiology studies have been reviewed by me and discussed with radiologist dictating the report.   EKG's independently viewed and interpreted by me.  Discussion below represents my analysis of pertinent findings related to patient's condition, differential diagnosis, treatment plan and final disposition.        ED Course as of Apr 19 2243   Mon Apr 19, 2021   1553 Per Eve, Vascular tech, the patient is NEGATIVE for SVT and NEGATIVE for DVT    [EW]   2242 CBC and chemistry are unremarkable    [DP]   2242 Troponin and proBNP are normal    [DP]   2242 Chest x-ray shows no active disease    [DP]   2242 KG at 2043  Sinus rhythm at 62  Normal MS, QRS and QT  No acute ST segment abnormalities are seen to suggest ischemia, there is nothing available for comparison at this time.    [DP]   2243 Venous Dopplers of the legs are negative bilaterally    [DP]   2243 I again counseled the patient in no uncertain terms that compression stockings are the best and quite frankly the only intervention that will help her legs long-term from being swollen and painful.  I gave her the option of using Ace wraps if the compression stockings are too uncomfortable, but that it is essential.  At that point she told me that Dr. Manas Goldsmith is told her exactly the same thing.    [DP]      ED  Course User Index  [DP] Reed Green MD  [EW] Sarah Fried, SHAZIA           PPE: Both the patient and I wore a surgical mask throughout the entire patient encounter. I wore protective goggles    AS OF 22:43 EDT VITALS:    BP - 176/71  HR - 68  TEMP - 97.9 °F (36.6 °C) (Tympanic)  O2 SATS - 98%        DIAGNOSIS  Final diagnoses:   Pedal edema   Venous stasis         DISPOSITION  Discharge           Reed Green MD  04/19/21 7578

## 2021-04-20 LAB — QT INTERVAL: 425 MS

## 2021-04-20 NOTE — DISCHARGE INSTRUCTIONS
It is essential that you wear compression devices on your legs every day in order to avoid swelling.  This is the only intervention that will help long-term with your venous insufficiency.

## 2021-04-20 NOTE — ED NOTES
Pt ambulatory c steady gait to wheelchair, AAOx4, ABC's intact, NAD noted at this time. Pt discharged c belongings and educational packet. PPE worn by this RN c pt wearing mask during encounters.     Pt BP cleared by Perez Lr, RN  04/19/21 9615

## 2022-02-10 ENCOUNTER — OFFICE VISIT (OUTPATIENT)
Dept: CARDIOLOGY | Facility: CLINIC | Age: 87
End: 2022-02-10

## 2022-02-10 VITALS
WEIGHT: 198 LBS | HEIGHT: 64 IN | HEART RATE: 65 BPM | DIASTOLIC BLOOD PRESSURE: 82 MMHG | SYSTOLIC BLOOD PRESSURE: 150 MMHG | BODY MASS INDEX: 33.8 KG/M2

## 2022-02-10 DIAGNOSIS — I51.7 LVH (LEFT VENTRICULAR HYPERTROPHY): ICD-10-CM

## 2022-02-10 DIAGNOSIS — I10 ESSENTIAL HYPERTENSION: Primary | ICD-10-CM

## 2022-02-10 PROCEDURE — 93000 ELECTROCARDIOGRAM COMPLETE: CPT | Performed by: INTERNAL MEDICINE

## 2022-02-10 PROCEDURE — 99204 OFFICE O/P NEW MOD 45 MIN: CPT | Performed by: INTERNAL MEDICINE

## 2022-02-10 RX ORDER — LISINOPRIL 40 MG/1
40 TABLET ORAL DAILY
Qty: 30 TABLET | Refills: 6 | Status: SHIPPED | OUTPATIENT
Start: 2022-02-10 | End: 2022-08-17 | Stop reason: SDUPTHER

## 2022-02-10 RX ORDER — METOPROLOL SUCCINATE 100 MG/1
1 TABLET, EXTENDED RELEASE ORAL DAILY
COMMUNITY
Start: 2022-01-13

## 2022-02-10 NOTE — PROGRESS NOTES
Anastasia Thakkar  1934  Date of Office Visit: 02/10/22  Encounter Provider: Christopher Greene MD  Place of Service: [facility] CARDIOLOGY      CHIEF COMPLAINT:  Essential hypertension  History of atypical AVNRT ablation       HISTORY OF PRESENT ILLNESS:   Very pleasant 87-year-old female with a medical history of essential hypertension, chronic pain, history of AVNRT and atypical AVNRT ablation, preserved ejection fraction, who presents to me for evaluation.  She denies any recent issues with chest pain.  She does have mild dyspnea on exertion that is unchanged from prior along with venous insufficiency.  She follows with Dr. Manas Goldsmith and wears compression stockings bilaterally.  She denies any tachycardia or palpitations.  No orthopnea or PND.  Her blood pressure has not been well controlled as of late and she has been uptitrating her metoprolol succinate therapy.  She has continued on 20 mg lisinopril therapy daily.  She has had no recent issues with hyperkalemia or renal insufficiency on that.  She denied any fatigue with increased Toprol dosing.  She is also been told in the past that she has a murmur but has a trileaflet aortic valve      Review of Systems   Constitutional: Negative for fever and malaise/fatigue.   HENT: Negative for nosebleeds and sore throat.    Eyes: Negative for blurred vision and double vision.   Cardiovascular: Negative for chest pain, claudication, palpitations and syncope.   Respiratory: Negative for cough, shortness of breath and snoring.    Endocrine: Negative for cold intolerance, heat intolerance and polydipsia.   Skin: Negative for itching, poor wound healing and rash.   Musculoskeletal: Negative for joint pain, joint swelling, muscle weakness and myalgias.   Gastrointestinal: Negative for abdominal pain, melena, nausea and vomiting.   Neurological: Negative for light-headedness, loss of balance, seizures, vertigo and weakness.  "  Psychiatric/Behavioral: Negative for altered mental status and depression.          Past Medical History:   Diagnosis Date   • A-fib (HCC)    • Diverticulitis    • H/O cardiac radiofrequency ablation    • Hyperlipidemia    • Hypertension    • Melanoma (HCC)    • Murmur        The following portions of the patient's history were reviewed and updated as appropriate: Social history , Family history and Surgical history     Current Outpatient Medications on File Prior to Visit   Medication Sig Dispense Refill   • metoprolol succinate XL (TOPROL-XL) 100 MG 24 hr tablet Take 1 tablet by mouth Daily.     • simvastatin (ZOCOR) 10 MG tablet Take 10 mg by mouth Every Night.     • [DISCONTINUED] lisinopril (PRINIVIL,ZESTRIL) 10 MG tablet Take 10 mg by mouth Daily.     • [DISCONTINUED] metoprolol succinate XL (TOPROL-XL) 25 MG 24 hr tablet Take 25 mg by mouth Daily.       No current facility-administered medications on file prior to visit.       No Known Allergies    Vitals:    02/10/22 1508   BP: 150/82   Pulse: 65   Weight: 89.8 kg (198 lb)   Height: 162.6 cm (64\")     Body mass index is 33.99 kg/m².   Constitutional:       Appearance: Well-developed.   Eyes:      General: No scleral icterus.     Conjunctiva/sclera: Conjunctivae normal.   HENT:      Head: Normocephalic and atraumatic.   Neck:      Thyroid: No thyromegaly.      Vascular: Normal carotid pulses. No carotid bruit, hepatojugular reflux or JVD.      Trachea: No tracheal deviation.   Pulmonary:      Effort: No respiratory distress.      Breath sounds: Normal breath sounds. No decreased breath sounds. No wheezing. No rhonchi. No rales.   Chest:      Chest wall: Not tender to palpatation.   Cardiovascular:      Normal rate. Regular rhythm.      No gallop.   Pulses:     Carotid: 2+ bilaterally.     Radial: 2+ bilaterally.     Femoral: 2+ bilaterally.     Dorsalis pedis: 2+ bilaterally.     Posterior tibial: 2+ bilaterally.  Edema:     Peripheral edema absent. "   Abdominal:      General: Bowel sounds are normal. There is no distension.      Palpations: Abdomen is soft.      Tenderness: There is no abdominal tenderness.   Musculoskeletal:         General: No deformity.      Cervical back: Normal range of motion and neck supple. Skin:     Findings: No erythema or rash.   Neurological:      Mental Status: Alert and oriented to person, place, and time.      Sensory: No sensory deficit.   Psychiatric:         Behavior: Behavior normal.         Lab Results   Component Value Date    WBC 7.19 04/19/2021    HGB 13.9 04/19/2021    HCT 40.5 04/19/2021    MCV 90.8 04/19/2021     04/19/2021       Lab Results   Component Value Date    GLUCOSE 91 04/19/2021    BUN 15 04/19/2021    CREATININE 0.81 04/19/2021    EGFRIFNONA 67 04/19/2021    BCR 18.5 04/19/2021    K 4.5 04/19/2021    CO2 28.4 04/19/2021    CALCIUM 9.6 04/19/2021    ALBUMIN 4.10 04/19/2021    AST 25 04/19/2021    ALT 22 04/19/2021       Lab Results   Component Value Date    GLUCOSE 91 04/19/2021    CALCIUM 9.6 04/19/2021     04/19/2021    K 4.5 04/19/2021    CO2 28.4 04/19/2021     04/19/2021    BUN 15 04/19/2021    CREATININE 0.81 04/19/2021    EGFRIFNONA 67 04/19/2021    BCR 18.5 04/19/2021    ANIONGAP 7.6 04/19/2021       No results found for: CHOL, CHLPL, TRIG, HDL, LDL, LDLDIRECT      ECG 12 Lead    Date/Time: 2/10/2022 3:37 PM  Performed by: Christopher Greene MD  Authorized by: Christopher Greene MD   Comparison: not compared with previous ECG   Rhythm: sinus rhythm  Rate: normal  QRS axis: normal                   DISCUSSION/SUMMARY very pleasant 87-year-old female with a medical history of atypical AVNRT, hypertension and hyperlipidemia presents to me as a new patient.  She denies any recent issues with chest pain but does have mild dyspnea on exertion that is chronic.  She denies any orthopnea or PND.  She has mild bilateral lower extremity edema that is chronic and continues to wear  compression stockings.  Her blood pressure is not quite at goal and yet we have recommended increasing her lisinopril therapy with close follow-up of her BMP.  Echo was also been ordered.    1.  Essential hypertension: Not quite at goal  -Continue Toprol at current dose.  No fatigue or symptomatic bradycardia.  -Continue lisinopril but increase to 40 mg p.o. daily.  Order has been placed.  Repeat order for BMP in 2 weeks to evaluate for renal insufficiency on the increased dose.  -Echocardiogram has also been ordered to evaluate for LVH.    2.  Hyperlipidemia: Continue simvastatin therapy.  No myalgias.  No elevation in transaminases.  I will see her back in 6 months or earlier with problems.

## 2022-03-04 ENCOUNTER — HOSPITAL ENCOUNTER (OUTPATIENT)
Dept: CARDIOLOGY | Facility: HOSPITAL | Age: 87
Discharge: HOME | End: 2022-03-04
Admitting: INTERNAL MEDICINE

## 2022-03-04 VITALS
BODY MASS INDEX: 33.8 KG/M2 | WEIGHT: 198 LBS | SYSTOLIC BLOOD PRESSURE: 198 MMHG | DIASTOLIC BLOOD PRESSURE: 80 MMHG | HEIGHT: 64 IN | HEART RATE: 70 BPM

## 2022-03-04 DIAGNOSIS — I51.7 LVH (LEFT VENTRICULAR HYPERTROPHY): ICD-10-CM

## 2022-03-04 DIAGNOSIS — I10 ESSENTIAL HYPERTENSION: ICD-10-CM

## 2022-03-04 LAB
AORTIC ARCH: 2.3 CM
BH CV ECHO MEAS - ACS: 1.16 CM
BH CV ECHO MEAS - AO MAX PG: 18.2 MMHG
BH CV ECHO MEAS - AO MEAN PG: 8.8 MMHG
BH CV ECHO MEAS - AO ROOT DIAM: 3.1 CM
BH CV ECHO MEAS - AO V2 MAX: 213.4 CM/SEC
BH CV ECHO MEAS - AO V2 VTI: 54.4 CM
BH CV ECHO MEAS - AVA(I,D): 1.76 CM2
BH CV ECHO MEAS - EDV(CUBED): 109.7 ML
BH CV ECHO MEAS - EDV(MOD-SP2): 57 ML
BH CV ECHO MEAS - EDV(MOD-SP4): 91 ML
BH CV ECHO MEAS - EF(MOD-SP2): 64.9 %
BH CV ECHO MEAS - EF(MOD-SP4): 63.7 %
BH CV ECHO MEAS - ESV(CUBED): 38.9 ML
BH CV ECHO MEAS - ESV(MOD-SP2): 20 ML
BH CV ECHO MEAS - ESV(MOD-SP4): 33 ML
BH CV ECHO MEAS - FS: 29.2 %
BH CV ECHO MEAS - IVS/LVPW: 1.03 CM
BH CV ECHO MEAS - IVSD: 1.15 CM
BH CV ECHO MEAS - LAT PEAK E' VEL: 10.6 CM/SEC
BH CV ECHO MEAS - LV DIASTOLIC VOL/BSA (35-75): 46.7 CM2
BH CV ECHO MEAS - LV MASS(C)D: 202.8 GRAMS
BH CV ECHO MEAS - LV MAX PG: 5.1 MMHG
BH CV ECHO MEAS - LV MEAN PG: 3.1 MMHG
BH CV ECHO MEAS - LV SYSTOLIC VOL/BSA (12-30): 16.9 CM2
BH CV ECHO MEAS - LV V1 MAX: 113.1 CM/SEC
BH CV ECHO MEAS - LV V1 VTI: 32.6 CM
BH CV ECHO MEAS - LVIDD: 4.8 CM
BH CV ECHO MEAS - LVIDS: 3.4 CM
BH CV ECHO MEAS - LVOT AREA: 2.9 CM2
BH CV ECHO MEAS - LVOT DIAM: 1.93 CM
BH CV ECHO MEAS - LVPWD: 1.12 CM
BH CV ECHO MEAS - MED PEAK E' VEL: 8.8 CM/SEC
BH CV ECHO MEAS - MV A DUR: 0.15 SEC
BH CV ECHO MEAS - MV A MAX VEL: 76.7 CM/SEC
BH CV ECHO MEAS - MV DEC SLOPE: 480.8 CM/SEC2
BH CV ECHO MEAS - MV DEC TIME: 0.18 MSEC
BH CV ECHO MEAS - MV E MAX VEL: 99.4 CM/SEC
BH CV ECHO MEAS - MV E/A: 1.3
BH CV ECHO MEAS - MV MAX PG: 6.2 MMHG
BH CV ECHO MEAS - MV MEAN PG: 1.96 MMHG
BH CV ECHO MEAS - MV V2 VTI: 37.8 CM
BH CV ECHO MEAS - MVA(VTI): 2.5 CM2
BH CV ECHO MEAS - PA ACC TIME: 0.19 SEC
BH CV ECHO MEAS - PA PR(ACCEL): -5.4 MMHG
BH CV ECHO MEAS - PA V2 MAX: 82.8 CM/SEC
BH CV ECHO MEAS - PULM A REVS DUR: 0.16 SEC
BH CV ECHO MEAS - PULM A REVS VEL: 32.6 CM/SEC
BH CV ECHO MEAS - PULM DIAS VEL: 38.1 CM/SEC
BH CV ECHO MEAS - PULM SYS VEL: 73.7 CM/SEC
BH CV ECHO MEAS - RAP SYSTOLE: 3 MMHG
BH CV ECHO MEAS - RV MAX PG: 2.07 MMHG
BH CV ECHO MEAS - RV V1 MAX: 72 CM/SEC
BH CV ECHO MEAS - RV V1 VTI: 17.4 CM
BH CV ECHO MEAS - RVOT DIAM: 1.97 CM
BH CV ECHO MEAS - RVSP: 28.2 MMHG
BH CV ECHO MEAS - SI(MOD-SP2): 19 ML/M2
BH CV ECHO MEAS - SI(MOD-SP4): 29.8 ML/M2
BH CV ECHO MEAS - SV(LVOT): 95.7 ML
BH CV ECHO MEAS - SV(MOD-SP2): 37 ML
BH CV ECHO MEAS - SV(MOD-SP4): 58 ML
BH CV ECHO MEAS - SV(RVOT): 53.2 ML
BH CV ECHO MEAS - TAPSE (>1.6): 2.48 CM
BH CV ECHO MEAS - TR MAX PG: 25.2 MMHG
BH CV ECHO MEAS - TR MAX VEL: 251.1 CM/SEC
BH CV ECHO MEASUREMENTS AVERAGE E/E' RATIO: 10.25
BH CV XLRA - RV BASE: 3.1 CM
BH CV XLRA - RV LENGTH: 5.9 CM
BH CV XLRA - RV MID: 2.11 CM
BH CV XLRA - TDI S': 9.8 CM/SEC
LEFT ATRIUM VOLUME INDEX: 33.2 ML/M2
LV EF 2D ECHO EST: 64 %
MAXIMAL PREDICTED HEART RATE: 133 BPM
SINUS: 2.6 CM
STJ: 2.2 CM
STRESS TARGET HR: 113 BPM

## 2022-03-04 PROCEDURE — 93306 TTE W/DOPPLER COMPLETE: CPT | Performed by: INTERNAL MEDICINE

## 2022-03-04 PROCEDURE — 93306 TTE W/DOPPLER COMPLETE: CPT

## 2022-03-04 PROCEDURE — 25010000002 PERFLUTREN (DEFINITY) 8.476 MG IN SODIUM CHLORIDE (PF) 0.9 % 10 ML INJECTION: Performed by: INTERNAL MEDICINE

## 2022-03-04 RX ADMIN — PERFLUTREN 3 ML: 6.52 INJECTION, SUSPENSION INTRAVENOUS at 14:06

## 2022-03-15 ENCOUNTER — TELEPHONE (OUTPATIENT)
Dept: CARDIOLOGY | Facility: CLINIC | Age: 87
End: 2022-03-15

## 2022-03-15 NOTE — TELEPHONE ENCOUNTER
----- Message from Crhistopher Greene MD sent at 3/11/2022 10:05 AM EST -----  Please let her know that her ejection fraction is normal.  Just mild wall thickness.  We will continue to work on her blood pressure.

## 2022-08-17 RX ORDER — LISINOPRIL 40 MG/1
40 TABLET ORAL DAILY
Qty: 90 TABLET | Refills: 4 | Status: SHIPPED | OUTPATIENT
Start: 2022-08-17

## 2022-08-18 ENCOUNTER — OFFICE VISIT (OUTPATIENT)
Dept: CARDIOLOGY | Facility: CLINIC | Age: 87
End: 2022-08-18

## 2022-08-18 VITALS
HEART RATE: 67 BPM | WEIGHT: 199.4 LBS | BODY MASS INDEX: 34.04 KG/M2 | SYSTOLIC BLOOD PRESSURE: 160 MMHG | DIASTOLIC BLOOD PRESSURE: 79 MMHG | HEIGHT: 64 IN

## 2022-08-18 DIAGNOSIS — Z98.890 H/O CARDIAC RADIOFREQUENCY ABLATION: ICD-10-CM

## 2022-08-18 DIAGNOSIS — I87.302 STASIS EDEMA OF LEFT LOWER EXTREMITY: ICD-10-CM

## 2022-08-18 DIAGNOSIS — I10 ESSENTIAL HYPERTENSION: Primary | ICD-10-CM

## 2022-08-18 DIAGNOSIS — I10 HYPERTENSION, UNSPECIFIED TYPE: ICD-10-CM

## 2022-08-18 PROCEDURE — 93000 ELECTROCARDIOGRAM COMPLETE: CPT | Performed by: NURSE PRACTITIONER

## 2022-08-18 PROCEDURE — 99214 OFFICE O/P EST MOD 30 MIN: CPT | Performed by: NURSE PRACTITIONER

## 2022-08-18 RX ORDER — CHLORTHALIDONE 25 MG/1
25 TABLET ORAL DAILY
Qty: 90 TABLET | Refills: 3 | Status: SHIPPED | OUTPATIENT
Start: 2022-08-18

## 2022-08-18 NOTE — PROGRESS NOTES
[facility] Follow Up Office Note     Encounter Date:22  Patient:Dex Thakkar  :1934  MRN:[id number]      Chief Complaint:   Chief Complaint   Patient presents with   • Edema   • Shortness of Breath   • Follow-up         History of Presenting Illness:        Dex Thakkar is a 88 y.o. female who is here for follow-up of hypertension and history of AVNRT.  She is a patient of Dr. Greene.    Patient has known hypertension, history of AVNRT status post atypical AVNRT ablation, chronic pain.    Patient recently establish care with Dr. Greene in 2022.  At this time she was reporting mild dyspnea on exertion which was at her baseline, poor blood pressure control. Her lisinopril was increased to 40mg.  Echocardiogram at this time showed normal LVEF with mild concentric LVH, normal diastolic function, mild AS.    Patient is seen today for follow-up.  She reports that her blood pressure does still stay elevated.  Its typically around 150 to 160/80 mmHg.  She also has venous insufficiency and chronic lower extremity edema.  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  She denies chest pressure, palpitations, dizziness, orthopnea.  Dyspnea with activity is at baseline.  Her daughter just established care yesterday with Dr. Greene as she has a history of prior valve replacement surgery.    Review of Systems:  Review of Systems   Cardiovascular: Negative for chest pain, dyspnea on exertion, leg swelling, orthopnea and palpitations.   Respiratory: Negative for shortness of breath.        Current Outpatient Medications on File Prior to Visit   Medication Sig Dispense Refill   • lisinopril (PRINIVIL,ZESTRIL) 40 MG tablet Take 1 tablet by mouth Daily. 90 tablet 4   • metoprolol succinate XL (TOPROL-XL) 100 MG 24 hr tablet Take 1 tablet by mouth Daily.     • simvastatin (ZOCOR) 10 MG tablet Take 10 mg by mouth Every Night.       No current facility-administered medications on file prior to  visit.       No Known Allergies    Past Medical History:   Diagnosis Date   • A-fib (HCC)    • Diverticulitis    • H/O cardiac radiofrequency ablation    • Hyperlipidemia    • Hypertension    • Melanoma (HCC)    • Murmur        Past Surgical History:   Procedure Laterality Date   • CARDIAC ABLATION N/A 2017    Dr. Leigh   • CHOLECYSTECTOMY N/A    • COLON RESECTION N/A 5/15/2019    Procedure: laparoscopic low anterior colon resection with splenic flexure mobilization, left salpingo oophorectomy, drainage of peritoneal abscess;  Surgeon: Renata Ray MD;  Location: [facility] MAIN OR;  Service: General   • COLONOSCOPY N/A 10/15/2004    Sigmoid diverticulosis, smal rectal polyp, internal hemorrhoids-Dr. Renata Ray   • COLONOSCOPY N/A 2009    Sigmoid diverticulosis-Dr. Renata Ray   • COLONOSCOPY N/A 2019    Procedure: COLONOSCOPY to cecum with cold biopsies;  Surgeon: Renata Ray MD;  Location: [facility] ENDOSCOPY;  Service: General   • ENDOSCOPY N/A 10/07/2013    Gastric ulcerations x10, ulcerative esophagitis, small hiatal hernia-Dr. Renata Ray   • HYSTERECTOMY     • KNEE ARTHROSCOPY W/ PARTIAL MEDIAL MENISCECTOMY Right 10/24/2014    Dr. Bud Muhammad   • SIGMOIDOSCOPY N/A 2020    Procedure: FLEXIBLE SIGMOIDOSCOPY WITH BIOPSY, balloon dilation;  Surgeon: Renata Ray MD;  Location: [facility] ENDOSCOPY;  Service: General   • SIGMOIDOSCOPY N/A 2020    Procedure: FLEXIBLE SIGMOIDOSCOPY with bx;  Surgeon: Renata Ray MD;  Location: [facility] ENDOSCOPY;  Service: General;  Laterality: N/A;  pre-history of a rectal ulcer and a resection for diverticulitis, with anastomotic stricture found 20  post-anastamotic nodule, mild stricture       • SKIN CANCER EXCISION Left     Melanoma left leg       Social History     Socioeconomic History   • Marital status:    Tobacco Use   • Smoking status: Former Smoker     Types: Cigarettes     Quit date: 1984     Years since quittin.1  "  • Smokeless tobacco: Never Used   • Tobacco comment: QUIT 30  YRS AGO   Substance and Sexual Activity   • Alcohol use: Yes     Alcohol/week: 1.0 standard drink     Types: 1 Glasses of wine per week     Comment: OCCASIONAL   • Drug use: No   • Sexual activity: Defer       Family History   Problem Relation Age of Onset   • Brain cancer Mother    • Cancer Father    • Cancer Daughter    • Heart valve disorder Daughter         Bicuspid AV   • Atrial fibrillation Daughter    • Breast cancer Sister    • Heart valve disorder Sister         Bicuspid AV   • Malig Hyperthermia Neg Hx        The following portions of the patient's history were reviewed and updated as appropriate: allergies, current medications, past family history, past medical history, past social history, past surgical history and problem list.       Objective:       Vitals:    08/18/22 1425   BP: 160/79   BP Location: Left arm   Patient Position: Sitting   Cuff Size: Adult   Pulse: 67   Weight: 90.4 kg (199 lb 6.4 oz)   Height: 162.6 cm (64\")         Physical Exam:  Constitutional:  Pleasant, conversant  HENT: Oropharynx clear and membrane moist  Eyes: Normal conjunctiva, no sclera icterus  Neck: Supple, no carotid bruit bilaterally  Cardiovascular: Regular rate and rhythm, 2/6 systolic murmur, 1+ bilateral lower extremity edema  Pulmonary: Normal respiratory effort, normal lung sounds, no wheezing  Neurological: Alert and orient x 3  Skin: Warm, dry, no ecchymosis, no rash  Psych: Appropriate mood and affect. Normal judgment and insight         Lab Results   Component Value Date     04/19/2021     05/17/2019    K 4.5 04/19/2021    K 4.7 05/17/2019     04/19/2021     05/17/2019    CO2 28.4 04/19/2021    CO2 27.1 05/17/2019    BUN 15 04/19/2021    BUN 11 05/17/2019    CREATININE 0.81 04/19/2021    CREATININE 0.76 05/17/2019    EGFRIFNONA 67 04/19/2021    EGFRIFNONA 72 05/17/2019    GLUCOSE 91 04/19/2021    GLUCOSE 92 05/17/2019    " CALCIUM 9.6 04/19/2021    CALCIUM 8.7 05/17/2019    ALBUMIN 4.10 04/19/2021    ALBUMIN 3.40 (L) 05/09/2019    BILITOT 0.7 04/19/2021    BILITOT 0.7 05/09/2019    AST 25 04/19/2021    AST 34 (H) 05/09/2019    ALT 22 04/19/2021    ALT 68 (H) 05/09/2019     Lab Results   Component Value Date    WBC 7.19 04/19/2021    WBC 12.49 (H) 05/16/2019    HGB 13.9 04/19/2021    HGB 11.8 (L) 05/16/2019    HCT 40.5 04/19/2021    HCT 35.9 05/16/2019    MCV 90.8 04/19/2021    MCV 91.6 05/16/2019     04/19/2021     05/16/2019     No results found for: CHOL, TRIG, HDL, LDL  Lab Results   Component Value Date    PROBNP 577.5 04/19/2021     Lab Results   Component Value Date    TROPONINT <0.010 04/19/2021     No results found for: TSH        ECG 12 Lead    Date/Time: 8/18/2022 2:37 PM  Performed by: Savanna Crenshaw APRN  Authorized by: Savanna Crenshaw APRN   Comparison: compared with previous ECG from 2/10/2022  Similar to previous ECG  Rhythm: sinus rhythm  Rate: normal  ST Segments: ST segments normal  T Waves: T waves normal  QRS axis: normal                 Assessment:          Diagnosis Plan   1. Essential hypertension  ECG 12 Lead    Basic Metabolic Panel   2. Hypertension, unspecified type     3. Stasis edema of left lower extremity     4. H/O cardiac radiofrequency ablation            Plan:        Hypertension - BP remains elevated, today 160/80 mmHg which is what it is typically running at home.  Patient is on lisinopril and metoprolol succinate.  I am going to start chlorthalidone 25mg today and check labs in 2 weeks to monitor electrolytes and kidney function. Her labs in December were appropriate.    Chronic venous insufficiency - with lower extremity edema. Starting mild diuretic as next treatment for BP    Hx AVNRT s/p ablation - denies palpitations    Patient is seen today for follow-up of hypertension.  Her blood pressure remains mildly elevated, running around 160/80 mmHg.  I am going to start  chlorthalidone 25 mg daily today with follow-up labs in 2 weeks.  Patient will monitor blood pressure at home.  Follow-up with Dr. Greene in 6 months or earlier with problems.    Orders Placed This Encounter   Procedures   • Basic Metabolic Panel     Standing Status:   Future     Standing Expiration Date:   8/18/2023     Order Specific Question:   Release to patient     Answer:   Routine Release   • ECG 12 Lead     This order was created via procedure documentation     Order Specific Question:   Release to patient     Answer:   Routine Release            SHAZIA Pinedo  [facility]  08/18/22  14:58 EDT

## 2022-09-13 ENCOUNTER — LAB (OUTPATIENT)
Dept: LAB | Facility: HOSPITAL | Age: 87
End: 2022-09-13

## 2022-09-13 DIAGNOSIS — I10 ESSENTIAL HYPERTENSION: ICD-10-CM

## 2022-09-13 LAB
ANION GAP SERPL CALCULATED.3IONS-SCNC: 7.3 MMOL/L (ref 5–15)
BUN SERPL-MCNC: 25 MG/DL (ref 8–23)
BUN/CREAT SERPL: 27.8 (ref 7–25)
CALCIUM SPEC-SCNC: 9.2 MG/DL (ref 8.6–10.5)
CHLORIDE SERPL-SCNC: 100 MMOL/L (ref 98–107)
CO2 SERPL-SCNC: 27.7 MMOL/L (ref 22–29)
CREAT SERPL-MCNC: 0.9 MG/DL (ref 0.57–1)
EGFRCR SERPLBLD CKD-EPI 2021: 61.6 ML/MIN/1.73
GLUCOSE SERPL-MCNC: 90 MG/DL (ref 65–99)
POTASSIUM SERPL-SCNC: 4.4 MMOL/L (ref 3.5–5.2)
SODIUM SERPL-SCNC: 135 MMOL/L (ref 136–145)

## 2022-09-13 PROCEDURE — 80048 BASIC METABOLIC PNL TOTAL CA: CPT

## 2022-09-13 PROCEDURE — 36415 COLL VENOUS BLD VENIPUNCTURE: CPT

## 2023-07-25 ENCOUNTER — HOSPITAL ENCOUNTER (OUTPATIENT)
Dept: GENERAL RADIOLOGY | Facility: HOSPITAL | Age: 88
Discharge: HOME | End: 2023-07-25
Admitting: INTERNAL MEDICINE
Payer: MEDICARE

## 2023-07-25 ENCOUNTER — HOSPITAL ENCOUNTER (OUTPATIENT)
Dept: GENERAL RADIOLOGY | Facility: HOSPITAL | Age: 88
Discharge: HOME | End: 2023-07-25
Payer: MEDICARE

## 2023-07-25 DIAGNOSIS — M25.512 LEFT SHOULDER PAIN, UNSPECIFIED CHRONICITY: ICD-10-CM

## 2023-07-25 DIAGNOSIS — R10.32 LEFT GROIN PAIN: ICD-10-CM

## 2023-07-25 PROCEDURE — 73030 X-RAY EXAM OF SHOULDER: CPT

## 2023-07-25 PROCEDURE — 72110 X-RAY EXAM L-2 SPINE 4/>VWS: CPT

## 2023-07-25 PROCEDURE — 73502 X-RAY EXAM HIP UNI 2-3 VIEWS: CPT

## 2023-07-28 ENCOUNTER — TRANSCRIBE ORDERS (OUTPATIENT)
Dept: ADMINISTRATIVE | Facility: HOSPITAL | Age: 88
End: 2023-07-28
Payer: MEDICARE

## 2023-07-28 DIAGNOSIS — R10.32 LT GROIN PAIN: Primary | ICD-10-CM

## 2023-08-15 RX ORDER — CHLORTHALIDONE 25 MG/1
TABLET ORAL
Qty: 90 TABLET | Refills: 3 | Status: SHIPPED | OUTPATIENT
Start: 2023-08-15

## 2023-08-15 NOTE — TELEPHONE ENCOUNTER
"Per Savanna on 9/14/22 for lab results- \" Please let her know labs are stable on chlorthalidone.\"  "

## 2023-08-31 ENCOUNTER — HOSPITAL ENCOUNTER (OUTPATIENT)
Dept: CARDIOLOGY | Facility: HOSPITAL | Age: 88
Discharge: HOME | End: 2023-08-31
Payer: MEDICARE

## 2023-08-31 ENCOUNTER — OFFICE VISIT (OUTPATIENT)
Dept: CARDIOLOGY | Facility: CLINIC | Age: 88
End: 2023-08-31
Payer: MEDICARE

## 2023-08-31 VITALS
SYSTOLIC BLOOD PRESSURE: 124 MMHG | WEIGHT: 205 LBS | HEART RATE: 63 BPM | BODY MASS INDEX: 35 KG/M2 | DIASTOLIC BLOOD PRESSURE: 80 MMHG | HEIGHT: 64 IN | OXYGEN SATURATION: 96 %

## 2023-08-31 DIAGNOSIS — I10 ESSENTIAL HYPERTENSION: ICD-10-CM

## 2023-08-31 DIAGNOSIS — R06.09 DYSPNEA ON EXERTION: ICD-10-CM

## 2023-08-31 DIAGNOSIS — I10 HYPERTENSION, UNSPECIFIED TYPE: ICD-10-CM

## 2023-08-31 DIAGNOSIS — I10 ESSENTIAL HYPERTENSION: Primary | ICD-10-CM

## 2023-08-31 DIAGNOSIS — R01.1 SYSTOLIC MURMUR: ICD-10-CM

## 2023-08-31 DIAGNOSIS — R60.9 EDEMA, UNSPECIFIED TYPE: ICD-10-CM

## 2023-08-31 PROBLEM — I35.8 AORTIC VALVE SCLEROSIS: Status: ACTIVE | Noted: 2023-08-31

## 2023-08-31 PROBLEM — I51.89 DIASTOLIC DYSFUNCTION: Status: ACTIVE | Noted: 2023-08-31

## 2023-08-31 PROBLEM — I65.29 CAROTID ARTERY STENOSIS: Status: ACTIVE | Noted: 2018-02-12

## 2023-08-31 PROBLEM — I47.19 AVNRT (AV NODAL RE-ENTRY TACHYCARDIA): Status: ACTIVE | Noted: 2017-08-28

## 2023-08-31 PROBLEM — I47.1 AVNRT (AV NODAL RE-ENTRY TACHYCARDIA): Status: ACTIVE | Noted: 2017-08-28

## 2023-08-31 LAB
ALBUMIN SERPL-MCNC: 3.9 G/DL (ref 3.5–5.2)
ALBUMIN/GLOB SERPL: 1.3 G/DL
ALP SERPL-CCNC: 82 U/L (ref 39–117)
ALT SERPL W P-5'-P-CCNC: 17 U/L (ref 1–33)
ANION GAP SERPL CALCULATED.3IONS-SCNC: 9.1 MMOL/L (ref 5–15)
AST SERPL-CCNC: 21 U/L (ref 1–32)
BILIRUB SERPL-MCNC: 0.5 MG/DL (ref 0–1.2)
BUN SERPL-MCNC: 23 MG/DL (ref 8–23)
BUN/CREAT SERPL: 23.5 (ref 7–25)
CALCIUM SPEC-SCNC: 9.1 MG/DL (ref 8.6–10.5)
CHLORIDE SERPL-SCNC: 102 MMOL/L (ref 98–107)
CO2 SERPL-SCNC: 24.9 MMOL/L (ref 22–29)
CREAT SERPL-MCNC: 0.98 MG/DL (ref 0.57–1)
DEPRECATED RDW RBC AUTO: 39.1 FL (ref 37–54)
EGFRCR SERPLBLD CKD-EPI 2021: 55.3 ML/MIN/1.73
ERYTHROCYTE [DISTWIDTH] IN BLOOD BY AUTOMATED COUNT: 12.1 % (ref 12.3–15.4)
GLOBULIN UR ELPH-MCNC: 2.9 GM/DL
GLUCOSE SERPL-MCNC: 82 MG/DL (ref 65–99)
HCT VFR BLD AUTO: 34.2 % (ref 34–46.6)
HGB BLD-MCNC: 11.6 G/DL (ref 12–15.9)
MCH RBC QN AUTO: 30 PG (ref 26.6–33)
MCHC RBC AUTO-ENTMCNC: 33.9 G/DL (ref 31.5–35.7)
MCV RBC AUTO: 88.4 FL (ref 79–97)
NT-PROBNP SERPL-MCNC: 716 PG/ML (ref 0–1800)
PLATELET # BLD AUTO: 204 10*3/MM3 (ref 140–450)
PMV BLD AUTO: 12.2 FL (ref 6–12)
POTASSIUM SERPL-SCNC: 4 MMOL/L (ref 3.5–5.2)
PROT SERPL-MCNC: 6.8 G/DL (ref 6–8.5)
RBC # BLD AUTO: 3.87 10*6/MM3 (ref 3.77–5.28)
SODIUM SERPL-SCNC: 136 MMOL/L (ref 136–145)
WBC NRBC COR # BLD: 6.42 10*3/MM3 (ref 3.4–10.8)

## 2023-08-31 PROCEDURE — 85027 COMPLETE CBC AUTOMATED: CPT | Performed by: NURSE PRACTITIONER

## 2023-08-31 PROCEDURE — 83880 ASSAY OF NATRIURETIC PEPTIDE: CPT | Performed by: NURSE PRACTITIONER

## 2023-08-31 PROCEDURE — 36415 COLL VENOUS BLD VENIPUNCTURE: CPT

## 2023-08-31 PROCEDURE — 80053 COMPREHEN METABOLIC PANEL: CPT | Performed by: NURSE PRACTITIONER

## 2023-08-31 RX ORDER — SULFAMETHOXAZOLE AND TRIMETHOPRIM 800; 160 MG/1; MG/1
TABLET ORAL
COMMUNITY
Start: 2023-07-12

## 2023-08-31 RX ORDER — MELOXICAM 15 MG/1
TABLET ORAL
COMMUNITY
Start: 2023-08-22

## 2023-08-31 NOTE — PROGRESS NOTES
[facility] Follow Up Office Note     Encounter Date:23  Patient:Dex Thakkar  :1934  MRN:[id number]      Chief Complaint:   Chief Complaint   Patient presents with    Hypertension    Follow-up         History of Presenting Illness:        Dex Thakkar is a 89 y.o. female who is here for follow-up of hypertension and history of AVNRT.  She is a patient of Dr. Greene.    Patient has known hypertension, history of AVNRT status post atypical AVNRT ablation, chronic pain.    Patient recently establish care with Dr. Greene in 2022.  At this time she was reporting mild dyspnea on exertion which was at her baseline, poor blood pressure control. Her lisinopril was increased to 40mg.  Echocardiogram at this time showed normal LVEF with mild concentric LVH, normal diastolic function, mild AS.    Patient is seen today for follow-up.  She reports that her blood pressure does still stay elevated.  Its typically around 150 to 160/80 mmHg.  She also has venous insufficiency and chronic lower extremity edema.  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  She denies chest pressure, palpitations, dizziness, orthopnea.  Dyspnea with activity is at baseline.  Her daughter just established care yesterday with Dr. Greene as she has a history of prior valve replacement surgery.    Patient is being seen today for increased shortness of breath and elevated blood pressure.  She reports she is actually felt more short of breath for a while and she has had a lot of orthopedic and arthritis issues as well as increased swelling.  She has been seen for her swelling and told it was related to venous insufficiency.  She is wearing compression stockings.  Admittedly, she is not good about watching her sodium and likes to snack.  Her son is a  and lives with them.  She used to work at the [employer] for 25 years.  She is not having chest pain, PND, orthopnea.    Review of Systems:  Review of  Systems   Cardiovascular:  Positive for dyspnea on exertion and leg swelling. Negative for chest pain, orthopnea and palpitations.   Respiratory:  Negative for shortness of breath.      Current Outpatient Medications on File Prior to Visit   Medication Sig Dispense Refill    chlorthalidone (HYGROTON) 25 MG tablet TAKE ONE TABLET BY MOUTH DAILY 90 tablet 3    lisinopril (PRINIVIL,ZESTRIL) 40 MG tablet Take 1 tablet by mouth Daily. 90 tablet 4    meloxicam (MOBIC) 15 MG tablet       metoprolol succinate XL (TOPROL-XL) 100 MG 24 hr tablet Take 1 tablet by mouth Daily.      sulfamethoxazole-trimethoprim (BACTRIM DS,SEPTRA DS) 800-160 MG per tablet       simvastatin (ZOCOR) 10 MG tablet Take 10 mg by mouth Every Night. (Patient not taking: Reported on 8/31/2023)       No current facility-administered medications on file prior to visit.       No Known Allergies    Past Medical History:   Diagnosis Date    A-fib     Diverticulitis     H/O cardiac radiofrequency ablation     Hyperlipidemia     Hypertension     Melanoma     Murmur        Past Surgical History:   Procedure Laterality Date    CARDIAC ABLATION N/A 07/17/2017    Dr. Leigh    CHOLECYSTECTOMY N/A     COLON RESECTION N/A 5/15/2019    Procedure: laparoscopic low anterior colon resection with splenic flexure mobilization, left salpingo oophorectomy, drainage of peritoneal abscess;  Surgeon: Renata Ray MD;  Location: [facility] MAIN OR;  Service: General    COLONOSCOPY N/A 10/15/2004    Sigmoid diverticulosis, smal rectal polyp, internal hemorrhoids-Dr. Renata Ray    COLONOSCOPY N/A 12/14/2009    Sigmoid diverticulosis-Dr. Renata Ray    COLONOSCOPY N/A 5/9/2019    Procedure: COLONOSCOPY to cecum with cold biopsies;  Surgeon: Renata Ray MD;  Location: [facility] ENDOSCOPY;  Service: General    ENDOSCOPY N/A 10/07/2013    Gastric ulcerations x10, ulcerative esophagitis, small hiatal hernia-Dr. Renata Ray    HYSTERECTOMY      KNEE ARTHROSCOPY W/ PARTIAL  "MEDIAL MENISCECTOMY Right 10/24/2014    Dr. Bud Muhammad    SIGMOIDOSCOPY N/A 2020    Procedure: FLEXIBLE SIGMOIDOSCOPY WITH BIOPSY, balloon dilation;  Surgeon: Renata Ray MD;  Location:  [facility];  Service: General    SIGMOIDOSCOPY N/A 2020    Procedure: FLEXIBLE SIGMOIDOSCOPY with bx;  Surgeon: Renata Ray MD;  Location:  [facility];  Service: General;  Laterality: N/A;  pre-history of a rectal ulcer and a resection for diverticulitis, with anastomotic stricture found 20  post-anastamotic nodule, mild stricture        SKIN CANCER EXCISION Left     Melanoma left leg       Social History     Socioeconomic History    Marital status:    Tobacco Use    Smoking status: Former     Types: Cigarettes     Quit date: 1984     Years since quittin.1    Smokeless tobacco: Never    Tobacco comments:     QUIT 30  YRS AGO   Substance and Sexual Activity    Alcohol use: Yes     Alcohol/week: 1.0 standard drink     Types: 1 Glasses of wine per week     Comment: OCCASIONAL    Drug use: No    Sexual activity: Defer       Family History   Problem Relation Age of Onset    Brain cancer Mother     Cancer Father     Cancer Daughter     Heart valve disorder Daughter         Bicuspid AV    Atrial fibrillation Daughter     Breast cancer Sister     Heart valve disorder Sister         Bicuspid AV    Malig Hyperthermia Neg Hx        The following portions of the patient's history were reviewed and updated as appropriate: allergies, current medications, past family history, past medical history, past social history, past surgical history and problem list.       Objective:       Vitals:    23 1343   BP: 124/80   Pulse: 63   SpO2: 96%   Weight: 93 kg (205 lb)   Height: 162.6 cm (64\")         Physical Exam:  Constitutional:  Pleasant, conversant  HENT: Oropharynx clear and membrane moist  Eyes: Normal conjunctiva, no sclera icterus  Neck: Supple, no carotid bruit bilaterally  Cardiovascular: " Regular rate and rhythm, 2/6 systolic murmur, 1+ bilateral lower extremity edema -DANNA hose  Pulmonary: Normal respiratory effort, normal lung sounds, no wheezing  Neurological: Alert and orient x 3  Skin: Warm, dry, no ecchymosis, no rash  Psych: Appropriate mood and affect. Normal judgment and insight         Lab Results   Component Value Date     (L) 09/13/2022     04/19/2021    K 4.4 09/13/2022    K 4.5 04/19/2021     09/13/2022     04/19/2021    CO2 27.7 09/13/2022    CO2 28.4 04/19/2021    BUN 25 (H) 09/13/2022    BUN 15 04/19/2021    CREATININE 0.90 09/13/2022    CREATININE 0.81 04/19/2021    EGFRIFNONA 67 04/19/2021    EGFRIFNONA 72 05/17/2019    GLUCOSE 90 09/13/2022    GLUCOSE 91 04/19/2021    CALCIUM 9.2 09/13/2022    CALCIUM 9.6 04/19/2021    ALBUMIN 4.10 04/19/2021    ALBUMIN 3.40 (L) 05/09/2019    BILITOT 0.7 04/19/2021    BILITOT 0.7 05/09/2019    AST 25 04/19/2021    AST 34 (H) 05/09/2019    ALT 22 04/19/2021    ALT 68 (H) 05/09/2019     Lab Results   Component Value Date    WBC 7.19 04/19/2021    WBC 12.49 (H) 05/16/2019    HGB 13.9 04/19/2021    HGB 11.8 (L) 05/16/2019    HCT 40.5 04/19/2021    HCT 35.9 05/16/2019    MCV 90.8 04/19/2021    MCV 91.6 05/16/2019     04/19/2021     05/16/2019     No results found for: CHOL, TRIG, HDL, LDL  Lab Results   Component Value Date    PROBNP 577.5 04/19/2021     Lab Results   Component Value Date    TROPONINT <0.010 04/19/2021     No results found for: TSH        ECG 12 Lead    Date/Time: 8/31/2023 1:52 PM  Performed by: Savanna Crenshaw APRN  Authorized by: Savanna Crenshaw, SHAZIA   Comparison: compared with previous ECG from 8/18/2022  Similar to previous ECG  Rhythm: sinus rhythm  Rate: normal  BPM: 63  ST Segments: ST segments normal  QRS axis: normal           Assessment:          Diagnosis Plan   1. Essential hypertension  ECG 12 Lead    CBC (No Diff)    Comprehensive Metabolic Panel      2. Dyspnea on exertion   proBNP      3. Hypertension, unspecified type        4. Systolic murmur        5. Edema, unspecified type               Plan:       Lower extremity edema /chronic venous insufficiency -with recent worsening but still seems related to venous insufficiency.  We will check labs today including CMP, CBC and proBNP.  If proBNP is elevated would repeat echocardiogram.  I really want her to monitor her sodium, I think this could be also driving some fluid retention and elevated blood pressures  Hypertension - BP is a little bit elevated today 154/80 mmHg, she is not monitoring at home.  She has a history of very high blood pressure and with her age I think her goal range should be around 140 mmHg.  I am recommending first cutting down on the sodium and continuing her current lisinopril and chlorthalidone doses.  She will keep a home blood pressure log and follow-up with me in 6 weeks   Chronic venous insufficiency - with lower extremity edema as above  Hx AVNRT s/p ablation -continue beta-blocker  Dyspnea on exertion -she is less active since COVID and is caretaker for her .  I think some of her symptoms are related to weight and deconditioning.  Checking proBNP, if elevated will repeat echocardiogram.  Her last echo shows normal LVEF with just mild aortic stenosis.    Patient is seen today for follow-up.  Her blood pressure is a little bit elevated and she has lower extremity edema that is worse than her baseline.  We will work on sodium monitoring, check labs and have her follow-up with me in 6 weeks and Dr. Greene in 1 year    Orders Placed This Encounter   Procedures    CBC (No Diff)     Standing Status:   Future     Number of Occurrences:   1     Standing Expiration Date:   8/31/2024     Order Specific Question:   Release to patient     Answer:   Routine Release [[id number]]    proBNP     Standing Status:   Future     Number of Occurrences:   1     Standing Expiration Date:   8/31/2024     Order Specific  Question:   Release to patient     Answer:   Routine Release [[id number]]    Comprehensive Metabolic Panel     Standing Status:   Future     Number of Occurrences:   1     Standing Expiration Date:   8/31/2024     Order Specific Question:   Release to patient     Answer:   Routine Release [[id number]]    ECG 12 Lead     This order was created via procedure documentation     Order Specific Question:   Release to patient     Answer:   Routine Release [[id number]]            SHAZIA Pinedo  [facility]  08/31/23  14:20 EDT

## 2023-09-01 ENCOUNTER — TELEPHONE (OUTPATIENT)
Dept: CARDIOLOGY | Facility: HOSPITAL | Age: 88
End: 2023-09-01
Payer: MEDICARE

## 2023-09-01 NOTE — PROGRESS NOTES
Please let her know labs look good.  Creatinine and liver function are normal, heart failure marker is not elevated.  She is just mildly anemic but not a significant drop from her baseline.

## 2023-09-01 NOTE — TELEPHONE ENCOUNTER
----- Message from SHAZIA Gabriel sent at 9/1/2023  9:58 AM EDT -----  Please let her know labs look good.  Creatinine and liver function are normal, heart failure marker is not elevated.  She is just mildly anemic but not a significant drop from her baseline.

## 2023-09-01 NOTE — TELEPHONE ENCOUNTER
Attempted to contact patient. Voicemail left requesting a call back for the results. Will continue to try and reach patient.      Madhuri Lindsay RN  [place] [facility] [place]

## 2023-09-01 NOTE — TELEPHONE ENCOUNTER
Notified patient of results. Patient verbalized understanding.    Dorcas Bowen RN  [place] [facility] [place]

## 2023-09-14 ENCOUNTER — HOSPITAL ENCOUNTER (OUTPATIENT)
Dept: MRI IMAGING | Facility: HOSPITAL | Age: 88
Discharge: HOME | End: 2023-09-14
Admitting: INTERNAL MEDICINE
Payer: MEDICARE

## 2023-09-14 DIAGNOSIS — R10.32 LT GROIN PAIN: ICD-10-CM

## 2023-09-14 PROCEDURE — 0 GADOBENATE DIMEGLUMINE 529 MG/ML SOLUTION: Performed by: INTERNAL MEDICINE

## 2023-09-14 PROCEDURE — 72158 MRI LUMBAR SPINE W/O & W/DYE: CPT

## 2023-09-14 PROCEDURE — A9577 INJ MULTIHANCE: HCPCS | Performed by: INTERNAL MEDICINE

## 2023-09-14 RX ADMIN — GADOBENATE DIMEGLUMINE 19 ML: 529 INJECTION, SOLUTION INTRAVENOUS at 17:18

## 2023-10-09 ENCOUNTER — TRANSCRIBE ORDERS (OUTPATIENT)
Dept: ADMINISTRATIVE | Facility: HOSPITAL | Age: 88
End: 2023-10-09
Payer: MEDICARE

## 2023-10-12 ENCOUNTER — OFFICE VISIT (OUTPATIENT)
Dept: CARDIOLOGY | Facility: CLINIC | Age: 88
End: 2023-10-12
Payer: MEDICARE

## 2023-10-12 VITALS
DIASTOLIC BLOOD PRESSURE: 74 MMHG | HEART RATE: 66 BPM | BODY MASS INDEX: 34.35 KG/M2 | HEIGHT: 64 IN | WEIGHT: 201.2 LBS | SYSTOLIC BLOOD PRESSURE: 160 MMHG

## 2023-10-12 DIAGNOSIS — I10 ESSENTIAL HYPERTENSION: Primary | ICD-10-CM

## 2023-10-12 RX ORDER — AMLODIPINE BESYLATE 2.5 MG/1
2.5 TABLET ORAL DAILY
Qty: 30 TABLET | Refills: 11 | Status: SHIPPED | OUTPATIENT
Start: 2023-10-12 | End: 2023-10-12 | Stop reason: SDUPTHER

## 2023-10-12 RX ORDER — SIMVASTATIN 10 MG
10 TABLET ORAL NIGHTLY
Qty: 30 TABLET | Refills: 11 | Status: SHIPPED | OUTPATIENT
Start: 2023-10-12 | End: 2023-10-12

## 2023-10-12 RX ORDER — ATORVASTATIN CALCIUM 10 MG/1
10 TABLET, FILM COATED ORAL DAILY
Qty: 30 TABLET | Refills: 11 | Status: SHIPPED | OUTPATIENT
Start: 2023-10-12

## 2023-10-12 RX ORDER — AMLODIPINE BESYLATE 5 MG/1
5 TABLET ORAL DAILY
Qty: 30 TABLET | Refills: 3 | Status: SHIPPED | OUTPATIENT
Start: 2023-10-12

## 2023-10-12 NOTE — PROGRESS NOTES
[facility] Follow Up Office Note     Encounter Date:10/12/23  Patient:Dex Thakkar  :1934  MRN:[id number]      Chief Complaint:   Chief Complaint   Patient presents with    Hypertension         History of Presenting Illness:        Dex Thakkar is a 89 y.o. female who is here for follow-up of hypertension and history of AVNRT.  She is a patient of Dr. Greene.    Patient has known hypertension, history of AVNRT status post atypical AVNRT ablation, chronic pain.    I saw patient several weeks ago for elevated blood pressure and dyspnea on exertion.  We discussed starting new medication for blood pressure but she wanted to focus on her lifestyle and monitoring her sodium intake, losing weight.  She is back today for further evaluation.     Patient states she feels significantly better in terms of her energy and dyspnea.  Her swelling has also improved.  She is not having chest pain, palpitations, PND orthopnea.    Review of Systems:  Review of Systems   Cardiovascular:  Positive for leg swelling. Negative for chest pain, dyspnea on exertion, orthopnea and palpitations.   Respiratory:  Negative for shortness of breath.        Current Outpatient Medications on File Prior to Visit   Medication Sig Dispense Refill    chlorthalidone (HYGROTON) 25 MG tablet TAKE ONE TABLET BY MOUTH DAILY 90 tablet 3    lisinopril (PRINIVIL,ZESTRIL) 40 MG tablet Take 1 tablet by mouth Daily. 90 tablet 4    meloxicam (MOBIC) 15 MG tablet       metoprolol succinate XL (TOPROL-XL) 100 MG 24 hr tablet Take 1 tablet by mouth Daily.      [DISCONTINUED] simvastatin (ZOCOR) 10 MG tablet Take 10 mg by mouth Every Night. (Patient not taking: Reported on 2023)      [DISCONTINUED] sulfamethoxazole-trimethoprim (BACTRIM DS,SEPTRA DS) 800-160 MG per tablet        No current facility-administered medications on file prior to visit.       No Known Allergies    Past Medical History:   Diagnosis Date    A-fib     Diverticulitis     H/O  cardiac radiofrequency ablation     Hyperlipidemia     Hypertension     Melanoma     Murmur        Past Surgical History:   Procedure Laterality Date    CARDIAC ABLATION N/A 2017    Dr. Leigh    CHOLECYSTECTOMY N/A     COLON RESECTION N/A 5/15/2019    Procedure: laparoscopic low anterior colon resection with splenic flexure mobilization, left salpingo oophorectomy, drainage of peritoneal abscess;  Surgeon: Renata Ray MD;  Location: [facility] MAIN OR;  Service: General    COLONOSCOPY N/A 10/15/2004    Sigmoid diverticulosis, smal rectal polyp, internal hemorrhoids-Dr. Renata Ray    COLONOSCOPY N/A 2009    Sigmoid diverticulosis-Dr. Renata Ray    COLONOSCOPY N/A 2019    Procedure: COLONOSCOPY to cecum with cold biopsies;  Surgeon: Renata Ray MD;  Location: [facility] ENDOSCOPY;  Service: General    ENDOSCOPY N/A 10/07/2013    Gastric ulcerations x10, ulcerative esophagitis, small hiatal hernia-Dr. Renata Ray    HYSTERECTOMY      KNEE ARTHROSCOPY W/ PARTIAL MEDIAL MENISCECTOMY Right 10/24/2014    Dr. Bud Muhammad    SIGMOIDOSCOPY N/A 2020    Procedure: FLEXIBLE SIGMOIDOSCOPY WITH BIOPSY, balloon dilation;  Surgeon: Renata Ray MD;  Location: [facility] ENDOSCOPY;  Service: General    SIGMOIDOSCOPY N/A 2020    Procedure: FLEXIBLE SIGMOIDOSCOPY with bx;  Surgeon: Renata Ray MD;  Location: [facility] ENDOSCOPY;  Service: General;  Laterality: N/A;  pre-history of a rectal ulcer and a resection for diverticulitis, with anastomotic stricture found 20  post-anastamotic nodule, mild stricture        SKIN CANCER EXCISION Left     Melanoma left leg       Social History     Socioeconomic History    Marital status:    Tobacco Use    Smoking status: Former     Types: Cigarettes     Quit date: 1984     Years since quittin.2    Smokeless tobacco: Never    Tobacco comments:     QUIT 30  YRS AGO   Substance and Sexual Activity    Alcohol use: Yes     Alcohol/week: 1.0  "standard drink of alcohol     Types: 1 Glasses of wine per week     Comment: OCCASIONAL    Drug use: No    Sexual activity: Defer       Family History   Problem Relation Age of Onset    Brain cancer Mother     Cancer Father     Cancer Daughter     Heart valve disorder Daughter         Bicuspid AV    Atrial fibrillation Daughter     Breast cancer Sister     Heart valve disorder Sister         Bicuspid AV    Malig Hyperthermia Neg Hx        The following portions of the patient's history were reviewed and updated as appropriate: allergies, current medications, past family history, past medical history, past social history, past surgical history and problem list.       Objective:       Vitals:    10/12/23 1402   BP: 160/74   Pulse: 66   Weight: 91.3 kg (201 lb 3.2 oz)   Height: 162.6 cm (64\")         Physical Exam:  Constitutional:  Pleasant, conversant.  No acute distress  HENT: Oropharynx clear and membrane moist  Eyes: Normal conjunctiva, no sclera icterus  Neck: Supple, no carotid bruit bilaterally  Cardiovascular: Regular rate and rhythm, 2/6 systolic murmur, 1+ bilateral lower extremity edema -DANNA hose  Pulmonary: Normal respiratory effort, normal lung sounds, no wheezing  Neurological: Alert and orient x 3  Skin: Warm, dry, no ecchymosis, no rash  Psych: Appropriate mood and affect. Normal judgment and insight         Lab Results   Component Value Date     08/31/2023     (L) 09/13/2022    K 4.0 08/31/2023    K 4.4 09/13/2022     08/31/2023     09/13/2022    CO2 24.9 08/31/2023    CO2 27.7 09/13/2022    BUN 23 08/31/2023    BUN 25 (H) 09/13/2022    CREATININE 0.98 08/31/2023    CREATININE 0.90 09/13/2022    EGFRIFNONA 67 04/19/2021    EGFRIFNONA 72 05/17/2019    GLUCOSE 82 08/31/2023    GLUCOSE 90 09/13/2022    CALCIUM 9.1 08/31/2023    CALCIUM 9.2 09/13/2022    ALBUMIN 3.9 08/31/2023    ALBUMIN 4.10 04/19/2021    BILITOT 0.5 08/31/2023    BILITOT 0.7 04/19/2021    AST 21 08/31/2023    AST " "25 04/19/2021    ALT 17 08/31/2023    ALT 22 04/19/2021     Lab Results   Component Value Date    WBC 6.42 08/31/2023    WBC 7.19 04/19/2021    HGB 11.6 (L) 08/31/2023    HGB 13.9 04/19/2021    HCT 34.2 08/31/2023    HCT 40.5 04/19/2021    MCV 88.4 08/31/2023    MCV 90.8 04/19/2021     08/31/2023     04/19/2021     No results found for: \"CHOL\", \"TRIG\", \"HDL\", \"LDL\"  Lab Results   Component Value Date    PROBNP 716.0 08/31/2023    PROBNP 577.5 04/19/2021     Lab Results   Component Value Date    TROPONINT <0.010 04/19/2021     No results found for: \"TSH\"        ECG 12 Lead    Date/Time: 10/12/2023 4:22 PM  Performed by: Savanna Crenshaw APRN    Authorized by: Savanna Crenshaw APRN  Comparison: compared with previous ECG from 8/31/2023  Similar to previous ECG  Rhythm: sinus rhythm  Rate: normal  BPM: 66  ST Segments: ST segments normal             Assessment:          Diagnosis Plan   1. Essential hypertension  ECG 12 Lead               Plan:       Lower extremity edema /chronic venous insufficiency -stable.  Last visit checked proBNP which is normal.  Continue chlorthalidone, compression, sodium monitoring and elevation  Hypertension -BP remains high.  In addition to lisinopril and chlorthalidone I am going to start her on amlodipine.  If she has worsening lower extremity edema would transition her to carvedilol.  I am going to keep her on metoprolol succinate for now given AVNRT history  Chronic venous insufficiency - with lower extremity edema as above  Hx AVNRT s/p ablation -continue beta-blocker  Dyspnea on exertion -improving.  She has actually lost about 5 pounds since I last saw her which I commended her on.  I think blood pressure control will also help a    Patient is seen today for follow-up.  Start amlodipine for blood pressure and return to clinic in 6 weeks to see me.  Follow-up with Dr. Greene in 1 year    Orders Placed This Encounter   Procedures    ECG 12 Lead     This order was " created via procedure documentation     Order Specific Question:   Release to patient     Answer:   Routine Release [[id number]]            SHAZIA Pinedo  [facility]  10/12/23  16:23 EDT

## 2023-10-17 ENCOUNTER — TELEPHONE (OUTPATIENT)
Dept: CARDIOLOGY | Facility: CLINIC | Age: 88
End: 2023-10-17
Payer: MEDICARE

## 2023-10-17 NOTE — TELEPHONE ENCOUNTER
Patient called and left voicemail with some questions regarding the medications you prescribed her at the last office visit. She request to speak with you directly.     Call back number is [phone].

## 2023-10-26 ENCOUNTER — OFFICE VISIT (OUTPATIENT)
Dept: PAIN MEDICINE | Facility: CLINIC | Age: 88
End: 2023-10-26
Payer: MEDICARE

## 2023-10-26 VITALS
DIASTOLIC BLOOD PRESSURE: 70 MMHG | HEART RATE: 67 BPM | RESPIRATION RATE: 18 BRPM | BODY MASS INDEX: 34.35 KG/M2 | WEIGHT: 201.2 LBS | TEMPERATURE: 98.1 F | OXYGEN SATURATION: 92 % | HEIGHT: 64 IN | SYSTOLIC BLOOD PRESSURE: 130 MMHG

## 2023-10-26 DIAGNOSIS — M54.16 LUMBAR RADICULOPATHY: Primary | ICD-10-CM

## 2023-10-26 RX ORDER — GABAPENTIN 100 MG/1
100 CAPSULE ORAL 3 TIMES DAILY
Qty: 90 CAPSULE | Refills: 2 | Status: SHIPPED | OUTPATIENT
Start: 2023-10-26

## 2023-10-26 NOTE — PATIENT INSTRUCTIONS
Take Gabapentin 100 mg at bedtime for 3-5 days, may increase to 100 mg twice daily after that and up to three times daily as tolerated. Ok to take 1 in the morning and two at bedtime if better tolerated.

## 2023-10-26 NOTE — PROGRESS NOTES
CHIEF COMPLAINT    Subjective   Dex Thakkar is a 89 y.o. female.   She presents to the office for evaluation of back pain. She was referred here by Dr. Carmen Chacon.     Patient presents today with chronic back pain.  Symptoms have become progressively worse over the past several months.  She has completed a variety of conservative treatment including PT, chiropractic treatment, acupuncture.  She has no history of lumbar spine surgery.  No history of interventional pain management.    Pain today is a 3/10 VAS. Pain is in the low back but most severely in the left groin area, also some pain in the posterior leg at times.  [facility] pcp does not think it is related to hip.  Pain is aggravated by activity related to caring for her  who is total care.  Pain does improve with rest.  She has taken Meloxicam intermittently for pain which does help some.      Just retired from [employer] 3 years ago. 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      History of Present Illness     PEG Assessment   What number best describes your pain on average in the past week?5  What number best describes how, during the past week, pain has interfered with your enjoyment of life?5  What number best describes how, during the past week, pain has interfered with your general activity?  8        Current Outpatient Medications:     amLODIPine (NORVASC) 5 MG tablet, Take 1 tablet by mouth Daily., Disp: 30 tablet, Rfl: 3    atorvastatin (LIPITOR) 10 MG tablet, Take 1 tablet by mouth Daily., Disp: 30 tablet, Rfl: 11    chlorthalidone (HYGROTON) 25 MG tablet, TAKE ONE TABLET BY MOUTH DAILY, Disp: 90 tablet, Rfl: 3    lisinopril (PRINIVIL,ZESTRIL) 40 MG tablet, Take 1 tablet by mouth Daily., Disp: 90 tablet, Rfl: 4    metoprolol succinate XL (TOPROL-XL) 100 MG 24 hr tablet, Take 1 tablet by mouth Daily., Disp: , Rfl:     gabapentin (NEURONTIN) 100 MG capsule, Take 1 capsule by mouth 3 (Three)  "Times a Day., Disp: 90 capsule, Rfl: 2    meloxicam (MOBIC) 15 MG tablet, , Disp: , Rfl:     The following portions of the patient's history were reviewed and updated as appropriate: allergies, current medications, past family history, past medical history, past social history, past surgical history, and problem list.      REVIEW OF PERTINENT MEDICAL DATA    MRI LUMBAR SPINE       Review of Systems   Constitutional:  Positive for activity change. Negative for fatigue and fever.   Respiratory:  Negative for cough and chest tightness.    Cardiovascular:  Negative for chest pain.   Gastrointestinal:  Negative for abdominal pain, constipation and diarrhea.   Genitourinary:  Negative for difficulty urinating and dysuria.   Musculoskeletal:  Positive for back pain.   Neurological:  Positive for weakness (feet,legs) and numbness (legs,feet). Negative for dizziness, light-headedness and headaches.   Psychiatric/Behavioral:  Positive for agitation. Negative for sleep disturbance and suicidal ideas. The patient is nervous/anxious.        I have reviewed and confirmed the accuracy of the ROS as documented by the MA/LPN/RN SHAZIA Barrientos      Vitals:    10/26/23 1403   BP: 130/70   BP Location: Left arm   Patient Position: Sitting   Cuff Size: Adult   Pulse: 67   Resp: 18   Temp: 98.1 °F (36.7 °C)   TempSrc: Temporal   SpO2: 92%   Weight: 91.3 kg (201 lb 3.2 oz)   Height: 162.6 cm (64\")   PainSc:   3         Objective       Physical Exam  Vitals and nursing note reviewed.   Constitutional:       General: She is not in acute distress.     Appearance: Normal appearance. She is not ill-appearing.   Pulmonary:      Effort: Pulmonary effort is normal. No respiratory distress.   Musculoskeletal:      Lumbar back: Tenderness and bony tenderness present. Positive left straight leg raise test.      Left hip: Tenderness and bony tenderness present.   Neurological:      Mental Status: She is alert and oriented to person, place, " and time.      Motor: Motor function is intact. No weakness.      Gait: Gait abnormal (slowed).   Psychiatric:         Mood and Affect: Mood normal.         Behavior: Behavior normal.     Assessment & Plan   Diagnoses and all orders for this visit:    1. Lumbar radiculopathy (Primary)  -     gabapentin (NEURONTIN) 100 MG capsule; Take 1 capsule by mouth 3 (Three) Times a Day.  Dispense: 90 capsule; Refill: 2        --- Trial Gabapentin 100 mg tid.  Begin with 1 po hs x 3-5 days, then increase to bid for 3-5 days, then tid thereafter as tolerated.  Discussed medication with the patient.  Included in this discussion was the potential for side effects and adverse events.  Patient verbalized understanding and wished to proceed.  Prescription will be sent to pharmacy.  --- Patient concerned about transportation and wishes to try medication before scheduling injection   --- We discussed LTFESI left L2, L5.  She will let us know if not improving and if she were to decide to move forward with this.  --- Plan B could be to consider diagnostic left IA hip injection   --- UDS next visit if Gabapentin continued    --- Dex Thakkar reports a pain score of 3.  Given her pain assessment as noted, treatment options were discussed and the following options were decided upon as a follow-up plan to address the patient's pain:  see plan .    --- Follow-up approximately 2 months (sooner if needed)           DEVIKA REPORT    As the clinician, I personally reviewed the DEVIKA from 10/26/2023 while the patient was in the office today.    History and physical exam exhibit continued safe and appropriate use of controlled substances.     Dictated utilizing Dragon dictation.     I spent 40 minutes caring for Dex on this date of service. This time includes time spent by me in the following activities: preparing for the visit, reviewing tests, obtaining and/or reviewing a separately obtained history, performing a medically appropriate  examination and/or evaluation, counseling and educating the patient/family/caregiver, ordering medications, tests, or procedures, and documenting information in the medical record

## 2023-11-14 RX ORDER — LISINOPRIL 40 MG/1
40 TABLET ORAL DAILY
Qty: 90 TABLET | Refills: 4 | Status: SHIPPED | OUTPATIENT
Start: 2023-11-14

## 2023-11-27 ENCOUNTER — OFFICE VISIT (OUTPATIENT)
Dept: CARDIOLOGY | Facility: CLINIC | Age: 88
End: 2023-11-27
Payer: MEDICARE

## 2023-11-27 VITALS
HEART RATE: 66 BPM | DIASTOLIC BLOOD PRESSURE: 77 MMHG | WEIGHT: 206.8 LBS | OXYGEN SATURATION: 98 % | SYSTOLIC BLOOD PRESSURE: 125 MMHG | HEIGHT: 64 IN | BODY MASS INDEX: 35.3 KG/M2

## 2023-11-27 DIAGNOSIS — I10 ESSENTIAL HYPERTENSION: Primary | ICD-10-CM

## 2023-11-27 DIAGNOSIS — M79.89 SWELLING OF LOWER EXTREMITY: ICD-10-CM

## 2023-11-27 NOTE — PROGRESS NOTES
[facility] Follow Up Office Note     Encounter Date:23  Patient:Dex Thakkar  :1934  MRN:[id number]      Chief Complaint:   Chief Complaint   Patient presents with    6 week follow up    Hypertension         History of Presenting Illness:        Dex Thakkar is a 89 y.o. female who is here for follow-up of hypertension and history of AVNRT.  She is a patient of Dr. Greene.    Patient has known hypertension, history of AVNRT status post atypical AVNRT ablation, chronic pain.    I saw patient several weeks ago for elevated blood pressure and dyspnea on exertion.  We discussed starting new medication for blood pressure but she wanted to focus on her lifestyle and monitoring her sodium intake, losing weight.  She is back today for further evaluation.     Patient states she feels significantly better in terms of her energy and dyspnea.  Her swelling is stable. She does have increased back pain and just started gabapentin which is helping. She is concerned that her swelling is making her mobility and pain issues worse.    Review of Systems:  Review of Systems   Cardiovascular:  Positive for leg swelling. Negative for chest pain, dyspnea on exertion, orthopnea and palpitations.   Respiratory:  Negative for shortness of breath.        Current Outpatient Medications on File Prior to Visit   Medication Sig Dispense Refill    amLODIPine (NORVASC) 5 MG tablet Take 1 tablet by mouth Daily. 30 tablet 3    atorvastatin (LIPITOR) 10 MG tablet Take 1 tablet by mouth Daily. 30 tablet 11    chlorthalidone (HYGROTON) 25 MG tablet TAKE ONE TABLET BY MOUTH DAILY 90 tablet 3    gabapentin (NEURONTIN) 100 MG capsule Take 1 capsule by mouth 3 (Three) Times a Day. 90 capsule 2    lisinopril (PRINIVIL,ZESTRIL) 40 MG tablet TAKE ONE TABLET BY MOUTH DAILY 90 tablet 4    metoprolol succinate XL (TOPROL-XL) 100 MG 24 hr tablet Take 1 tablet by mouth Daily.      [DISCONTINUED] meloxicam (MOBIC) 15 MG tablet  (Patient not  taking: Reported on 10/26/2023)       No current facility-administered medications on file prior to visit.       No Known Allergies    Past Medical History:   Diagnosis Date    A-fib     Diverticulitis     H/O cardiac radiofrequency ablation     Hyperlipidemia     Hypertension     Melanoma     Murmur        Past Surgical History:   Procedure Laterality Date    CARDIAC ABLATION N/A 07/17/2017    Dr. Leigh    CHOLECYSTECTOMY N/A     COLON RESECTION N/A 5/15/2019    Procedure: laparoscopic low anterior colon resection with splenic flexure mobilization, left salpingo oophorectomy, drainage of peritoneal abscess;  Surgeon: Renata Ray MD;  Location: [facility] MAIN OR;  Service: General    COLONOSCOPY N/A 10/15/2004    Sigmoid diverticulosis, smal rectal polyp, internal hemorrhoids-Dr. Renata Ray    COLONOSCOPY N/A 12/14/2009    Sigmoid diverticulosis-Dr. Renata Ray    COLONOSCOPY N/A 5/9/2019    Procedure: COLONOSCOPY to cecum with cold biopsies;  Surgeon: Renata Ray MD;  Location: [facility] ENDOSCOPY;  Service: General    ENDOSCOPY N/A 10/07/2013    Gastric ulcerations x10, ulcerative esophagitis, small hiatal hernia-Dr. Renata Ray    HYSTERECTOMY      KNEE ARTHROSCOPY W/ PARTIAL MEDIAL MENISCECTOMY Right 10/24/2014    Dr. Bud Muhammad    SIGMOIDOSCOPY N/A 1/20/2020    Procedure: FLEXIBLE SIGMOIDOSCOPY WITH BIOPSY, balloon dilation;  Surgeon: Renata Ray MD;  Location: [facility] ENDOSCOPY;  Service: General    SIGMOIDOSCOPY N/A 7/20/2020    Procedure: FLEXIBLE SIGMOIDOSCOPY with bx;  Surgeon: Renata Ray MD;  Location: [facility] ENDOSCOPY;  Service: General;  Laterality: N/A;  pre-history of a rectal ulcer and a resection for diverticulitis, with anastomotic stricture found 1/20/20  post-anastamotic nodule, mild stricture        SKIN CANCER EXCISION Left     Melanoma left leg       Social History     Socioeconomic History    Marital status:    Tobacco Use    Smoking status: Former     Types:  "Cigarettes     Quit date: 1984     Years since quittin.3    Smokeless tobacco: Never    Tobacco comments:     QUIT 30  YRS AGO   Substance and Sexual Activity    Alcohol use: Yes     Alcohol/week: 1.0 standard drink of alcohol     Types: 1 Glasses of wine per week     Comment: OCCASIONAL    Drug use: No    Sexual activity: Defer       Family History   Problem Relation Age of Onset    Brain cancer Mother     Cancer Father     Cancer Daughter     Heart valve disorder Daughter         Bicuspid AV    Atrial fibrillation Daughter     Breast cancer Sister     Heart valve disorder Sister         Bicuspid AV    Malig Hyperthermia Neg Hx        The following portions of the patient's history were reviewed and updated as appropriate: allergies, current medications, past family history, past medical history, past social history, past surgical history and problem list.       Objective:       Vitals:    23 1449   BP: 125/77   BP Location: Left arm   Patient Position: Sitting   Cuff Size: Adult   Pulse: 66   SpO2: 98%   Weight: 93.8 kg (206 lb 12.8 oz)   Height: 162.6 cm (64\")           Physical Exam:  Constitutional:  Pleasant, conversant.  No acute distress  HENT: Oropharynx clear and membrane moist  Eyes: Normal conjunctiva, no sclera icterus  Neck: Supple, no carotid bruit bilaterally  Cardiovascular: Regular rate and rhythm, 2/6 systolic murmur, 1+ bilateral lower extremity edema -DANNA hose  Pulmonary: Normal respiratory effort, normal lung sounds, no wheezing  Neurological: Alert and orient x 3  Skin: Warm, dry, no ecchymosis, no rash  Psych: Appropriate mood and affect. Normal judgment and insight         Lab Results   Component Value Date     2023     (L) 2022    K 4.0 2023    K 4.4 2022     2023     2022    CO2 24.9 2023    CO2 27.7 2022    BUN 23 2023    BUN 25 (H) 2022    CREATININE 0.98 2023    CREATININE 0.90 " "09/13/2022    EGFRIFNONA 67 04/19/2021    EGFRIFNONA 72 05/17/2019    GLUCOSE 82 08/31/2023    GLUCOSE 90 09/13/2022    CALCIUM 9.1 08/31/2023    CALCIUM 9.2 09/13/2022    ALBUMIN 3.9 08/31/2023    ALBUMIN 4.10 04/19/2021    BILITOT 0.5 08/31/2023    BILITOT 0.7 04/19/2021    AST 21 08/31/2023    AST 25 04/19/2021    ALT 17 08/31/2023    ALT 22 04/19/2021     Lab Results   Component Value Date    WBC 6.42 08/31/2023    WBC 7.19 04/19/2021    HGB 11.6 (L) 08/31/2023    HGB 13.9 04/19/2021    HCT 34.2 08/31/2023    HCT 40.5 04/19/2021    MCV 88.4 08/31/2023    MCV 90.8 04/19/2021     08/31/2023     04/19/2021     No results found for: \"CHOL\", \"TRIG\", \"HDL\", \"LDL\"  Lab Results   Component Value Date    PROBNP 716.0 08/31/2023    PROBNP 577.5 04/19/2021     Lab Results   Component Value Date    TROPONINT <0.010 04/19/2021     No results found for: \"TSH\"        ECG 12 Lead    Date/Time: 11/27/2023 3:07 PM  Performed by: Savanna Crenshaw APRN    Authorized by: Savanna Crenshaw APRN  Comparison: compared with previous ECG   Similar to previous ECG  Rhythm: sinus rhythm  Rate: normal  BPM: 65  ST Segments: ST segments normal  QRS axis: normal             Assessment:          Diagnosis Plan   1. Essential hypertension  ECG 12 Lead      2. Swelling of lower extremity  Ambulatory Referral to Lymphedema Clinic                 Plan:       Lower extremity edema /chronic venous insufficiency -stable.  Last visit checked proBNP which is normal.  Continue chlorthalidone, compression, sodium monitoring and elevation. Referred to lymphedema clinic. Edema is stable but if it gets worse would switch off amlodipine  Hypertension - BP controlled on current regimen.  If she has worsening lower extremity edema would transition her to carvedilol  Chronic venous insufficiency - with lower extremity edema as above  Hx AVNRT s/p ablation -continue beta-blocker  Dyspnea on exertion -improving with BP controle    Patient is " seen today for follow-up.  Refer to lymphedema clinic. Return to clinic in 3 months. Switch off amlodipine with worsened lower extremity edema    Orders Placed This Encounter   Procedures    Ambulatory Referral to Lymphedema Clinic     Referral Priority:   Routine     Referral Type:   Physical Therapy     Number of Visits Requested:   1    ECG 12 Lead     This order was created via procedure documentation     Order Specific Question:   Release to patient     Answer:   Routine Release [[id number]]            SHAZIA Pinedo  [facility]  11/27/23  15:30 EST

## 2023-12-15 ENCOUNTER — HOSPITAL ENCOUNTER (OUTPATIENT)
Dept: PHYSICAL THERAPY | Facility: HOSPITAL | Age: 88
Setting detail: THERAPIES SERIES
Discharge: HOME | End: 2023-12-15
Payer: MEDICARE

## 2023-12-15 DIAGNOSIS — M79.89 SWELLING OF LOWER EXTREMITY: ICD-10-CM

## 2023-12-15 DIAGNOSIS — I89.0 LYMPHEDEMA: Primary | ICD-10-CM

## 2023-12-15 PROCEDURE — 97162 PT EVAL MOD COMPLEX 30 MIN: CPT

## 2023-12-15 NOTE — THERAPY EVALUATION
Physical Therapy Lymphedema Initial Evaluation  [facility]     Patient Name: Dex Thakkar  : 1934  MRN: [id number]  Today's Date: 12/15/2023      Visit Date: 12/15/2023    Visit Dx:    ICD-10-CM ICD-9-CM   1. Lymphedema  I89.0 457.1   2. Swelling of lower extremity  M79.89 729.81       Patient Active Problem List   Diagnosis    Stasis edema of left lower extremity    Pain and swelling of ankle, left    Hypertension    H/O cardiac radiofrequency ablation    Left lower quadrant pain    Urinary retention    Rectal ulcer    Anastomotic stricture of colorectal region    Aortic valve sclerosis    AVNRT (AV gatito re-entry tachycardia)    Carotid artery stenosis    Diastolic dysfunction    Edema    History of PSVT (paroxysmal supraventricular tachycardia)    Hyperlipidemia LDL goal <100    Irritable bladder    Systolic murmur        Past Medical History:   Diagnosis Date    A-fib     Diverticulitis     H/O cardiac radiofrequency ablation     Hyperlipidemia     Hypertension     Incontinence     Melanoma     Murmur     Spinal stenosis         Past Surgical History:   Procedure Laterality Date    CARDIAC ABLATION N/A 2017    Dr. Leigh    CHOLECYSTECTOMY N/A     COLON RESECTION N/A 5/15/2019    Procedure: laparoscopic low anterior colon resection with splenic flexure mobilization, left salpingo oophorectomy, drainage of peritoneal abscess;  Surgeon: Renata Ray MD;  Location: [facility] MAIN OR;  Service: General    COLONOSCOPY N/A 10/15/2004    Sigmoid diverticulosis, smal rectal polyp, internal hemorrhoids-Dr. Renata Ray    COLONOSCOPY N/A 2009    Sigmoid diverticulosis-Dr. Renata Ray    COLONOSCOPY N/A 2019    Procedure: COLONOSCOPY to cecum with cold biopsies;  Surgeon: Renata Ray MD;  Location: [facility] ENDOSCOPY;  Service: General    ENDOSCOPY N/A 10/07/2013    Gastric ulcerations x10, ulcerative esophagitis, small hiatal hernia-Dr. Renata Ray    HYSTERECTOMY      KNEE  ARTHROSCOPY W/ PARTIAL MEDIAL MENISCECTOMY Right 10/24/2014    Dr. Bud Muhammad    SIGMOIDOSCOPY N/A 1/20/2020    Procedure: FLEXIBLE SIGMOIDOSCOPY WITH BIOPSY, balloon dilation;  Surgeon: Renata Ray MD;  Location: [facility] ENDOSCOPY;  Service: General    SIGMOIDOSCOPY N/A 7/20/2020    Procedure: FLEXIBLE SIGMOIDOSCOPY with bx;  Surgeon: Renata Ray MD;  Location: [facility] ENDOSCOPY;  Service: General;  Laterality: N/A;  pre-history of a rectal ulcer and a resection for diverticulitis, with anastomotic stricture found 1/20/20  post-anastamotic nodule, mild stricture        SKIN CANCER EXCISION Left     Melanoma left leg       Visit Dx:    ICD-10-CM ICD-9-CM   1. Lymphedema  I89.0 457.1   2. Swelling of lower extremity  M79.89 729.81        Patient History       Row Name 12/15/23 1500             History    Chief Complaint Swelling  -      Date Current Problem(s) Began --  worse over past 4 years  -      Brief Description of Current Complaint Pt reports that she has been having swelling in her legs for roughly the past 4-5 years, was diagnosed with venous insufficiency around that time.  She notes that the worst of the swelling is knee and lower, always worse on LLE.  Does not note any improvement in swelling with elevation, uses compression garments ordered by vein doctor and compression pumps daily.  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  She denies wounds, infections, weeping, DVT.  She is able to apply compression garments but does need help from  to remove them.  She has 3 pair of her compression garments which she replaces periodically.  -KA      Patient/Caregiver Goals Decrease swelling;Return to prior level of function;Know what to do to help the symptoms  -KA      Patient/Caregiver Goals Comment Wants to be able to get out of her house, get dressed and shop/socialize with friends, fatigues quickly  -NEGRITO      Occupation/sports/leisure activities Shopping, socializing with friends   -KA      How has patient tried to help current problem? Compression garments, compression pumps  -KA         Pain     Pain Location Leg  -KA      Pain at Present 0  -KA      Pain at Best 0  -KA      Pain at Worst 0  -KA      Is your sleep disturbed? No  -KA      Difficulties with ADL's? Difficulty with walking, standing, getting in and out of cars, fatigues with bathing and dressing, needs assistance to remove compression garments  -KA      Difficulties with recreational activities? Difficulty with endurance to shop and socialize with friends in community  -KA         Fall Risk Assessment    Any falls in the past year: No  -KA         Services    Are you currently receiving Home Health services No  -KA      Do you plan to receive Home Health services in the near future No  -KA         Daily Activities    Primary Language English  -KA      Are you able to read Yes  -KA      Are you able to write Yes  -KA      How does patient learn best? Demonstration  -KA      Teaching needs identified Management of Condition;Home Exercise Program  -KA      Patient is concerned about/has problems with Difficulty with self care (i.e. bathing, dressing, toileting:;Performing home management (household chores, shopping, care of dependents);Bed Mobility;Standing;Transfers (getting out of a chair, bed);Walking;Performing sports, recreation, and play activities  -KA      Does patient have problems with the following? None  -KA      Barriers to learning None  -KA      Pt Participated in POC and Goals Yes  -KA         Safety    Are you being hurt, hit, or frightened by anyone at home or in your life? No  -KA      Are you being neglected by a caregiver No  -KA      Have you had any of the following issues with N/A  -KA                User Key  (r) = Recorded By, (t) = Taken By, (c) = Cosigned By      Initials Name Provider Type    Bertha Ruiz, PT Physical Therapist                     Lymphedema       Row Name 12/15/23 1600           "   Subjective Pain    Able to rate subjective pain? yes  -KA      Pre-Treatment Pain Level 0  -KA         Subjective    Subjective Comments See initial evaluation  -KA         Lymphedema Assessment    Lymphedema Classification RLE:;LLE:;secondary;stage 2 (Spontaneously Irreversible)  -KA      Cancer Comments History of melanoma LLE  -KA      Infections or Cellulitis? no  -KA      Lymphedema Assessment Comments Moderate lymphedema affecting LLE, mild lymphedema affecting RLE, foot to lower thigh bilaterally  -KA         LLIS - Physical Concerns    The amount of pain associated with my lymphedema is: 2  -KA      The amount of limb heaviness associated with my lymphedema is: 2  -KA      The amount of skin tightness associated with my lymphedema is: 3  -KA      The size of my swollen limb(s) seems: 3  -KA      Lymphedema affects the movement of my swollen limb(s): 3  -KA      The strength in my swollen limb(s) is: 3  -KA         LLIS - Psychosocial Concerns    Lymphedema affects my body image (i.e., \"how I think I look\"). 3  -KA      Lymphedema affects my socializing with others. 3  -KA      Lymphedema affects my intimate relations with spouse or partner (rate 0 if not applicable 3  -KA      Lymphedema \"gets me down\" (i.e., depression, frustration, or anger) 1  -KA      I must rely on others for help due to my lymphedema. 0  -KA      I know what to do to manage my lymphedema 0  -KA         LLIS - Functional Concerns    Lymphedema affects my ability to perform self-care activities (i.e. eating, dressing, hygiene) 2  -KA      Lymphedema affects my ability to perform routine home or work-related activities. 2  -KA      Lymphedema affects my performance of preferred leisure activities. 2  -KA      Lymphedema affects proper fit of clothing/shoes 2  -KA      Lymphedema affects my sleep 0  -KA         Posture/Observations    Posture/Observations Comments Mild gait instability, decreased gait speed, decreased heel toe gait " mechanics, flexed posture, no AD; Wearing compression stockings on BLE  -KA         General ROM    GENERAL ROM COMMENTS Pt has some ROM limitations at knee and ankle due to swelling, able to reach feet to don compression garments independently  -KA         MMT (Manual Muscle Testing)    General MMT Comments BLE strength at least 4-/5 except L hip flexion 3-/5  -KA         Lymphedema Edema Assessment    Ptting Edema Category By grade out of 4  -KA      Pitting Edema + 2/4  -KA      Stemmer Sign positive;bilateral:  -KA      [place] Hump negative;bilateral:  -KA      Edema Assessment Comment Moderate lymphedema affecting LLE, mild lymphedema affecting RLE, foot to lower thigh bilaterally  -KA         Skin Changes/Observations    Location/Assessment Lower Extremity  -KA      Lower Extremity Conditions bilateral:;intact;clean;dry  -KA      Lower Extremity Color/Pigment left:;red;bilateral:;fibrosis  -KA      Lower Extremity Skin Details Has abnormal contours/fibrosis/papillomas B posteriomedial knee, L ankle  -KA         Lymphedema Sensation    Lymphedema Sensation Reports RLE:;LLE:;numbness  -KA      Lymphedema Sensation Tests light touch  -KA      Lymphedema Light Touch LLE:;RLE:;mild impairment  -KA         Lymphedema Measurements    Measurement Type(s) Circumferential  -KA      Circumferential Areas Lower extremities  -KA         BLE Circumferential (cm)    Measurement Location 1 20 cm above knee  -KA      Left 1 57 cm  -KA      Right 1 55.5 cm  -KA      Measurement Location 2 10 cm above knee  -KA      Left 2 51.5 cm  -KA      Right 2 53.8 cm  -KA      Measurement Location 3 Knee  -KA      Left 3 37.6 cm  -KA      Right 3 38 cm  -KA      Measurement Location 4 10 cm below knee  -KA      Left 4 38.8 cm  -KA      Right 4 36.8 cm  -KA      Measurement Location 5 20 cm below knee  -KA      Left 5 32.3 cm  -KA      Right 5 25.9 cm  -KA      Measurement Location 6 30 cm below knee  -KA      Left 6 29.7 cm  -KA      Right  6 28.4 cm  -KA      Measurement Location 7 Ankle  -KA      Left 7 31.4 cm  -KA      Right 7 26.8 cm  -KA      Measurement Location 8 Midfoot  -KA      Left 8 24.3 cm  -KA      Right 8 22.7 cm  -KA      LLE Circumferential Total 302.6 cm  -KA      RLE Circumferential Total 287.9 cm  -KA         Lymphedema Life Impact Scale Totals    A.  Total Q1 - Q17 (Do not include Q18) 34  -KA      B.  Total number of questions answered (Q1-Q17) 17  -KA      C. Divide A by B 2  -KA      D. Multiple C by 25 50  -KA                User Key  (r) = Recorded By, (t) = Taken By, (c) = Cosigned By      Initials Name Provider Type    Bertha Ruiz, PT Physical Therapist                                    Therapy Education  Education Details: Pt was educated regarding components of complete decongestive therapy including skin care, manual lymph drainage, compression bandaging, and decongestive exercise.  Expectations were discussed regarding patient responsibilities during Phase I (intensive skilled therapy) and Phase 2 (self management Phase) of CDT.  Pt was educated regarding skin care including use of low pH lotion like Eucerin as well as performing ankle pumps to improve circulation.  Encouraged continued use of elevation and using lymphedema pump daily.  Lymphedema Act is set to go into effect January 1, 2023, discussed proposed coverage of garments, donning/doffing aids, and bandaging supplies.  Given: Edema management, Symptoms/condition management  Program: New  How Provided: Verbal  Provided to: Patient  Level of Understanding: Verbalized       OP Exercises       Row Name 12/15/23 1600             Subjective    Subjective Comments See initial evaluation  -KA         Subjective Pain    Able to rate subjective pain? yes  -KA      Pre-Treatment Pain Level 0  -KA                User Key  (r) = Recorded By, (t) = Taken By, (c) = Cosigned By      Initials Name Provider Type    Bertha Ruiz, PT Physical Therapist                                  PT OP Goals       Row Name 12/15/23 1600          PT Short Term Goals    STG Date to Achieve 12/29/23  -KA     STG 1 Patient to demonstrate awareness of condition and precautions for improved prevention, management, care of symptoms, and ease of transition to self care of condition.  -KA     STG 1 Progress New  -KA     STG 2 Patient/family independent with self-wrapping techniques of compression bandages as indicated for improved self-management of condition.  -KA     STG 2 Progress New  -KA     STG 3 Patient demonstrates decreased net edema of Bilateral Lower Extremity>/= 5-10 cm for decreased edema symptoms, decreased risk of infection, and improved skin care.  -KA     STG 3 Progress New  -KA        Long Term Goals    LTG Date to Achieve 01/26/24  -KA     LTG 1 Patient/family independent with self-care techniques for self-management of condition.  -KA     LTG 1 Progress New  -KA     LTG 2 Patient demonstrates decreased net edema of Left Lower Extremity >/= 10-20 cm for decreased edema symptoms, decreased risk of infection, and improved skin care.  -KA     LTG 2 Progress New  -KA     LTG 3 Patient/family independent with compression garments as indicated for self-management of condition.  -KA     LTG 3 Progress New  -KA        Time Calculation    PT Goal Re-Cert Due Date 03/14/24  -               User Key  (r) = Recorded By, (t) = Taken By, (c) = Cosigned By      Initials Name Provider Type    Bertha Ruiz, PT Physical Therapist                     PT Assessment/Plan       Row Name 12/15/23 1631          PT Assessment    Functional Limitations Impaired locomotion;Impaired gait;Limitation in home management;Limitations in community activities;Limitations in functional capacity and performance;Performance in leisure activities;Performance in self-care ADL  -KA     Impairments Balance;Edema;Endurance;Gait;Impaired lymphatic circulation;Integumentary integrity;Muscle strength;Sensation   -KA     Assessment Comments 89 y.o. female who presents with bilateral Lower Extremity lymphedema, left worse than right.  Presentation is consistent with secondary Stage 2. Lymphedema is present from feet to lower thighs and is characterized by +B stemmer sign, 2+ pitting edema, fibrotic skin changes, abnormal contours at left ankle and B posteriomedial thigh just above knee, dry skin, redness LLE, evidence of blistering L posterior leg.  Impairments include gait deviations, LE weakness, decreased ankle/knee ROM related to swelling, edema, decreased skin integrity, and decreased balance.  Pt is limited with ability to walk, perform car transfers, shop, socialize with friends, dress and bathe due to lymphedema.  Score on Lymphedema Life Impact Scale is 50.  Patient is a good candidate for complete decongestive therapy to reduce infection risk, improve skin condition, and promote self-management of condition.  Following education, patient agrees to participate in Phase I (skilled care) and Phase II (self-management) of CDT.  -KA     Rehab Potential Good  -KA     Patient/caregiver participated in establishment of treatment plan and goals Yes  -KA     Patient would benefit from skilled therapy intervention Yes  -KA        PT Plan    Predicted Duration of Therapy Intervention (PT) 4-6 weeks  -KA     Planned CPT's? PT EVAL MOD COMPLELITY: 04421;PT RE-EVAL: 76117;PT THER PROC EA 15 MIN: 23629;PT THER ACT EA 15 MIN: 60734;PT MANUAL THERAPY EA 15 MIN: 17135;PT NEUROMUSC RE-EDUCATION EA 15 MIN: 45443;PT GAIT TRAINING EA 15 MIN: 43952;PT SELF CARE/HOME MGMT/TRAIN EA 15: 79670  -     Physical Therapy Interventions (Optional Details) bandaging;home exercise program;manual lymphatic drainage;manual therapy techniques;patient/family education  -     PT Plan Comments Proceed with CDT, light compression only due to age  -KA               User Key  (r) = Recorded By, (t) = Taken By, (c) = Cosigned By      Initials Name Provider  Type    KA Bertha Vasquez, PT Physical Therapist                                 Time Calculation:   Start Time: 1525  Stop Time: 1622  Time Calculation (min): 57 min  Untimed Charges  PT Eval/Re-eval Minutes: 57  Total Minutes  Untimed Charges Total Minutes: 57   Total Minutes: 57   Therapy Charges for Today       Code Description Service Date Service Provider Modifiers Qty    63407969164 HC PT EVAL MOD COMPLEXITY 4 12/15/2023 Bertha Vasquez, PT GP 1                      Bertha Vasquez, PT  12/15/2023

## 2024-01-17 ENCOUNTER — TELEPHONE (OUTPATIENT)
Dept: CARDIOLOGY | Facility: CLINIC | Age: 89
End: 2024-01-17

## 2024-01-22 ENCOUNTER — TELEPHONE (OUTPATIENT)
Dept: CARDIOLOGY | Facility: CLINIC | Age: 89
End: 2024-01-22
Payer: MEDICARE

## 2024-01-22 DIAGNOSIS — E78.2 MIXED HYPERLIPIDEMIA: Primary | ICD-10-CM

## 2024-01-22 NOTE — TELEPHONE ENCOUNTER
----- Message from SHAZIA Gabriel sent at 10/12/2023  2:40 PM EDT -----  LABS RESUMING ZOCOR FOR CAROTID ATHEROSCLEROSIS

## 2024-01-22 NOTE — TELEPHONE ENCOUNTER
Please remind her to get repeat labs for checking on cholesterol with statin medication.    Thanks!

## 2024-01-26 ENCOUNTER — OFFICE VISIT (OUTPATIENT)
Dept: PAIN MEDICINE | Facility: CLINIC | Age: 89
End: 2024-01-26
Payer: MEDICARE

## 2024-01-26 VITALS
TEMPERATURE: 97.1 F | SYSTOLIC BLOOD PRESSURE: 128 MMHG | DIASTOLIC BLOOD PRESSURE: 64 MMHG | WEIGHT: 196.8 LBS | RESPIRATION RATE: 18 BRPM | HEART RATE: 59 BPM | HEIGHT: 64 IN | OXYGEN SATURATION: 99 % | BODY MASS INDEX: 33.6 KG/M2

## 2024-01-26 DIAGNOSIS — M54.2 CERVICALGIA: ICD-10-CM

## 2024-01-26 DIAGNOSIS — G89.29 CHRONIC BILATERAL LOW BACK PAIN, UNSPECIFIED WHETHER SCIATICA PRESENT: ICD-10-CM

## 2024-01-26 DIAGNOSIS — M25.511 PAIN OF BOTH SHOULDER JOINTS: ICD-10-CM

## 2024-01-26 DIAGNOSIS — M54.16 LUMBAR RADICULOPATHY: Primary | ICD-10-CM

## 2024-01-26 DIAGNOSIS — M25.512 PAIN OF BOTH SHOULDER JOINTS: ICD-10-CM

## 2024-01-26 DIAGNOSIS — M54.50 CHRONIC BILATERAL LOW BACK PAIN, UNSPECIFIED WHETHER SCIATICA PRESENT: ICD-10-CM

## 2024-01-26 PROCEDURE — 1160F RVW MEDS BY RX/DR IN RCRD: CPT | Performed by: NURSE PRACTITIONER

## 2024-01-26 PROCEDURE — 99212 OFFICE O/P EST SF 10 MIN: CPT | Performed by: NURSE PRACTITIONER

## 2024-01-26 PROCEDURE — 1159F MED LIST DOCD IN RCRD: CPT | Performed by: NURSE PRACTITIONER

## 2024-01-26 PROCEDURE — 1125F AMNT PAIN NOTED PAIN PRSNT: CPT | Performed by: NURSE PRACTITIONER

## 2024-01-26 RX ORDER — GABAPENTIN 100 MG/1
100 CAPSULE ORAL 3 TIMES DAILY
Qty: 90 CAPSULE | Refills: 5 | Status: SHIPPED | OUTPATIENT
Start: 2024-01-26

## 2024-01-26 NOTE — PROGRESS NOTES
CHIEF COMPLAINT  Back pain      Subjective   Dex Thakkar is a 89 y.o. female  who presents for follow-up.  She has a history of back pain, joint pain.     Pain today is a 4/10 VAS. Pain is in the low back, neck, both shoulders.  We started Gabapentin last visit.  She is taking 100 mg tid and this seems to be helping. She denies adverse reactions.      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    Back Pain  Pertinent negatives include no abdominal pain, dysuria, headaches, numbness or weakness.        PEG Assessment   What number best describes your pain on average in the past week?4  What number best describes how, during the past week, pain has interfered with your enjoyment of life?4  What number best describes how, during the past week, pain has interfered with your general activity?  4    Review of Pertinent Medical Data ---    The following portions of the patient's history were reviewed and updated as appropriate: allergies, current medications, past family history, past medical history, past social history, past surgical history, and problem list.    Review of Systems   Constitutional:  Negative for activity change and fatigue.   Gastrointestinal:  Negative for abdominal pain, constipation and diarrhea.   Genitourinary:  Negative for difficulty urinating and dysuria.   Musculoskeletal:  Positive for back pain.   Neurological:  Negative for dizziness, weakness, light-headedness, numbness and headaches.   Psychiatric/Behavioral:  Negative for agitation, sleep disturbance and suicidal ideas. The patient is not nervous/anxious.      I have reviewed and confirmed the accuracy of the ROS as documented by the MA/LPN/RN SHAZIA Barrientos    Vitals:    01/26/24 1412   BP: 128/64   BP Location: Left arm   Patient Position: Sitting   Cuff Size: Large Adult   Pulse: 59   Resp: 18   Temp: 97.1 °F (36.2 °C)   SpO2:  "99%   Weight: 89.3 kg (196 lb 12.8 oz)   Height: 162.6 cm (64.02\")   PainSc:   4   PainLoc: Back         Objective   Physical Exam        Assessment & Plan   Diagnoses and all orders for this visit:    1. Lumbar radiculopathy (Primary)  -     gabapentin (NEURONTIN) 100 MG capsule; Take 1 capsule by mouth 3 (Three) Times a Day.  Dispense: 90 capsule; Refill: 5    2. Cervicalgia  -     Ambulatory Referral to Physical Therapy Evaluate and treat    3. Pain of both shoulder joints  -     Ambulatory Referral to Physical Therapy Evaluate and treat    4. Chronic bilateral low back pain, unspecified whether sciatica present  -     Ambulatory Referral to Physical Therapy Evaluate and treat        --- PT to eval/treat --- wants to take a more conservative approach for now  --- Continue Gabapentin - refilled today. Plan to release back to PCP for ongoing management.  We can continue to manage if needed.   --- No interest in injections     Dex Thakkar reports a pain score of 4.  Given her pain assessment as noted, treatment options were discussed and the following options were decided upon as a follow-up plan to address the patient's pain: continuation of current treatment plan for pain and referral to Physical Therapy.      --- Follow-up PRN (or 6 months if we continue to manage Gabapentin)               As the clinician, I personally reviewed the DEVIKA from 1/26/2024 while the patient was in the office today.    History and physical exam exhibit continued safe and appropriate use of controlled substances.       Dictated utilizing Dragon dictation.     "

## 2024-02-05 ENCOUNTER — TELEPHONE (OUTPATIENT)
Dept: PHYSICAL THERAPY | Facility: HOSPITAL | Age: 89
End: 2024-02-05
Payer: MEDICARE

## 2024-02-05 NOTE — TELEPHONE ENCOUNTER
Caller: Dex Thakkar         What was the call regarding: NOT FEELING WELL, GOING TO URGENT CARE

## 2024-02-23 ENCOUNTER — HOSPITAL ENCOUNTER (OUTPATIENT)
Dept: PHYSICAL THERAPY | Facility: HOSPITAL | Age: 89
Setting detail: THERAPIES SERIES
Discharge: HOME | End: 2024-02-23
Payer: MEDICARE

## 2024-02-23 DIAGNOSIS — G89.29 CHRONIC PAIN OF BOTH SHOULDERS: ICD-10-CM

## 2024-02-23 DIAGNOSIS — M54.2 NECK PAIN: Primary | ICD-10-CM

## 2024-02-23 DIAGNOSIS — M25.511 CHRONIC PAIN OF BOTH SHOULDERS: ICD-10-CM

## 2024-02-23 DIAGNOSIS — M25.512 CHRONIC PAIN OF BOTH SHOULDERS: ICD-10-CM

## 2024-02-23 PROCEDURE — 97110 THERAPEUTIC EXERCISES: CPT

## 2024-02-23 PROCEDURE — 97161 PT EVAL LOW COMPLEX 20 MIN: CPT

## 2024-02-23 NOTE — THERAPY EVALUATION
Outpatient Physical Therapy Ortho Initial Evaluation  [facility]     Patient Name: Dex Thakkar  : 1934  MRN: [id number]  Today's Date: 2024      Visit Date: 2024    Patient Active Problem List   Diagnosis    Stasis edema of left lower extremity    Pain and swelling of ankle, left    Hypertension    H/O cardiac radiofrequency ablation    Left lower quadrant pain    Urinary retention    Rectal ulcer    Anastomotic stricture of colorectal region    Aortic valve sclerosis    AVNRT (AV gatito re-entry tachycardia)    Carotid artery stenosis    Diastolic dysfunction    Edema    History of PSVT (paroxysmal supraventricular tachycardia)    Hyperlipidemia LDL goal <100    Irritable bladder    Systolic murmur        Past Medical History:   Diagnosis Date    A-fib     Diverticulitis     H/O cardiac radiofrequency ablation     Hyperlipidemia     Hypertension     Incontinence     Melanoma     Murmur     Spinal stenosis         Past Surgical History:   Procedure Laterality Date    CARDIAC ABLATION N/A 2017    Dr. Leigh    CHOLECYSTECTOMY N/A     COLON RESECTION N/A 5/15/2019    Procedure: laparoscopic low anterior colon resection with splenic flexure mobilization, left salpingo oophorectomy, drainage of peritoneal abscess;  Surgeon: Renata Ray MD;  Location: [facility] OR;  Service: General    COLONOSCOPY N/A 10/15/2004    Sigmoid diverticulosis, smal rectal polyp, internal hemorrhoids-Dr. Renata Ray    COLONOSCOPY N/A 2009    Sigmoid diverticulosis-Dr. Renata Ray    COLONOSCOPY N/A 2019    Procedure: COLONOSCOPY to cecum with cold biopsies;  Surgeon: Renata Ray MD;  Location: [facility]OSCOPY;  Service: General    ENDOSCOPY N/A 10/07/2013    Gastric ulcerations x10, ulcerative esophagitis, small hiatal hernia-Dr. Renata Ray    HYSTERECTOMY      KNEE ARTHROSCOPY W/ PARTIAL MEDIAL MENISCECTOMY Right 10/24/2014    Dr. Bud Muhammad    SIGMOIDOSCOPY N/A 2020     "Procedure: FLEXIBLE SIGMOIDOSCOPY WITH BIOPSY, balloon dilation;  Surgeon: Renata Ray MD;  Location: [facility] ENDOSCOPY;  Service: General    SIGMOIDOSCOPY N/A 7/20/2020    Procedure: FLEXIBLE SIGMOIDOSCOPY with bx;  Surgeon: Renata Ray MD;  Location: [facility] ENDOSCOPY;  Service: General;  Laterality: N/A;  pre-history of a rectal ulcer and a resection for diverticulitis, with anastomotic stricture found 1/20/20  post-anastamotic nodule, mild stricture        SKIN CANCER EXCISION Left     Melanoma left leg       Visit Dx:     ICD-10-CM ICD-9-CM   1. Neck pain  M54.2 723.1   2. Chronic pain of both shoulders  M25.511 719.41    G89.29 338.29    M25.512           Patient History       Row Name 02/23/24 1400             History    Chief Complaint Pain  -LB      Type of Pain Neck pain  -LB      Date Current Problem(s) Began 08/27/23  -LB      Brief Description of Current Complaint Pt reports neck and LBP 6 months ago. She had MRI revealing spinal stenosis. She was sent to pain management where she has been twice and was given pain meds that has now controlled the pain. 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 She states she gets \"locked up\" when she doesn't spend time \"unwinding\" with meditation/prayer. Carotid arteries have been examined as symptoms feel B laterally in neck. She takes BP meds, cholestorol med, and diuretic. She does not drive due to dec B cervical rotation.  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 Compression garments, compression pumps for LEs  -LB         Pain     Pain Location Neck  -LB  "     Pain at Present 0  -LB      Pain at Best 0  -LB      Pain at Worst 0  -LB      Pain Comments no pain; controlled by medication  -LB      Is your sleep disturbed? No  -LB      What position do you sleep in? --  seated in recliner  -LB      Difficulties at work? Retired  -LB      Difficulties with ADL's? No issues  -LB      Difficulties with recreational activities? Pt denies recreational issues.  -LB         Fall Risk Assessment    Any falls in the past year: No  -LB         Services    Prior Rehab/Home Health Experiences No  -LB      Are you currently receiving Home Health services No  -LB      Do you plan to receive Home Health services in the near future No  -LB         Daily Activities    Primary Language English  -LB      How does patient learn best? Listening;Reading;Demonstration  -LB      Barriers to learning None  -LB      Pt Participated in POC and Goals Yes  -LB         Safety    Are you being hurt, hit, or frightened by anyone at home or in your life? No  -LB      Are you being neglected by a caregiver No  -LB      Have you had any of the following issues with N/A  -LB                User Key  (r) = Recorded By, (t) = Taken By, (c) = Cosigned By      Initials Name Provider Type    LB Michelle Valverde PT Physical Therapist                     PT Ortho       Row Name 02/23/24 1500       Subjective    Subjective Comments I am actually doing well.  -LB       Subjective Pain    Able to rate subjective pain? yes  -LB    Pre-Treatment Pain Level 0  -LB       Posture/Observations    Posture/Observations Comments fwd head, rounded forward shoulders  -LB       Myotomal Screen- Upper Quarter Clearing    Shoulder flexion (C5) Right:;4- (Good -);Left:;4 (Good)  -LB    Elbow flexion/wrist extension (C6) Bilateral:;4 (Good)  -LB    Elbow extension/wrist flexion (C7) Bilateral:;4 (Good)  -LB     WNL  -LB       Cervical/Shoulder ROM Screen    Cervical flexion Normal  -LB    Cervical extension Impaired  to neutral   -LB    Cervical lateral flexion Impaired  dec 75% B  -LB    Cervical rotation Impaired  dec 50% B  -LB       Cervical Palpation    Suboccipital Bilateral:;Guarded/taut  -LB    Levator Scapula Bilateral:;Guarded/taut  -LB    Upper Traps Bilateral:;Guarded/taut  -LB       Cervical/Thoracic Special Tests    Spurlings (Foraminal Compression) Negative  -LB    Cervical Compression (Forarminal Compression vs. Facet Pain) Positive  -LB    Cervical Distraction (Foraminal Compression vs. Facet Pain) Positive  -LB       General ROM    GENERAL ROM COMMENTS BUE functional ROM  -LB       MMT (Manual Muscle Testing)    General MMT Comments dec R shoulder flexion strength, ER/IR B 4/5  -LB       Sensation    Sensation WNL? WNL  -LB       Upper Extremity Flexibility    Upper Trapezius Bilateral:;Moderately limited  -LB    Levator Scapula Bilateral:;Moderately limited  -LB    Pect Minor Bilateral:;Moderately limited  -LB    Pect Major Bilateral:;Moderately limited  -LB       Gait/Stairs (Locomotion)    C[place]heart Level (Gait) independent  -LB              User Key  (r) = Recorded By, (t) = Taken By, (c) = Cosigned By      Initials Name Provider Type    Michelle Del Valle, PT Physical Therapist                                Therapy Education  Education Details: discussed body mechanics for caring for her spouse, need for scapular strengthening, HEP issued for posture/strengthening, discussed recliner position and adding pillow to lumbar spine to reduce FF position of cervical spine  Given: HEP, Mobility training, Symptoms/condition management, Posture/body mechanics  Program: New  How Provided: Verbal, Demonstration, Written  Provided to: Patient  Level of Understanding: Teach back education performed, Verbalized, Demonstrated      PT OP Goals       Row Name 02/23/24 1500          PT Short Term Goals    STG Date to Achieve 03/08/24  -LB     STG 1 Pt will demonstrate understanding and compliance with initial HEP.  -LB     STG 1  Progress New  -LB     STG 2 Pt will report understanding of improved body mechanics for lifting/transferring spouse.  -LB     STG 2 Progress New  -LB        Long Term Goals    LTG Date to Achieve 03/24/24  -LB     LTG 1 Pt will demonstrate improved RUE flexion to 4/5 or better.  -LB     LTG 1 Progress New  -LB     LTG 2 Pt will demonstrate improved posture in seated position without inc FF position of cervical spine.  -LB     LTG 2 Progress New  -LB     LTG 3 Pt will demonstrate understanding of advanced HEP to allow continued management of condition.  -LB     LTG 3 Progress New  -LB        Time Calculation    PT Goal Re-Cert Due Date 05/23/24  -LB               User Key  (r) = Recorded By, (t) = Taken By, (c) = Cosigned By      Initials Name Provider Type    Michelle Del Valle, PT Physical Therapist                     PT Assessment/Plan       Row Name 02/23/24 1534          PT Assessment    Functional Limitations Limitations in functional capacity and performance  -LB     Impairments Muscle strength;Sensation  -LB     Assessment Comments Pt is 89 y.o. female referred to outpatient physical therapy for evaluation and treatment of  evolving  bilateral neck pain that has been well controlled with pain meds and she reports 0/10 pain today.  Patient presents with dec R shoulder strength, dec cervical ROM, forward posture. PMHx consistent with no prior injury, spinal stenosis. 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 Pt demonstrates signs and symptoms  consistent with referring diagnosis.  Pt scored **% disability on the NDI. Pt is limited in their ability to participate in caregiving tasks, sleeping. She will benefit from continued skilled PT services to address functional deficits. Thank you for this referral.  -LB     Rehab Potential Good  -LB     Patient/caregiver participated in establishment of treatment plan and goals Yes   -LB     Patient would benefit from skilled therapy intervention Yes  -LB        PT Plan    PT Frequency Other (comment)  -LB     Predicted Duration of Therapy Intervention (PT) 1-2 visits  -LB     Planned CPT's? PT EVAL LOW COMPLEXITY: 51823;PT RE-EVAL: 96926;PT THER PROC EA 15 MIN: 51394;PT THER ACT EA 15 MIN: 36316;PT MANUAL THERAPY EA 15 MIN: 61090;PT NEUROMUSC RE-EDUCATION EA 15 MIN: 06470;PT SELF CARE/HOME MGMT/TRAIN EA 15: 35979  -LB     PT Plan Comments focus on posture, review body mechanics for caring for spouse  -LB               User Key  (r) = Recorded By, (t) = Taken By, (c) = Cosigned By      Initials Name Provider Type    Michelle Del Valle PT Physical Therapist                       OP Exercises       Row Name 02/23/24 1500             Subjective    Subjective Comments I am actually doing well.  -LB         Subjective Pain    Able to rate subjective pain? yes  -LB      Pre-Treatment Pain Level 0  -LB         Total Minutes    56497 - PT Therapeutic Exercise Minutes 15  -LB         Exercise 1    Exercise Name 1 shoulder rolls  -LB      Sets 1 2  -LB      Reps 1 10  -LB         Exercise 2    Exercise Name 2 scap retraction  -LB      Sets 2 2  -LB      Reps 2 10  -LB      Time 2 5  -LB         Exercise 3    Exercise Name 3 UT stretch (in shower)  -LB      Reps 3 3  -LB      Time 3 20  -LB         Exercise 4    Exercise Name 4 tband row  -LB      Sets 4 2  -LB      Reps 4 10  -LB      Time 4 RTB  -LB         Exercise 5    Exercise Name 5 supine cervical rotation  -LB      Reps 5 10  -LB      Time 5 each way  -LB                User Key  (r) = Recorded By, (t) = Taken By, (c) = Cosigned By      Initials Name Provider Type    Michelle Del Valle PT Physical Therapist                                  Outcome Measure Options: Neck Disability Index (NDI), Modified Oswestry  Modified Oswestry  Modified Oswestry Score/Comments: 10% disability  Neck Disability Index  Section 1 - Pain Intensity: I have no pain at the  moment.  Section 2 - Personal Care: I can look after myself normally without causing extra pain.  Section 3 - Lifting: I can lift heavy weights without extra pain  Section 4 - Work: I can only do my usual work, but no more  Section 5 - Headaches: I have no headaches at all.  Section 6 - Concentration: I can concentrate fully when I want to without difficulty.  Section 7 - Sleeping: I have no trouble sleeping.  Section 8 - Driving: I can drive my car without neck pain  Section 9 - Reading: I can read as much as I want with no neck pain.  Section 10 - Recreation: I am able to engage in most but not all recreational activities because of pain in my neck.  Neck Disability Index Score: 3  Neck Disability Index Comments: 6% disability      Time Calculation:     Start Time: 1445  Stop Time: 1530  Time Calculation (min): 45 min  Total Timed Code Minutes- PT: 15 minute(s)  Timed Charges  98094 - PT Therapeutic Exercise Minutes: 15  Total Minutes  Timed Charges Total Minutes: 15   Total Minutes: 15     Therapy Charges for Today       Code Description Service Date Service Provider Modifiers Qty    45151394635 HC PT THER PROC EA 15 MIN 2/23/2024 Michelle Valverde, PT GP 1    85357551380 HC PT EVAL LOW COMPLEXITY 2 2/23/2024 Michelle Valverde, PT GP 1            PT G-Codes  Outcome Measure Options: Neck Disability Index (NDI), Modified Oswestry  Modified Oswestry Score/Comments: 10% disability  Neck Disability Index Score: 3         Michelle Valverde PT  2/23/2024

## 2024-03-04 ENCOUNTER — HOSPITAL ENCOUNTER (OUTPATIENT)
Dept: CARDIOLOGY | Facility: HOSPITAL | Age: 89
Discharge: HOME | End: 2024-03-04
Admitting: NURSE PRACTITIONER
Payer: MEDICARE

## 2024-03-04 ENCOUNTER — OFFICE VISIT (OUTPATIENT)
Dept: CARDIOLOGY | Facility: CLINIC | Age: 89
End: 2024-03-04
Payer: MEDICARE

## 2024-03-04 VITALS
HEART RATE: 60 BPM | WEIGHT: 200 LBS | HEIGHT: 64 IN | OXYGEN SATURATION: 96 % | BODY MASS INDEX: 34.15 KG/M2 | DIASTOLIC BLOOD PRESSURE: 60 MMHG | SYSTOLIC BLOOD PRESSURE: 124 MMHG

## 2024-03-04 DIAGNOSIS — I10 ESSENTIAL HYPERTENSION: ICD-10-CM

## 2024-03-04 DIAGNOSIS — I10 ESSENTIAL HYPERTENSION: Primary | ICD-10-CM

## 2024-03-04 DIAGNOSIS — E78.2 MIXED HYPERLIPIDEMIA: ICD-10-CM

## 2024-03-04 LAB
ALBUMIN SERPL-MCNC: 3.9 G/DL (ref 3.5–5.2)
ALBUMIN/GLOB SERPL: 1.3 G/DL
ALP SERPL-CCNC: 104 U/L (ref 39–117)
ALT SERPL W P-5'-P-CCNC: 14 U/L (ref 1–33)
ANION GAP SERPL CALCULATED.3IONS-SCNC: 8 MMOL/L (ref 5–15)
AST SERPL-CCNC: 17 U/L (ref 1–32)
BILIRUB SERPL-MCNC: 0.6 MG/DL (ref 0–1.2)
BUN SERPL-MCNC: 24 MG/DL (ref 8–23)
BUN/CREAT SERPL: 22.6 (ref 7–25)
CALCIUM SPEC-SCNC: 9.4 MG/DL (ref 8.6–10.5)
CHLORIDE SERPL-SCNC: 101 MMOL/L (ref 98–107)
CHOLEST SERPL-MCNC: 146 MG/DL (ref 0–200)
CO2 SERPL-SCNC: 26 MMOL/L (ref 22–29)
CREAT SERPL-MCNC: 1.06 MG/DL (ref 0.57–1)
EGFRCR SERPLBLD CKD-EPI 2021: 50.3 ML/MIN/1.73
GLOBULIN UR ELPH-MCNC: 3 GM/DL
GLUCOSE SERPL-MCNC: 97 MG/DL (ref 65–99)
HDLC SERPL-MCNC: 61 MG/DL (ref 40–60)
LDLC SERPL CALC-MCNC: 69 MG/DL (ref 0–100)
LDLC/HDLC SERPL: 1.11 {RATIO}
POTASSIUM SERPL-SCNC: 4.2 MMOL/L (ref 3.5–5.2)
PROT SERPL-MCNC: 6.9 G/DL (ref 6–8.5)
SODIUM SERPL-SCNC: 135 MMOL/L (ref 136–145)
TRIGL SERPL-MCNC: 87 MG/DL (ref 0–150)
VLDLC SERPL-MCNC: 16 MG/DL (ref 5–40)

## 2024-03-04 PROCEDURE — 80053 COMPREHEN METABOLIC PANEL: CPT | Performed by: NURSE PRACTITIONER

## 2024-03-04 PROCEDURE — 1159F MED LIST DOCD IN RCRD: CPT | Performed by: NURSE PRACTITIONER

## 2024-03-04 PROCEDURE — 93000 ELECTROCARDIOGRAM COMPLETE: CPT | Performed by: NURSE PRACTITIONER

## 2024-03-04 PROCEDURE — 99214 OFFICE O/P EST MOD 30 MIN: CPT | Performed by: NURSE PRACTITIONER

## 2024-03-04 PROCEDURE — 36415 COLL VENOUS BLD VENIPUNCTURE: CPT

## 2024-03-04 PROCEDURE — 1160F RVW MEDS BY RX/DR IN RCRD: CPT | Performed by: NURSE PRACTITIONER

## 2024-03-04 PROCEDURE — 80061 LIPID PANEL: CPT | Performed by: NURSE PRACTITIONER

## 2024-03-04 NOTE — PROGRESS NOTES
[facility] Follow Up Office Note     Encounter Date:24  Patient:Dex Thakkar  :1934  MRN:[id number]      Chief Complaint:   Chief Complaint   Patient presents with    Follow-up     3 mon f/u         History of Presenting Illness:        Dex Thakkar is a 89 y.o. female who is here for follow-up of hypertension and history of AVNRT.  She is a patient of Dr. Greene.    Patient has known hypertension, history of AVNRT status post atypical AVNRT ablation, chronic pain.    Today patient reports in general she is feeling well.  She has been to lymphedema clinic and thinks that this may be helping her swelling a little bit.  She does not feel her swelling is worse since starting amlodipine.      Review of Systems:  Review of Systems   Cardiovascular:  Positive for leg swelling. Negative for chest pain, dyspnea on exertion, orthopnea and palpitations.   Respiratory:  Negative for shortness of breath.        Current Outpatient Medications on File Prior to Visit   Medication Sig Dispense Refill    amLODIPine (NORVASC) 5 MG tablet Take 1 tablet by mouth Daily. 30 tablet 3    atorvastatin (LIPITOR) 10 MG tablet Take 1 tablet by mouth Daily. 30 tablet 11    chlorthalidone (HYGROTON) 25 MG tablet TAKE ONE TABLET BY MOUTH DAILY 90 tablet 3    gabapentin (NEURONTIN) 100 MG capsule Take 1 capsule by mouth 3 (Three) Times a Day. 90 capsule 5    lisinopril (PRINIVIL,ZESTRIL) 40 MG tablet TAKE ONE TABLET BY MOUTH DAILY 90 tablet 4    metoprolol succinate XL (TOPROL-XL) 100 MG 24 hr tablet Take 1 tablet by mouth Daily.       No current facility-administered medications on file prior to visit.       No Known Allergies    Past Medical History:   Diagnosis Date    A-fib     Diverticulitis     H/O cardiac radiofrequency ablation     Hyperlipidemia     Hypertension     Incontinence     Melanoma     Murmur     Spinal stenosis        Past Surgical History:   Procedure Laterality Date    CARDIAC ABLATION N/A 2017     Dr. Leigh    CHOLECYSTECTOMY N/A     COLON RESECTION N/A 5/15/2019    Procedure: laparoscopic low anterior colon resection with splenic flexure mobilization, left salpingo oophorectomy, drainage of peritoneal abscess;  Surgeon: Renata Ray MD;  Location: [facility] MAIN OR;  Service: General    COLONOSCOPY N/A 10/15/2004    Sigmoid diverticulosis, smal rectal polyp, internal hemorrhoids-Dr. Renata Ray    COLONOSCOPY N/A 2009    Sigmoid diverticulosis-Dr. Renata Ray    COLONOSCOPY N/A 2019    Procedure: COLONOSCOPY to cecum with cold biopsies;  Surgeon: Renata Ray MD;  Location: [facility] ENDOSCOPY;  Service: General    ENDOSCOPY N/A 10/07/2013    Gastric ulcerations x10, ulcerative esophagitis, small hiatal hernia-Dr. Renata Ray    HYSTERECTOMY      KNEE ARTHROSCOPY W/ PARTIAL MEDIAL MENISCECTOMY Right 10/24/2014    Dr. Bud Muhammad    SIGMOIDOSCOPY N/A 2020    Procedure: FLEXIBLE SIGMOIDOSCOPY WITH BIOPSY, balloon dilation;  Surgeon: Renata Ray MD;  Location[facility] ENDOSCOPY;  Service: General    SIGMOIDOSCOPY N/A 2020    Procedure: FLEXIBLE SIGMOIDOSCOPY with bx;  Surgeon: Renata Ray MD;  Location: [facility] ENDOSCOPY;  Service: General;  Laterality: N/A;  pre-history of a rectal ulcer and a resection for diverticulitis, with anastomotic stricture found 20  post-anastamotic nodule, mild stricture        SKIN CANCER EXCISION Left     Melanoma left leg       Social History     Socioeconomic History    Marital status:    Tobacco Use    Smoking status: Former     Current packs/day: 0.00     Types: Cigarettes     Quit date: 1984     Years since quittin.6    Smokeless tobacco: Never    Tobacco comments:     QUIT 30  YRS AGO   Vaping Use    Vaping status: Never Used   Substance and Sexual Activity    Alcohol use: Yes     Alcohol/week: 1.0 standard drink of alcohol     Types: 1 Glasses of wine per week     Comment: OCCASIONAL    Drug use: No    Sexual  "activity: Defer       Family History   Problem Relation Age of Onset    Brain cancer Mother     Cancer Father     Cancer Daughter     Heart valve disorder Daughter         Bicuspid AV    Atrial fibrillation Daughter     Breast cancer Sister     Heart valve disorder Sister         Bicuspid AV    Malig Hyperthermia Neg Hx        The following portions of the patient's history were reviewed and updated as appropriate: allergies, current medications, past family history, past medical history, past social history, past surgical history and problem list.       Objective:       Vitals:    03/04/24 1346   BP: 124/60   BP Location: Right arm   Patient Position: Sitting   Pulse: 60   SpO2: 96%   Weight: 90.7 kg (200 lb)   Height: 162.6 cm (64.03\")           Physical Exam:  Constitutional:  Pleasant, conversant.  No acute distress  HENT: Oropharynx clear and membrane moist  Eyes: Normal conjunctiva, no sclera icterus  Neck: Supple, no carotid bruit bilaterally  Cardiovascular: Regular rate and rhythm, 2/6 systolic murmur, 1+ bilateral lower extremity edema nonpitting-DANNA hose  Pulmonary: Normal respiratory effort, normal lung sounds, no wheezing  Neurological: Alert and orient x 3  Skin: Warm, dry, no ecchymosis, no rash  Psych: Appropriate mood and affect. Normal judgment and insight         Lab Results   Component Value Date     08/31/2023     (L) 09/13/2022    K 4.0 08/31/2023    K 4.4 09/13/2022     08/31/2023     09/13/2022    CO2 24.9 08/31/2023    CO2 27.7 09/13/2022    BUN 23 08/31/2023    BUN 25 (H) 09/13/2022    CREATININE 0.98 08/31/2023    CREATININE 0.90 09/13/2022    EGFRIFNONA 67 04/19/2021    EGFRIFNONA 72 05/17/2019    GLUCOSE 82 08/31/2023    GLUCOSE 90 09/13/2022    CALCIUM 9.1 08/31/2023    CALCIUM 9.2 09/13/2022    ALBUMIN 3.9 08/31/2023    ALBUMIN 4.10 04/19/2021    BILITOT 0.5 08/31/2023    BILITOT 0.7 04/19/2021    AST 21 08/31/2023    AST 25 04/19/2021    ALT 17 08/31/2023    " "ALT 22 04/19/2021     Lab Results   Component Value Date    WBC 6.42 08/31/2023    WBC 7.19 04/19/2021    HGB 11.6 (L) 08/31/2023    HGB 13.9 04/19/2021    HCT 34.2 08/31/2023    HCT 40.5 04/19/2021    MCV 88.4 08/31/2023    MCV 90.8 04/19/2021     08/31/2023     04/19/2021     No results found for: \"CHOL\", \"TRIG\", \"HDL\", \"LDL\"  Lab Results   Component Value Date    PROBNP 716.0 08/31/2023    PROBNP 577.5 04/19/2021     Lab Results   Component Value Date    TROPONINT <0.010 04/19/2021     No results found for: \"TSH\"        ECG 12 Lead    Date/Time: 3/4/2024 1:53 PM  Performed by: Savanna Crenshaw APRN    Authorized by: Savanna Crenshaw APRN  Comparison: compared with previous ECG from 11/27/2023  Similar to previous ECG  Rhythm: sinus rhythm  Rate: normal  BPM: 60  Conduction: conduction normal  ST Segments: ST segments normal             Assessment:          Diagnosis Plan   1. Essential hypertension  ECG 12 Lead    Comprehensive Metabolic Panel                   Plan:       Hypertension - BP controlled on current regimen.  If she has worsening lower extremity edema would transition her to carvedilol  Lower extremity edema / lymphedema and chronic venous insufficiency -stable.  She has been referred to lymphedema clinic which seems to be helping some  Hx AVNRT s/p ablation -continue beta-blocker    Patient is seen today for follow-up.  I think she is stable from a cardiac perspective and I am not recommending any changes.  Follow-up with Dr. Greene in 6 months as scheduled  Orders Placed This Encounter   Procedures    Comprehensive Metabolic Panel     Standing Status:   Future     Standing Expiration Date:   3/4/2025     Order Specific Question:   Release to patient     Answer:   Routine Release [[id number]]    ECG 12 Lead     This order was created via procedure documentation     Order Specific Question:   Release to patient     Answer:   Routine Release [[id number]]            Savanna IBRAHIM" SHAZIA Crenshaw  [facility]  03/04/24  14:11 EST

## 2024-03-05 ENCOUNTER — TELEPHONE (OUTPATIENT)
Dept: CARDIOLOGY | Facility: CLINIC | Age: 89
End: 2024-03-05

## 2024-03-05 NOTE — TELEPHONE ENCOUNTER
Pt called back. Went over results. She verbalized understanding.    Thank you,    Mary Mcdermott, RN  [place] [facility] [place]  03/05/24 09:29 EST

## 2024-03-05 NOTE — TELEPHONE ENCOUNTER
HUB- please put patient straight through to triage    Saba Maki RN  Triage RN  03/05/24 09:05 EST

## 2024-03-05 NOTE — TELEPHONE ENCOUNTER
----- Message from SHAZIA Gabriel sent at 3/4/2024 10:11 PM EST -----  Please let her know cholesterol looks great and other labs are stable

## 2024-03-07 RX ORDER — AMLODIPINE BESYLATE 5 MG/1
5 TABLET ORAL DAILY
Qty: 30 TABLET | Refills: 3 | Status: SHIPPED | OUTPATIENT
Start: 2024-03-07

## 2024-03-17 NOTE — TELEPHONE ENCOUNTER
Pt has a RECALL coming due for:    6-mo FLEX SIGMOID (due 01/2021)    Could you please place orders for scheduling?  
normal...

## 2024-03-20 ENCOUNTER — HOSPITAL ENCOUNTER (OUTPATIENT)
Dept: PHYSICAL THERAPY | Facility: HOSPITAL | Age: 89
Setting detail: THERAPIES SERIES
Discharge: HOME | End: 2024-03-20
Payer: MEDICARE

## 2024-03-20 DIAGNOSIS — M54.2 NECK PAIN: Primary | ICD-10-CM

## 2024-03-20 PROCEDURE — 97535 SELF CARE MNGMENT TRAINING: CPT

## 2024-03-20 PROCEDURE — 97110 THERAPEUTIC EXERCISES: CPT

## 2024-03-21 NOTE — THERAPY TREATMENT NOTE
Outpatient Physical Therapy Ortho Treatment Note  [facility]     Patient Name: Dex Thakkar  : 1934  MRN: [id number]  Today's Date: 3/21/2024      Visit Date: 2024    Visit Dx:    ICD-10-CM ICD-9-CM   1. Neck pain  M54.2 723.1       Patient Active Problem List   Diagnosis    Stasis edema of left lower extremity    Pain and swelling of ankle, left    Hypertension    H/O cardiac radiofrequency ablation    Left lower quadrant pain    Urinary retention    Rectal ulcer    Anastomotic stricture of colorectal region    Aortic valve sclerosis    AVNRT (AV gatito re-entry tachycardia)    Carotid artery stenosis    Diastolic dysfunction    Edema    History of PSVT (paroxysmal supraventricular tachycardia)    Hyperlipidemia LDL goal <100    Irritable bladder    Systolic murmur        Past Medical History:   Diagnosis Date    A-fib     Diverticulitis     H/O cardiac radiofrequency ablation     Hyperlipidemia     Hypertension     Incontinence     Melanoma     Murmur     Spinal stenosis         Past Surgical History:   Procedure Laterality Date    CARDIAC ABLATION N/A 2017    Dr. Leigh    CHOLECYSTECTOMY N/A     COLON RESECTION N/A 5/15/2019    Procedure: laparoscopic low anterior colon resection with splenic flexure mobilization, left salpingo oophorectomy, drainage of peritoneal abscess;  Surgeon: Renata Ray MD;  Location: [facility] MAIN OR;  Service: General    COLONOSCOPY N/A 10/15/2004    Sigmoid diverticulosis, smal rectal polyp, internal hemorrhoids-Dr. Renata Ray    COLONOSCOPY N/A 2009    Sigmoid diverticulosis-Dr. Renata Ray    COLONOSCOPY N/A 2019    Procedure: COLONOSCOPY to cecum with cold biopsies;  Surgeon: Renata Ray MD;  Location: [facility] ENDOSCOPY;  Service: General    ENDOSCOPY N/A 10/07/2013    Gastric ulcerations x10, ulcerative esophagitis, small hiatal hernia-Dr. Renata Ray    HYSTERECTOMY      KNEE ARTHROSCOPY W/ PARTIAL MEDIAL MENISCECTOMY Right  "10/24/2014    Dr. Bud Muhammad    SIGMOIDOSCOPY N/A 1/20/2020    Procedure: FLEXIBLE SIGMOIDOSCOPY WITH BIOPSY, balloon dilation;  Surgeon: Renata Ray MD;  Location: [facility] ENDOSCOPY;  Service: General    SIGMOIDOSCOPY N/A 7/20/2020    Procedure: FLEXIBLE SIGMOIDOSCOPY with bx;  Surgeon: Renata Ray MD;  Location: [facility] ENDOSCOPY;  Service: General;  Laterality: N/A;  pre-history of a rectal ulcer and a resection for diverticulitis, with anastomotic stricture found 1/20/20  post-anastamotic nodule, mild stricture        SKIN CANCER EXCISION Left     Melanoma left leg                        PT Assessment/Plan       Row Name 03/21/24 0737          PT Assessment    Assessment Comments Pt returns for first follow up since evaluation reporting no neck pain. She states she is tolerating transferring spouse but does \"pull\" him out of bed and up to standing position daily from [facility] to start their day. Spouse with dec LE strength and dec tolerance to touching in certain areas, unable to roll. Reviewed HEP and kept simple to allow improved compliance. Demonstrated and performed transfers for spouse with body mechanics education to reduce liklihood of injury and discussed options for inc help in her home. Pt with limited availability to return to PT at this time so will hold unless need arises or time increases.  -LB        PT Plan    PT Plan Comments hold PT  -LB               User Key  (r) = Recorded By, (t) = Taken By, (c) = Cosigned By      Initials Name Provider Type    LB Michelle Valverde, PT Physical Therapist                       OP Exercises       Row Name 03/21/24 0700             Subjective    Subjective Comments I am not having pain. I just have a hard time taking care of myself bc I can't leave my  alone for extended periods.  -LB         Subjective Pain    Able to rate subjective pain? yes  -LB      Pre-Treatment Pain Level 0  -LB         Total Minutes    34501 - PT Therapeutic Exercise " Minutes 15  -LB         Exercise 1    Exercise Name 1 shoulder rolls  -LB      Sets 1 2  -LB      Reps 1 10  -LB         Exercise 2    Exercise Name 2 scap retraction  -LB      Sets 2 2  -LB      Reps 2 10  -LB      Time 2 5  -LB         Exercise 3    Exercise Name 3 UT stretch (in shower)  -LB      Reps 3 3  -LB      Time 3 20  -LB         Exercise 4    Exercise Name 4 tband row  -LB      Sets 4 2  -LB      Reps 4 10  -LB      Time 4 RTB  -LB         Exercise 5    Exercise Name 5 supine cervical rotation  -LB      Reps 5 10  -LB      Time 5 each way  -LB                User Key  (r) = Recorded By, (t) = Taken By, (c) = Cosigned By      Initials Name Provider Type    LB Michelle Valverde, PT Physical Therapist                                  PT OP Goals       Row Name 03/21/24 0700          PT Short Term Goals    STG Date to Achieve 03/08/24  -LB     STG 1 Pt will demonstrate understanding and compliance with initial HEP.  -LB     STG 1 Progress Met  -LB     STG 2 Pt will report understanding of improved body mechanics for lifting/transferring spouse.  -LB     STG 2 Progress Met  -LB        Long Term Goals    LTG Date to Achieve 03/24/24  -LB     LTG 1 Pt will demonstrate improved RUE flexion to 4/5 or better.  -LB     LTG 1 Progress Ongoing  -LB     LTG 2 Pt will demonstrate improved posture in seated position without inc FF position of cervical spine.  -LB     LTG 2 Progress Ongoing  -LB     LTG 3 Pt will demonstrate understanding of advanced HEP to allow continued management of condition.  -LB     LTG 3 Progress Ongoing  -LB               User Key  (r) = Recorded By, (t) = Taken By, (c) = Cosigned By      Initials Name Provider Type    Michelle Del Valle PT Physical Therapist                    Therapy Education  Education Details: reviewed transfer body mechanics for pt transferring spouse to reduce cervical strain, discussed caregiving tasks and improving body mechanics to prevent injury  Given: Symptoms/condition  management, Mobility training, Posture/body mechanics  Program: Reinforced, New  How Provided: Verbal, Demonstration  Provided to: Patient  Level of Understanding: Teach back education performed, Verbalized, Demonstrated  99887 - PT Self Care/Mgmt Minutes: 15              Time Calculation:   Start Time: 1445  Stop Time: 1515  Time Calculation (min): 30 min  Total Timed Code Minutes- PT: 30 minute(s)  Timed Charges  59404 - PT Therapeutic Exercise Minutes: 15  23153 - PT Self Care/Mgmt Minutes: 15  Total Minutes  Timed Charges Total Minutes: 15   Total Minutes: 15  Therapy Charges for Today       Code Description Service Date Service Provider Modifiers Qty    52333543300 HC PT THER PROC EA 15 MIN 3/20/2024 Michelle Valverde, PT GP 1    98674334876 HC PT SELF CARE/MGMT/TRAIN EA 15 MIN 3/20/2024 Michelle Valverde, PT GP 1                      Michelle Valverde, PT  3/21/2024

## 2024-04-08 ENCOUNTER — HOSPITAL ENCOUNTER (OUTPATIENT)
Dept: PHYSICAL THERAPY | Facility: HOSPITAL | Age: 89
Setting detail: THERAPIES SERIES
Discharge: HOME | End: 2024-04-08
Payer: MEDICARE

## 2024-04-08 DIAGNOSIS — I89.0 LYMPHEDEMA: Primary | ICD-10-CM

## 2024-04-08 PROCEDURE — 97140 MANUAL THERAPY 1/> REGIONS: CPT

## 2024-04-08 NOTE — THERAPY PROGRESS REPORT/RE-CERT
Outpatient Physical Therapy Lymphedema Progress Note  [facility]     Patient Name: Dex Thakkar  : 1934  MRN: [id number]  Today's Date: 2024        Visit Date: 2024    Visit Dx:    ICD-10-CM ICD-9-CM   1. Lymphedema  I89.0 457.1       Patient Active Problem List   Diagnosis    Stasis edema of left lower extremity    Pain and swelling of ankle, left    Hypertension    H/O cardiac radiofrequency ablation    Left lower quadrant pain    Urinary retention    Rectal ulcer    Anastomotic stricture of colorectal region    Aortic valve sclerosis    AVNRT (AV gatito re-entry tachycardia)    Carotid artery stenosis    Diastolic dysfunction    Edema    History of PSVT (paroxysmal supraventricular tachycardia)    Hyperlipidemia LDL goal <100    Irritable bladder    Systolic murmur         Lymphedema       Row Name 24 1300             Subjective Pain    Able to rate subjective pain? yes  -KA      Pre-Treatment Pain Level 6  -KA      Subjective Pain Comment Knees  -KA         Subjective    Subjective Comments Pt reports that she left bandaging supplies at home, but did receive them.  She is having issues with swelling and knee/foot pain, stopped BP meds with side effects of swelling.  -KA         Lymphedema Edema Assessment    Ptting Edema Category By grade out of 4  -KA      Pitting Edema + 3/4  -KA      [place] Sign positive;bilateral:  -KA      [place] Hump negative;bilateral:  -KA      Edema Assessment Comment Moderate lymphedema affecting BLE L>R, foot to lower thigh, some swelling in toes as well  -KA         Skin Changes/Observations    Location/Assessment Lower Extremity  -KA      Lower Extremity Conditions bilateral:;intact;clean;dry  -KA      Lower Extremity Color/Pigment left:;red;bilateral:;fibrosis  -KA      Lower Extremity Skin Details Has abnormal contours/fibrosis/papillomas B posteriomedial knee, L ankle  -KA         Lymphedema Sensation    Lymphedema Sensation Reports  RLE:;LLE:;numbness  -KA      Lymphedema Sensation Tests light touch  -KA      Lymphedema Light Touch LLE:;RLE:;mild impairment  -KA         Lymphedema Measurements    Measurement Type(s) Circumferential  -KA      Circumferential Areas Lower extremities  -KA         BLE Circumferential (cm)    Measurement Location 1 20 cm above knee  -KA      Left 1 53.8 cm  -KA      Right 1 56.9 cm  -KA      Measurement Location 2 10 cm above knee  -KA      Left 2 52.7 cm  -KA      Right 2 54.3 cm  -KA      Measurement Location 3 Knee  -KA      Left 3 41.7 cm  -KA      Right 3 41.8 cm  -KA      Measurement Location 4 10 cm below knee  -KA      Left 4 41.9 cm  -KA      Right 4 39.3 cm  -KA      Measurement Location 5 20 cm below knee  -KA      Left 5 35 cm  -KA      Right 5 32.3 cm  -KA      Measurement Location 6 30 cm below knee  -KA      Left 6 30.3 cm  -KA      Right 6 29.3 cm  -KA      Measurement Location 7 Ankle  -KA      Left 7 31.3 cm  -KA      Right 7 29.3 cm  -KA      Measurement Location 8 Midfoot  -KA      Left 8 23.5 cm  -KA      Right 8 23.8 cm  -KA      LLE Circumferential Total 310.2 cm  -KA      RLE Circumferential Total 307 cm  -KA         Manual Lymphatic Drainage    Manual Lymphatic Drainage Comments Short neck, B axilla, B IA, diaphragmatic breathing, B inguinals, BLE  -KA                User Key  (r) = Recorded By, (t) = Taken By, (c) = Cosigned By      Initials Name Provider Type    Bertha Ruiz, PT Physical Therapist                                   PT Assessment/Plan       Row Name 04/08/24 6297          PT Assessment    Functional Limitations Limitations in functional capacity and performance;Impaired gait  -KA     Impairments Balance;Edema;Gait;Impaired lymphatic circulation;Sensation  -KA     Assessment Comments Pt returns for first lymphedema treatment following initial evaluation, has received bandaging supplies from [facility], but forgot to bring them in.  Overall, lymphedema has worsened since  initial evaluation with girth measurments increasing and increased redness/fibrotic skin changes, no open wounds or signs of active infection.  Began MLD opening sequence as well as treatment of BLE this date.  Will begin compression bandaging next visit.  Pt remains appropriate for skilled physical therapy to reduce edema, reduce infection risk, improve skin condition, and improve self management of condition.  -KA     Rehab Potential Good  -KA     Patient/caregiver participated in establishment of treatment plan and goals Yes  -KA     Patient would benefit from skilled therapy intervention Yes  -KA        PT Plan    PT Frequency 3x/week  -KA     Predicted Duration of Therapy Intervention (PT) 4-6 weeks  -KA     Planned CPT's? PT RE-EVAL: 75361;PT THER PROC EA 15 MIN: 37675;PT THER ACT EA 15 MIN: 55637;PT MANUAL THERAPY EA 15 MIN: 96538;PT NEUROMUSC RE-EDUCATION EA 15 MIN: 11698;PT GAIT TRAINING EA 15 MIN: 86646;PT SELF CARE/HOME MGMT/TRAIN EA 15: 51578  -KA     PT Plan Comments Continue CDT.  Assess response to MLD and begin compression bandaging with light compression.  -KA               User Key  (r) = Recorded By, (t) = Taken By, (c) = Cosigned By      Initials Name Provider Type    Bertha Ruiz, PT Physical Therapist                        OP Exercises       Row Name 04/08/24 1655 04/08/24 1300          Subjective    Subjective Comments -- Pt reports that she left bandaging supplies at home, but did receive them.  She is having issues with swelling and knee/foot pain, stopped BP meds with side effects of swelling.  -KA        Subjective Pain    Able to rate subjective pain? -- yes  -KA     Pre-Treatment Pain Level -- 6  -KA     Subjective Pain Comment -- Knees  -KA        Total Minutes    72058 - PT Manual Therapy Minutes 55  -KA --               User Key  (r) = Recorded By, (t) = Taken By, (c) = Cosigned By      Initials Name Provider Type    Bertha Ruiz, PT Physical Therapist                             Manual Rx (Last 36 Hours)       Manual Treatments       Row Name 04/08/24 1655             Total Minutes    65868 - PT Manual Therapy Minutes 55  -KA                User Key  (r) = Recorded By, (t) = Taken By, (c) = Cosigned By      Initials Name Provider Type    Bertha Ruiz, PT Physical Therapist                     PT OP Goals       Row Name 04/08/24 1600          PT Short Term Goals    STG Date to Achieve 04/22/24  -     STG 1 Patient to demonstrate awareness of condition and precautions for improved prevention, management, care of symptoms, and ease of transition to self care of condition.  -KA     STG 1 Progress New  -     STG 2 Patient/family independent with self-wrapping techniques of compression bandages as indicated for improved self-management of condition.  -     STG 2 Progress New  -     STG 3 Patient demonstrates decreased net edema of Bilateral Lower Extremity>/= 5-10 cm for decreased edema symptoms, decreased risk of infection, and improved skin care.  -     STG 3 Progress New  -        Long Term Goals    LTG Date to Achieve 05/20/24  -     LTG 1 Patient/family independent with self-care techniques for self-management of condition.  -KA     LTG 1 Progress New  -     LTG 2 Patient demonstrates decreased net edema of Left Lower Extremity >/= 10-20 cm for decreased edema symptoms, decreased risk of infection, and improved skin care.  -KA     LTG 2 Progress New  -     LTG 3 Patient/family independent with compression garments as indicated for self-management of condition.  -     LTG 3 Progress New  -        Time Calculation    PT Goal Re-Cert Due Date 07/07/24  -               User Key  (r) = Recorded By, (t) = Taken By, (c) = Cosigned By      Initials Name Provider Type    Bertha Ruiz, PT Physical Therapist                    Therapy Education  Education Details: Discussed girth measurements.  Pt instructed to bring bandaging supplies to next visit  to begin compression bandaging.  Discussed purpose and steps of MLD.  Given: Symptoms/condition management, Edema management, Bandaging/dressing change  Program: New  How Provided: Verbal, Demonstration  Provided to: Patient  Level of Understanding: Verbalized              Time Calculation:   Start Time: 1425  Stop Time: 1520  Time Calculation (min): 55 min  Timed Charges  96428 - PT Manual Therapy Minutes: 55  Total Minutes  Timed Charges Total Minutes: 55   Total Minutes: 55   Therapy Charges for Today       Code Description Service Date Service Provider Modifiers Qty    17393390616 HC PT MANUAL THERAPY EA 15 MIN 4/8/2024 Bertha Vasquez, PT GP 4                      Bertha Vasquez, PT  4/8/2024

## 2024-04-09 ENCOUNTER — TELEPHONE (OUTPATIENT)
Dept: CARDIOLOGY | Facility: CLINIC | Age: 89
End: 2024-04-09
Payer: MEDICARE

## 2024-04-09 NOTE — TELEPHONE ENCOUNTER
Patient called office this afternoon and lvm  with some questions regarding a medication and would like you to return her call to discuss

## 2024-04-10 ENCOUNTER — HOSPITAL ENCOUNTER (OUTPATIENT)
Dept: PHYSICAL THERAPY | Facility: HOSPITAL | Age: 89
Setting detail: THERAPIES SERIES
Discharge: HOME | End: 2024-04-10
Payer: MEDICARE

## 2024-04-10 DIAGNOSIS — I89.0 LYMPHEDEMA: Primary | ICD-10-CM

## 2024-04-10 PROCEDURE — 97140 MANUAL THERAPY 1/> REGIONS: CPT

## 2024-04-10 NOTE — THERAPY TREATMENT NOTE
Outpatient Physical Therapy Lymphedema Treatment Note  [facility]     Patient Name: Dex Thakkar  : 1934  MRN: [id number]  Today's Date: 4/10/2024        Visit Date: 04/10/2024    Visit Dx:    ICD-10-CM ICD-9-CM   1. Lymphedema  I89.0 457.1       Patient Active Problem List   Diagnosis    Stasis edema of left lower extremity    Pain and swelling of ankle, left    Hypertension    H/O cardiac radiofrequency ablation    Left lower quadrant pain    Urinary retention    Rectal ulcer    Anastomotic stricture of colorectal region    Aortic valve sclerosis    AVNRT (AV gatito re-entry tachycardia)    Carotid artery stenosis    Diastolic dysfunction    Edema    History of PSVT (paroxysmal supraventricular tachycardia)    Hyperlipidemia LDL goal <100    Irritable bladder    Systolic murmur         Lymphedema       Row Name 04/10/24 1300             Subjective Pain    Able to rate subjective pain? yes  -KA      Pre-Treatment Pain Level 0  -KA         Subjective    Subjective Comments Responded well to MLD and brought bandaging supplies in, reports increased urination after MLD last visit.  -KA         Manual Lymphatic Drainage    Manual Lymphatic Drainage Comments Short neck, B axilla, B IA, diaphragmatic breathing, L inguinals, LLE  -KA         Compression/Skin Care    Compression/Skin Care skin care;wrapping location;bandaging  -KA      Skin Care moisturizing lotion applied  Eucerin  -KA      Wrapping Location lower extremity  -KA      Wrapping Location LE bilateral:;foot to knee  -KA      Bandage Layers cotton liner;padding/fluff layer;short-stretch bandages (comment size/quantity)  -KA      Bandaging Comments TG9, Artiflex x 1/2, Rosidal soft ankle to knee, Rosidal K 1-8 cm, 2-10 cm, pt's shoes  -NEGRITO      Bandaging Technique circumferential/spiral;light compression  -NEGRITO                User Key  (r) = Recorded By, (t) = Taken By, (c) = Cosigned By      Initials Name Provider Type    Bertha Ruiz, PT  Physical Therapist                                   PT Assessment/Plan       Row Name 04/10/24 1431          PT Assessment    Assessment Comments MLD performed to LLE this date.  Began compression bandaging for BLE foot to knee, 3 SS bandages per leg, light compression, spiral technique.  Good initial response to bandaging.  -KA        PT Plan    PT Plan Comments Assess response to bandaging, modify as needed.  Continue MLD.  -NEGRITO               User Key  (r) = Recorded By, (t) = Taken By, (c) = Cosigned By      Initials Name Provider Type    Bertha Ruiz, BERT Physical Therapist                        OP Exercises       Row Name 04/10/24 1432 04/10/24 1300          Subjective    Subjective Comments -- Responded well to MLD and brought bandaging supplies in, reports increased urination after MLD last visit.  -NEGRITO        Subjective Pain    Able to rate subjective pain? -- yes  -NEGRITO     Pre-Treatment Pain Level -- 0  -KA        Total Minutes    40056 - PT Manual Therapy Minutes 56  -KA --               User Key  (r) = Recorded By, (t) = Taken By, (c) = Cosigned By      Initials Name Provider Type    Bertha Ruiz, BERT Physical Therapist                            Manual Rx (Last 36 Hours)       Manual Treatments       Row Name 04/10/24 1432             Total Minutes    84331 - PT Manual Therapy Minutes 56  -KA                User Key  (r) = Recorded By, (t) = Taken By, (c) = Cosigned By      Initials Name Provider Type    Bertha Ruiz, BERT Physical Therapist                        Therapy Education  Education Details: Pt to remove bandaging if painful or feels circulation is impaired. Discussed purpose of bandaging supplies and trained for bandaging technique.  Pt to leave bandaging intact until Friday morning, then remove, shower, and bring all supplies back to clinic except stockinette which will need to be washed and dried first.  Given: Edema management, Bandaging/dressing change  Program: New  How  Provided: Verbal, Demonstration  Provided to: Patient  Level of Understanding: Verbalized              Time Calculation:   Start Time: 1315  Stop Time: 1411  Time Calculation (min): 56 min  Timed Charges  55598 - PT Manual Therapy Minutes: 56  Total Minutes  Timed Charges Total Minutes: 56   Total Minutes: 56   Therapy Charges for Today       Code Description Service Date Service Provider Modifiers Qty    13735118620 HC PT MANUAL THERAPY EA 15 MIN 4/10/2024 Bertha Vasquez, PT GP 4                      Bertha Vasquez, PT  4/10/2024

## 2024-04-10 NOTE — TELEPHONE ENCOUNTER
I spoke to patient. She was concerned seeing side effect of amlodipine was edema.  When we started medication she already had lymphedema and we did not see much difference.  She stopped taking it a week ago, her BP is still controlled and her swelling is unchanged. I told her to stay off of it another week so we can monitor. If BP goes up we can decide if we go back on amlodipine or switch beta blocker

## 2024-04-12 ENCOUNTER — HOSPITAL ENCOUNTER (OUTPATIENT)
Dept: PHYSICAL THERAPY | Facility: HOSPITAL | Age: 89
Setting detail: THERAPIES SERIES
Discharge: HOME | End: 2024-04-12
Payer: MEDICARE

## 2024-04-12 DIAGNOSIS — I89.0 LYMPHEDEMA: Primary | ICD-10-CM

## 2024-04-12 PROCEDURE — 97140 MANUAL THERAPY 1/> REGIONS: CPT

## 2024-04-12 NOTE — THERAPY TREATMENT NOTE
Outpatient Physical Therapy Lymphedema Treatment Note  [facility]     Patient Name: Dex Thakkar  : 1934  MRN: [id number]  Today's Date: 2024        Visit Date: 2024    Visit Dx:    ICD-10-CM ICD-9-CM   1. Lymphedema  I89.0 457.1       Patient Active Problem List   Diagnosis    Stasis edema of left lower extremity    Pain and swelling of ankle, left    Hypertension    H/O cardiac radiofrequency ablation    Left lower quadrant pain    Urinary retention    Rectal ulcer    Anastomotic stricture of colorectal region    Aortic valve sclerosis    AVNRT (AV gatito re-entry tachycardia)    Carotid artery stenosis    Diastolic dysfunction    Edema    History of PSVT (paroxysmal supraventricular tachycardia)    Hyperlipidemia LDL goal <100    Irritable bladder    Systolic murmur         Lymphedema       Row Name 24 1300             Subjective Pain    Able to rate subjective pain? yes  -KA      Pre-Treatment Pain Level 0  -KA      Subjective Pain Comment Has intermittent feeling on tingling, electric shock in legs, not a new sensation  -KA         Subjective    Subjective Comments Removed bandaging and showered this morning, legs looked really good but have swollen up some since she has removed them.  Had itching last night and has a rash this morning around the ankles.  -KA         Skin Changes/Observations    Skin Observations Comment Red rash on lower legs, fine and not raised, worse around ankles where pt reported itching  -KA         Manual Lymphatic Drainage    Manual Lymphatic Drainage Comments Short neck, B axilla, B IA, diaphragmatic breathing, L inguinals, LLE  -KA         Compression/Skin Care    Compression/Skin Care skin care;wrapping location;bandaging  -KA      Skin Care moisturizing lotion applied  HCC+Eucerin  -KA      Wrapping Location lower extremity  -KA      Wrapping Location LE bilateral:;foot to knee  -KA      Bandage Layers cotton liner;padding/fluff layer;short-stretch  Received fax on 9/30/2021  Forms/paperwork faxed by [employer]  Forms/paperwork sent to PCP mailbox    Short term disability paperwork.  Please fill out and fax back to [phone]         bandages (comment size/quantity)  -      Bandaging Comments TG9, Artiflex x 1, Rosidal soft ankle to knee, Rosidal K 1-8 cm, 2-10 cm, pt's shoes  -NEGRITO      Bandaging Technique circumferential/spiral;light compression  -      Compression/Skin Care Comments Added artiflex all the way to knee today due to skin irritability  -                User Key  (r) = Recorded By, (t) = Taken By, (c) = Cosigned By      Initials Name Provider Type    Bertha Ruiz, PT Physical Therapist                                   PT Assessment/Plan       Row Name 04/12/24 1637          PT Assessment    Assessment Comments Pt saw reduction in edema with compression bandaging, but swelling returned after several hours on her feet.  Did have itching (and a resulting rash), added HCC to Eucerin and used artiflex from foot to knee in case Rosidal soft is causing the irritation.  Continued with MLD to more affected LLE and continued compression bandaging with light compression and modifications listed previously.  -        PT Plan    PT Plan Comments Assess response to bandaging and modify as needed, continue MLD.  -               User Key  (r) = Recorded By, (t) = Taken By, (c) = Cosigned By      Initials Name Provider Type    Bertha Ruiz, PT Physical Therapist                        OP Exercises       Row Name 04/12/24 1638 04/12/24 1300          Subjective    Subjective Comments -- Removed bandaging and showered this morning, legs looked really good but have swollen up some since she has removed them.  Had itching last night and has a rash this morning around the ankles.  -NEGRITO        Subjective Pain    Able to rate subjective pain? -- yes  -     Pre-Treatment Pain Level -- 0  -     Subjective Pain Comment -- Has intermittent feeling on tingling, electric shock in legs, not a new sensation  -KA        Total Minutes    17016 - PT Manual Therapy Minutes 53  -KA --               User Key  (r) = Recorded By, (t) = Taken  By, (c) = Cosigned By      Initials Name Provider Type    Bertha Ruiz, PT Physical Therapist                            Manual Rx (Last 36 Hours)       Manual Treatments       Row Name 04/12/24 1638             Total Minutes    44068 - PT Manual Therapy Minutes 53  -KA                User Key  (r) = Recorded By, (t) = Taken By, (c) = Cosigned By      Initials Name Provider Type    Bertha Ruiz, PT Physical Therapist                        Therapy Education  Education Details: Discussed bandaging changes, washing instructions for SS bandages.  Given: Edema management, Bandaging/dressing change  Program: New  How Provided: Verbal  Provided to: Patient  Level of Understanding: Verbalized              Time Calculation:   Start Time: 1325  Stop Time: 1418  Time Calculation (min): 53 min  Timed Charges  99757 - PT Manual Therapy Minutes: 53  Total Minutes  Timed Charges Total Minutes: 53   Total Minutes: 53   Therapy Charges for Today       Code Description Service Date Service Provider Modifiers Qty    28841048009 HC PT MANUAL THERAPY EA 15 MIN 4/12/2024 Bertha Vasquez, PT GP 4                      Bertha Vasquez PT  4/12/2024

## 2024-04-15 ENCOUNTER — HOSPITAL ENCOUNTER (OUTPATIENT)
Dept: PHYSICAL THERAPY | Facility: HOSPITAL | Age: 89
Setting detail: THERAPIES SERIES
Discharge: HOME | End: 2024-04-15
Payer: MEDICARE

## 2024-04-15 DIAGNOSIS — I89.0 LYMPHEDEMA: Primary | ICD-10-CM

## 2024-04-15 PROCEDURE — 97140 MANUAL THERAPY 1/> REGIONS: CPT

## 2024-04-15 NOTE — THERAPY PROGRESS REPORT/RE-CERT
Outpatient Physical Therapy Lymphedema Progress Note  [facility]     Patient Name: Dex Thakkar  : 1934  MRN: [id number]  Today's Date: 4/15/2024        Visit Date: 04/15/2024    Visit Dx:    ICD-10-CM ICD-9-CM   1. Lymphedema  I89.0 457.1       Patient Active Problem List   Diagnosis    Stasis edema of left lower extremity    Pain and swelling of ankle, left    Hypertension    H/O cardiac radiofrequency ablation    Left lower quadrant pain    Urinary retention    Rectal ulcer    Anastomotic stricture of colorectal region    Aortic valve sclerosis    AVNRT (AV gatito re-entry tachycardia)    Carotid artery stenosis    Diastolic dysfunction    Edema    History of PSVT (paroxysmal supraventricular tachycardia)    Hyperlipidemia LDL goal <100    Irritable bladder    Systolic murmur         Lymphedema       Row Name 04/15/24 1300             Subjective Pain    Able to rate subjective pain? yes  -KA      Pre-Treatment Pain Level 0  -KA         Subjective    Subjective Comments 15 min late due to forgetting bandaging at home and having to turn back.  Wore compression stockings after she got out of shower today.  Pt reports itching was better, is pleased with the way her legs look.  -KA         Skin Changes/Observations    Skin Observations Comment Rash still present but improving  -KA         Lymphedema Measurements    Measurement Type(s) Circumferential  -KA      Circumferential Areas Lower extremities  -KA         BLE Circumferential (cm)    Measurement Location 1 20 cm above knee  -KA      Left 1 55 cm  -KA      Right 1 55.4 cm  -KA      Measurement Location 2 10 cm above knee  -KA      Left 2 50.8 cm  -KA      Right 2 53 cm  -KA      Measurement Location 3 Knee  -KA      Left 3 39.8 cm  -KA      Right 3 39.9 cm  -KA      Measurement Location 4 10 cm below knee  -KA      Left 4 38.4 cm  -KA      Right 4 37 cm  -KA      Measurement Location 5 20 cm below knee  -KA      Left 5 30.5 cm  -KA      Right 5 27.5  cm  -KA      Measurement Location 6 30 cm below knee  -KA      Left 6 27.5 cm  -KA      Right 6 24.9 cm  -KA      Measurement Location 7 Ankle  -KA      Left 7 30 cm  -KA      Right 7 26.7 cm  -KA      Measurement Location 8 Midfoot  -KA      Left 8 22.3 cm  -KA      Right 8 22.3 cm  -KA      LLE Circumferential Total 294.3 cm  -KA      RLE Circumferential Total 286.7 cm  -KA         Manual Lymphatic Drainage    Manual Lymphatic Drainage Comments Short neck, B axilla, B IA, diaphragmatic breathing, B inguinals, B thighs  -KA         Compression/Skin Care    Compression/Skin Care skin care;wrapping location;bandaging  -KA      Skin Care moisturizing lotion applied  HCC+Eucerin  -KA      Wrapping Location lower extremity  -KA      Wrapping Location LE bilateral:;foot to knee  -KA      Bandage Layers cotton liner;padding/fluff layer;short-stretch bandages (comment size/quantity)  -KA      Bandaging Comments TG9, Artiflex x 1, Rosidal soft ankle to knee, Rosidal K 1-8 cm, 2-10 cm, pt's shoes  -KA      Bandaging Technique circumferential/spiral;light compression  -KA                User Key  (r) = Recorded By, (t) = Taken By, (c) = Cosigned By      Initials Name Provider Type    Bertha Ruiz, PT Physical Therapist                                   PT Assessment/Plan       Row Name 04/15/24 6715          PT Assessment    Assessment Comments Pt is making excellent progress with CDT.  At this time, she has met 1/3 STG and 1/3 LTG. She has seen net decreased edema of 15.9 cm LLE and 20.3 RLE since starting treatment, skin condition is gradually improving.  Continued with compression bandaging using same technique as last visit.  Performed MLD to B thighs this date.  Pt remains appropriate for skilled physical therapy to reduce edema, improve skin condition, reduce infection risk, and improve self management of condition.  -KA        PT Plan    PT Plan Comments Continue CDT.  -NEGRITO               User Key  (r) = Recorded  By, (t) = Taken By, (c) = Cosigned By      Initials Name Provider Type    Bertha Ruiz, PT Physical Therapist                        OP Exercises       Row Name 04/15/24 1422 04/15/24 1300          Subjective    Subjective Comments -- 15 min late due to forgetting bandaging at home and having to turn back.  Wore compression stockings after she got out of shower today.  Pt reports itching was better, is pleased with the way her legs look.  -KA        Subjective Pain    Able to rate subjective pain? -- yes  -KA     Pre-Treatment Pain Level -- 0  -KA        Total Minutes    91357 - PT Manual Therapy Minutes 48  -KA --               User Key  (r) = Recorded By, (t) = Taken By, (c) = Cosigned By      Initials Name Provider Type    Bertha Ruiz, BERT Physical Therapist                            Manual Rx (Last 36 Hours)       Manual Treatments       Row Name 04/15/24 1422             Total Minutes    02070 - PT Manual Therapy Minutes 48  -KA                User Key  (r) = Recorded By, (t) = Taken By, (c) = Cosigned By      Initials Name Provider Type    Bertha Ruiz, PT Physical Therapist                     PT OP Goals       Row Name 04/15/24 1300          PT Short Term Goals    STG Date to Achieve 04/22/24  -KA     STG 1 Patient to demonstrate awareness of condition and precautions for improved prevention, management, care of symptoms, and ease of transition to self care of condition.  -KA     STG 1 Progress Ongoing  -KA     STG 2 Patient/family independent with self-wrapping techniques of compression bandages as indicated for improved self-management of condition.  -KA     STG 2 Progress Ongoing  -KA     STG 3 Patient demonstrates decreased net edema of Bilateral Lower Extremity>/= 5-10 cm for decreased edema symptoms, decreased risk of infection, and improved skin care.  -KA     STG 3 Progress Met  -KA     STG 3 Progress Comments Decreased net edema 15.9 cm LLE, 20.3 cm RLE  -KA        Long Term  Goals    LTG Date to Achieve 05/20/24  -     LTG 1 Patient/family independent with self-care techniques for self-management of condition.  -     LTG 1 Progress Ongoing  -     LTG 2 Patient demonstrates decreased net edema of Left Lower Extremity >/= 10-20 cm for decreased edema symptoms, decreased risk of infection, and improved skin care.  -     LTG 2 Progress Met  -     LTG 2 Progress Comments Decreased net edema 15.9 cm LLE, 20.3 cm RLE  -     LTG 3 Patient/family independent with compression garments as indicated for self-management of condition.  -     LTG 3 Progress New  -               User Key  (r) = Recorded By, (t) = Taken By, (c) = Cosigned By      Initials Name Provider Type    Bertha Ruiz, PT Physical Therapist                                   Time Calculation:   Start Time: 1330  Stop Time: 1418  Time Calculation (min): 48 min  Timed Charges  71461 - PT Manual Therapy Minutes: 48  Total Minutes  Timed Charges Total Minutes: 48   Total Minutes: 48   Therapy Charges for Today       Code Description Service Date Service Provider Modifiers Qty    64891688620  PT MANUAL THERAPY EA 15 MIN 4/15/2024 Bertha Vasquez, PT GP 3                      Bertha Vasquez PT  4/15/2024

## 2024-04-17 ENCOUNTER — HOSPITAL ENCOUNTER (OUTPATIENT)
Dept: PHYSICAL THERAPY | Facility: HOSPITAL | Age: 89
Setting detail: THERAPIES SERIES
Discharge: HOME | End: 2024-04-17
Payer: MEDICARE

## 2024-04-17 DIAGNOSIS — I89.0 LYMPHEDEMA: Primary | ICD-10-CM

## 2024-04-17 PROCEDURE — 97140 MANUAL THERAPY 1/> REGIONS: CPT

## 2024-04-17 NOTE — THERAPY TREATMENT NOTE
Outpatient Physical Therapy Lymphedema Treatment Note  [facility]     Patient Name: Dex Thakkar  : 1934  MRN: [id number]  Today's Date: 2024        Visit Date: 2024    Visit Dx:    ICD-10-CM ICD-9-CM   1. Lymphedema  I89.0 457.1       Patient Active Problem List   Diagnosis    Stasis edema of left lower extremity    Pain and swelling of ankle, left    Hypertension    H/O cardiac radiofrequency ablation    Left lower quadrant pain    Urinary retention    Rectal ulcer    Anastomotic stricture of colorectal region    Aortic valve sclerosis    AVNRT (AV gatito re-entry tachycardia)    Carotid artery stenosis    Diastolic dysfunction    Edema    History of PSVT (paroxysmal supraventricular tachycardia)    Hyperlipidemia LDL goal <100    Irritable bladder    Systolic murmur         Lymphedema       Row Name 24 1300             Subjective Pain    Able to rate subjective pain? yes  -KA      Pre-Treatment Pain Level 0  -KA         Subjective    Subjective Comments Pt reports that she can tell a big difference in how she is able to move legs and less pain.  -KA         Manual Lymphatic Drainage    Manual Lymphatic Drainage Comments Short neck, B axilla, B IA, diaphragmatic breathing, L inguinals, LLE  -KA         Compression/Skin Care    Compression/Skin Care skin care;wrapping location;bandaging  -KA      Skin Care moisturizing lotion applied  HCC+Eucerin  -KA      Wrapping Location lower extremity  -KA      Wrapping Location LE bilateral:;foot to knee  -KA      Bandage Layers cotton liner;padding/fluff layer;short-stretch bandages (comment size/quantity)  -KA      Bandaging Comments TG9, Artiflex x 1, Rosidal soft ankle to knee, Rosidal K 1-8 cm, 2-10 cm, pt's shoes  -KA      Bandaging Technique circumferential/spiral;light compression  -KA                User Key  (r) = Recorded By, (t) = Taken By, (c) = Cosigned By      Initials Name Provider Type    Bertha Ruiz, PT Physical  Therapist                                   PT Assessment/Plan       Row Name 04/17/24 1417          PT Assessment    Assessment Comments Pt still progressing well with CDT, contours at ankles improving.  MLD to LLE this date and compression bandaging using same technique as last visit.  -NEGRITO        PT Plan    PT Plan Comments Continue CDT.  -NEGRITO               User Key  (r) = Recorded By, (t) = Taken By, (c) = Cosigned By      Initials Name Provider Type    Bertha Ruiz, PT Physical Therapist                        OP Exercises       Row Name 04/17/24 1417 04/17/24 1300          Subjective    Subjective Comments -- Pt reports that she can tell a big difference in how she is able to move legs and less pain.  -NEGRITO        Subjective Pain    Able to rate subjective pain? -- yes  -NEGRITO     Pre-Treatment Pain Level -- 0  -KA        Total Minutes    40224 - PT Manual Therapy Minutes 53  -KA --               User Key  (r) = Recorded By, (t) = Taken By, (c) = Cosigned By      Initials Name Provider Type    Bertha Ruiz, PT Physical Therapist                            Manual Rx (Last 36 Hours)       Manual Treatments       Row Name 04/17/24 1417             Total Minutes    23384 - PT Manual Therapy Minutes 53  -KA                User Key  (r) = Recorded By, (t) = Taken By, (c) = Cosigned By      Initials Name Provider Type    Bertha Ruiz, PT Physical Therapist                                       Time Calculation:   Start Time: 1318  Stop Time: 1411  Time Calculation (min): 53 min  Timed Charges  39602 - PT Manual Therapy Minutes: 53  Total Minutes  Timed Charges Total Minutes: 53   Total Minutes: 53   Therapy Charges for Today       Code Description Service Date Service Provider Modifiers Qty    65583444924 HC PT MANUAL THERAPY EA 15 MIN 4/17/2024 Bertha Vasquez, PT GP 4                      Bertha Vasquez PT  4/17/2024

## 2024-04-19 ENCOUNTER — HOSPITAL ENCOUNTER (OUTPATIENT)
Dept: PHYSICAL THERAPY | Facility: HOSPITAL | Age: 89
Setting detail: THERAPIES SERIES
Discharge: HOME | End: 2024-04-19
Payer: MEDICARE

## 2024-04-19 DIAGNOSIS — I89.0 LYMPHEDEMA: Primary | ICD-10-CM

## 2024-04-19 PROCEDURE — 97140 MANUAL THERAPY 1/> REGIONS: CPT

## 2024-04-19 NOTE — THERAPY TREATMENT NOTE
Outpatient Physical Therapy Lymphedema Treatment Note  [facility]     Patient Name: Dex Thakkar  : 1934  MRN: [id number]  Today's Date: 2024        Visit Date: 2024    Visit Dx:    ICD-10-CM ICD-9-CM   1. Lymphedema  I89.0 457.1       Patient Active Problem List   Diagnosis    Stasis edema of left lower extremity    Pain and swelling of ankle, left    Hypertension    H/O cardiac radiofrequency ablation    Left lower quadrant pain    Urinary retention    Rectal ulcer    Anastomotic stricture of colorectal region    Aortic valve sclerosis    AVNRT (AV gatito re-entry tachycardia)    Carotid artery stenosis    Diastolic dysfunction    Edema    History of PSVT (paroxysmal supraventricular tachycardia)    Hyperlipidemia LDL goal <100    Irritable bladder    Systolic murmur         Lymphedema       Row Name 24 1300             Manual Lymphatic Drainage    Manual Lymphatic Drainage Comments Short neck, B axilla, B IA, diaphragmatic breathing, B inguinals, R thigh, LLE  -KA         Compression/Skin Care    Compression/Skin Care skin care;wrapping location;bandaging  -KA      Skin Care moisturizing lotion applied  HCC+Eucerin  -KA      Wrapping Location lower extremity  -KA      Wrapping Location LE bilateral:;foot to knee  -KA      Bandage Layers cotton liner;padding/fluff layer;short-stretch bandages (comment size/quantity)  -KA      Bandaging Comments TG9, Artiflex x 1, Rosidal soft ankle to knee, Rosidal K 1-8 cm, 2-10 cm, pt's shoes  -KA      Bandaging Technique circumferential/spiral;light compression  -NEGRITO                User Key  (r) = Recorded By, (t) = Taken By, (c) = Cosigned By      Initials Name Provider Type    Bertha Ruiz, PT Physical Therapist                                   PT Assessment/Plan       Row Name 24 1356          PT Assessment    Assessment Comments BLE edema continues to reduce, continued with MLD to entire LLE and R thigh and compression bandaging  for BLE below knees.  May consider bandaging knee and lower half of thigh next visit if it does not impede movement too much.  -NEGRITO        PT Plan    PT Plan Comments Continue CDT, reassess girth measurements next visit, consider bandaging thigh.  -NEGRITO               User Key  (r) = Recorded By, (t) = Taken By, (c) = Cosigned By      Initials Name Provider Type    Bertha Ruiz, PT Physical Therapist                        OP Exercises       Row Name 04/19/24 1358             Total Minutes    12967 - PT Manual Therapy Minutes 54  -KA                User Key  (r) = Recorded By, (t) = Taken By, (c) = Cosigned By      Initials Name Provider Type    Bertha Ruiz, PT Physical Therapist                            Manual Rx (Last 36 Hours)       Manual Treatments       Row Name 04/19/24 1358             Total Minutes    88376 - PT Manual Therapy Minutes 54  -KA                User Key  (r) = Recorded By, (t) = Taken By, (c) = Cosigned By      Initials Name Provider Type    Bertha Ruiz, PT Physical Therapist                                       Time Calculation:   Start Time: 1318  Stop Time: 1412  Time Calculation (min): 54 min  Timed Charges  09256 - PT Manual Therapy Minutes: 54  Total Minutes  Timed Charges Total Minutes: 54   Total Minutes: 54   Therapy Charges for Today       Code Description Service Date Service Provider Modifiers Qty    94150727910 HC PT MANUAL THERAPY EA 15 MIN 4/19/2024 Bertha Vasquez, PT GP 4                      Bertha Vasquez, PT  4/19/2024

## 2024-04-22 ENCOUNTER — HOSPITAL ENCOUNTER (OUTPATIENT)
Dept: PHYSICAL THERAPY | Facility: HOSPITAL | Age: 89
Setting detail: THERAPIES SERIES
Discharge: HOME | End: 2024-04-22
Payer: MEDICARE

## 2024-04-22 DIAGNOSIS — I89.0 LYMPHEDEMA: Primary | ICD-10-CM

## 2024-04-22 PROCEDURE — 97140 MANUAL THERAPY 1/> REGIONS: CPT

## 2024-04-22 NOTE — THERAPY PROGRESS REPORT/RE-CERT
Outpatient Physical Therapy Lymphedema Progress Note  [facility]     Patient Name: Dex Thakkar  : 1934  MRN: [id number]  Today's Date: 2024        Visit Date: 2024    Visit Dx:    ICD-10-CM ICD-9-CM   1. Lymphedema  I89.0 457.1       Patient Active Problem List   Diagnosis    Stasis edema of left lower extremity    Pain and swelling of ankle, left    Hypertension    H/O cardiac radiofrequency ablation    Left lower quadrant pain    Urinary retention    Rectal ulcer    Anastomotic stricture of colorectal region    Aortic valve sclerosis    AVNRT (AV gatito re-entry tachycardia)    Carotid artery stenosis    Diastolic dysfunction    Edema    History of PSVT (paroxysmal supraventricular tachycardia)    Hyperlipidemia LDL goal <100    Irritable bladder    Systolic murmur         Lymphedema       Row Name 24 1300             Subjective Pain    Able to rate subjective pain? yes  -KA      Pre-Treatment Pain Level 4  -KA      Subjective Pain Comment R knee  -KA         Subjective    Subjective Comments Pt reports increased knee pain R related to arthritis, proud of the progress she's seen with her lower legs.  -KA         Lymphedema Measurements    Measurement Type(s) Circumferential  -KA      Circumferential Areas Lower extremities  -KA         BLE Circumferential (cm)    Measurement Location 1 20 cm above knee  -KA      Left 1 54 cm  -KA      Right 1 55.4 cm  -KA      Measurement Location 2 10 cm above knee  -KA      Left 2 52.3 cm  -KA      Right 2 54.7 cm  -KA      Measurement Location 3 Knee  -KA      Left 3 38.8 cm  -KA      Right 3 38.3 cm  -KA      Measurement Location 4 10 cm below knee  -KA      Left 4 37.8 cm  -KA      Right 4 36.2 cm  -KA      Measurement Location 5 20 cm below knee  -KA      Left 5 28.3 cm  -KA      Right 5 26.3 cm  -KA      Measurement Location 6 30 cm below knee  -KA      Left 6 26 cm  -KA      Right 6 24.9 cm  -KA      Measurement Location 7 Ankle  -KA      Left  7 27.4 cm  -KA      Right 7 25.6 cm  -KA      Measurement Location 8 Midfoot  -KA      Left 8 22 cm  -KA      Right 8 22 cm  -KA      LLE Circumferential Total 286.6 cm  -KA      RLE Circumferential Total 283.4 cm  -KA         Manual Lymphatic Drainage    Manual Lymphatic Drainage Comments Short neck, B axilla, B IA, diaphragmatic breathing, B inguinals, B thighs  -KA         Compression/Skin Care    Compression/Skin Care skin care;wrapping location;bandaging  -KA      Skin Care moisturizing lotion applied  HCC+Eucerin  -KA      Wrapping Location lower extremity  -KA      Wrapping Location LE bilateral:;foot to knee;thigh  -KA      Bandage Layers cotton liner;padding/fluff layer;short-stretch bandages (comment size/quantity)  -KA      Bandaging Comments TG9, Artiflex x 2, Rosidal soft ankle to mid thigh (1-10 cm + 1/2 15 cm to each leg), Rosidal K 1-8 cm, 2-10 cm, 1-12,  pt's shoes  -KA      Bandaging Technique circumferential/spiral;light compression  4th bandage (12 cm) using figure 8 pattern to knee and thigh.  -KA                User Key  (r) = Recorded By, (t) = Taken By, (c) = Cosigned By      Initials Name Provider Type    Bertha Ruiz, PT Physical Therapist                                   PT Assessment/Plan       Row Name 04/22/24 6309          PT Assessment    Functional Limitations Limitations in functional capacity and performance;Impaired gait  -KA     Impairments Balance;Edema;Gait;Impaired lymphatic circulation;Sensation  -KA     Assessment Comments Pt continues to respond well to CDT.  At this time, she has met 2/3 STG and 1/3 LTG.  She has seen net decreased edema of 23.6 cm in each leg since starting treatment.  Though lower legs have reduced well, lower thighs are still quite edematous.  Continued MLD to B thighs and added compression bandaging to thighs this date.  Pt remains appropriate for skilled physical therapy at this time.  -KA     Rehab Potential Good  -KA     Patient/caregiver  participated in establishment of treatment plan and goals Yes  -KA     Patient would benefit from skilled therapy intervention Yes  -KA        PT Plan    PT Plan Comments Continue CDT, assess response to thigh bandaging.  -KA               User Key  (r) = Recorded By, (t) = Taken By, (c) = Cosigned By      Initials Name Provider Type    Bertha Ruiz, PT Physical Therapist                        OP Exercises       Row Name 04/22/24 1658 04/22/24 1300          Subjective    Subjective Comments -- Pt reports increased knee pain R related to arthritis, proud of the progress she's seen with her lower legs.  -KA        Subjective Pain    Able to rate subjective pain? -- yes  -KA     Pre-Treatment Pain Level -- 4  -KA     Subjective Pain Comment -- R knee  -KA        Total Minutes    81199 - PT Manual Therapy Minutes 58  -KA --               User Key  (r) = Recorded By, (t) = Taken By, (c) = Cosigned By      Initials Name Provider Type    Bertha Ruiz, PT Physical Therapist                            Manual Rx (Last 36 Hours)       Manual Treatments       Row Name 04/22/24 1658             Total Minutes    13041 - PT Manual Therapy Minutes 58  -KA                User Key  (r) = Recorded By, (t) = Taken By, (c) = Cosigned By      Initials Name Provider Type    Bertha Ruiz, PT Physical Therapist                     PT OP Goals       Row Name 04/22/24 1600          PT Short Term Goals    STG Date to Achieve 04/22/24  -KA     STG 1 Patient to demonstrate awareness of condition and precautions for improved prevention, management, care of symptoms, and ease of transition to self care of condition.  -KA     STG 1 Progress Met  -KA     STG 2 Patient/family independent with self-wrapping techniques of compression bandages as indicated for improved self-management of condition.  -KA     STG 2 Progress Ongoing  -KA     STG 3 Patient demonstrates decreased net edema of Bilateral Lower Extremity>/= 5-10 cm for  decreased edema symptoms, decreased risk of infection, and improved skin care.  -     STG 3 Progress Met  -        Long Term Goals    LTG Date to Achieve 05/20/24  -     LTG 1 Patient/family independent with self-care techniques for self-management of condition.  -     LTG 1 Progress Ongoing  -     LTG 2 Patient demonstrates decreased net edema of Left Lower Extremity >/= 10-20 cm for decreased edema symptoms, decreased risk of infection, and improved skin care.  -     LTG 2 Progress Met  -     LTG 3 Patient/family independent with compression garments as indicated for self-management of condition.  -     LTG 3 Progress New  -               User Key  (r) = Recorded By, (t) = Taken By, (c) = Cosigned By      Initials Name Provider Type    Bertha Ruiz, PT Physical Therapist                    Therapy Education  Education Details: Discussed girth measurements, bandaging changes.  If thigh bandaging causes issues, remove and reapply knee high compression stockings.  Given: Edema management, Bandaging/dressing change  Program: New  How Provided: Verbal, Demonstration  Provided to: Patient  Level of Understanding: Verbalized              Time Calculation:   Start Time: 1315  Stop Time: 1413  Time Calculation (min): 58 min  Timed Charges  19900 - PT Manual Therapy Minutes: 58  Total Minutes  Timed Charges Total Minutes: 58   Total Minutes: 58   Therapy Charges for Today       Code Description Service Date Service Provider Modifiers Qty    35343475806  PT MANUAL THERAPY EA 15 MIN 4/22/2024 Bertha Vasquez, PT GP 4                      Bertha Vasquez PT  4/22/2024

## 2024-04-24 ENCOUNTER — HOSPITAL ENCOUNTER (OUTPATIENT)
Dept: PHYSICAL THERAPY | Facility: HOSPITAL | Age: 89
Setting detail: THERAPIES SERIES
Discharge: HOME | End: 2024-04-24
Payer: MEDICARE

## 2024-04-24 DIAGNOSIS — I89.0 LYMPHEDEMA: Primary | ICD-10-CM

## 2024-04-24 PROCEDURE — 97140 MANUAL THERAPY 1/> REGIONS: CPT

## 2024-04-24 NOTE — THERAPY TREATMENT NOTE
Outpatient Physical Therapy Lymphedema Treatment Note  [facility]     Patient Name: Dex Thakkar  : 1934  MRN: [id number]  Today's Date: 2024        Visit Date: 2024    Visit Dx:    ICD-10-CM ICD-9-CM   1. Lymphedema  I89.0 457.1       Patient Active Problem List   Diagnosis    Stasis edema of left lower extremity    Pain and swelling of ankle, left    Hypertension    H/O cardiac radiofrequency ablation    Left lower quadrant pain    Urinary retention    Rectal ulcer    Anastomotic stricture of colorectal region    Aortic valve sclerosis    AVNRT (AV gatito re-entry tachycardia)    Carotid artery stenosis    Diastolic dysfunction    Edema    History of PSVT (paroxysmal supraventricular tachycardia)    Hyperlipidemia LDL goal <100    Irritable bladder    Systolic murmur         Lymphedema       Row Name 24 1300             Subjective Pain    Able to rate subjective pain? yes  -KA      Pre-Treatment Pain Level 0  -KA         Subjective    Subjective Comments Pt did well with thigh bandaging, but did have more itching and some redness of knees and lobes just above knees when she removed  bandaging.  -KA         Manual Lymphatic Drainage    Manual Lymphatic Drainage Comments Short neck, B axilla, B IA, diaphragmatic breathing, B inguinals, B thighs  -KA         Compression/Skin Care    Compression/Skin Care skin care;wrapping location;bandaging  -KA      Skin Care moisturizing lotion applied  -KA      Wrapping Location lower extremity  -KA      Wrapping Location LE bilateral:;foot to knee;thigh  -KA      Bandage Layers cotton liner;padding/fluff layer;short-stretch bandages (comment size/quantity)  -KA      Bandaging Comments TG9, Artiflex x 2, Rosidal soft ankle to mid thigh (1-10 cm + 1/2 15 cm to each leg), Rosidal K 1-8 cm, 2-10 cm, 1-12, pt's shoes  -KA      Bandaging Technique circumferential/spiral;light compression  4th bandage figure 8 to knee and lower thigh  -KA                 User Key  (r) = Recorded By, (t) = Taken By, (c) = Cosigned By      Initials Name Provider Type    Bertha Ruiz, BERT Physical Therapist                                   PT Assessment/Plan       Row Name 04/24/24 1359          PT Assessment    Assessment Comments Pt responded well to thigh bandaging, added extra HCC to this area to reduce itching/redness.  Continued with MLD to B thighs, continued with compression bandaging foot to mid thigh.  If she continues to do well with bandaging of thighs, anticipate that she will do well with CCL1 thigh high compression socks.  -KA        PT Plan    PT Plan Comments Continue CDT.  -NEGRITO               User Key  (r) = Recorded By, (t) = Taken By, (c) = Cosigned By      Initials Name Provider Type    Bertha Ruiz, PT Physical Therapist                        OP Exercises       Row Name 04/24/24 1400 04/24/24 1300          Subjective    Subjective Comments -- Pt did well with thigh bandaging, but did have more itching and some redness of knees and lobes just above knees when she removed  bandaging.  -NEGRITO        Subjective Pain    Able to rate subjective pain? -- yes  -NEGRITO     Pre-Treatment Pain Level -- 0  -KA        Total Minutes    59216 - PT Manual Therapy Minutes 55  -KA --               User Key  (r) = Recorded By, (t) = Taken By, (c) = Cosigned By      Initials Name Provider Type    Bertha Ruiz, BERT Physical Therapist                            Manual Rx (Last 36 Hours)       Manual Treatments       Row Name 04/24/24 1400             Total Minutes    95570 - PT Manual Therapy Minutes 55  -KA                User Key  (r) = Recorded By, (t) = Taken By, (c) = Cosigned By      Initials Name Provider Type    Bertha Ruiz, PT Physical Therapist                                       Time Calculation:   Start Time: 1325  Stop Time: 1420  Time Calculation (min): 55 min  Timed Charges  09143 - PT Manual Therapy Minutes: 55  Total Minutes  Timed Charges  Total Minutes: 55   Total Minutes: 55   Therapy Charges for Today       Code Description Service Date Service Provider Modifiers Qty    25190169097 HC PT MANUAL THERAPY EA 15 MIN 4/24/2024 Bertha Vasquez, PT GP 4                      Bertha Vasquez, PT  4/24/2024

## 2024-04-26 ENCOUNTER — HOSPITAL ENCOUNTER (OUTPATIENT)
Dept: PHYSICAL THERAPY | Facility: HOSPITAL | Age: 89
Setting detail: THERAPIES SERIES
Discharge: HOME | End: 2024-04-26
Payer: MEDICARE

## 2024-04-26 DIAGNOSIS — I89.0 LYMPHEDEMA: Primary | ICD-10-CM

## 2024-04-26 PROCEDURE — 97140 MANUAL THERAPY 1/> REGIONS: CPT

## 2024-04-26 NOTE — THERAPY TREATMENT NOTE
Outpatient Physical Therapy Lymphedema Treatment Note  [facility]     Patient Name: Dex Thakkar  : 1934  MRN: [id number]  Today's Date: 2024        Visit Date: 2024    Visit Dx:    ICD-10-CM ICD-9-CM   1. Lymphedema  I89.0 457.1       Patient Active Problem List   Diagnosis    Stasis edema of left lower extremity    Pain and swelling of ankle, left    Hypertension    H/O cardiac radiofrequency ablation    Left lower quadrant pain    Urinary retention    Rectal ulcer    Anastomotic stricture of colorectal region    Aortic valve sclerosis    AVNRT (AV gatito re-entry tachycardia)    Carotid artery stenosis    Diastolic dysfunction    Edema    History of PSVT (paroxysmal supraventricular tachycardia)    Hyperlipidemia LDL goal <100    Irritable bladder    Systolic murmur         Lymphedema       Row Name 24 1300             Subjective Pain    Able to rate subjective pain? yes  -KA      Pre-Treatment Pain Level 0  -KA         Subjective    Subjective Comments Doing well with thigh bandaging, no itching with use of extra HCC.  Wants to try thigh high compression stockings vs. knee highs or knee high velcro compression to maintain reduction.  -KA         Manual Lymphatic Drainage    Manual Lymphatic Drainage Comments Short neck, B axilla, B IA, diaphragmatic breathing, B inguinals, B thighs  -KA         Compression/Skin Care    Compression/Skin Care skin care;wrapping location;bandaging  -KA      Skin Care moisturizing lotion applied  -KA      Wrapping Location lower extremity  -KA      Wrapping Location LE bilateral:;foot to knee;thigh  -KA      Bandage Layers cotton liner;padding/fluff layer;short-stretch bandages (comment size/quantity)  -KA      Bandaging Comments TG9, Artiflex x 2, Rosidal soft ankle to mid thigh (1-10 cm + 1/2 15 cm to each leg), Rosidal K 1-8 cm, 2-10 cm, 1-12, pt's shoes  -KA      Bandaging Technique circumferential/spiral;light compression  4th bandage figure 8 to  thigh and knee  -KA                User Key  (r) = Recorded By, (t) = Taken By, (c) = Cosigned By      Initials Name Provider Type    Bertha Ruiz, BERT Physical Therapist                                   PT Assessment/Plan       Row Name 04/26/24 1350          PT Assessment    Assessment Comments Continued with MLD to thighs and compression bandaging foot to thigh due to good response. Reassess girth measurements next visit; thighs appear to be reducing.  -NEGRITO        PT Plan    PT Plan Comments Continue CDT.  Reassess girth measurements next visit.  Order long term compression when appropriate.  -NEGRITO               User Key  (r) = Recorded By, (t) = Taken By, (c) = Cosigned By      Initials Name Provider Type    Bertha Ruiz, BERT Physical Therapist                        OP Exercises       Row Name 04/26/24 1352 04/26/24 1300          Subjective    Subjective Comments -- Doing well with thigh bandaging, no itching with use of extra HCC.  Wants to try thigh high compression stockings vs. knee highs or knee high velcro compression to maintain reduction.  -NEGRITO        Subjective Pain    Able to rate subjective pain? -- yes  -NEGRITO     Pre-Treatment Pain Level -- 0  -KA        Total Minutes    20693 - PT Manual Therapy Minutes 53  -KA --               User Key  (r) = Recorded By, (t) = Taken By, (c) = Cosigned By      Initials Name Provider Type    Bertha Ruiz, BERT Physical Therapist                            Manual Rx (Last 36 Hours)       Manual Treatments       Row Name 04/26/24 1352             Total Minutes    14914 - PT Manual Therapy Minutes 53  -KA                User Key  (r) = Recorded By, (t) = Taken By, (c) = Cosigned By      Initials Name Provider Type    Bertha Ruiz, BERT Physical Therapist                                       Time Calculation:   Start Time: 1320  Stop Time: 1413  Time Calculation (min): 53 min  Timed Charges  74694 - PT Manual Therapy Minutes: 53  Total  Minutes  Timed Charges Total Minutes: 53   Total Minutes: 53   Therapy Charges for Today       Code Description Service Date Service Provider Modifiers Qty    60522349864 HC PT MANUAL THERAPY EA 15 MIN 4/26/2024 Bertha Vasquez, PT GP 4                      Bertha Vasquez, PT  4/26/2024

## 2024-04-29 ENCOUNTER — HOSPITAL ENCOUNTER (OUTPATIENT)
Dept: PHYSICAL THERAPY | Facility: HOSPITAL | Age: 89
Setting detail: THERAPIES SERIES
Discharge: HOME | End: 2024-04-29
Payer: MEDICARE

## 2024-04-29 DIAGNOSIS — I89.0 LYMPHEDEMA: Primary | ICD-10-CM

## 2024-04-29 PROCEDURE — 97535 SELF CARE MNGMENT TRAINING: CPT

## 2024-04-29 PROCEDURE — 97140 MANUAL THERAPY 1/> REGIONS: CPT

## 2024-04-29 NOTE — THERAPY PROGRESS REPORT/RE-CERT
Outpatient Physical Therapy Lymphedema Progress Note  [facility]     Patient Name: Dex Thakkar  : 1934  MRN: [id number]  Today's Date: 2024        Visit Date: 2024    Visit Dx:    ICD-10-CM ICD-9-CM   1. Lymphedema  I89.0 457.1       Patient Active Problem List   Diagnosis    Stasis edema of left lower extremity    Pain and swelling of ankle, left    Hypertension    H/O cardiac radiofrequency ablation    Left lower quadrant pain    Urinary retention    Rectal ulcer    Anastomotic stricture of colorectal region    Aortic valve sclerosis    AVNRT (AV gatito re-entry tachycardia)    Carotid artery stenosis    Diastolic dysfunction    Edema    History of PSVT (paroxysmal supraventricular tachycardia)    Hyperlipidemia LDL goal <100    Irritable bladder    Systolic murmur         Lymphedema       Row Name 24 1300             Subjective Pain    Able to rate subjective pain? yes  -KA      Pre-Treatment Pain Level 0  -KA         Subjective    Subjective Comments Feels thighs have gone down, but maybe not completely.  -KA         Lymphedema Measurements    Measurement Type(s) Circumferential  -KA      Circumferential Areas Lower extremities  -KA         BLE Circumferential (cm)    Measurement Location 1 20 cm above knee  -KA      Left 1 59 cm  -KA      Right 1 59.2 cm  -KA      Measurement Location 2 10 cm above knee  -KA      Left 2 49.7 cm  -KA      Right 2 51.3 cm  -KA      Measurement Location 3 Knee  -KA      Left 3 38.4 cm  -KA      Right 3 37.7 cm  -KA      Measurement Location 4 10 cm below knee  -KA      Left 4 37.2 cm  -KA      Right 4 36.5 cm  -KA      Measurement Location 5 20 cm below knee  -KA      Left 5 27 cm  -KA      Right 5 26.1 cm  -KA      Measurement Location 6 30 cm below knee  -KA      Left 6 25.7 cm  -KA      Right 6 24 cm  -KA      Measurement Location 7 Ankle  -KA      Left 7 26.8 cm  -KA      Right 7 26 cm  -KA      Measurement Location 8 Midfoot  -KA      Left 8 22  cm  -KA      Right 8 22 cm  -KA      LLE Circumferential Total 285.8 cm  -KA      RLE Circumferential Total 282.8 cm  -KA         Manual Lymphatic Drainage    Manual Lymphatic Drainage Comments No MLD this date  -KA         Compression/Skin Care    Compression/Skin Care skin care;wrapping location;bandaging  -KA      Skin Care moisturizing lotion applied  HCC+Eucerin  -KA      Wrapping Location lower extremity  -KA      Wrapping Location LE bilateral:;foot to knee;thigh  -KA      Bandage Layers cotton liner;padding/fluff layer;short-stretch bandages (comment size/quantity)  -KA      Bandaging Comments TG9, Artiflex x 2, Rosidal soft ankle to mid thigh (1-10 cm + 1/2 15 cm to each leg), Rosidal K 1-8 cm, 2-10 cm, 1-12, pt's shoes  -KA      Bandaging Technique circumferential/spiral;light compression  4th bandage figure 8 to thigh and knee  -KA                User Key  (r) = Recorded By, (t) = Taken By, (c) = Cosigned By      Initials Name Provider Type    Bertha Ruiz, PT Physical Therapist                                   PT Assessment/Plan       Row Name 04/29/24 1551 04/29/24 1546       PT Assessment    Functional Limitations -- Limitations in functional capacity and performance;Impaired gait  -KA    Impairments -- Balance;Edema;Gait;Impaired lymphatic circulation;Sensation  -KA    Assessment Comments Pt is responding well to CDT; at this time, she has met 2/3 STG and 1/3 LTG. She has responded well to bandaging of knee and lower thigh, but while lower thigh is reducing, upper thigh is becoming more edematous, and I cannot bandage any higher without impairing toileting ability/hygiene concerns. She has seen net decreased edema of 24.4 cm LLE and 24.2 cm RLE since starting treatment.  Discussed long term compression options today, will try Juzo Dynamic thigh high compression stockings first but may ultimately need to add a bike short or try guero + knee high or custom thigh high to achieve proper fit.  Pt  remains appropriate for skilled physical therapy at this time, will focus on MLD to thighs and continue compression bandaging while waiting for long term compression to arrive.  -KA --    Rehab Potential Good  -KA --    Patient/caregiver participated in establishment of treatment plan and goals Yes  -KA --    Patient would benefit from skilled therapy intervention Yes  -KA --       PT Plan    PT Frequency 3x/week  -KA --    Predicted Duration of Therapy Intervention (PT) 4-6 weeks per PT initial POC  -KA --    Planned CPT's? PT RE-EVAL: 81538;PT THER PROC EA 15 MIN: 80920;PT THER ACT EA 15 MIN: 88272;PT MANUAL THERAPY EA 15 MIN: 04342;PT NEUROMUSC RE-EDUCATION EA 15 MIN: 52593;PT GAIT TRAINING EA 15 MIN: 20822;PT SELF CARE/HOME MGMT/TRAIN EA 15: 53533  -KA --    PT Plan Comments Continue CDT.  Order Juzo Dynamic thigh highs from [facility].  -KA --              User Key  (r) = Recorded By, (t) = Taken By, (c) = Cosigned By      Initials Name Provider Type    Bertha Ruiz, PT Physical Therapist                        OP Exercises       Row Name 04/29/24 1555 04/29/24 1300          Subjective    Subjective Comments -- Feels thighs have gone down, but maybe not completely.  -KA        Subjective Pain    Able to rate subjective pain? -- yes  -KA     Pre-Treatment Pain Level -- 0  -KA        Total Minutes    80280 - PT Manual Therapy Minutes 30  -KA --               User Key  (r) = Recorded By, (t) = Taken By, (c) = Cosigned By      Initials Name Provider Type    Bertha Ruiz, PT Physical Therapist                            Manual Rx (Last 36 Hours)       Manual Treatments       Row Name 04/29/24 1555             Total Minutes    08534 - PT Manual Therapy Minutes 30  -KA                User Key  (r) = Recorded By, (t) = Taken By, (c) = Cosigned By      Initials Name Provider Type    Bertha Ruiz, PT Physical Therapist                     PT OP Goals       Row Name 04/29/24 1500          PT  Short Term Goals    STG Date to Achieve 04/22/24  -KA     STG 1 Patient to demonstrate awareness of condition and precautions for improved prevention, management, care of symptoms, and ease of transition to self care of condition.  -KA     STG 1 Progress Met  -KA     STG 2 Patient/family independent with self-wrapping techniques of compression bandages as indicated for improved self-management of condition.  -KA     STG 2 Progress Ongoing  -KA     STG 3 Patient demonstrates decreased net edema of Bilateral Lower Extremity>/= 5-10 cm for decreased edema symptoms, decreased risk of infection, and improved skin care.  -KA     STG 3 Progress Met  -        Long Term Goals    LTG Date to Achieve 05/20/24  -KA     LTG 1 Patient/family independent with self-care techniques for self-management of condition.  -KA     LTG 1 Progress Ongoing  -KA     LTG 2 Patient demonstrates decreased net edema of Left Lower Extremity >/= 10-20 cm for decreased edema symptoms, decreased risk of infection, and improved skin care.  -KA     LTG 2 Progress Met  -KA     LTG 3 Patient/family independent with compression garments as indicated for self-management of condition.  -KA     LTG 3 Progress New  -               User Key  (r) = Recorded By, (t) = Taken By, (c) = Cosigned By      Initials Name Provider Type    Bertha Ruiz, PT Physical Therapist                    Therapy Education  Education Details: Discussed girth measurements.  Extensive discussion of compression options based on her presentation and girth measurements.  Girth measurements at thigh are out of range for thigh highs with silicone top band, so recommending Juzo Dynamic 20-30 mmHg thigh high without silicone top band size 3 short open toe in beige as first attempt at long term compression based on her stated preferences.  Will order 1 pair to try from [employer], if they do well, will order 2 additional pairs.  If thigh highs are not a viable long term option  due to difficulty donning or due to rolling, will need to consider adding a bike short, or choosing a guero + knee high combination or a custom thigh high with silicone border.  Due to the fact that leg is well reduced where bandaging has been applied but more edematous above that area, do not anticipate that CCL1 knee high will be adequate to prevent progression of her lymphedema.  Given: Edema management  Program: New  How Provided: Verbal  Provided to: Patient  Level of Understanding: Verbalized  62951 - PT Self Care/Mgmt Minutes: 25              Time Calculation:   Start Time: 1320  Stop Time: 1415  Time Calculation (min): 55 min  Timed Charges  10011 - PT Manual Therapy Minutes: 30  16471 - PT Self Care/Mgmt Minutes: 25  Total Minutes  Timed Charges Total Minutes: 55   Total Minutes: 55   Therapy Charges for Today       Code Description Service Date Service Provider Modifiers Qty    19251126862 HC PT MANUAL THERAPY EA 15 MIN 4/29/2024 Bertha Vasquez, PT GP, KX 2    33259088215 HC PT SELF CARE/MGMT/TRAIN EA 15 MIN 4/29/2024 Bertha Vasquez, PT GP, KX 2                      Bertha Vasquez, PT  4/29/2024

## 2024-05-01 ENCOUNTER — HOSPITAL ENCOUNTER (OUTPATIENT)
Dept: PHYSICAL THERAPY | Facility: HOSPITAL | Age: 89
Setting detail: THERAPIES SERIES
Discharge: HOME | End: 2024-05-01
Payer: MEDICARE

## 2024-05-01 DIAGNOSIS — I89.0 LYMPHEDEMA: Primary | ICD-10-CM

## 2024-05-01 PROCEDURE — 97140 MANUAL THERAPY 1/> REGIONS: CPT

## 2024-05-01 NOTE — THERAPY TREATMENT NOTE
Outpatient Physical Therapy Lymphedema Treatment Note  [facility]     Patient Name: Dex Thakkar  : 1934  MRN: [id number]  Today's Date: 2024        Visit Date: 2024    Visit Dx:    ICD-10-CM ICD-9-CM   1. Lymphedema  I89.0 457.1       Patient Active Problem List   Diagnosis    Stasis edema of left lower extremity    Pain and swelling of ankle, left    Hypertension    H/O cardiac radiofrequency ablation    Left lower quadrant pain    Urinary retention    Rectal ulcer    Anastomotic stricture of colorectal region    Aortic valve sclerosis    AVNRT (AV gatito re-entry tachycardia)    Carotid artery stenosis    Diastolic dysfunction    Edema    History of PSVT (paroxysmal supraventricular tachycardia)    Hyperlipidemia LDL goal <100    Irritable bladder    Systolic murmur         Lymphedema       Row Name 24 1300             Subjective Pain    Able to rate subjective pain? yes  -KA      Pre-Treatment Pain Level 0  -KA         Subjective    Subjective Comments Still pleased with progress, nothing new to report.  -KA         Manual Lymphatic Drainage    Manual Lymphatic Drainage Comments Short neck, B axilla, B IA, diaphragmatic breathing, B inguinals, B thighs  -KA         Compression/Skin Care    Compression/Skin Care skin care;wrapping location;bandaging  -KA      Skin Care moisturizing lotion applied  HCC+ Eucerin  -KA      Wrapping Location lower extremity  -KA      Wrapping Location LE bilateral:;foot to knee;thigh  -KA      Bandage Layers cotton liner;padding/fluff layer;short-stretch bandages (comment size/quantity)  -KA      Bandaging Comments TG9, Artiflex x 2, Rosidal soft ankle to mid thigh (1-10 cm + 1/2 15 cm to each leg), Rosidal K 1-8 cm, 2-10 cm, 1-12, pt's shoes  -KA      Bandaging Technique circumferential/spiral;light compression  4th bandage figure 8 to knee and thigh  -KA                User Key  (r) = Recorded By, (t) = Taken By, (c) = Cosigned By      Initials Name  Provider Type    Bertha Ruiz PT Physical Therapist                                   PT Assessment/Plan       Row Name 05/01/24 1344          PT Assessment    Assessment Comments Continued MLD to B thighs and compression bandaging foot to mid thigh this date, good response from patient.  Awaiting long term compression garments.  Pt will try knee highs that were previously too tight on Friday and bring to clinic for therapist to check.  -ENGRITO        PT Plan    PT Plan Comments Continue CDT.  -NEGRITO               User Key  (r) = Recorded By, (t) = Taken By, (c) = Cosigned By      Initials Name Provider Type    Bertha Ruiz, PT Physical Therapist                        OP Exercises       Row Name 05/01/24 1345 05/01/24 1300          Subjective    Subjective Comments -- Still pleased with progress, nothing new to report.  -NEGRITO        Subjective Pain    Able to rate subjective pain? -- yes  -NEGRITO     Pre-Treatment Pain Level -- 0  -KA        Total Minutes    77717 - PT Manual Therapy Minutes 53  -KA --               User Key  (r) = Recorded By, (t) = Taken By, (c) = Cosigned By      Initials Name Provider Type    Bertha Ruiz PT Physical Therapist                            Manual Rx (Last 36 Hours)       Manual Treatments       Row Name 05/01/24 1345             Total Minutes    37370 - PT Manual Therapy Minutes 53  -KA                User Key  (r) = Recorded By, (t) = Taken By, (c) = Cosigned By      Initials Name Provider Type    Bertha Ruiz PT Physical Therapist                                       Time Calculation:   Start Time: 1318  Stop Time: 1411  Time Calculation (min): 53 min  Timed Charges  83037 - PT Manual Therapy Minutes: 53  Total Minutes  Timed Charges Total Minutes: 53   Total Minutes: 53   Therapy Charges for Today       Code Description Service Date Service Provider Modifiers Qty    38225682209 HC PT MANUAL THERAPY EA 15 MIN 5/1/2024 Bertha Vasquez, BERT GP, KX 4                       Bertha Vasquez, PT  5/1/2024

## 2024-05-03 ENCOUNTER — HOSPITAL ENCOUNTER (OUTPATIENT)
Dept: PHYSICAL THERAPY | Facility: HOSPITAL | Age: 89
Setting detail: THERAPIES SERIES
Discharge: HOME | End: 2024-05-03
Payer: MEDICARE

## 2024-05-03 DIAGNOSIS — I89.0 LYMPHEDEMA: Primary | ICD-10-CM

## 2024-05-03 PROCEDURE — 97140 MANUAL THERAPY 1/> REGIONS: CPT

## 2024-05-03 NOTE — THERAPY TREATMENT NOTE
Outpatient Physical Therapy Lymphedema Treatment Note  [facility]     Patient Name: Dex Thakkar  : 1934  MRN: [id number]  Today's Date: 5/3/2024        Visit Date: 2024    Visit Dx:    ICD-10-CM ICD-9-CM   1. Lymphedema  I89.0 457.1       Patient Active Problem List   Diagnosis    Stasis edema of left lower extremity    Pain and swelling of ankle, left    Hypertension    H/O cardiac radiofrequency ablation    Left lower quadrant pain    Urinary retention    Rectal ulcer    Anastomotic stricture of colorectal region    Aortic valve sclerosis    AVNRT (AV gatito re-entry tachycardia)    Carotid artery stenosis    Diastolic dysfunction    Edema    History of PSVT (paroxysmal supraventricular tachycardia)    Hyperlipidemia LDL goal <100    Irritable bladder    Systolic murmur         Lymphedema       Row Name 24 1700             Subjective Pain    Able to rate subjective pain? yes  -KA      Pre-Treatment Pain Level 0  -KA         Subjective    Subjective Comments Wore in new compression socks, cannot pull them over then knee because they are not stretched out.  -KA         Manual Lymphatic Drainage    Manual Lymphatic Drainage Comments Short neck, B axilla, B IA, diaphragmatic breathing, B inguinals, LLE, R thigh  -KA         Compression/Skin Care    Compression/Skin Care skin care;wrapping location;bandaging  -KA      Skin Care moisturizing lotion applied  HCC+Eucerin  -KA      Wrapping Location lower extremity  -KA      Wrapping Location LE bilateral:;foot to knee;thigh  -KA      Bandage Layers cotton liner;padding/fluff layer;short-stretch bandages (comment size/quantity)  -KA      Bandaging Comments TG9, Artiflex x 2, Rosidal soft ankle to mid thigh (1-10 cm + 1/2 15 cm to each leg), Rosidal K 1-8 cm, 2-10 cm, 1-12, pt's shoes  -KA      Bandaging Technique circumferential/spiral;light compression  figure 8 4th bandage to knee and thigh  -KA                User Key  (r) = Recorded By, (t) =  Taken By, (c) = Cosigned By      Initials Name Provider Type    Bertha Ruiz, PT Physical Therapist                                   PT Assessment/Plan       Row Name 05/03/24 1736          PT Assessment    Assessment Comments Knee high Jobst compression stockings fit circumferentially, but may be too long, pt has them folded below knees.  Still awaiting thigh high stockings.  Continued with MLD and compression bandaging this date, pt responding well to treatment, thighs are softening.  -NEGRITO        PT Plan    PT Plan Comments Continue CDT.  -NEGRITO               User Key  (r) = Recorded By, (t) = Taken By, (c) = Cosigned By      Initials Name Provider Type    Bertha Ruiz, PT Physical Therapist                        OP Exercises       Row Name 05/03/24 1737 05/03/24 1700          Subjective    Subjective Comments -- Wore in new compression socks, cannot pull them over then knee because they are not stretched out.  -NEGRITO        Subjective Pain    Able to rate subjective pain? -- yes  -NEGRITO     Pre-Treatment Pain Level -- 0  -KA        Total Minutes    49375 - PT Manual Therapy Minutes 55  -KA --               User Key  (r) = Recorded By, (t) = Taken By, (c) = Cosigned By      Initials Name Provider Type    Bertha Ruiz, PT Physical Therapist                            Manual Rx (Last 36 Hours)       Manual Treatments       Row Name 05/03/24 1737             Total Minutes    08323 - PT Manual Therapy Minutes 55  -KA                User Key  (r) = Recorded By, (t) = Taken By, (c) = Cosigned By      Initials Name Provider Type    Bertha Ruiz, PT Physical Therapist                                       Time Calculation:   Start Time: 1320  Stop Time: 1415  Time Calculation (min): 55 min  Timed Charges  87462 - PT Manual Therapy Minutes: 55  Total Minutes  Timed Charges Total Minutes: 55   Total Minutes: 55   Therapy Charges for Today       Code Description Service Date Service Provider Modifiers  Qty    94825028612  PT MANUAL THERAPY EA 15 MIN 5/3/2024 Bertha Vasquez, PT GP, KX 4                      Bertha Vasquez, PT  5/3/2024

## 2024-05-06 ENCOUNTER — APPOINTMENT (OUTPATIENT)
Dept: PHYSICAL THERAPY | Facility: HOSPITAL | Age: 89
End: 2024-05-06
Payer: MEDICARE

## 2024-05-08 ENCOUNTER — HOSPITAL ENCOUNTER (OUTPATIENT)
Dept: PHYSICAL THERAPY | Facility: HOSPITAL | Age: 89
Setting detail: THERAPIES SERIES
Discharge: HOME | End: 2024-05-08
Payer: MEDICARE

## 2024-05-08 DIAGNOSIS — I89.0 LYMPHEDEMA: Primary | ICD-10-CM

## 2024-05-08 PROCEDURE — 97140 MANUAL THERAPY 1/> REGIONS: CPT

## 2024-05-10 ENCOUNTER — HOSPITAL ENCOUNTER (OUTPATIENT)
Dept: PHYSICAL THERAPY | Facility: HOSPITAL | Age: 89
Setting detail: THERAPIES SERIES
Discharge: HOME | End: 2024-05-10
Payer: MEDICARE

## 2024-05-10 DIAGNOSIS — I89.0 LYMPHEDEMA: Primary | ICD-10-CM

## 2024-05-10 PROCEDURE — 97535 SELF CARE MNGMENT TRAINING: CPT

## 2024-05-13 ENCOUNTER — HOSPITAL ENCOUNTER (OUTPATIENT)
Dept: PHYSICAL THERAPY | Facility: HOSPITAL | Age: 89
Setting detail: THERAPIES SERIES
Discharge: HOME | End: 2024-05-13
Payer: MEDICARE

## 2024-05-13 DIAGNOSIS — I89.0 LYMPHEDEMA: Primary | ICD-10-CM

## 2024-05-13 PROCEDURE — 97140 MANUAL THERAPY 1/> REGIONS: CPT

## 2024-05-13 PROCEDURE — 97535 SELF CARE MNGMENT TRAINING: CPT

## 2024-05-13 NOTE — THERAPY TREATMENT NOTE
"  Outpatient Physical Therapy Lymphedema Treatment Note  [facility]     Patient Name: Dex Thakkar  : 1934  MRN: [id number]  Today's Date: 2024        Visit Date: 2024    Visit Dx:    ICD-10-CM ICD-9-CM   1. Lymphedema  I89.0 457.1       Patient Active Problem List   Diagnosis    Stasis edema of left lower extremity    Pain and swelling of ankle, left    Hypertension    H/O cardiac radiofrequency ablation    Left lower quadrant pain    Urinary retention    Rectal ulcer    Anastomotic stricture of colorectal region    Aortic valve sclerosis    AVNRT (AV gatito re-entry tachycardia)    Carotid artery stenosis    Diastolic dysfunction    Edema    History of PSVT (paroxysmal supraventricular tachycardia)    Hyperlipidemia LDL goal <100    Irritable bladder    Systolic murmur         Lymphedema       Row Name 24 1500 24 1400          Subjective Pain    Able to rate subjective pain? yes  -KA --     Pre-Treatment Pain Level 0  -KA --        Subjective    Subjective Comments Pt reports that she could not keep thigh high stockings to stay up above her knees, continually slid.  They were painful to apply due to fragile skin on legs, but felt okay once applied but 15-20 mmHg feel better.  Would prefer to try guero with 15-20 mmHg knee highs and use lymphedema pump more often.  -KA --        LLIS - Physical Concerns    The amount of pain associated with my lymphedema is: -- 0  -KA     The amount of limb heaviness associated with my lymphedema is: -- 2  -KA     The amount of skin tightness associated with my lymphedema is: -- 1  -KA     The size of my swollen limb(s) seems: -- 0  -KA     Lymphedema affects the movement of my swollen limb(s): -- 0  -KA     The strength in my swollen limb(s) is: -- 0  -KA        LLIS - Psychosocial Concerns    Lymphedema affects my body image (i.e., \"how I think I look\"). -- 0  -KA     Lymphedema affects my socializing with others. -- 0  -KA     Lymphedema affects " "my intimate relations with spouse or partner (rate 0 if not applicable -- 0  -KA     Lymphedema \"gets me down\" (i.e., depression, frustration, or anger) -- 1  -KA     I must rely on others for help due to my lymphedema. -- 0  -KA     I know what to do to manage my lymphedema -- 0  -KA        LLIS - Functional Concerns    Lymphedema affects my ability to perform self-care activities (i.e. eating, dressing, hygiene) -- 0  -KA     Lymphedema affects my ability to perform routine home or work-related activities. -- 1  -KA     Lymphedema affects my performance of preferred leisure activities. -- 1  -KA     Lymphedema affects proper fit of clothing/shoes -- 0  -KA     Lymphedema affects my sleep -- 0  -KA        Manual Lymphatic Drainage    Manual Lymphatic Drainage Comments -- Short neck, B axilla, B IA, diaphragmatic breathing, B inguinals, BLE  -KA        Compression/Skin Care    Compression Garment Comments -- Pt applies Jobst relief 15-20 mmHg knee high independently  -KA        Lymphedema Life Impact Scale Totals    A.  Total Q1 - Q17 (Do not include Q18) -- 6  -KA     B.  Total number of questions answered (Q1-Q17) -- 17  -KA     C. Divide A by B -- 0.35  -KA     D. Multiple C by 25 -- 8.75  -KA               User Key  (r) = Recorded By, (t) = Taken By, (c) = Cosigned By      Initials Name Provider Type    Bertha Ruiz, PT Physical Therapist                                   PT Assessment/Plan       Row Name 05/13/24 2805          PT Assessment    Functional Limitations Limitations in functional capacity and performance;Impaired gait  -KA     Impairments Balance;Edema;Gait;Impaired lymphatic circulation;Sensation  -KA     Assessment Comments Pt was unable to keep thigh highs from sliding down over her knees despite 2 days of attempting use, also reports that they were painful to apply and felt tight while on.  Circaid guero has been ordered for her to trial in combination with 15-20 mmHg knee high " stockings, unsure that this will fully contain her edema and prevent progression but might be adequate if she will use lymphedema pump faithfully.  Performed MLD to BLE today, pt may seek massage therapist who can occasionally perform lymphatic massage.  Will begin transition to self management, but she should follow-up as needed over next few months as we troubleshoot long term compression options to find what is best for her, bandage PRN.  Lymphedema Life Impact Scale reassessed today, has decreased to 8.75 from 50 at initial evaluation.  -KA        PT Plan    PT Plan Comments Trial circaid guero with 15-20 mmHg knee high stockings and use lymphedema pump daily.  Bandage PRN.  -NEGRITO               User Key  (r) = Recorded By, (t) = Taken By, (c) = Cosigned By      Initials Name Provider Type    Bertha Ruiz, PT Physical Therapist                        OP Exercises       Row Name 05/13/24 1538 05/13/24 1500          Subjective    Subjective Comments -- Pt reports that she could not keep thigh high stockings to stay up above her knees, continually slid.  They were painful to apply due to fragile skin on legs, but felt okay once applied but 15-20 mmHg feel better.  Would prefer to try guero with 15-20 mmHg knee highs and use lymphedema pump more often.  -NEGRITO        Subjective Pain    Able to rate subjective pain? -- yes  -KA     Pre-Treatment Pain Level -- 0  -KA        Total Minutes    57486 - PT Manual Therapy Minutes 43  -KA --               User Key  (r) = Recorded By, (t) = Taken By, (c) = Cosigned By      Initials Name Provider Type    Bertha Ruiz, PT Physical Therapist                            Manual Rx (Last 36 Hours)       Manual Treatments       Row Name 05/13/24 1538             Total Minutes    93580 - PT Manual Therapy Minutes 43  -KA                User Key  (r) = Recorded By, (t) = Taken By, (c) = Cosigned By      Initials Name Provider Type    Bertha Ruiz, PT Physical  Therapist                     PT OP Goals       Row Name 05/13/24 1500          PT Short Term Goals    STG Date to Achieve 04/22/24  -KA     STG 1 Patient to demonstrate awareness of condition and precautions for improved prevention, management, care of symptoms, and ease of transition to self care of condition.  -KA     STG 1 Progress Met  -KA     STG 2 Patient/family independent with self-wrapping techniques of compression bandages as indicated for improved self-management of condition.  -KA     STG 2 Progress Ongoing  -KA     STG 2 Progress Comments Have not attempted at home  -KA     STG 3 Patient demonstrates decreased net edema of Bilateral Lower Extremity>/= 5-10 cm for decreased edema symptoms, decreased risk of infection, and improved skin care.  -KA     STG 3 Progress Met  -        Long Term Goals    LTG Date to Achieve 05/20/24  -KA     LTG 1 Patient/family independent with self-care techniques for self-management of condition.  -KA     LTG 1 Progress Ongoing  -KA     LTG 2 Patient demonstrates decreased net edema of Left Lower Extremity >/= 10-20 cm for decreased edema symptoms, decreased risk of infection, and improved skin care.  -KA     LTG 2 Progress Met  -KA     LTG 3 Patient/family independent with compression garments as indicated for self-management of condition.  -KA     LTG 3 Progress Ongoing  -KA     LTG 3 Progress Comments Pt is independent with 15-20 mmHg knee high stockings, still awaiting arrival of [facility], had issues with thigh high  -               User Key  (r) = Recorded By, (t) = Taken By, (c) = Cosigned By      Initials Name Provider Type    Bertha Ruiz, PT Physical Therapist                    Therapy Education  Education Details: Discussed change in LLIS.  Reviewed self care in preparation for transition to self management including use of lymphedema pump daily for 1 hour, self MLD as able, use of Eucerin lotion, daily use of compression garments.  Recommend trying  15-20 mmHg knee highs in combination with circaid guero (when it arrives); if they are not contained well, can try CCL1 thigh highs with circaid guero to see if this will help them stay up but she should not wear if painful.  Suspect that she may be able to do lighter daily compression with daily use of lymphedema pump.  Instructed to keep all bandaging supplies as they can be reused as needed to address flare ups.  Call to schedule additional appointments as needed while trying to establish best long term compression solution.  Given: Edema management, Bandaging/dressing change, HEP  Program: New, Reinforced  How Provided: Verbal  Provided to: Patient  Level of Understanding: Verbalized  91084 - PT Self Care/Mgmt Minutes: 15              Time Calculation:   Start Time: 1317  Stop Time: 1415  Time Calculation (min): 58 min  Timed Charges  63726 - PT Manual Therapy Minutes: 43  75346 - PT Self Care/Mgmt Minutes: 15  Total Minutes  Timed Charges Total Minutes: 58   Total Minutes: 58   Therapy Charges for Today       Code Description Service Date Service Provider Modifiers Qty    41042769528 HC PT MANUAL THERAPY EA 15 MIN 5/13/2024 Bertha Vasquez, PT GP, KX 3    10154343253 HC PT SELF CARE/MGMT/TRAIN EA 15 MIN 5/13/2024 Bertha Vasquez, PT GP, KX 1                      Bertha Vasquez, PT  5/13/2024

## 2024-05-15 ENCOUNTER — APPOINTMENT (OUTPATIENT)
Dept: PHYSICAL THERAPY | Facility: HOSPITAL | Age: 89
End: 2024-05-15
Payer: MEDICARE

## 2024-05-21 NOTE — PROGRESS NOTES
Chief Complaint  Varicose Veins    Deana Thakkar presents to [facility] VASCULAR SURGERY  History of Present Illness  The patient is an 89 year-old female who has had venous insufficiency and leg swelling bilaterally for several years. Swelling on the left has been worse than the right from the onset. In addition, she has burning pain and aching which worsens as the day progresses. She still has significant swelling in the legs, left worse than right despite faithfully wearing compression stockings. We obtained lymphedema pumps for her and her swelling had improved significantly with this, until more recently when she has not been using her pumps nearly as much, because of taking care of her  with severe dementia. In addition, she has significant degenerative lumbar disc disease.   She has gone to the lymphedema clinic and with this she has had significant decrease in size of her legs.  The discomfort has improved significantly as well.  She has no compression stockings and is using the lymphedema pumps a little more faithfully.      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   Surgical History: has a past surgical history that includes Colonoscopy (N/A, 10/15/2004); Hysterectomy; Cholecystectomy (N/A); Knee arthroscopy w/ partial medial meniscectomy (Right,  "10/24/2014); Esophagogastroduodenoscopy (N/A, 10/07/2013); Colonoscopy (N/A, 12/14/2009); Cardiac Ablation (N/A, 07/17/2017); Colonoscopy (N/A, 05/09/2019); Colectomy (N/A, 05/15/2019); Sigmoidoscopy (N/A, 01/20/2020); Skin cancer excision (Left); and Sigmoidoscopy (N/A, 07/20/2020).   Family History: family history includes Atrial fibrillation in her daughter; Brain cancer (age of onset: 81) in her mother; Breast cancer in her sister; Cancer in her daughter and father; Heart disease in her father; Heart valve disorder in her daughter and sister.   Social History: reports that she quit smoking about 39 years ago. Her smoking use included cigarettes. She has never used smokeless tobacco. She reports current alcohol use of about 1.0 standard drink of alcohol per week. She reports that she does not use drugs.    (Not in a hospital admission)     Allergies: Patient has no known allergies.   Objective   Vital Signs:  /75   Pulse 58   Ht 162.6 cm (64.03\")   Wt 88.9 kg (196 lb)   BMI 33.61 kg/m²   Estimated body mass index is 33.61 kg/m² as calculated from the following:    Height as of this encounter: 162.6 cm (64.03\").    Weight as of this encounter: 88.9 kg (196 lb).           Dex DOZIER Bijan  reports that she quit smoking about 39 years ago. Her smoking use included cigarettes. She has never used smokeless tobacco.     Physical Exam only trace edema noted bilaterally.  The left lower leg is only 2.5 cm larger than the right.  Good pulses noted bilaterally.  Result Review :                     Assessment and Plan     Diagnoses and all orders for this visit:    1. Varicose veins of leg with pain, bilateral (Primary)    2. Venous (peripheral) insufficiency    3. Secondary lymphedema    She has had significant improvement after her visits to the lymphedema clinic.  Her edema is being controlled very nicely with her new compression hose and the lymphedema pumps.  Nothing further needed other than to continue the " same.  I will see her in follow-up in 1 year or sooner if new problems arise.         Follow Up     Return in about 1 year (around 5/22/2025).  Patient was given instructions and counseling regarding her condition or for health maintenance advice. Please see specific information pulled into the AVS if appropriate.

## 2024-05-22 ENCOUNTER — OFFICE VISIT (OUTPATIENT)
Age: 89
End: 2024-05-22
Payer: MEDICARE

## 2024-05-22 VITALS
SYSTOLIC BLOOD PRESSURE: 180 MMHG | HEART RATE: 58 BPM | WEIGHT: 196 LBS | HEIGHT: 64 IN | BODY MASS INDEX: 33.46 KG/M2 | DIASTOLIC BLOOD PRESSURE: 75 MMHG

## 2024-05-22 DIAGNOSIS — I89.0 SECONDARY LYMPHEDEMA: ICD-10-CM

## 2024-05-22 DIAGNOSIS — I87.2 VENOUS (PERIPHERAL) INSUFFICIENCY: ICD-10-CM

## 2024-05-22 DIAGNOSIS — I83.813 VARICOSE VEINS OF LEG WITH PAIN, BILATERAL: Primary | ICD-10-CM

## 2024-08-30 ENCOUNTER — TELEPHONE (OUTPATIENT)
Age: 89
End: 2024-08-30
Payer: MEDICARE

## 2024-08-30 DIAGNOSIS — N39.0 ACUTE UTI: Primary | ICD-10-CM

## 2024-08-30 RX ORDER — SULFAMETHOXAZOLE/TRIMETHOPRIM 800-160 MG
1 TABLET ORAL 2 TIMES DAILY
Qty: 20 TABLET | Refills: 0 | Status: SHIPPED | OUTPATIENT
Start: 2024-08-30

## 2024-09-10 ENCOUNTER — OFFICE VISIT (OUTPATIENT)
Dept: CARDIOLOGY | Facility: CLINIC | Age: 89
End: 2024-09-10
Payer: MEDICARE

## 2024-09-10 VITALS
HEART RATE: 60 BPM | WEIGHT: 199.4 LBS | HEIGHT: 64 IN | BODY MASS INDEX: 34.04 KG/M2 | DIASTOLIC BLOOD PRESSURE: 80 MMHG | SYSTOLIC BLOOD PRESSURE: 158 MMHG

## 2024-09-10 DIAGNOSIS — I10 PRIMARY HYPERTENSION: ICD-10-CM

## 2024-09-10 DIAGNOSIS — I47.19 AVNRT (AV NODAL RE-ENTRY TACHYCARDIA): Primary | ICD-10-CM

## 2024-09-10 PROCEDURE — 1160F RVW MEDS BY RX/DR IN RCRD: CPT | Performed by: NURSE PRACTITIONER

## 2024-09-10 PROCEDURE — 1159F MED LIST DOCD IN RCRD: CPT | Performed by: NURSE PRACTITIONER

## 2024-09-10 PROCEDURE — 99214 OFFICE O/P EST MOD 30 MIN: CPT | Performed by: NURSE PRACTITIONER

## 2024-09-10 RX ORDER — HYDRALAZINE HYDROCHLORIDE 25 MG/1
25 TABLET, FILM COATED ORAL 2 TIMES DAILY
Qty: 180 TABLET | Refills: 0 | Status: SHIPPED | OUTPATIENT
Start: 2024-09-10

## 2024-09-10 NOTE — PROGRESS NOTES
Date of Office Visit: 09/10/2024  Encounter Provider: SHAZIA Wright  Place of Service: [facility] CARDIOLOGY  Patient Name: Dex Thakkar  :1934    Chief Complaint   Patient presents with    Hypertension     6 month f/u   :     HPI: Dex Thakkar is a 89 y.o. female patient of Dr. Greene's with hypertension and history of AVNRT (status post ablation).  She also has chronic venous insufficiency and lymphedema for which she follows with the lymphedema clinic.    Echocardiogram from 2022 demonstrated normal LV function and mild aortic stenosis.    She was last seen in the office by Savanna MCCRAY in March at which time she was feeling well.  No changes were made to her regimen, and she was advised to follow-up in 6 months.    Overall, she has been doing okay.  She is tired all the time.  She struggles with her balance.  She suffers from spinal stenosis which causes her multiple problems.  She has stopped driving.  She is now undergoing massage therapy for her lymphedema.  She stopped taking the amlodipine due to worsening swelling.  Since then, her blood pressure has gradually increased.  She denies any chest pain, shortness of breath, palpitations, dizziness, or syncope.  She is a full-time caregiver for her .    Past Medical History:   Diagnosis Date    A-fib     Chronic venous hypertension (idiopathic) without complications of left lower extremity     Diverticulitis     Effusion, left ankle     H/O cardiac radiofrequency ablation     Hyperlipidemia     Hypertension     Incontinence     Malignant melanoma of skin, unspecified     Melanoma     Murmur     Personal history of other venous thrombosis and embolism     Personal history of other venous thrombosis and embolism     Secondary lymphedema 10/27/2021    Spinal stenosis     Unilateral osteoarthritis of hip 2021    Varicose veins of leg with pain, bilateral 2018    Venous insufficiency  (chronic) (peripheral) 2018       Past Surgical History:   Procedure Laterality Date    CARDIAC ABLATION N/A 2017    Dr. Leigh    CHOLECYSTECTOMY N/A     COLON RESECTION N/A 05/15/2019    Procedure: laparoscopic low anterior colon resection with splenic flexure mobilization, left salpingo oophorectomy, drainage of peritoneal abscess;  Surgeon: Renata Ray MD;  Location: [facility] MAIN OR;  Service: General    COLONOSCOPY N/A 10/15/2004    Sigmoid diverticulosis, smal rectal polyp, internal hemorrhoids-Dr. Renata Ray    COLONOSCOPY N/A 2009    Sigmoid diverticulosis-Dr. Renata Ray    COLONOSCOPY N/A 2019    Procedure: COLONOSCOPY to cecum with cold biopsies;  Surgeon: Renata Ray MD;  Location[facility] ENDOSCOPY;  Service: General    ENDOSCOPY N/A 10/07/2013    Gastric ulcerations x10, ulcerative esophagitis, small hiatal hernia-Dr. Renata Ray    HYSTERECTOMY      KNEE ARTHROSCOPY W/ PARTIAL MEDIAL MENISCECTOMY Right 10/24/2014    Dr. Bud Muhammad    SIGMOIDOSCOPY N/A 2020    Procedure: FLEXIBLE SIGMOIDOSCOPY WITH BIOPSY, balloon dilation;  Surgeon: Renata Ray MD;  [facility] END[facility]COPY;  Service: General    SIGMOIDOSCOPY N/A 2020    Procedure: FLEXIBLE SIGMOIDOSCOPY with bx;  Surgeon: Renata Ray MD;  Location: [facility] ENDOSCOPY;  Service: General;  Laterality: N/A;  pre-history of a rectal ulcer and a resection for diverticulitis, with anastomotic stricture found 20  post-anastamotic nodule, mild stricture        SKIN CANCER EXCISION Left     Melanoma left leg       Social History     Socioeconomic History    Marital status:    Tobacco Use    Smoking status: Former     Current packs/day: 0.00     Types: Cigarettes     Quit date: 1984     Years since quittin.1     Passive exposure: Past    Smokeless tobacco: Never    Tobacco comments:     QUIT 30  YRS AGO   Vaping Use    Vaping status: Never Used   Substance and Sexual Activity     "Alcohol use: Yes     Alcohol/week: 1.0 standard drink of alcohol     Types: 1 Glasses of wine per week     Comment: OCCASIONAL    Drug use: No    Sexual activity: Defer       Family History   Problem Relation Age of Onset    Brain cancer Mother 81    Cancer Father     Heart disease Father     Breast cancer Sister     Heart valve disorder Sister         Bicuspid AV    Cancer Daughter     Heart valve disorder Daughter         Bicuspid AV    Atrial fibrillation Daughter     Malig Hyperthermia Neg Hx        Review of Systems   Constitutional: Negative. Positive for malaise/fatigue.   Cardiovascular: Negative.  Positive for leg swelling. Negative for chest pain, dyspnea on exertion, orthopnea, paroxysmal nocturnal dyspnea and syncope.   Respiratory: Negative.     Hematologic/Lymphatic: Negative for bleeding problem.   Musculoskeletal:  Positive for arthritis, joint pain and neck pain. Negative for falls.   Gastrointestinal:  Negative for melena.   Neurological:  Negative for dizziness and light-headedness.       No Known Allergies      Current Outpatient Medications:     atorvastatin (LIPITOR) 10 MG tablet, Take 1 tablet by mouth Daily., Disp: 30 tablet, Rfl: 11    chlorthalidone (HYGROTON) 25 MG tablet, TAKE ONE TABLET BY MOUTH DAILY, Disp: 90 tablet, Rfl: 3    lisinopril (PRINIVIL,ZESTRIL) 40 MG tablet, TAKE ONE TABLET BY MOUTH DAILY, Disp: 90 tablet, Rfl: 4    metoprolol succinate XL (TOPROL-XL) 100 MG 24 hr tablet, Take 1 tablet by mouth Daily., Disp: , Rfl:     simvastatin (ZOCOR) 10 MG tablet, Take 1 tablet by mouth Every Night., Disp: , Rfl:     hydrALAZINE (APRESOLINE) 25 MG tablet, Take 1 tablet by mouth 2 (Two) Times a Day., Disp: 180 tablet, Rfl: 0      Objective:     Vitals:    09/10/24 1357   BP: 158/80   BP Location: Left arm   Patient Position: Sitting   Pulse: 60   Weight: 90.4 kg (199 lb 6.4 oz)   Height: 162.6 cm (64.02\")     Body mass index is 34.21 kg/m².    PHYSICAL EXAM:    Neck:      Vascular: No " JVD.   Pulmonary:      Effort: Pulmonary effort is normal.      Breath sounds: Normal breath sounds.   Cardiovascular:      Normal rate. Regular rhythm.      Murmurs: There is a harsh midsystolic murmur at the URSB, radiating to the neck.      No gallop.  No click. No rub.   Pulses:     Intact distal pulses.   Edema:     Peripheral edema present.        Procedures      Assessment:       Diagnosis Plan   1. AVNRT (AV gatito re-entry tachycardia)        2. Primary hypertension          No orders of the defined types were placed in this encounter.         Plan:       1.  AVNRT.  Status post ablation.  Continue metoprolol.      2.  Hypertension.  Her blood pressure is elevated.  She cannot take amlodipine secondary to swelling.  Recommended adding hydralazine to her regimen.  I told her to send me an update in a few weeks.      Overall, I think she is doing well.  She will follow-up with Dr. Greene in 1 year.      As always, it has been a pleasure to participate in your patient's care.      Sincerely,         SHAZIA Oseguera

## 2024-09-19 RX ORDER — CHLORTHALIDONE 25 MG/1
25 TABLET ORAL DAILY
Qty: 90 TABLET | Refills: 3 | Status: SHIPPED | OUTPATIENT
Start: 2024-09-19

## 2024-09-25 ENCOUNTER — HOSPITAL ENCOUNTER (OUTPATIENT)
Dept: PHYSICAL THERAPY | Facility: HOSPITAL | Age: 89
Setting detail: THERAPIES SERIES
Discharge: HOME | End: 2024-09-25
Payer: MEDICARE

## 2024-09-25 DIAGNOSIS — I89.0 LYMPHEDEMA: Primary | ICD-10-CM

## 2024-09-25 PROCEDURE — 97535 SELF CARE MNGMENT TRAINING: CPT

## 2024-09-25 PROCEDURE — 97164 PT RE-EVAL EST PLAN CARE: CPT

## 2024-10-07 ENCOUNTER — HOSPITAL ENCOUNTER (OUTPATIENT)
Dept: PHYSICAL THERAPY | Facility: HOSPITAL | Age: 89
Setting detail: THERAPIES SERIES
Discharge: HOME | End: 2024-10-07
Payer: MEDICARE

## 2024-10-07 DIAGNOSIS — M79.89 SWELLING OF LOWER EXTREMITY: ICD-10-CM

## 2024-10-07 DIAGNOSIS — I89.0 LYMPHEDEMA: Primary | ICD-10-CM

## 2024-10-07 PROCEDURE — 97140 MANUAL THERAPY 1/> REGIONS: CPT

## 2024-10-07 NOTE — THERAPY PROGRESS REPORT/RE-CERT
Outpatient Physical Therapy Lymphedema Progress Note  [facility]     Patient Name: Dex Thakkar  : 1934  MRN: [id number]  Today's Date: 10/7/2024        Visit Date: 10/07/2024    Visit Dx:    ICD-10-CM ICD-9-CM   1. Lymphedema  I89.0 457.1   2. Swelling of lower extremity  M79.89 729.81       Patient Active Problem List   Diagnosis    Stasis edema of left lower extremity    Pain and swelling of ankle, left    Hypertension    H/O cardiac radiofrequency ablation    Left lower quadrant pain    Urinary retention    Rectal ulcer    Anastomotic stricture of colorectal region    Aortic valve sclerosis    AVNRT (AV gatito re-entry tachycardia)    Carotid artery stenosis    Diastolic dysfunction    Edema    History of PSVT (paroxysmal supraventricular tachycardia)    Hyperlipidemia LDL goal <100    Irritable bladder    Systolic murmur         Lymphedema       Row Name 10/07/24 1400             Subjective Pain    Able to rate subjective pain? yes  -KA      Pre-Treatment Pain Level 0  -KA      Post-Treatment Pain Level 0  -KA         Subjective    Subjective Comments No significant changes to report, brought bandaging supplies to clinic.  -KA         Lymphedema Measurements    Measurement Type(s) Circumferential  -KA      Circumferential Areas Lower extremities  -KA         BLE Circumferential (cm)    Measurement Location 1 20 cm above knee  -KA      Left 1 56 cm  -KA      Right 1 57.9 cm  -KA      Measurement Location 2 10 cm above knee  -KA      Left 2 53.7 cm  -KA      Right 2 54.5 cm  -KA      Measurement Location 3 Knee  -KA      Left 3 42.2 cm  -KA      Right 3 41.3 cm  -KA      Measurement Location 4 10 cm below knee  -KA      Left 4 40.5 cm  -KA      Right 4 37 cm  -KA      Measurement Location 5 20 cm below knee  -KA      Left 5 33.8 cm  -KA      Right 5 28.4 cm  -KA      Measurement Location 6 30 cm below knee  -KA      Left 6 30.2 cm  -KA      Right 6 27.3 cm  -KA      Measurement Location 7 Ankle  -KA       Left 7 32.8 cm  -KA      Right 7 29 cm  -KA      Measurement Location 8 Midfoot  -KA      Left 8 23.5 cm  -KA      Right 8 23.5 cm  -KA      LLE Circumferential Total 312.7 cm  -KA      RLE Circumferential Total 298.9 cm  -KA         Manual Lymphatic Drainage    Manual Lymphatic Drainage Comments Short neck, B axilla, B IA, diaphragmatic breathing, B inguinals, B thighs  -KA         Compression/Skin Care    Compression/Skin Care skin care;wrapping location;bandaging  -KA      Skin Care moisturizing lotion applied  -KA      Wrapping Location lower extremity  -KA      Wrapping Location LE bilateral:;foot to knee  -KA      Bandage Layers cotton liner;padding/fluff layer;short-stretch bandages (comment size/quantity)  -KA      Bandaging Comments TG9, Artiflex x 2, Rosidal soft ankle to knee, Rosidal K 1-8 cm, 2-10 cm, pt's shoes  -KA      Bandaging Technique figure-eight;light compression  -                User Key  (r) = Recorded By, (t) = Taken By, (c) = Cosigned By      Initials Name Provider Type    Bertha Ruiz, PT Physical Therapist                                   PT Assessment/Plan       Row Name 10/07/24 5281          PT Assessment    Functional Limitations Limitations in functional capacity and performance;Impaired gait  -KA     Impairments Balance;Edema;Gait;Impaired lymphatic circulation;Sensation  -KA     Assessment Comments Pt returns for first session following re-evaluation to begin CDT Phase 1 treatment. Girth measurements reassessed, have increased.  Began with MLD opening sequence + treatment of thighs.  Compression bandaging applied foot to knee, 3 SS bandages per leg, extra artiflex, light compression, figure 8 technique.  Pt remains appropriate for skilled physical therapy at this time.  -NEGRITO        PT Plan    PT Frequency 1x/week;2x/week  -NEGRITO     Predicted Duration of Therapy Intervention (PT) 8-10 total visits  -NEGRITO     Planned CPT's? PT RE-EVAL: 77473;PT THER PROC EA 15 MIN: 97219;PT  THER ACT EA 15 MIN: 01377;PT MANUAL THERAPY EA 15 MIN: 55651;PT NEUROMUSC RE-EDUCATION EA 15 MIN: 50620;PT GAIT TRAINING EA 15 MIN: 43073;PT SELF CARE/HOME MGMT/TRAIN EA 15: 37750  -KA     PT Plan Comments Assess response to bandaging, modify as needed.  Continue CDT.  -KA               User Key  (r) = Recorded By, (t) = Taken By, (c) = Cosigned By      Initials Name Provider Type    Bertha Ruiz, PT Physical Therapist                        OP Exercises       Row Name 10/07/24 1507 10/07/24 1400          Subjective    Subjective Comments -- No significant changes to report, brought bandaging supplies to clinic.  -KA        Subjective Pain    Able to rate subjective pain? -- yes  -KA     Pre-Treatment Pain Level -- 0  -KA     Post-Treatment Pain Level -- 0  -KA        Total Minutes    23212 - PT Manual Therapy Minutes 55  -KA --               User Key  (r) = Recorded By, (t) = Taken By, (c) = Cosigned By      Initials Name Provider Type    Bertha Ruiz, PT Physical Therapist                            Manual Rx (Last 36 Hours)       Manual Treatments       Row Name 10/07/24 1507             Total Minutes    46901 - PT Manual Therapy Minutes 55  -KA                User Key  (r) = Recorded By, (t) = Taken By, (c) = Cosigned By      Initials Name Provider Type    Bertha Ruiz, PT Physical Therapist                     PT OP Goals       Row Name 10/07/24 1500          PT Short Term Goals    STG Date to Achieve 10/16/24  -KA     STG 1 Patient demonstrates decreased net edema of Bilateral Lower Extremity>/= 5-10 cm compared to girth measurements taken 9/25/24 for decreased edema symptoms, decreased risk of infection, and improved skin care.  -KA     STG 1 Progress New  -KA        Long Term Goals    LTG Date to Achieve 12/24/24  -KA     LTG 1 Patient demonstrates decreased net edema of Left Lower Extremity >/= 10-20 cm compared to girth measurements taken 9/25/24 for decreased edema symptoms,  decreased risk of infection, and improved skin care.  -NEGRITO     LTG 1 Progress New  -NEGRITO     LTG 2 Pt independent/compliant with either non-elastic compression garments or custom flat knit compression garments due to increased need for containment.  -NEGRITO     LTG 2 Progress New  -NEGRITO     LTG 3 Pt independent/compliant with using pump at least 5 days/week for 1 hour.  -NEGRITO     LTG 3 Progress New  -NEGRITO               User Key  (r) = Recorded By, (t) = Taken By, (c) = Cosigned By      Initials Name Provider Type    Bertha Ruiz, PT Physical Therapist                                   Time Calculation:   Start Time: 1400  Stop Time: 1500  Time Calculation (min): 60 min  Timed Charges  95313 - PT Manual Therapy Minutes: 55  Total Minutes  Timed Charges Total Minutes: 55   Total Minutes: 55   Therapy Charges for Today       Code Description Service Date Service Provider Modifiers Qty    [id number]  PT MANUAL THERAPY EA 15 MIN 10/7/2024 Bertha Vasquez, PT GP, KX 4                      Bertha Vasquez PT  10/7/2024

## 2024-10-11 ENCOUNTER — HOSPITAL ENCOUNTER (OUTPATIENT)
Dept: PHYSICAL THERAPY | Facility: HOSPITAL | Age: 89
Setting detail: THERAPIES SERIES
Discharge: HOME | End: 2024-10-11
Payer: MEDICARE

## 2024-10-11 DIAGNOSIS — I89.0 LYMPHEDEMA: Primary | ICD-10-CM

## 2024-10-11 PROCEDURE — 97140 MANUAL THERAPY 1/> REGIONS: CPT

## 2024-10-11 NOTE — THERAPY TREATMENT NOTE
Outpatient Physical Therapy Lymphedema Treatment Note  [facility]     Patient Name: Dex Thakkar  : 1934  MRN: [id number]  Today's Date: 10/11/2024        Visit Date: 10/11/2024    Visit Dx:    ICD-10-CM ICD-9-CM   1. Lymphedema  I89.0 457.1       Patient Active Problem List   Diagnosis    Stasis edema of left lower extremity    Pain and swelling of ankle, left    Hypertension    H/O cardiac radiofrequency ablation    Left lower quadrant pain    Urinary retention    Rectal ulcer    Anastomotic stricture of colorectal region    Aortic valve sclerosis    AVNRT (AV gatito re-entry tachycardia)    Carotid artery stenosis    Diastolic dysfunction    Edema    History of PSVT (paroxysmal supraventricular tachycardia)    Hyperlipidemia LDL goal <100    Irritable bladder    Systolic murmur         Lymphedema       Row Name 10/11/24 1400             Subjective Pain    Able to rate subjective pain? yes  -KA      Pre-Treatment Pain Level 0  -KA      Post-Treatment Pain Level 0  -KA         Subjective    Subjective Comments Did well with bandaging, feels legs are already reducing.  -KA         Manual Lymphatic Drainage    Manual Lymphatic Drainage Comments Short neck, B axilla, B IA, diaphragmatic breathing, B inguinals, B thighs, entire LLE  -KA         Compression/Skin Care    Compression/Skin Care skin care;wrapping location;bandaging  -KA      Skin Care moisturizing lotion applied  -KA      Wrapping Location lower extremity  -KA      Wrapping Location LE bilateral:;foot to knee  -KA      Bandage Layers cotton liner;padding/fluff layer;short-stretch bandages (comment size/quantity)  -KA      Bandaging Comments TG9, Artiflex x 2, Rosidal soft ankle to knee, Rosidal K 1-8 cm, 2-10 cm, pt's shoes  -KA      Bandaging Technique figure-eight;light compression  -                User Key  (r) = Recorded By, (t) = Taken By, (c) = Cosigned By      Initials Name Provider Type    Bertha Ruiz, PT Physical Therapist                                    PT Assessment/Plan       Row Name 10/11/24 1709          PT Assessment    Assessment Comments Performed MLD for B thighs, entire LLE this date.  Pt responded well to initial bandaging, no change to technique when reapplying this date.  Pt remains appropriate for skilled therapy at this time.  -KA        PT Plan    PT Plan Comments Reassess girth measurements.  Continue CDT  -KA               User Key  (r) = Recorded By, (t) = Taken By, (c) = Cosigned By      Initials Name Provider Type    Bertha Ruiz, BERT Physical Therapist                        OP Exercises       Row Name 10/11/24 1710 10/11/24 1400          Subjective    Subjective Comments -- Did well with bandaging, feels legs are already reducing.  -NEGRITO        Subjective Pain    Able to rate subjective pain? -- yes  -NEGRITO     Pre-Treatment Pain Level -- 0  -KA     Post-Treatment Pain Level -- 0  -KA        Total Minutes    31298 - PT Manual Therapy Minutes 55  -KA --               User Key  (r) = Recorded By, (t) = Taken By, (c) = Cosigned By      Initials Name Provider Type    Bertha Ruiz, PT Physical Therapist                            Manual Rx (Last 36 Hours)       Manual Treatments       Row Name 10/11/24 1710             Total Minutes    50161 - PT Manual Therapy Minutes 55  -KA                User Key  (r) = Recorded By, (t) = Taken By, (c) = Cosigned By      Initials Name Provider Type    Bertha Ruiz PT Physical Therapist                                       Time Calculation:   Start Time: 1400  Stop Time: 1455  Time Calculation (min): 55 min  Timed Charges  27567 - PT Manual Therapy Minutes: 55  Total Minutes  Timed Charges Total Minutes: 55   Total Minutes: 55   Therapy Charges for Today       Code Description Service Date Service Provider Modifiers Qty    [id number] HC PT MANUAL THERAPY EA 15 MIN 10/11/2024 Bertha Vasquez, PT GP, KX 4                      Bertha Vasquez  PT  10/11/2024

## 2024-10-14 ENCOUNTER — HOSPITAL ENCOUNTER (OUTPATIENT)
Dept: PHYSICAL THERAPY | Facility: HOSPITAL | Age: 89
Setting detail: THERAPIES SERIES
Discharge: HOME | End: 2024-10-14
Payer: MEDICARE

## 2024-10-14 DIAGNOSIS — M79.89 SWELLING OF LOWER EXTREMITY: ICD-10-CM

## 2024-10-14 DIAGNOSIS — I89.0 LYMPHEDEMA: Primary | ICD-10-CM

## 2024-10-14 PROCEDURE — 97140 MANUAL THERAPY 1/> REGIONS: CPT

## 2024-10-14 NOTE — THERAPY PROGRESS REPORT/RE-CERT
Outpatient Physical Therapy Lymphedema Progress Note  [facility]     Patient Name: Dex Thakkar  : 1934  MRN: [id number]  Today's Date: 10/14/2024        Visit Date: 10/14/2024    Visit Dx:    ICD-10-CM ICD-9-CM   1. Lymphedema  I89.0 457.1   2. Swelling of lower extremity  M79.89 729.81       Patient Active Problem List   Diagnosis    Stasis edema of left lower extremity    Pain and swelling of ankle, left    Hypertension    H/O cardiac radiofrequency ablation    Left lower quadrant pain    Urinary retention    Rectal ulcer    Anastomotic stricture of colorectal region    Aortic valve sclerosis    AVNRT (AV gatito re-entry tachycardia)    Carotid artery stenosis    Diastolic dysfunction    Edema    History of PSVT (paroxysmal supraventricular tachycardia)    Hyperlipidemia LDL goal <100    Irritable bladder    Systolic murmur         Lymphedema       Row Name 10/14/24 1500             Subjective Pain    Able to rate subjective pain? yes  -KA      Pre-Treatment Pain Level 0  -KA      Post-Treatment Pain Level 0  -KA         Subjective    Subjective Comments Pt still doing well with bandaging.  -KA         Lymphedema Measurements    Measurement Type(s) Circumferential  -KA      Circumferential Areas Lower extremities  -KA         BLE Circumferential (cm)    Measurement Location 1 20 cm above knee  -KA      Left 1 55.9 cm  -KA      Right 1 57 cm  -KA      Measurement Location 2 10 cm above knee  -KA      Left 2 53.9 cm  -KA      Right 2 55.2 cm  -KA      Measurement Location 3 Knee  -KA      Left 3 40.7 cm  -KA      Right 3 41.5 cm  -KA      Measurement Location 4 10 cm below knee  -KA      Left 4 37.9 cm  -KA      Right 4 35.9 cm  -KA      Measurement Location 5 20 cm below knee  -KA      Left 5 28.9 cm  -KA      Right 5 26.3 cm  -KA      Measurement Location 6 30 cm below knee  -KA      Left 6 28.6 cm  -KA      Right 6 24.3 cm  -KA      Measurement Location 7 Ankle  -KA      Left 7 29.1 cm  -KA       Right 7 25.3 cm  -KA      Measurement Location 8 Midfoot  -KA      Left 8 22.8 cm  -KA      Right 8 22.1 cm  -KA      LLE Circumferential Total 297.8 cm  -KA      RLE Circumferential Total 287.6 cm  -KA         Manual Lymphatic Drainage    Manual Lymphatic Drainage Comments Short neck, B axilla, B IA, diaphragmatic breathing, B inguinals, B thighs  -KA         Compression/Skin Care    Compression/Skin Care skin care;wrapping location;bandaging  -KA      Skin Care moisturizing lotion applied  -KA      Wrapping Location lower extremity  -KA      Wrapping Location LE bilateral:;foot to knee  -KA      Bandage Layers cotton liner;padding/fluff layer;short-stretch bandages (comment size/quantity)  -KA      Bandaging Comments TG9, Artiflex x 2, Rosidal soft ankle to knee, Rosidal K 1-8 cm, 2-10 cm, pt's shoes  -KA      Bandaging Technique figure-eight;light compression  -KA                User Key  (r) = Recorded By, (t) = Taken By, (c) = Cosigned By      Initials Name Provider Type    Bertha Ruiz, PT Physical Therapist                                   PT Assessment/Plan       Row Name 10/14/24 1709 10/14/24 1708       PT Assessment    Functional Limitations -- Limitations in functional capacity and performance;Impaired gait  -KA    Impairments -- Balance;Edema;Gait;Impaired lymphatic circulation;Sensation  -KA    Assessment Comments Pt is responding well to CDT. After one week of treatment, she has met 1/1 STG and 1/3 LTG. She has seen net decreased edema of 14.9 cm LLE and 11.3 cm RLE since starting treatment.  Continued with compression bandaging today using same technique, MLD to B thighs.  Pt remains appropriate for skilled physical therapy at this time to reduce edema, improve skin condition, and improve self management of condition.  -KA --    Rehab Potential Good  -KA --    Patient/caregiver participated in establishment of treatment plan and goals Yes  -KA --    Patient would benefit from skilled therapy  intervention Yes  -KA --       PT Plan    PT Plan Comments Continue CDT.  -KA --              User Key  (r) = Recorded By, (t) = Taken By, (c) = Cosigned By      Initials Name Provider Type    Bertha Ruiz, PT Physical Therapist                        OP Exercises       Row Name 10/14/24 1710 10/14/24 1500          Subjective    Subjective Comments -- Pt still doing well with bandaging.  -KA        Subjective Pain    Able to rate subjective pain? -- yes  -KA     Pre-Treatment Pain Level -- 0  -KA     Post-Treatment Pain Level -- 0  -KA        Total Minutes    54928 - PT Manual Therapy Minutes 55  -KA --               User Key  (r) = Recorded By, (t) = Taken By, (c) = Cosigned By      Initials Name Provider Type    Bertha Ruiz, PT Physical Therapist                            Manual Rx (Last 36 Hours)       Manual Treatments       Row Name 10/14/24 1710             Total Minutes    48099 - PT Manual Therapy Minutes 55  -KA                User Key  (r) = Recorded By, (t) = Taken By, (c) = Cosigned By      Initials Name Provider Type    Bertha Ruiz, PT Physical Therapist                     PT OP Goals       Row Name 10/14/24 1700          PT Short Term Goals    STG Date to Achieve 10/16/24  -KA     STG 1 Patient demonstrates decreased net edema of Bilateral Lower Extremity>/= 5-10 cm compared to girth measurements taken 9/25/24 for decreased edema symptoms, decreased risk of infection, and improved skin care.  -KA     STG 1 Progress Met  -KA        Long Term Goals    LTG Date to Achieve 12/24/24  -KA     LTG 1 Patient demonstrates decreased net edema of Left Lower Extremity >/= 10-20 cm compared to girth measurements taken 9/25/24 for decreased edema symptoms, decreased risk of infection, and improved skin care.  -KA     LTG 1 Progress Met;Ongoing  -KA     LTG 2 Pt independent/compliant with either non-elastic compression garments or custom flat knit compression garments due to increased need  for containment.  -NEGRITO     LTG 2 Progress New  -NEGRITO     LTG 3 Pt independent/compliant with using pump at least 5 days/week for 1 hour.  -NEGRITO     LTG 3 Progress Ongoing  -NEGRITO               User Key  (r) = Recorded By, (t) = Taken By, (c) = Cosigned By      Initials Name Provider Type    Bertha Ruiz, BERT Physical Therapist                                   Time Calculation:   Start Time: 1545  Stop Time: 1640  Time Calculation (min): 55 min  Timed Charges  96786 - PT Manual Therapy Minutes: 55  Total Minutes  Timed Charges Total Minutes: 55   Total Minutes: 55   Therapy Charges for Today       Code Description Service Date Service Provider Modifiers Qty    [id number] HC PT MANUAL THERAPY EA 15 MIN 10/14/2024 Bertha Vasquez, PT GP, KX 4                      Bertha Vasquez PT  10/14/2024

## 2024-10-17 ENCOUNTER — OFFICE VISIT (OUTPATIENT)
Age: 89
End: 2024-10-17
Payer: MEDICARE

## 2024-10-17 VITALS
HEIGHT: 64 IN | BODY MASS INDEX: 33.97 KG/M2 | DIASTOLIC BLOOD PRESSURE: 66 MMHG | WEIGHT: 199 LBS | HEART RATE: 67 BPM | SYSTOLIC BLOOD PRESSURE: 132 MMHG

## 2024-10-17 DIAGNOSIS — I10 ESSENTIAL HYPERTENSION: ICD-10-CM

## 2024-10-17 DIAGNOSIS — I10 PRIMARY HYPERTENSION: ICD-10-CM

## 2024-10-17 DIAGNOSIS — I47.19 AVNRT (AV NODAL RE-ENTRY TACHYCARDIA): Primary | ICD-10-CM

## 2024-10-17 PROCEDURE — 93000 ELECTROCARDIOGRAM COMPLETE: CPT | Performed by: INTERNAL MEDICINE

## 2024-10-17 PROCEDURE — 99214 OFFICE O/P EST MOD 30 MIN: CPT | Performed by: INTERNAL MEDICINE

## 2024-10-17 NOTE — PROGRESS NOTES
Date of Office Visit: 10/17/24    Encounter Provider: Christopher Greene MD  Place of Service: [facility] CARDIOLOGY  Patient Name: Dex Thakkar  :1934    Chief Complaint   Patient presents with    Aortic Valve Sclerosis    AVNRT    Hypertension    Follow-up     1 year       HPI: Dex Thakkar is a 89 y.o. female patient with a history of essential hypertension and history of AVNRT (status post ablation).  She also has chronic venous insufficiency and lymphedema for which she follows with the lymphedema clinic.  Transthoracic echocardiogram from 2022 demonstrated normal LV function and mild aortic stenosis.   She has been doing very well as of late.  She denies any new complaints.  Her blood pressure and heart rate are reasonably controlled.  She continues to follow with the lymphedema clinic.      Past Medical History:   Diagnosis Date    A-fib     Chronic venous hypertension (idiopathic) without complications of left lower extremity     Diverticulitis     Effusion, left ankle     H/O cardiac radiofrequency ablation     Hyperlipidemia     Hypertension     Incontinence     Malignant melanoma of skin, unspecified     Melanoma     Murmur     Personal history of other venous thrombosis and embolism     Personal history of other venous thrombosis and embolism     Secondary lymphedema 10/27/2021    Spinal stenosis     Unilateral osteoarthritis of hip 2021    Varicose veins of leg with pain, bilateral 2018    Venous insufficiency (chronic) (peripheral) 2018       Past Surgical History:   Procedure Laterality Date    CARDIAC ABLATION N/A 2017    Dr. Leigh    CHOLECYSTECTOMY N/A     COLON RESECTION N/A 05/15/2019    Procedure: laparoscopic low anterior colon resection with splenic flexure mobilization, left salpingo oophorectomy, drainage of peritoneal abscess;  Surgeon: Renata Ray MD;  Location: [facility];  Service: General    COLONOSCOPY  N/A 10/15/2004    Sigmoid diverticulosis, smal rectal polyp, internal hemorrhoids-Dr. Renata Ray    COLONOSCOPY N/A 2009    Sigmoid diverticulosis-Dr. Renata Ray    COLONOSCOPY N/A 2019    Procedure: COLONOSCOPY to cecum with cold biopsies;  Surgeon: Renata Ray MD;  Location: [facility];  Service: General    ENDOSCOPY N/A 10/07/2013    Gastric ulcerations x10, ulcerative esophagitis, small hiatal hernia-Dr. Renata Ray    HYSTERECTOMY      KNEE ARTHROSCOPY W/ PARTIAL MEDIAL MENISCECTOMY Right 10/24/2014    Dr. Bud Muhammad    SIGMOIDOSCOPY N/A 2020    Procedure: FLEXIBLE SIGMOIDOSCOPY WITH BIOPSY, balloon dilation;  Surgeon: Renata Ray MD;  Location:  [facility];  Service: General    SIGMOIDOSCOPY N/A 2020    Procedure: FLEXIBLE SIGMOIDOSCOPY with bx;  Surgeon: Renata Ray MD;  Location: [facility];  Service: General;  Laterality: N/A;  pre-history of a rectal ulcer and a resection for diverticulitis, with anastomotic stricture found 20  post-anastamotic nodule, mild stricture        SKIN CANCER EXCISION Left     Melanoma left leg       Social History     Socioeconomic History    Marital status:    Tobacco Use    Smoking status: Former     Current packs/day: 0.00     Types: Cigarettes     Quit date: 1984     Years since quittin.2     Passive exposure: Past    Smokeless tobacco: Never    Tobacco comments:     QUIT 30  YRS AGO   Vaping Use    Vaping status: Never Used   Substance and Sexual Activity    Alcohol use: Yes     Alcohol/week: 1.0 standard drink of alcohol     Types: 1 Glasses of wine per week     Comment: OCCASIONAL    Drug use: No    Sexual activity: Defer       Family History   Problem Relation Age of Onset    Brain cancer Mother 81    Cancer Father     Heart disease Father     Breast cancer Sister     Heart valve disorder Sister         Bicuspid AV    Cancer Daughter     Heart valve disorder Daughter         Bicuspid AV     "Atrial fibrillation Daughter     Malig Hyperthermia Neg Hx        Review of Systems   Constitutional: Negative for fever and malaise/fatigue.   HENT:  Negative for nosebleeds and sore throat.    Eyes:  Negative for blurred vision and double vision.   Cardiovascular:  Negative for chest pain, claudication, dyspnea on exertion, leg swelling, orthopnea, palpitations, paroxysmal nocturnal dyspnea and syncope.   Respiratory: Negative.  Negative for cough, shortness of breath and snoring.    Endocrine: Negative for cold intolerance, heat intolerance and polydipsia.   Hematologic/Lymphatic: Negative for bleeding problem.   Skin:  Negative for itching, poor wound healing and rash.   Musculoskeletal:  Negative for arthritis, falls, joint pain, joint swelling, muscle weakness, myalgias and neck pain.   Gastrointestinal:  Negative for abdominal pain, melena, nausea and vomiting.   Neurological:  Negative for dizziness, light-headedness, loss of balance, seizures, vertigo and weakness.   Psychiatric/Behavioral:  Negative for altered mental status and depression.        No Known Allergies      Current Outpatient Medications:     atorvastatin (LIPITOR) 10 MG tablet, Take 1 tablet by mouth Daily., Disp: 30 tablet, Rfl: 11    hydrALAZINE (APRESOLINE) 25 MG tablet, Take 1 tablet by mouth 2 (Two) Times a Day., Disp: 180 tablet, Rfl: 0    lisinopril (PRINIVIL,ZESTRIL) 40 MG tablet, TAKE ONE TABLET BY MOUTH DAILY, Disp: 90 tablet, Rfl: 4    metoprolol succinate XL (TOPROL-XL) 100 MG 24 hr tablet, Take 1 tablet by mouth Daily., Disp: , Rfl:     simvastatin (ZOCOR) 10 MG tablet, Take 1 tablet by mouth Every Night., Disp: , Rfl:       Objective:     Vitals:    10/17/24 1406   BP: 132/66   Pulse: 67   Weight: 90.3 kg (199 lb)   Height: 162.6 cm (64\")     Body mass index is 34.16 kg/m².    PHYSICAL EXAM:    Constitutional:       Appearance: Well-developed.   Eyes:      General: No scleral icterus.     Conjunctiva/sclera: Conjunctivae " normal.   HENT:      Head: Normocephalic and atraumatic.   Neck:      Thyroid: No thyromegaly.      Vascular: Normal carotid pulses. No carotid bruit, hepatojugular reflux or JVD.      Trachea: No tracheal deviation.   Pulmonary:      Effort: Pulmonary effort is normal. No respiratory distress.      Breath sounds: Normal breath sounds. No decreased breath sounds. No wheezing. No rhonchi. No rales.   Chest:      Chest wall: Not tender to palpatation.   Cardiovascular:      Normal rate. Regular rhythm.      Murmurs: There is a early systolic murmur at the URSB, radiating to the neck.      No gallop.  No click. No rub.   Pulses:     Intact distal pulses.      Carotid: 2+ bilaterally.     Radial: 2+ bilaterally.     Femoral: 2+ bilaterally.     Dorsalis pedis: 2+ bilaterally.     Posterior tibial: 2+ bilaterally.  Edema:     Peripheral edema present.  Abdominal:      General: Bowel sounds are normal. There is no distension.      Palpations: Abdomen is soft.      Tenderness: There is no abdominal tenderness.   Musculoskeletal:         General: No deformity.      Cervical back: Normal range of motion and neck supple. Skin:     Findings: No erythema or rash.   Neurological:      Mental Status: Alert and oriented to person, place, and time.      Sensory: No sensory deficit.   Psychiatric:         Behavior: Behavior normal.           ECG 12 Lead    Date/Time: 10/17/2024 2:43 PM  Performed by: Christopher Greene MD    Authorized by: Christopher Greene MD  Comparison: compared with previous ECG from 9/10/2024  Similar to previous ECG  Rhythm: sinus rhythm  Rate: normal  QRS axis: normal    Clinical impression: normal ECG            Assessment:     Plan:       1.  AVNRT.  Status post ablation.  Continue metoprolol.  Maintaining sinus rhythm.  No complaints of tachycardia or palpitations    2.  Hypertension.  Continues on hydralazine, lisinopril and Toprol therapy.  She is tolerating these well.  -Labs been reviewed.   Creatinine 1.06.  No issues with hyperkalemia.

## 2024-10-18 ENCOUNTER — HOSPITAL ENCOUNTER (OUTPATIENT)
Dept: PHYSICAL THERAPY | Facility: HOSPITAL | Age: 89
Setting detail: THERAPIES SERIES
Discharge: HOME | End: 2024-10-18
Payer: MEDICARE

## 2024-10-18 DIAGNOSIS — I89.0 LYMPHEDEMA: Primary | ICD-10-CM

## 2024-10-18 PROCEDURE — 97140 MANUAL THERAPY 1/> REGIONS: CPT

## 2024-10-18 NOTE — THERAPY TREATMENT NOTE
Outpatient Physical Therapy Lymphedema Treatment Note  [facility]     Patient Name: Dex Thakkar  : 1934  MRN: [id number]  Today's Date: 10/18/2024        Visit Date: 10/18/2024    Visit Dx:    ICD-10-CM ICD-9-CM   1. Lymphedema  I89.0 457.1       Patient Active Problem List   Diagnosis    Stasis edema of left lower extremity    Pain and swelling of ankle, left    Hypertension    H/O cardiac radiofrequency ablation    Left lower quadrant pain    Urinary retention    Rectal ulcer    Anastomotic stricture of colorectal region    Aortic valve sclerosis    AVNRT (AV gatito re-entry tachycardia)    Carotid artery stenosis    Diastolic dysfunction    Edema    History of PSVT (paroxysmal supraventricular tachycardia)    Hyperlipidemia LDL goal <100    Irritable bladder    Systolic murmur         Lymphedema       Row Name 10/18/24 1500             Subjective Pain    Able to rate subjective pain? yes  -KA      Pre-Treatment Pain Level 0  -KA      Post-Treatment Pain Level 0  -KA         Subjective    Subjective Comments Went to cardiologist yesterday, got a good report, they were pleased with the appearance of her legs.  -KA         Manual Lymphatic Drainage    Manual Lymphatic Drainage Comments Short neck, B axilla, B IA, diaphragmatic breathing, B inguinals, BLE  -KA         Compression/Skin Care    Compression/Skin Care skin care;wrapping location;bandaging  -KA      Skin Care moisturizing lotion applied  -KA      Wrapping Location lower extremity  -KA      Wrapping Location LE bilateral:;foot to knee  -KA      Bandage Layers cotton liner;padding/fluff layer;short-stretch bandages (comment size/quantity)  -KA      Bandaging Comments TG9, Artiflex x 2, Rosidal soft ankle to knee, Rosidal K 1-8 cm, 2-10 cm, pt's shoes  -NEGRITO      Bandaging Technique figure-eight;light compression  -NEGRITO                User Key  (r) = Recorded By, (t) = Taken By, (c) = Cosigned By      Initials Name Provider Type    NEGRITO Vasquez  Bertha, PT Physical Therapist                                   PT Assessment/Plan       Row Name 10/18/24 1519          PT Assessment    Assessment Comments Performed MLD to full leg BLE today, continued wtih compression bandaging using same technique.  Her lymphedema is steadily improving with treatment.  -NEGRITO        PT Plan    PT Plan Comments Continue CDT.  -NEGRITO               User Key  (r) = Recorded By, (t) = Taken By, (c) = Cosigned By      Initials Name Provider Type    Bertha Ruiz, BERT Physical Therapist                        OP Exercises       Row Name 10/18/24 1520 10/18/24 1500          Subjective    Subjective Comments -- Went to cardiologist yesterday, got a good report, they were pleased with the appearance of her legs.  -NEGRITO        Subjective Pain    Able to rate subjective pain? -- yes  -KA     Pre-Treatment Pain Level -- 0  -KA     Post-Treatment Pain Level -- 0  -KA        Total Minutes    66827 - PT Manual Therapy Minutes 55  -KA --               User Key  (r) = Recorded By, (t) = Taken By, (c) = Cosigned By      Initials Name Provider Type    Bertha Ruiz, PT Physical Therapist                            Manual Rx (Last 36 Hours)       Manual Treatments       Row Name 10/18/24 1520             Total Minutes    16195 - PT Manual Therapy Minutes 55  -KA                User Key  (r) = Recorded By, (t) = Taken By, (c) = Cosigned By      Initials Name Provider Type    Bertha Ruiz, BERT Physical Therapist                                       Time Calculation:   Start Time: 1420  Stop Time: 1515  Time Calculation (min): 55 min  Timed Charges  75489 - PT Manual Therapy Minutes: 55  Total Minutes  Timed Charges Total Minutes: 55   Total Minutes: 55   Therapy Charges for Today       Code Description Service Date Service Provider Modifiers Qty    34376109059 HC PT MANUAL THERAPY EA 15 MIN 10/18/2024 Bertha Vasquez, PT GP, KX 4                      Bertha Vasquez  PT  10/18/2024

## 2024-10-21 ENCOUNTER — HOSPITAL ENCOUNTER (OUTPATIENT)
Dept: PHYSICAL THERAPY | Facility: HOSPITAL | Age: 89
Setting detail: THERAPIES SERIES
Discharge: HOME | End: 2024-10-21
Payer: MEDICARE

## 2024-10-21 DIAGNOSIS — I89.0 LYMPHEDEMA: Primary | ICD-10-CM

## 2024-10-21 PROCEDURE — 97140 MANUAL THERAPY 1/> REGIONS: CPT

## 2024-10-21 NOTE — THERAPY TREATMENT NOTE
Outpatient Physical Therapy Lymphedema Treatment Note  [facility]     Patient Name: Dex Thakkar  : 1934  MRN: [id number]  Today's Date: 10/21/2024        Visit Date: 10/21/2024    Visit Dx:    ICD-10-CM ICD-9-CM   1. Lymphedema  I89.0 457.1       Patient Active Problem List   Diagnosis    Stasis edema of left lower extremity    Pain and swelling of ankle, left    Hypertension    H/O cardiac radiofrequency ablation    Left lower quadrant pain    Urinary retention    Rectal ulcer    Anastomotic stricture of colorectal region    Aortic valve sclerosis    AVNRT (AV gatito re-entry tachycardia)    Carotid artery stenosis    Diastolic dysfunction    Edema    History of PSVT (paroxysmal supraventricular tachycardia)    Hyperlipidemia LDL goal <100    Irritable bladder    Systolic murmur         Lymphedema       Row Name 10/21/24 1400             Subjective Pain    Able to rate subjective pain? yes  -KA      Pre-Treatment Pain Level 0  -KA      Post-Treatment Pain Level 0  -KA         Subjective    Subjective Comments No issues with bandaging, legs continue to improve.  -KA         Manual Lymphatic Drainage    Manual Lymphatic Drainage Comments Short neck, B axilla, B IA, diaphragmatic breathing, B inguinals, BLE  -KA         Compression/Skin Care    Compression/Skin Care skin care;wrapping location;bandaging  -KA      Skin Care moisturizing lotion applied  HCC+Eucerin  -KA      Wrapping Location lower extremity  -KA      Wrapping Location LE bilateral:;foot to knee  -KA      Bandage Layers cotton liner;padding/fluff layer;short-stretch bandages (comment size/quantity)  -KA      Bandaging Comments TG9, Artiflex x 2, Rosidal soft ankle to knee, Rosidal K 1-8 cm, 2-10 cm, pt's shoes  -KA      Bandaging Technique figure-eight;light compression  -KA                User Key  (r) = Recorded By, (t) = Taken By, (c) = Cosigned By      Initials Name Provider Type    Bertha Ruiz, PT Physical Therapist                                    PT Assessment/Plan       Row Name 10/21/24 1410          PT Assessment    Assessment Comments Pt continues to respond well to MLD, RLE contours near normal below knee, still has edema B medial and distal thighs and RLE.  Continue with MLD to full leg BLE today, continued with compression bandaging.  Used HCC mixed with Eucerin today to manage itching.  She remains appropriate for skilled physical therapy at this time.  -NEGRITO     Rehab Potential Good  -NEGRITO     Patient/caregiver participated in establishment of treatment plan and goals Yes  -KA     Patient would benefit from skilled therapy intervention Yes  -KA        PT Plan    PT Plan Comments Continue CDT.  -NEGRITO               User Key  (r) = Recorded By, (t) = Taken By, (c) = Cosigned By      Initials Name Provider Type    Bertha Ruiz, PT Physical Therapist                        OP Exercises       Row Name 10/21/24 1508 10/21/24 1400          Subjective    Subjective Comments -- No issues with bandaging, legs continue to improve.  -NEGRITO        Subjective Pain    Able to rate subjective pain? -- yes  -NEGRITO     Pre-Treatment Pain Level -- 0  -KA     Post-Treatment Pain Level -- 0  -KA        Total Minutes    98759 - PT Manual Therapy Minutes 58  -KA --               User Key  (r) = Recorded By, (t) = Taken By, (c) = Cosigned By      Initials Name Provider Type    Bertha Ruiz, PT Physical Therapist                            Manual Rx (Last 36 Hours)       Manual Treatments       Row Name 10/21/24 1508             Total Minutes    47774 - PT Manual Therapy Minutes 58  -KA                User Key  (r) = Recorded By, (t) = Taken By, (c) = Cosigned By      Initials Name Provider Type    Bertha Ruiz, PT Physical Therapist                                       Time Calculation:   Start Time: 1405  Stop Time: 1503  Time Calculation (min): 58 min  Timed Charges  69326 - PT Manual Therapy Minutes: 58  Total Minutes  Timed Charges  Total Minutes: 58   Total Minutes: 58   Therapy Charges for Today       Code Description Service Date Service Provider Modifiers Qty    83214763637  PT MANUAL THERAPY EA 15 MIN 10/21/2024 Bertha Vasquez, PT GP, KX 4                      Bertha Vasquez, PT  10/21/2024

## 2024-10-25 ENCOUNTER — HOSPITAL ENCOUNTER (OUTPATIENT)
Dept: PHYSICAL THERAPY | Facility: HOSPITAL | Age: 89
Setting detail: THERAPIES SERIES
Discharge: HOME | End: 2024-10-25
Payer: MEDICARE

## 2024-10-25 DIAGNOSIS — I89.0 LYMPHEDEMA: Primary | ICD-10-CM

## 2024-10-25 DIAGNOSIS — M79.89 SWELLING OF LOWER EXTREMITY: ICD-10-CM

## 2024-10-25 PROCEDURE — 97140 MANUAL THERAPY 1/> REGIONS: CPT

## 2024-10-25 NOTE — THERAPY TREATMENT NOTE
Outpatient Physical Therapy Lymphedema Treatment Note  [facility]     Patient Name: Dex Thakkar  : 1934  MRN: [id number]  Today's Date: 10/25/2024        Visit Date: 10/25/2024    Visit Dx:    ICD-10-CM ICD-9-CM   1. Lymphedema  I89.0 457.1   2. Swelling of lower extremity  M79.89 729.81       Patient Active Problem List   Diagnosis    Stasis edema of left lower extremity    Pain and swelling of ankle, left    Hypertension    H/O cardiac radiofrequency ablation    Left lower quadrant pain    Urinary retention    Rectal ulcer    Anastomotic stricture of colorectal region    Aortic valve sclerosis    AVNRT (AV gatito re-entry tachycardia)    Carotid artery stenosis    Diastolic dysfunction    Edema    History of PSVT (paroxysmal supraventricular tachycardia)    Hyperlipidemia LDL goal <100    Irritable bladder    Systolic murmur         Lymphedema       Row Name 10/25/24 1500             Subjective Pain    Able to rate subjective pain? yes  -KA      Pre-Treatment Pain Level 0  -KA      Post-Treatment Pain Level 0  -KA         Subjective    Subjective Comments Legs look good today.  -KA         Manual Lymphatic Drainage    Manual Lymphatic Drainage Comments Short neck, B axilla, B IA, diaphragmatic breathing, B inguinals, BLE  -KA         Compression/Skin Care    Compression/Skin Care skin care;wrapping location;bandaging  -KA      Skin Care lotion applied  Eucerin  -KA      Wrapping Location lower extremity  -KA      Wrapping Location LE bilateral:;foot to knee  -KA      Bandage Layers cotton liner;padding/fluff layer;short-stretch bandages (comment size/quantity)  -KA      Bandaging Comments TG9, Artiflex x 2, Rosidal soft ankle to knee, Rosidal K 1-8 cm, 2-10 cm, pt's shoes  -NEGRITO      Bandaging Technique figure-eight;light compression  -                User Key  (r) = Recorded By, (t) = Taken By, (c) = Cosigned By      Initials Name Provider Type    Bertha Ruiz, BERT Physical Therapist                                    PT Assessment/Plan       Row Name 10/25/24 1515          PT Assessment    Assessment Comments Continued with MLD BLE, no changes to bandaging technique as she is tolerating well and is still steadily reducing, ankle contours improving.  Held HCC today as no itching was reported.  Will reassess girth measurements next visit, will likely not be eligible for new compression garments until mid to late November, [facility] guero + knee high.  -KA        PT Plan    PT Plan Comments Continue CDT.  -KA               User Key  (r) = Recorded By, (t) = Taken By, (c) = Cosigned By      Initials Name Provider Type    Bertha Ruiz, PT Physical Therapist                        OP Exercises       Row Name 10/25/24 1517 10/25/24 1500          Subjective    Subjective Comments -- Legs look good today.  -KA        Subjective Pain    Able to rate subjective pain? -- yes  -KA     Pre-Treatment Pain Level -- 0  -KA     Post-Treatment Pain Level -- 0  -KA        Total Minutes    88149 - PT Manual Therapy Minutes 55  -KA --               User Key  (r) = Recorded By, (t) = Taken By, (c) = Cosigned By      Initials Name Provider Type    Bertha Ruiz, PT Physical Therapist                            Manual Rx (Last 36 Hours)       Manual Treatments       Row Name 10/25/24 1517             Total Minutes    36886 - PT Manual Therapy Minutes 55  -KA                User Key  (r) = Recorded By, (t) = Taken By, (c) = Cosigned By      Initials Name Provider Type    Bertha Ruiz, PT Physical Therapist                                       Time Calculation:   Start Time: 1405  Stop Time: 1500  Time Calculation (min): 55 min  Timed Charges  62972 - PT Manual Therapy Minutes: 55  Total Minutes  Timed Charges Total Minutes: 55   Total Minutes: 55   Therapy Charges for Today       Code Description Service Date Service Provider Modifiers Qty     [id number]  PT MANUAL THERAPY EA 15 MIN 10/25/2024 Bertha Vasquez, PT GP, KX 4                      Bertha Vasquez, PT  10/25/2024

## 2024-10-28 ENCOUNTER — HOSPITAL ENCOUNTER (OUTPATIENT)
Dept: PHYSICAL THERAPY | Facility: HOSPITAL | Age: 89
Setting detail: THERAPIES SERIES
Discharge: HOME | End: 2024-10-28
Payer: MEDICARE

## 2024-10-28 DIAGNOSIS — M79.89 SWELLING OF LOWER EXTREMITY: ICD-10-CM

## 2024-10-28 DIAGNOSIS — I89.0 LYMPHEDEMA: Primary | ICD-10-CM

## 2024-10-28 PROCEDURE — 97140 MANUAL THERAPY 1/> REGIONS: CPT

## 2024-10-28 NOTE — THERAPY PROGRESS REPORT/RE-CERT
Chief Complaint   Patient presents with   • Rash       Sher Gray male 11 y.o. 4 m.o.    History was provided by the mother.    Rash on arm, belly and back   No fevers   Went to [facility] who diagnosed him with poison ivy and started oral steroids     Wants referral to [facility] for reading assistance         The following portions of the patient's history were reviewed and updated as appropriate: allergies, current medications, past family history, past medical history, past social history, past surgical history and problem list.    Current Outpatient Medications   Medication Sig Dispense Refill   • predniSONE (DELTASONE) 10 MG tablet Take 10 mg by mouth 2 (Two) Times a Day.     • triamcinolone (KENALOG) 0.025 % cream APPLY ON THE SKIN TWICE DAILY     • brompheniramine-pseudoephedrine-DM 30-2-10 MG/5ML syrup Take 2.5 mL by mouth 4 (Four) Times a Day As Needed for Congestion or Cough. 118 mL 0   • cetirizine (zyrTEC) 1 MG/ML syrup Take 5 mL by mouth Daily. 118 mL 0   • lamoTRIgine (LaMICtal) 5 MG chewable tablet chewable tablet Chew 5 mg Daily.     • lisdexamfetamine dimesylate (VYVANSE) 10 MG capsule Take 10 mg by mouth Daily.     • mupirocin (BACTROBAN) 2 % ointment Apply 1 application topically to the appropriate area as directed 3 (Three) Times a Day. 22 g 2     No current facility-administered medications for this visit.       No Known Allergies        Review of Systems   Constitutional: Negative for activity change, appetite change, fatigue and fever.   HENT: Negative for congestion, ear discharge, ear pain, hearing loss and sore throat.    Eyes: Negative for pain, discharge, redness and visual disturbance.   Respiratory: Negative for cough, wheezing and stridor.    Cardiovascular: Negative for chest pain and palpitations.   Gastrointestinal: Negative for abdominal pain, constipation, diarrhea, nausea and vomiting.   Skin: Positive for rash.   Neurological: Negative for headache.  "  Outpatient Physical Therapy Lymphedema Progress Note  [facility]     Patient Name: Dex Thakkar  : 1934  MRN: [id number]  Today's Date: 10/28/2024        Visit Date: 10/28/2024    Visit Dx:    ICD-10-CM ICD-9-CM   1. Lymphedema  I89.0 457.1   2. Swelling of lower extremity  M79.89 729.81       Patient Active Problem List   Diagnosis    Stasis edema of left lower extremity    Pain and swelling of ankle, left    Hypertension    H/O cardiac radiofrequency ablation    Left lower quadrant pain    Urinary retention    Rectal ulcer    Anastomotic stricture of colorectal region    Aortic valve sclerosis    AVNRT (AV gatito re-entry tachycardia)    Carotid artery stenosis    Diastolic dysfunction    Edema    History of PSVT (paroxysmal supraventricular tachycardia)    Hyperlipidemia LDL goal <100    Irritable bladder    Systolic murmur         Lymphedema       Row Name 10/28/24 1400             Subjective Pain    Able to rate subjective pain? yes  -KA      Pre-Treatment Pain Level 0  -KA      Post-Treatment Pain Level 0  -KA         Subjective    Subjective Comments Legs still doing well! Pleased with progress.  -KA         Lymphedema Assessment    Lymphedema Classification RLE:;LLE:;secondary;stage 2 (Spontaneously Irreversible)  -KA         LLIS - Physical Concerns    The amount of pain associated with my lymphedema is: 1  -KA      The amount of limb heaviness associated with my lymphedema is: 1  -KA      The amount of skin tightness associated with my lymphedema is: 0  -KA      The size of my swollen limb(s) seems: 2  -KA      Lymphedema affects the movement of my swollen limb(s): 0  -KA      The strength in my swollen limb(s) is: 0  -KA         LLIS - Psychosocial Concerns    Lymphedema affects my body image (i.e., \"how I think I look\"). 0  -KA      Lymphedema affects my socializing with others. 0  -KA      Lymphedema affects my intimate relations with spouse or partner (rate 0 if not applicable 0  -KA      "   Hematological: Negative for adenopathy.              Temp 97.8 °F (36.6 °C)   Wt 51.1 kg (112 lb 9.6 oz)     Physical Exam  Vitals and nursing note reviewed.   Constitutional:       General: He is active. He is not in acute distress.     Appearance: Normal appearance. He is well-developed and normal weight.   HENT:      Head: Normocephalic.      Nose: Nose normal.      Mouth/Throat:      Mouth: Mucous membranes are moist.      Pharynx: Oropharynx is clear.      Tonsils: No tonsillar exudate.   Eyes:      General:         Right eye: No discharge.         Left eye: No discharge.      Conjunctiva/sclera: Conjunctivae normal.   Cardiovascular:      Rate and Rhythm: Normal rate and regular rhythm.      Pulses: Normal pulses.      Heart sounds: Normal heart sounds, S1 normal and S2 normal. No murmur heard.  Pulmonary:      Effort: Pulmonary effort is normal. No respiratory distress or retractions.      Breath sounds: Normal breath sounds. No stridor. No wheezing, rhonchi or rales.   Abdominal:      General: Bowel sounds are normal. There is no distension.      Palpations: Abdomen is soft.      Tenderness: There is no abdominal tenderness. There is no guarding or rebound.   Musculoskeletal:         General: Normal range of motion.      Cervical back: Normal range of motion and neck supple. No rigidity.   Lymphadenopathy:      Cervical: No cervical adenopathy.   Skin:     General: Skin is warm and dry.      Findings: Rash present.      Comments: Skin color dome shaped lesions on left lower arm, stomach and back    Neurological:      Mental Status: He is alert.   Psychiatric:         Mood and Affect: Mood normal.         Behavior: Behavior normal.           Assessment & Plan     Diagnoses and all orders for this visit:    1. Molluscum contagiosum (Primary)  -     mupirocin (BACTROBAN) 2 % ointment; Apply 1 application topically to the appropriate area as directed 3 (Three) Times a Day.  Dispense: 22 g; Refill:  "Lymphedema \"gets me down\" (i.e., depression, frustration, or anger) 0  -KA      I must rely on others for help due to my lymphedema. 0  -KA      I know what to do to manage my lymphedema 0  -KA         LLIS - Functional Concerns    Lymphedema affects my ability to perform self-care activities (i.e. eating, dressing, hygiene) 0  -KA      Lymphedema affects my ability to perform routine home or work-related activities. 1  -KA      Lymphedema affects my performance of preferred leisure activities. 0  -KA      Lymphedema affects proper fit of clothing/shoes 0  -KA      Lymphedema affects my sleep 0  -KA         Lymphedema Measurements    Measurement Type(s) Circumferential  -KA      Circumferential Areas Lower extremities  -KA         BLE Circumferential (cm)    Measurement Location 1 20 cm above knee  -KA      Left 1 56.1 cm  -KA      Right 1 59.5 cm  -KA      Measurement Location 2 10 cm above knee  -KA      Left 2 50.7 cm  -KA      Right 2 53.8 cm  -KA      Measurement Location 3 Knee  -KA      Left 3 39.2 cm  -KA      Right 3 39.3 cm  -KA      Measurement Location 4 10 cm below knee  -KA      Left 4 37.8 cm  -KA      Right 4 36.3 cm  -KA      Measurement Location 5 20 cm below knee  -KA      Left 5 27.8 cm  -KA      Right 5 26.7 cm  -KA      Measurement Location 6 30 cm below knee  -KA      Left 6 25.8 cm  -KA      Right 6 24 cm  -KA      Measurement Location 7 Ankle  -KA      Left 7 26.6 cm  -KA      Right 7 24 cm  -KA      Measurement Location 8 Midfoot  -KA      Left 8 22 cm  -KA      Right 8 22 cm  -KA      LLE Circumferential Total 286 cm  -KA      RLE Circumferential Total 285.6 cm  -KA         Manual Lymphatic Drainage    Manual Lymphatic Drainage Comments Short neck, B axilla, B IA, diaphragmatic breathing, B inguinals, BLE  -KA         Compression/Skin Care    Compression/Skin Care skin care;wrapping location;bandaging  -KA      Skin Care moisturizing lotion applied  HCC+Eucerin  -KA      Wrapping Location " 2      Called in molluscum compound to [facility] pharmacy     Return if symptoms worsen or fail to improve.                     lower extremity  -KA      Wrapping Location LE bilateral:;foot to knee  -KA      Bandage Layers cotton liner;padding/fluff layer;short-stretch bandages (comment size/quantity)  -KA      Bandaging Comments TG9, Artiflex x 2, Rosidal soft ankle to knee, Rosidal K 1-8 cm, 2-10 cm, pt's shoes  -KA      Bandaging Technique figure-eight;light compression  -KA         Lymphedema Life Impact Scale Totals    A.  Total Q1 - Q17 (Do not include Q18) 5  -KA      B.  Total number of questions answered (Q1-Q17) 17  -KA      C. Divide A by B 0.29  -KA      D. Multiple C by 25 7.25  -KA                User Key  (r) = Recorded By, (t) = Taken By, (c) = Cosigned By      Initials Name Provider Type    Bertha Ruiz, PT Physical Therapist                                   PT Assessment/Plan       Row Name 10/28/24 5432          PT Assessment    Functional Limitations Limitations in functional capacity and performance;Impaired gait  -KA     Impairments Balance;Edema;Gait;Impaired lymphatic circulation;Sensation  -KA     Assessment Comments Pt continues to respond well to CDT.  She has seen net reduced edema of 26.7 cm LLE and 13.3 cm RLE since resuming skilled therapy.  She has near normal contours for LE below knees, thighs still edematous and fibrotic, still has pockets of edema around ankles.  Continued with MLD and bandaging, will plan to bandage for 1 more week, then trial using CCL1 garments with pump daily to see if she will be able to maintain reduction or need at least weekly bandaging until she can receive custom flat knit or inelastic garments at the end of the month.  Pt remains appropriate for skilled physical therapy to reduce edema, improve skin condition, and improve self management of condition.  -KA     Rehab Potential Good  -KA     Patient/caregiver participated in establishment of treatment plan and goals Yes  -KA     Patient would benefit from skilled therapy intervention Yes  -KA        PT Plan    PT  Frequency 1x/week;2x/week  -KA     Predicted Duration of Therapy Intervention (PT) 12-16 total visits  -KA     Planned CPT's? PT RE-EVAL: 42532;PT THER PROC EA 15 MIN: 55151;PT THER ACT EA 15 MIN: 26817;PT MANUAL THERAPY EA 15 MIN: 71071;PT NEUROMUSC RE-EDUCATION EA 15 MIN: 97244;PT GAIT TRAINING EA 15 MIN: 17040;PT SELF CARE/HOME MGMT/TRAIN EA 15: 37658  -KA     PT Plan Comments Continue CDT.  -KA               User Key  (r) = Recorded By, (t) = Taken By, (c) = Cosigned By      Initials Name Provider Type    Bertha Ruiz, PT Physical Therapist                        OP Exercises       Row Name 10/28/24 1703 10/28/24 1400          Subjective    Subjective Comments -- Legs still doing well! Pleased with progress.  -NEGRITO        Subjective Pain    Able to rate subjective pain? -- yes  -KA     Pre-Treatment Pain Level -- 0  -KA     Post-Treatment Pain Level -- 0  -KA        Total Minutes    97498 - PT Manual Therapy Minutes 61  -KA --               User Key  (r) = Recorded By, (t) = Taken By, (c) = Cosigned By      Initials Name Provider Type    Bertha Ruiz, PT Physical Therapist                            Manual Rx (Last 36 Hours)       Manual Treatments       Row Name 10/28/24 1703             Total Minutes    80187 - PT Manual Therapy Minutes 61  -KA                User Key  (r) = Recorded By, (t) = Taken By, (c) = Cosigned By      Initials Name Provider Type    Bertha Ruiz, PT Physical Therapist                     PT OP Goals       Row Name 10/28/24 1600          PT Short Term Goals    STG Date to Achieve 10/16/24  -KA     STG 1 Patient demonstrates decreased net edema of Bilateral Lower Extremity>/= 5-10 cm compared to girth measurements taken 9/25/24 for decreased edema symptoms, decreased risk of infection, and improved skin care.  -KA     STG 1 Progress Met  -NEGRITO        Long Term Goals    LTG Date to Achieve 12/24/24  -KA     LTG 1 Patient demonstrates decreased net edema of Left Lower  Extremity >/= 10-20 cm compared to girth measurements taken 9/25/24 for decreased edema symptoms, decreased risk of infection, and improved skin care.  -NEGRITO     LTG 1 Progress Met  -NEGRITO     LTG 2 Pt independent/compliant with either non-elastic compression garments or custom flat knit compression garments due to increased need for containment.  -NEGRITO     LTG 2 Progress New  -NEGRITO     LTG 3 Pt independent/compliant with using pump at least 5 days/week for 1 hour.  -NEGRITO     LTG 3 Progress Ongoing  -NEGRITO               User Key  (r) = Recorded By, (t) = Taken By, (c) = Cosigned By      Initials Name Provider Type    Bertha Ruiz, PT Physical Therapist                                   Time Calculation:   Start Time: 1400  Stop Time: 1501  Time Calculation (min): 61 min  Timed Charges  14466 - PT Manual Therapy Minutes: 61  Total Minutes  Timed Charges Total Minutes: 61   Total Minutes: 61   Therapy Charges for Today       Code Description Service Date Service Provider Modifiers Qty    91289658881 HC PT MANUAL THERAPY EA 15 MIN 10/28/2024 Bertha Vasquez, PT GP, KX 4                      Bertha Vasquez PT  10/28/2024

## 2024-11-01 ENCOUNTER — HOSPITAL ENCOUNTER (OUTPATIENT)
Dept: PHYSICAL THERAPY | Facility: HOSPITAL | Age: 89
Setting detail: THERAPIES SERIES
Discharge: HOME | End: 2024-11-01
Payer: MEDICARE

## 2024-11-01 DIAGNOSIS — I89.0 LYMPHEDEMA: Primary | ICD-10-CM

## 2024-11-01 PROCEDURE — 97140 MANUAL THERAPY 1/> REGIONS: CPT

## 2024-11-01 NOTE — THERAPY TREATMENT NOTE
Outpatient Physical Therapy Lymphedema Treatment Note  [facility]     Patient Name: Dex Thakkar  : 1934  MRN: [id number]  Today's Date: 2024        Visit Date: 2024    Visit Dx:    ICD-10-CM ICD-9-CM   1. Lymphedema  I89.0 457.1       Patient Active Problem List   Diagnosis    Stasis edema of left lower extremity    Pain and swelling of ankle, left    Hypertension    H/O cardiac radiofrequency ablation    Left lower quadrant pain    Urinary retention    Rectal ulcer    Anastomotic stricture of colorectal region    Aortic valve sclerosis    AVNRT (AV gatito re-entry tachycardia)    Carotid artery stenosis    Diastolic dysfunction    Edema    History of PSVT (paroxysmal supraventricular tachycardia)    Hyperlipidemia LDL goal <100    Irritable bladder    Systolic murmur         Lymphedema       Row Name 24 1400             Subjective Pain    Able to rate subjective pain? yes  -KA      Pre-Treatment Pain Level 0  -KA      Post-Treatment Pain Level 0  -KA         Subjective    Subjective Comments Still doing well with bandaging.  -KA         Manual Lymphatic Drainage    Manual Lymphatic Drainage Comments Short neck, B axilla, B IA, diaphragmatic breathing, B inguinals, BLE  -KA         Compression/Skin Care    Compression/Skin Care skin care;wrapping location;bandaging  -KA      Skin Care moisturizing lotion applied  HCC+Eucerin  -KA      Wrapping Location lower extremity  -KA      Wrapping Location LE bilateral:;foot to knee  -KA      Bandage Layers cotton liner;padding/fluff layer;short-stretch bandages (comment size/quantity)  -KA      Bandaging Comments TG9, Artiflex x 2, Rosidal soft ankle to knee, Rosidal K 1-8 cm, 2-10 cm, pt's shoes  -      Bandaging Technique figure-eight;light compression  -                User Key  (r) = Recorded By, (t) = Taken By, (c) = Cosigned By      Initials Name Provider Type    Bertha Ruiz, PT Physical Therapist                                    PT Assessment/Plan       Row Name 11/01/24 1701          PT Assessment    Assessment Comments Continued with MLD and compression bandaging for BLE today, no changes to technique.  Pt instructed to remove bandaging on Monday and transition to daily pump with garments to see if it is feasible for her to maintain reduction short term while awaiting Juxtalite HD and custom knee high + guero that will be ordered midNovember.  She remains appropriate for skilled physical therapy at this time.  -NEGRITO        PT Plan    PT Plan Comments Reassess girth measurements in 1 week and modify POC according to bandaging needs, rebandage 11/8, [facility] for custom compression garments and nighttime garments.  -NEGRITO               User Key  (r) = Recorded By, (t) = Taken By, (c) = Cosigned By      Initials Name Provider Type    Bertha Ruiz, PT Physical Therapist                        OP Exercises       Row Name 11/01/24 1704 11/01/24 1400          Subjective    Subjective Comments -- Still doing well with bandaging.  -NEGRITO        Subjective Pain    Able to rate subjective pain? -- yes  -KA     Pre-Treatment Pain Level -- 0  -KA     Post-Treatment Pain Level -- 0  -KA        Total Minutes    32198 - PT Manual Therapy Minutes 55  -KA --               User Key  (r) = Recorded By, (t) = Taken By, (c) = Cosigned By      Initials Name Provider Type    Bertha Ruiz, BERT Physical Therapist                            Manual Rx (Last 36 Hours)       Manual Treatments       Row Name 11/01/24 1704             Total Minutes    05170 - PT Manual Therapy Minutes 55  -KA                User Key  (r) = Recorded By, (t) = Taken By, (c) = Cosigned By      Initials Name Provider Type    Bertha Ruiz, BERT Physical Therapist                        Therapy Education  Education Details: Pt to remove bandaging on Monday and use pump + ready to wear knee highs daily until Friday, will reassess  girth measurements to see if she can discontinue 2x/week bandaging while waiting for more containing long term garments.  Will meet with Rm Cabello on 11/12 at 2 pm to be fitted for Jobst Relax thigh high nighttime garments and Jobst [employer] guero + knee highs CCL2; will order [employer]i Juxtalite HD for her as well when eligible to reorder garments.  Given: Edema management, Bandaging/dressing change, HEP  Program: New, Reinforced  How Provided: Verbal  Provided to: Patient  Level of Understanding: Verbalized              Time Calculation:   Start Time: 1405  Stop Time: 1500  Time Calculation (min): 55 min  Timed Charges  12489 - PT Manual Therapy Minutes: 55  Total Minutes  Timed Charges Total Minutes: 55   Total Minutes: 55   Therapy Charges for Today       Code Description Service Date Service Provider Modifiers Qty    18987280636 HC PT MANUAL THERAPY EA 15 MIN 11/1/2024 Bertha Vasquez, PT GP, KX 4                      Bertha Vasquez, PT  11/1/2024

## 2024-11-08 ENCOUNTER — HOSPITAL ENCOUNTER (OUTPATIENT)
Dept: PHYSICAL THERAPY | Facility: HOSPITAL | Age: 89
Setting detail: THERAPIES SERIES
Discharge: HOME | End: 2024-11-08
Payer: MEDICARE

## 2024-11-08 DIAGNOSIS — I89.0 LYMPHEDEMA: Primary | ICD-10-CM

## 2024-11-08 PROCEDURE — 97535 SELF CARE MNGMENT TRAINING: CPT

## 2024-11-08 PROCEDURE — 97140 MANUAL THERAPY 1/> REGIONS: CPT

## 2024-11-08 NOTE — THERAPY PROGRESS REPORT/RE-CERT
Outpatient Physical Therapy Lymphedema Progress Note  [facility]     Patient Name: Dex Thakkar  : 1934  MRN: [id number]  Today's Date: 2024        Visit Date: 2024    Visit Dx:    ICD-10-CM ICD-9-CM   1. Lymphedema  I89.0 457.1       Patient Active Problem List   Diagnosis    Stasis edema of left lower extremity    Pain and swelling of ankle, left    Hypertension    H/O cardiac radiofrequency ablation    Left lower quadrant pain    Urinary retention    Rectal ulcer    Anastomotic stricture of colorectal region    Aortic valve sclerosis    AVNRT (AV gatito re-entry tachycardia)    Carotid artery stenosis    Diastolic dysfunction    Edema    History of PSVT (paroxysmal supraventricular tachycardia)    Hyperlipidemia LDL goal <100    Irritable bladder    Systolic murmur         Lymphedema       Row Name 24 1400             Subjective Pain    Able to rate subjective pain? yes  -KA      Pre-Treatment Pain Level 0  -KA      Post-Treatment Pain Level 0  -KA         Subjective    Subjective Comments Pt reports that she thinks legs have held up fairly well since removing bandaging on Monday, has been using CCL1 stockings.  Has not had the chance to use her pump.  -KA         Lymphedema Assessment    Lymphedema Classification RLE:;LLE:;secondary;stage 2 (Spontaneously Irreversible)  -KA         Lymphedema Measurements    Measurement Type(s) Circumferential  -KA      Circumferential Areas Lower extremities  -KA         BLE Circumferential (cm)    Measurement Location 1 20 cm above knee  -KA      Left 1 54.9 cm  -KA      Right 1 56.8 cm  -KA      Measurement Location 2 10 cm above knee  -KA      Left 2 53.1 cm  -KA      Right 2 54.4 cm  -KA      Measurement Location 3 Knee  -KA      Left 3 40.8 cm  -KA      Right 3 41.4 cm  -KA      Measurement Location 4 10 cm below knee  -KA      Left 4 38.9 cm  -KA      Right 4 37.8 cm  -KA      Measurement Location 5 20 cm below knee  -KA      Left 5 30.8 cm   -KA      Right 5 27 cm  -KA      Measurement Location 6 30 cm below knee  -KA      Left 6 29.5 cm  -KA      Right 6 27.2 cm  -KA      Measurement Location 7 Ankle  -KA      Left 7 30.4 cm  -KA      Right 7 27.2 cm  -KA      Measurement Location 8 Midfoot  -KA      Left 8 22.8 cm  -KA      Right 8 22.3 cm  -KA      LLE Circumferential Total 301.2 cm  -KA      RLE Circumferential Total 294.1 cm  -KA         Manual Lymphatic Drainage    Manual Lymphatic Drainage Comments Short neck, B axilla, B IA, diaphragmatic breathing, B inguinals, B thighs  -KA         Compression/Skin Care    Compression/Skin Care skin care;wrapping location;bandaging  -KA      Skin Care moisturizing lotion applied  HCC+Eucerin  -KA      Wrapping Location lower extremity  -KA      Wrapping Location LE bilateral:;foot to knee  -KA      Bandage Layers cotton liner;padding/fluff layer;short-stretch bandages (comment size/quantity)  -KA      Bandaging Comments TG9, Artiflex x 2, Rosidal soft ankle to knee, Rosidal K 1-8 cm, 2-10 cm, pt's shoes  -KA      Bandaging Technique figure-eight;light compression  -KA                User Key  (r) = Recorded By, (t) = Taken By, (c) = Cosigned By      Initials Name Provider Type    Bertha Ruiz, PT Physical Therapist                                   PT Assessment/Plan       Row Name 11/08/24 7424          PT Assessment    Functional Limitations Limitations in functional capacity and performance;Impaired gait  -KA     Impairments Balance;Edema;Gait;Impaired lymphatic circulation;Sensation  -KA     Assessment Comments Pt returns for follow-up after one week of self management, used [employer] ready to wear garments daily but did not have time for pump, large increase in swelling BLE noted today.  Resumed MLD and compression bandaging, will place order for Juxtalite HD for BLE through [facility] (size Medium short), will have her fitted for custom garments next week as planned, to stay bandaging until morning of  fitting appointment.  Stressed importance of using lymphedema pump daily, especially while waiting on long term compression that is more containing.  She will continue to need skilled therapy 1-2x/week to reduce edema, improve skin condition, reduce infection risk, and improve self management of condition at least until she receives Juxtalite HD garment which should help her maintain in combination with pump until she receives custom flat knit knee highs and capris and nighttime garments.  -NEGRITO     Rehab Potential Good  -NEGRITO     Patient/caregiver participated in establishment of treatment plan and goals Yes  -KA     Patient would benefit from skilled therapy intervention Yes  -KA        PT Plan    PT Frequency 1x/week;2x/week  -NEGRITO     Predicted Duration of Therapy Intervention (PT) 4-8 more visits  -NEGRITO     Planned CPT's? PT RE-EVAL: 88433;PT THER PROC EA 15 MIN: 68727;PT THER ACT EA 15 MIN: 77340;PT MANUAL THERAPY EA 15 MIN: 21302;PT NEUROMUSC RE-EDUCATION EA 15 MIN: 00800;PT GAIT TRAINING EA 15 MIN: 81829;PT SELF CARE/HOME MGMT/TRAIN EA 15: 09791  -NEGRITO     PT Plan Comments Continue CDT, order Juxtalite HD from [facility] based on measurements from 10/28 when well reduced, meet with [employer] Rep next week to be fitted for custom daytime and nighttime garments.  -NEGRITO               User Key  (r) = Recorded By, (t) = Taken By, (c) = Cosigned By      Initials Name Provider Type    Bertha Ruiz, PT Physical Therapist                        OP Exercises       Row Name 11/08/24 5529 11/08/24 1400          Subjective    Subjective Comments -- Pt reports that she thinks legs have held up fairly well since removing bandaging on Monday, has been using CCL1 stockings.  Has not had the chance to use her pump.  -NEGRITO        Subjective Pain    Able to rate subjective pain? -- yes  -KA     Pre-Treatment Pain Level -- 0  -KA     Post-Treatment Pain Level -- 0  -KA        Total Minutes    05885 - PT Manual Therapy Minutes 40  -KA --                User Key  (r) = Recorded By, (t) = Taken By, (c) = Cosigned By      Initials Name Provider Type    Bertha Ruiz, PT Physical Therapist                            Manual Rx (Last 36 Hours)       Manual Treatments       Row Name 11/08/24 1703             Total Minutes    74636 - PT Manual Therapy Minutes 40  -KA                User Key  (r) = Recorded By, (t) = Taken By, (c) = Cosigned By      Initials Name Provider Type    Bertha Ruiz, PT Physical Therapist                     PT OP Goals       Row Name 11/08/24 1600          PT Short Term Goals    STG Date to Achieve 10/16/24  -KA     STG 1 Patient demonstrates decreased net edema of Bilateral Lower Extremity>/= 5-10 cm compared to girth measurements taken 9/25/24 for decreased edema symptoms, decreased risk of infection, and improved skin care.  -KA     STG 1 Progress Met  -        Long Term Goals    LTG Date to Achieve 12/24/24  -     LTG 1 Patient demonstrates decreased net edema of Left Lower Extremity >/= 10-20 cm compared to girth measurements taken 9/25/24 for decreased edema symptoms, decreased risk of infection, and improved skin care.  -KA     LTG 1 Progress Met  -KA     LTG 1 Progress Comments Flare up today, but met previously  -KA     LTG 2 Pt independent/compliant with either non-elastic compression garments or custom flat knit compression garments due to increased need for containment.  -KA     LTG 2 Progress New  -     LTG 2 Progress Comments Will order Juxtalite HD today, fit for custom garments next week  -     LTG 3 Pt independent/compliant with using pump at least 5 days/week for 1 hour.  -     LTG 3 Progress Ongoing  -     LTG 3 Progress Comments Non-compliant at this time, busy as caregiver for   -NEGRITO               User Key  (r) = Recorded By, (t) = Taken By, (c) = Cosigned By      Initials Name Provider Type    Bertha Ruiz, PT Physical Therapist                    Therapy Education  Education  Details: Discussed girth measurements, think she needs to continue therapy 1-2x/week until she receives at least Juxtalite HD size Medium short for BLE to maintain reduction due to increases she saw after less than a week of self management.  Stressed importance of using pump especially while waiting to receive more containing garments.  Keep bandaging on until morning of fitting next Tuesday, then remove to shower and change into ready to wear stockings to come to clinic, keep legs elevated as much as possible.  Will take at least a month or two to get long term custom garments based on current waittimes, but expect that she would be able to transition to self management once she receives and gets used to using Juxtalite HD and fits within them well (using girth measurements from last visit not today since that is a better reflection of leg size when fully reduced).  Given: Edema management, HEP, Symptoms/condition management  Program: New, Reinforced  How Provided: Verbal  Provided to: Patient  Level of Understanding: Verbalized  01046 - PT Self Care/Mgmt Minutes: 15              Time Calculation:   Start Time: 1415  Stop Time: 1510  Time Calculation (min): 55 min  Timed Charges  08883 - PT Manual Therapy Minutes: 40  89774 - PT Self Care/Mgmt Minutes: 15  Total Minutes  Timed Charges Total Minutes: 40   Total Minutes: 40   Therapy Charges for Today       Code Description Service Date Service Provider Modifiers Qty    48513280847 HC PT MANUAL THERAPY EA 15 MIN 11/8/2024 Bertha Vasquez, PT GP, KX 3    83006565717 HC PT SELF CARE/MGMT/TRAIN EA 15 MIN 11/8/2024 Bertha Vasquez, PT GP, KX 1                      Bertha Vasquez, PT  11/8/2024

## 2024-11-18 ENCOUNTER — HOSPITAL ENCOUNTER (OUTPATIENT)
Dept: PHYSICAL THERAPY | Facility: HOSPITAL | Age: 89
Setting detail: THERAPIES SERIES
Discharge: HOME | End: 2024-11-18
Payer: MEDICARE

## 2024-11-18 DIAGNOSIS — I89.0 LYMPHEDEMA: Primary | ICD-10-CM

## 2024-11-18 PROCEDURE — 97140 MANUAL THERAPY 1/> REGIONS: CPT

## 2024-11-18 NOTE — THERAPY PROGRESS REPORT/RE-CERT
Outpatient Physical Therapy Lymphedema Progress Note  [facility]     Patient Name: Dex Thakkar  : 1934  MRN: [id number]  Today's Date: 2024        Visit Date: 2024    Visit Dx:    ICD-10-CM ICD-9-CM   1. Lymphedema  I89.0 457.1       Patient Active Problem List   Diagnosis    Stasis edema of left lower extremity    Pain and swelling of ankle, left    Hypertension    H/O cardiac radiofrequency ablation    Left lower quadrant pain    Urinary retention    Rectal ulcer    Anastomotic stricture of colorectal region    Aortic valve sclerosis    AVNRT (AV gatito re-entry tachycardia)    Carotid artery stenosis    Diastolic dysfunction    Edema    History of PSVT (paroxysmal supraventricular tachycardia)    Hyperlipidemia LDL goal <100    Irritable bladder    Systolic murmur         Lymphedema       Row Name 24 1400             Subjective Pain    Able to rate subjective pain? yes  -KA      Pre-Treatment Pain Level 0  -KA      Post-Treatment Pain Level 0  -KA         Subjective    Subjective Comments Pt reports increased swelling in legs today, has not been able to use pump, has not been able to self bandaging since last visit on  due to constant appointments as primary caregiver for her .  Did meet with Rm Cabello on  to get sized for custom flat knit garments and nighttime garments.  -KA         Lymphedema Assessment    Lymphedema Classification RLE:;LLE:;secondary;stage 2 (Spontaneously Irreversible)  -KA      Lymphedema Assessment Comments Moderate lymphedema affecting BLE from feet to upper thighs  -KA         Lymphedema Measurements    Measurement Type(s) Circumferential  -KA      Circumferential Areas Lower extremities  -KA         BLE Circumferential (cm)    Measurement Location 1 20 cm above knee  -KA      Left 1 56.3 cm  -KA      Right 1 60.3 cm  -KA      Measurement Location 2 10 cm above knee  -KA      Left 2 53.8 cm  -KA      Right 2 54.3 cm  -KA       Measurement Location 3 Knee  -KA      Left 3 39.8 cm  -KA      Right 3 41 cm  -KA      Measurement Location 4 10 cm below knee  -KA      Left 4 38 cm  -KA      Right 4 36.4 cm  -KA      Measurement Location 5 20 cm below knee  -KA      Left 5 30.4 cm  -KA      Right 5 27.3 cm  -KA      Measurement Location 6 30 cm below knee  -KA      Left 6 29.3 cm  -KA      Right 6 26.8 cm  -KA      Measurement Location 7 Ankle  -KA      Left 7 30.8 cm  -KA      Right 7 26.9 cm  -KA      Measurement Location 8 Midfoot  -KA      Left 8 22.8 cm  -KA      Right 8 22.2 cm  -KA      LLE Circumferential Total 301.2 cm  -KA      RLE Circumferential Total 295.2 cm  -KA         Manual Lymphatic Drainage    Manual Lymphatic Drainage Comments Short neck, B axilla, B IA, diaphragmatic breathing, L inguinals, LLE  -KA         Compression/Skin Care    Compression/Skin Care skin care;wrapping location;bandaging  -KA      Skin Care moisturizing lotion applied;other (comment)  HCC+Eucerin  -KA      Wrapping Location lower extremity  -KA      Wrapping Location LE bilateral:;foot to knee  -KA      Bandage Layers cotton liner;padding/fluff layer;short-stretch bandages (comment size/quantity)  -KA      Bandaging Comments TG9, Artiflex x 2, Rosidal soft ankle to knee, Rosidal K 1-8 cm, 2-10 cm, pt's shoes  -KA      Bandaging Technique figure-eight;light compression  -KA                User Key  (r) = Recorded By, (t) = Taken By, (c) = Cosigned By      Initials Name Provider Type    Bertha Ruiz, PT Physical Therapist                                   PT Assessment/Plan       Row Name 11/18/24 1701 11/18/24 7314       PT Assessment    Functional Limitations -- Limitations in functional capacity and performance;Impaired gait  -KA    Impairments -- Balance;Edema;Gait;Impaired lymphatic circulation;Sensation  -KA    Rehab Potential Good  -KA --    Patient/caregiver participated in establishment of treatment plan and goals Yes  -KA --    Patient  would benefit from skilled therapy intervention Yes  -KA --       PT Plan    PT Frequency 1x/week;2x/week  -NEGRITO --    Planned CPT's? PT RE-EVAL: 96159;PT THER PROC EA 15 MIN: 15417;PT THER ACT EA 15 MIN: 31862;PT MANUAL THERAPY EA 15 MIN: 29335;PT NEUROMUSC RE-EDUCATION EA 15 MIN: 24958;PT GAIT TRAINING EA 15 MIN: 65624;PT SELF CARE/HOME MGMT/TRAIN EA 15: 55038  -NEGRITO --    PT Plan Comments Continue CDT, order long term compression garments  -NEGRITO --      Row Name 11/18/24 1440          PT Assessment    Assessment Comments Pt returns to clinic for first visit since 11/8/2024, has met with Jobst fitter for custom compression, but has been unable to use lymphedema pump daily and has seen increased swelling in legs again even with use of ready to wear compression garments for LE.  Resumed MLD for LLE and compression bandaging today.  Recommending ordering Circaid Juxtalite HD in size medium short, one for each LE due to persistent swelling, able to don garments but not able to self bandage so this will help manage condition until she receives long term custom garments.  Pt requires custom flat knit daytime and nighttime compression garments as ready to wear garments do not have adequate containment for her stage 2 lymphedema and allow legs to swell inside the garment.  Recommending [employer] Confidence custom CCL2 knee highs in beige with softFit Band with ankle comfort zone and hallux valgus, slant open toe in combination with [employer]st Confidence Custom B1T CCL2 guero in beige with elastic 5 cm waistband for women.  Ideal would be one to wash, one to wear for each leg knee highs and guero, but will order 1 to start to ensure she is able to don independently.  For nighttime compression garments, she will require custom Jobst relax thigh high with zippers for improved  ease of donning CCL2 in [place].  Lymphedema is a chronic condition and requires compression 24 hours per day. Without a nighttime garment, patient could swell at night  causing the daytime garments not to fit. These garments are essential in the maintenance of the patient's current extremity volume and to help slow/limit progression and management of lymphatic disease. Due to the patient's lymphedema causing a unique shape to their affected limbs, a custom fit garment is the required garment as they are shaped to fit the exact dimensions of the affected extremity.   Pt will need to continue weekly skilled therapy 1-2x/week until she receives at least inelastic compression garments as she does not have a reliable way to maintain edema otherwise while waiting for custom compression garments.  -KA               User Key  (r) = Recorded By, (t) = Taken By, (c) = Cosigned By      Initials Name Provider Type    Bertha Ruiz, PT Physical Therapist                        OP Exercises       Row Name 11/18/24 1700 11/18/24 1400          Subjective    Subjective Comments -- Pt reports increased swelling in legs today, has not been able to use pump, has not been able to self bandaging since last visit on 11/8 due to constant appointments as primary caregiver for her .  Did meet with Rm Cabello on 11/12 to get sized for custom flat knit garments and nighttime garments.  -KA        Subjective Pain    Able to rate subjective pain? -- yes  -KA     Pre-Treatment Pain Level -- 0  -KA     Post-Treatment Pain Level -- 0  -KA        Total Minutes    55801 - PT Manual Therapy Minutes 55  -KA --               User Key  (r) = Recorded By, (t) = Taken By, (c) = Cosigned By      Initials Name Provider Type    Bertha Ruiz PT Physical Therapist                            Manual Rx (Last 36 Hours)       Manual Treatments       Row Name 11/18/24 1700             Total Minutes    58700 - PT Manual Therapy Minutes 55  -KA                User Key  (r) = Recorded By, (t) = Taken By, (c) = Cosigned By      Initials Name Provider Type    Bertha Ruiz, PT Physical Therapist                      PT OP Goals       Row Name 11/18/24 1600          PT Short Term Goals    STG Date to Achieve 10/16/24  -KA     STG 1 Patient demonstrates decreased net edema of Bilateral Lower Extremity>/= 5-10 cm compared to girth measurements taken 9/25/24 for decreased edema symptoms, decreased risk of infection, and improved skin care.  -KA     STG 1 Progress Met  -KA        Long Term Goals    LTG Date to Achieve 12/24/24  -KA     LTG 1 Patient demonstrates decreased net edema of Left Lower Extremity >/= 10-20 cm compared to girth measurements taken 9/25/24 for decreased edema symptoms, decreased risk of infection, and improved skin care.  -KA     LTG 1 Progress Met  -KA     LTG 1 Progress Comments Still increased today, needs to continue bandaging for reduction  -KA     LTG 2 Pt independent/compliant with either non-elastic compression garments or custom flat knit compression garments due to increased need for containment.  -KA     LTG 2 Progress Ongoing  -     LTG 2 Progress Comments Will order, has not received  -     LTG 3 Pt independent/compliant with using pump at least 5 days/week for 1 hour.  -     LTG 3 Progress Ongoing  -     LTG 3 Progress Comments Non-compliant at this time due to family caregiving responsibilities  -               User Key  (r) = Recorded By, (t) = Taken By, (c) = Cosigned By      Initials Name Provider Type    Bertha Ruiz, PT Physical Therapist                                   Time Calculation:   Start Time: 1425  Stop Time: 1525  Time Calculation (min): 60 min  Timed Charges  89015 - PT Manual Therapy Minutes: 55  Total Minutes  Timed Charges Total Minutes: 55   Total Minutes: 55   Therapy Charges for Today       Code Description Service Date Service Provider Modifiers Qty    02024717072 HC PT MANUAL THERAPY EA 15 MIN 11/18/2024 Bertha Vasquez, PT GP, KX 4                      Bertha Vasquez, PT  11/18/2024

## 2024-11-22 ENCOUNTER — HOSPITAL ENCOUNTER (OUTPATIENT)
Dept: PHYSICAL THERAPY | Facility: HOSPITAL | Age: 89
Setting detail: THERAPIES SERIES
Discharge: HOME | End: 2024-11-22
Payer: MEDICARE

## 2024-11-22 DIAGNOSIS — I89.0 LYMPHEDEMA: Primary | ICD-10-CM

## 2024-11-22 PROCEDURE — 97535 SELF CARE MNGMENT TRAINING: CPT

## 2024-11-22 PROCEDURE — 97140 MANUAL THERAPY 1/> REGIONS: CPT

## 2024-11-22 NOTE — THERAPY TREATMENT NOTE
Outpatient Physical Therapy Lymphedema Treatment Note  [facility]     Patient Name: Dex Thakkar  : 1934  MRN: [id number]  Today's Date: 2024        Visit Date: 2024    Visit Dx:    ICD-10-CM ICD-9-CM   1. Lymphedema  I89.0 457.1       Patient Active Problem List   Diagnosis    Stasis edema of left lower extremity    Pain and swelling of ankle, left    Hypertension    H/O cardiac radiofrequency ablation    Left lower quadrant pain    Urinary retention    Rectal ulcer    Anastomotic stricture of colorectal region    Aortic valve sclerosis    AVNRT (AV gatito re-entry tachycardia)    Carotid artery stenosis    Diastolic dysfunction    Edema    History of PSVT (paroxysmal supraventricular tachycardia)    Hyperlipidemia LDL goal <100    Irritable bladder    Systolic murmur         Lymphedema       Row Name 24 1500             Subjective Pain    Able to rate subjective pain? yes  -KA      Pre-Treatment Pain Level 0  -KA      Post-Treatment Pain Level 0  -KA         Subjective    Subjective Comments Did well with bandaging, RLE pretty much reduced, but LLE still swollen at ankle.  -KA         Manual Lymphatic Drainage    Manual Lymphatic Drainage Comments Short neck, B axilla, B IA, diaphragmatic breathing, B inguinals, BLE  -KA         Compression/Skin Care    Compression/Skin Care skin care;wrapping location;bandaging  -KA      Skin Care moisturizing lotion applied;other (comment)  HCC+Eucerin  -KA      Wrapping Location lower extremity  -KA      Wrapping Location LE bilateral:;foot to knee  -KA      Bandage Layers cotton liner;padding/fluff layer;short-stretch bandages (comment size/quantity)  -KA      Bandaging Comments TG9, Artiflex x 2, Rosidal soft ankle to knee, Rosidal K 1-8 cm, 2-10 cm, pt's shoes  -KA      Bandaging Technique figure-eight;light compression  -KA                User Key  (r) = Recorded By, (t) = Taken By, (c) = Cosigned By      Initials Name Provider Type    NEGRITO  Bertha Vasquez, BERT Physical Therapist                                   PT Assessment/Plan       Row Name 11/22/24 1545          PT Assessment    Assessment Comments Discussed plan for maintaining reduction after last scheduled session next week (has several weeks she will be unable to come to clinic).  Overall, legs are reducing again, still edematous on LLE especially ankle.  Continued MLD to BLE and bandaging foot to knee today.  Pt remains appropriate for skilled therapy at this time, awaiting long term compression.  -NEGRITO     Rehab Potential Good  -NEGRITO     Patient/caregiver participated in establishment of treatment plan and goals Yes  -KA     Patient would benefit from skilled therapy intervention Yes  -KA        PT Plan    PT Plan Comments Continue CDT.  -NEGRITO               User Key  (r) = Recorded By, (t) = Taken By, (c) = Cosigned By      Initials Name Provider Type    Bertha Ruiz PT Physical Therapist                        OP Exercises       Row Name 11/22/24 1547 11/22/24 1500          Subjective    Subjective Comments -- Did well with bandaging, RLE pretty much reduced, but LLE still swollen at ankle.  -NEGRITO        Subjective Pain    Able to rate subjective pain? -- yes  -NEGRITO     Pre-Treatment Pain Level -- 0  -KA     Post-Treatment Pain Level -- 0  -KA        Total Minutes    21713 - PT Manual Therapy Minutes 45  -KA --               User Key  (r) = Recorded By, (t) = Taken By, (c) = Cosigned By      Initials Name Provider Type    Bertha Ruiz PT Physical Therapist                            Manual Rx (Last 36 Hours)       Manual Treatments       Row Name 11/22/24 1547             Total Minutes    63385 - PT Manual Therapy Minutes 45  -KA                User Key  (r) = Recorded By, (t) = Taken By, (c) = Cosigned By      Initials Name Provider Type    Bertha Ruiz PT Physical Therapist                        Therapy Education  Education Details: Placed order with [employer] earlier  today, be on the lookout for garments in the mail, may be a week or longer before she does get them.  Discussed videoing bandaging next visit for family member to assist if needed over Thanksgiving holiday in case she needs to remove bandaging prior to receiving Juxtalite HD.  Discussed wearing Juxtalite HD day and night until she receives custom compression for both daytime and nighttime.  RTW garments are just not containing enough for her, needs to either bandage, wear Juxtalite HD or wear custom compression from here on out to prevent progression of symptoms.  Given: Edema management, HEP, Symptoms/condition management, Bandaging/dressing change  Program: New, Reinforced  How Provided: Verbal  Provided to: Patient  Level of Understanding: Verbalized  66431 - PT Self Care/Mgmt Minutes: 15              Time Calculation:   Start Time: 1415  Stop Time: 1520  Time Calculation (min): 65 min  Timed Charges  12955 - PT Manual Therapy Minutes: 45  93560 - PT Self Care/Mgmt Minutes: 15  Total Minutes  Timed Charges Total Minutes: 60   Total Minutes: 60   Therapy Charges for Today       Code Description Service Date Service Provider Modifiers Qty    34665777186 HC PT MANUAL THERAPY EA 15 MIN 11/22/2024 Bertha Vasquez, PT GP, KX 3    21104894379 HC PT SELF CARE/MGMT/TRAIN EA 15 MIN 11/22/2024 Bertha Vasquez, PT GP, KX 1                      Bertha Vasquez, PT  11/22/2024

## 2024-11-25 ENCOUNTER — HOSPITAL ENCOUNTER (OUTPATIENT)
Dept: PHYSICAL THERAPY | Facility: HOSPITAL | Age: 89
Setting detail: THERAPIES SERIES
Discharge: HOME | End: 2024-11-25
Payer: MEDICARE

## 2024-11-25 DIAGNOSIS — I89.0 LYMPHEDEMA: Primary | ICD-10-CM

## 2024-11-25 PROCEDURE — 97140 MANUAL THERAPY 1/> REGIONS: CPT

## 2024-11-25 NOTE — THERAPY PROGRESS REPORT/RE-CERT
Outpatient Physical Therapy Lymphedema Progress Note  [facility]     Patient Name: Dex Thakkar  : 1934  MRN: [id number]  Today's Date: 2024        Visit Date: 2024    Visit Dx:    ICD-10-CM ICD-9-CM   1. Lymphedema  I89.0 457.1       Patient Active Problem List   Diagnosis    Stasis edema of left lower extremity    Pain and swelling of ankle, left    Hypertension    H/O cardiac radiofrequency ablation    Left lower quadrant pain    Urinary retention    Rectal ulcer    Anastomotic stricture of colorectal region    Aortic valve sclerosis    AVNRT (AV gatito re-entry tachycardia)    Carotid artery stenosis    Diastolic dysfunction    Edema    History of PSVT (paroxysmal supraventricular tachycardia)    Hyperlipidemia LDL goal <100    Irritable bladder    Systolic murmur         Lymphedema       Row Name 24 1400             Subjective Pain    Able to rate subjective pain? yes  -KA      Pre-Treatment Pain Level 0  -KA      Post-Treatment Pain Level 0  -KA         Subjective    Subjective Comments Pt reports that legs are looking good, she has not received velcro yet.  -KA         Lymphedema Assessment    Lymphedema Classification RLE:;LLE:;secondary;stage 2 (Spontaneously Irreversible)  -KA      Lymphedema Assessment Comments Moderate lymphedema affecting BLE from feet to upper thighs  -KA         Lymphedema Measurements    Measurement Type(s) Circumferential  -KA      Circumferential Areas Lower extremities  -KA         BLE Circumferential (cm)    Measurement Location 1 20 cm above knee  -KA      Left 1 52.9 cm  -KA      Right 1 55.1 cm  -KA      Measurement Location 2 10 cm above knee  -KA      Left 2 50.5 cm  -KA      Right 2 54.1 cm  -KA      Measurement Location 3 Knee  -KA      Left 3 39.9 cm  -KA      Right 3 40.2 cm  -KA      Measurement Location 4 10 cm below knee  -KA      Left 4 37 cm  -KA      Right 4 36.5 cm  -KA      Measurement Location 5 20 cm below knee  -KA      Left 5  28.8 cm  -KA      Right 5 26.4 cm  -KA      Measurement Location 6 30 cm below knee  -KA      Left 6 25.3 cm  -KA      Right 6 24.3 cm  -KA      Measurement Location 7 Ankle  -KA      Left 7 25.5 cm  -KA      Right 7 24.6 cm  -KA      Measurement Location 8 Midfoot  -KA      Left 8 22.3 cm  -KA      Right 8 22 cm  -KA      LLE Circumferential Total 282.2 cm  -KA      RLE Circumferential Total 283.2 cm  -KA         Manual Lymphatic Drainage    Manual Lymphatic Drainage Comments Short neck, B axilla, B IA, diaphragmatic breathing, B inguinals, BLE  -KA         Compression/Skin Care    Compression/Skin Care skin care;wrapping location;bandaging  -KA      Skin Care moisturizing lotion applied;other (comment)  HCC+Eucerin  -KA      Wrapping Location lower extremity  -KA      Wrapping Location LE bilateral:;foot to knee  -KA      Bandage Layers cotton liner;padding/fluff layer;short-stretch bandages (comment size/quantity)  -KA      Bandaging Comments TG9, Artiflex x 2, Rosidal soft ankle to knee, Rosidal K 1-8 cm, 2-10 cm, pt's shoes  -KA      Bandaging Technique figure-eight;light compression  -KA                User Key  (r) = Recorded By, (t) = Taken By, (c) = Cosigned By      Initials Name Provider Type    Bertha Ruiz, PT Physical Therapist                                   PT Assessment/Plan       Row Name 11/25/24 1595          PT Assessment    Functional Limitations Limitations in functional capacity and performance;Impaired gait  -KA     Impairments Balance;Edema;Gait;Impaired lymphatic circulation;Sensation  -KA     Assessment Comments Pt continues to respond well to CDT.  Girth measurements decreased significantly today, Decreased net edema 30.5 cm LLE, 15.3 cm RLE upon reassessment.  She has not received long term compression garments yet, but was given instructions in how to use inelastic garments when they arrive, will need family members to help her stay bandaged until long term compression arrives  unless able to come back to clinic.  She remains appropriate for skilled physical therapy at this time, awaiting long term compression.  -KA     Rehab Potential Good  -KA     Patient/caregiver participated in establishment of treatment plan and goals Yes  -KA     Patient would benefit from skilled therapy intervention Yes  -KA        PT Plan    PT Plan Comments Continue CDT.  Transition to long term compression when it arrives, stay bandaged until she receives velcro garments.  -KA               User Key  (r) = Recorded By, (t) = Taken By, (c) = Cosigned By      Initials Name Provider Type    Bertha Ruiz, PT Physical Therapist                        OP Exercises       Row Name 11/25/24 1746 11/25/24 1400          Subjective    Subjective Comments -- Pt reports that legs are looking good, she has not received velcro yet.  -NEGRITO        Subjective Pain    Able to rate subjective pain? -- yes  -KA     Pre-Treatment Pain Level -- 0  -KA     Post-Treatment Pain Level -- 0  -KA        Total Minutes    42022 - PT Manual Therapy Minutes 55  -KA --               User Key  (r) = Recorded By, (t) = Taken By, (c) = Cosigned By      Initials Name Provider Type    Bertha Ruiz, PT Physical Therapist                            Manual Rx (Last 36 Hours)       Manual Treatments       Row Name 11/25/24 1746             Total Minutes    19385 - PT Manual Therapy Minutes 55  -KA                User Key  (r) = Recorded By, (t) = Taken By, (c) = Cosigned By      Initials Name Provider Type    Bertha Ruiz, PT Physical Therapist                     PT OP Goals       Row Name 11/25/24 1700          PT Short Term Goals    STG Date to Achieve 10/16/24  -KA     STG 1 Patient demonstrates decreased net edema of Bilateral Lower Extremity>/= 5-10 cm compared to girth measurements taken 9/25/24 for decreased edema symptoms, decreased risk of infection, and improved skin care.  -KA     STG 1 Progress Met  -KA        Long Term  Goals    LTG Date to Achieve 12/24/24  -     LTG 1 Patient demonstrates decreased net edema of Left Lower Extremity >/= 10-20 cm compared to girth measurements taken 9/25/24 for decreased edema symptoms, decreased risk of infection, and improved skin care.  -     LTG 1 Progress Met  -     LTG 1 Progress Comments Decreased net edema 30.5 cm LLE, 15.3 cm RLE  -     LTG 2 Pt independent/compliant with either non-elastic compression garments or custom flat knit compression garments due to increased need for containment.  -     LTG 2 Progress Ongoing  -     LTG 3 Pt independent/compliant with using pump at least 5 days/week for 1 hour.  -     LTG 3 Progress Ongoing  -               User Key  (r) = Recorded By, (t) = Taken By, (c) = Cosigned By      Initials Name Provider Type    Bertha Ruiz, PT Physical Therapist                    Therapy Education  Education Details: Pt to keep legs bandaged until she receives Juxtalite HD, then begin using that day and night until she gets custom compression garments.  Videoed bandaging technique so that family member can help her if needed.  Bandaging will need to be changed by 11/29.  Given: Edema management, HEP, Symptoms/condition management, Bandaging/dressing change  Program: New  How Provided: Verbal  Provided to: Patient  Level of Understanding: Verbalized              Time Calculation:   Start Time: 1415  Stop Time: 1510  Time Calculation (min): 55 min  Timed Charges  97162 - PT Manual Therapy Minutes: 55  Total Minutes  Timed Charges Total Minutes: 55   Total Minutes: 55   Therapy Charges for Today       Code Description Service Date Service Provider Modifiers Qty    71996738814 HC PT MANUAL THERAPY EA 15 MIN 11/25/2024 Bertha Vasquez, PT GP, KX 4                      Bertha Vasquez, PT  11/25/2024

## 2024-12-03 RX ORDER — LISINOPRIL 40 MG/1
40 TABLET ORAL DAILY
Qty: 90 TABLET | Refills: 3 | Status: SHIPPED | OUTPATIENT
Start: 2024-12-03

## 2024-12-03 NOTE — TELEPHONE ENCOUNTER
Caller: Dex Thakkar TASIA    Relationship: Self    Best call back number: [phone]    Requested Prescriptions:   Requested Prescriptions     Pending Prescriptions Disp Refills    lisinopril (PRINIVIL,ZESTRIL) 40 MG tablet 90 tablet 4     Sig: Take 1 tablet by mouth Daily.        Pharmacy where request should be sent: [facility] [phone] [address] [address] AT [facility] [place] [address] [facility] [phone] [facility] [phone]      Last office visit with prescribing clinician: 10/17/2024   Last telemedicine visit with prescribing clinician: Visit date not found   Next office visit with prescribing clinician: Visit date not found       Does the patient have less than a 3 day supply:  [x] Yes  [] No    Would you like a call back once the refill request has been completed: [] Yes [x] No    If the office needs to give you a call back, can they leave a voicemail: [] Yes [] No    Dar Galarza Rep   12/03/24 12:44 EST

## 2024-12-23 RX ORDER — HYDRALAZINE HYDROCHLORIDE 25 MG/1
25 TABLET, FILM COATED ORAL 2 TIMES DAILY
Qty: 180 TABLET | Refills: 0 | Status: SHIPPED | OUTPATIENT
Start: 2024-12-23

## 2024-12-30 ENCOUNTER — TELEPHONE (OUTPATIENT)
Age: 89
End: 2024-12-30
Payer: MEDICARE

## 2025-01-03 ENCOUNTER — HOSPITAL ENCOUNTER (OUTPATIENT)
Facility: HOSPITAL | Age: OVER 89
Setting detail: OBSERVATION
Discharge: HOME | End: 2025-01-04
Attending: EMERGENCY MEDICINE | Admitting: HOSPITALIST
Payer: MEDICARE

## 2025-01-03 ENCOUNTER — APPOINTMENT (OUTPATIENT)
Dept: GENERAL RADIOLOGY | Facility: HOSPITAL | Age: OVER 89
End: 2025-01-03
Payer: MEDICARE

## 2025-01-03 DIAGNOSIS — E87.1 HYPONATREMIA: Primary | ICD-10-CM

## 2025-01-03 DIAGNOSIS — B34.2 CORONAVIRUS INFECTION: ICD-10-CM

## 2025-01-03 DIAGNOSIS — I10 HYPERTENSION NOT AT GOAL: ICD-10-CM

## 2025-01-03 LAB
ALBUMIN SERPL-MCNC: 4.1 G/DL (ref 3.5–5.2)
ALBUMIN/GLOB SERPL: 1.1 G/DL
ALP SERPL-CCNC: 115 U/L (ref 39–117)
ALT SERPL W P-5'-P-CCNC: 18 U/L (ref 1–33)
ANION GAP SERPL CALCULATED.3IONS-SCNC: 13 MMOL/L (ref 5–15)
ANION GAP SERPL CALCULATED.3IONS-SCNC: 9.8 MMOL/L (ref 5–15)
AST SERPL-CCNC: 22 U/L (ref 1–32)
B PARAPERT DNA SPEC QL NAA+PROBE: NOT DETECTED
B PERT DNA SPEC QL NAA+PROBE: NOT DETECTED
BACTERIA UR QL AUTO: ABNORMAL /HPF
BASOPHILS # BLD AUTO: 0.02 10*3/MM3 (ref 0–0.2)
BASOPHILS NFR BLD AUTO: 0.3 % (ref 0–1.5)
BILIRUB SERPL-MCNC: 0.6 MG/DL (ref 0–1.2)
BILIRUB UR QL STRIP: NEGATIVE
BUN SERPL-MCNC: 22 MG/DL (ref 8–23)
BUN SERPL-MCNC: 22 MG/DL (ref 8–23)
BUN/CREAT SERPL: 17.7 (ref 7–25)
BUN/CREAT SERPL: 17.9 (ref 7–25)
C PNEUM DNA NPH QL NAA+NON-PROBE: NOT DETECTED
CALCIUM SPEC-SCNC: 9.1 MG/DL (ref 8.2–9.6)
CALCIUM SPEC-SCNC: 9.7 MG/DL (ref 8.2–9.6)
CHLORIDE SERPL-SCNC: 91 MMOL/L (ref 98–107)
CHLORIDE SERPL-SCNC: 96 MMOL/L (ref 98–107)
CLARITY UR: CLEAR
CO2 SERPL-SCNC: 22 MMOL/L (ref 22–29)
CO2 SERPL-SCNC: 24.2 MMOL/L (ref 22–29)
COLOR UR: YELLOW
CREAT SERPL-MCNC: 1.23 MG/DL (ref 0.57–1)
CREAT SERPL-MCNC: 1.24 MG/DL (ref 0.57–1)
DEPRECATED RDW RBC AUTO: 39.1 FL (ref 37–54)
EGFRCR SERPLBLD CKD-EPI 2021: 41.4 ML/MIN/1.73
EGFRCR SERPLBLD CKD-EPI 2021: 41.8 ML/MIN/1.73
EOSINOPHIL # BLD AUTO: 0.01 10*3/MM3 (ref 0–0.4)
EOSINOPHIL NFR BLD AUTO: 0.2 % (ref 0.3–6.2)
ERYTHROCYTE [DISTWIDTH] IN BLOOD BY AUTOMATED COUNT: 12.4 % (ref 12.3–15.4)
FLUAV SUBTYP SPEC NAA+PROBE: NOT DETECTED
FLUBV RNA ISLT QL NAA+PROBE: NOT DETECTED
GLOBULIN UR ELPH-MCNC: 3.6 GM/DL
GLUCOSE SERPL-MCNC: 108 MG/DL (ref 65–99)
GLUCOSE SERPL-MCNC: 112 MG/DL (ref 65–99)
GLUCOSE UR STRIP-MCNC: NEGATIVE MG/DL
HADV DNA SPEC NAA+PROBE: NOT DETECTED
HCOV 229E RNA SPEC QL NAA+PROBE: NOT DETECTED
HCOV HKU1 RNA SPEC QL NAA+PROBE: NOT DETECTED
HCOV NL63 RNA SPEC QL NAA+PROBE: DETECTED
HCOV OC43 RNA SPEC QL NAA+PROBE: NOT DETECTED
HCT VFR BLD AUTO: 37.8 % (ref 34–46.6)
HGB BLD-MCNC: 13.2 G/DL (ref 12–15.9)
HGB UR QL STRIP.AUTO: NEGATIVE
HMPV RNA NPH QL NAA+NON-PROBE: NOT DETECTED
HOLD SPECIMEN: NORMAL
HPIV1 RNA ISLT QL NAA+PROBE: NOT DETECTED
HPIV2 RNA SPEC QL NAA+PROBE: NOT DETECTED
HPIV3 RNA NPH QL NAA+PROBE: NOT DETECTED
HPIV4 P GENE NPH QL NAA+PROBE: NOT DETECTED
HYALINE CASTS UR QL AUTO: ABNORMAL /LPF
IMM GRANULOCYTES # BLD AUTO: 0.03 10*3/MM3 (ref 0–0.05)
IMM GRANULOCYTES NFR BLD AUTO: 0.5 % (ref 0–0.5)
KETONES UR QL STRIP: ABNORMAL
LEUKOCYTE ESTERASE UR QL STRIP.AUTO: ABNORMAL
LIPASE SERPL-CCNC: 36 U/L (ref 13–60)
LYMPHOCYTES # BLD AUTO: 0.68 10*3/MM3 (ref 0.7–3.1)
LYMPHOCYTES NFR BLD AUTO: 11.6 % (ref 19.6–45.3)
M PNEUMO IGG SER IA-ACNC: NOT DETECTED
MCH RBC QN AUTO: 30.7 PG (ref 26.6–33)
MCHC RBC AUTO-ENTMCNC: 34.9 G/DL (ref 31.5–35.7)
MCV RBC AUTO: 87.9 FL (ref 79–97)
MONOCYTES # BLD AUTO: 0.58 10*3/MM3 (ref 0.1–0.9)
MONOCYTES NFR BLD AUTO: 9.9 % (ref 5–12)
NEUTROPHILS NFR BLD AUTO: 4.56 10*3/MM3 (ref 1.7–7)
NEUTROPHILS NFR BLD AUTO: 77.5 % (ref 42.7–76)
NITRITE UR QL STRIP: NEGATIVE
NRBC BLD AUTO-RTO: 0 /100 WBC (ref 0–0.2)
OSMOLALITY UR: 536 MOSM/KG
PH UR STRIP.AUTO: 5.5 [PH] (ref 5–8)
PLATELET # BLD AUTO: 215 10*3/MM3 (ref 140–450)
PMV BLD AUTO: 11.1 FL (ref 6–12)
POTASSIUM SERPL-SCNC: 4.5 MMOL/L (ref 3.5–5.2)
POTASSIUM SERPL-SCNC: 4.6 MMOL/L (ref 3.5–5.2)
PROT SERPL-MCNC: 7.7 G/DL (ref 6–8.5)
PROT UR QL STRIP: NEGATIVE
RBC # BLD AUTO: 4.3 10*6/MM3 (ref 3.77–5.28)
RBC # UR STRIP: ABNORMAL /HPF
REF LAB TEST METHOD: ABNORMAL
RHINOVIRUS RNA SPEC NAA+PROBE: NOT DETECTED
RSV RNA NPH QL NAA+NON-PROBE: NOT DETECTED
SARS-COV-2 RNA NPH QL NAA+NON-PROBE: NOT DETECTED
SODIUM SERPL-SCNC: 126 MMOL/L (ref 136–145)
SODIUM SERPL-SCNC: 130 MMOL/L (ref 136–145)
SODIUM UR-SCNC: 76 MMOL/L
SP GR UR STRIP: 1.02 (ref 1–1.03)
SQUAMOUS #/AREA URNS HPF: ABNORMAL /HPF
TSH SERPL DL<=0.05 MIU/L-ACNC: 1.13 UIU/ML (ref 0.27–4.2)
UROBILINOGEN UR QL STRIP: ABNORMAL
WBC # UR STRIP: ABNORMAL /HPF
WBC NRBC COR # BLD AUTO: 5.88 10*3/MM3 (ref 3.4–10.8)

## 2025-01-03 PROCEDURE — 84443 ASSAY THYROID STIM HORMONE: CPT | Performed by: HOSPITALIST

## 2025-01-03 PROCEDURE — 83690 ASSAY OF LIPASE: CPT | Performed by: PHYSICIAN ASSISTANT

## 2025-01-03 PROCEDURE — 0202U NFCT DS 22 TRGT SARS-COV-2: CPT | Performed by: PHYSICIAN ASSISTANT

## 2025-01-03 PROCEDURE — 80053 COMPREHEN METABOLIC PANEL: CPT | Performed by: PHYSICIAN ASSISTANT

## 2025-01-03 PROCEDURE — 96372 THER/PROPH/DIAG INJ SC/IM: CPT

## 2025-01-03 PROCEDURE — 96374 THER/PROPH/DIAG INJ IV PUSH: CPT

## 2025-01-03 PROCEDURE — 25010000002 ONDANSETRON PER 1 MG: Performed by: PHYSICIAN ASSISTANT

## 2025-01-03 PROCEDURE — G0378 HOSPITAL OBSERVATION PER HR: HCPCS

## 2025-01-03 PROCEDURE — 84300 ASSAY OF URINE SODIUM: CPT | Performed by: HOSPITALIST

## 2025-01-03 PROCEDURE — 81001 URINALYSIS AUTO W/SCOPE: CPT | Performed by: PHYSICIAN ASSISTANT

## 2025-01-03 PROCEDURE — 25810000003 SODIUM CHLORIDE 0.9 % SOLUTION: Performed by: HOSPITALIST

## 2025-01-03 PROCEDURE — 25010000002 ENOXAPARIN PER 10 MG: Performed by: HOSPITALIST

## 2025-01-03 PROCEDURE — 71046 X-RAY EXAM CHEST 2 VIEWS: CPT

## 2025-01-03 PROCEDURE — 25810000003 SODIUM CHLORIDE 0.9 % SOLUTION: Performed by: PHYSICIAN ASSISTANT

## 2025-01-03 PROCEDURE — 83935 ASSAY OF URINE OSMOLALITY: CPT | Performed by: HOSPITALIST

## 2025-01-03 PROCEDURE — 85025 COMPLETE CBC W/AUTO DIFF WBC: CPT | Performed by: PHYSICIAN ASSISTANT

## 2025-01-03 PROCEDURE — 36415 COLL VENOUS BLD VENIPUNCTURE: CPT

## 2025-01-03 PROCEDURE — 99285 EMERGENCY DEPT VISIT HI MDM: CPT

## 2025-01-03 RX ORDER — ATORVASTATIN CALCIUM 20 MG/1
10 TABLET, FILM COATED ORAL DAILY
Status: DISCONTINUED | OUTPATIENT
Start: 2025-01-03 | End: 2025-01-04 | Stop reason: HOSPADM

## 2025-01-03 RX ORDER — ACETAMINOPHEN 160 MG/5ML
650 SOLUTION ORAL EVERY 4 HOURS PRN
Status: DISCONTINUED | OUTPATIENT
Start: 2025-01-03 | End: 2025-01-04 | Stop reason: HOSPADM

## 2025-01-03 RX ORDER — ONDANSETRON 4 MG/1
4 TABLET, ORALLY DISINTEGRATING ORAL EVERY 6 HOURS PRN
Status: DISCONTINUED | OUTPATIENT
Start: 2025-01-03 | End: 2025-01-04 | Stop reason: HOSPADM

## 2025-01-03 RX ORDER — ACETAMINOPHEN 650 MG/1
650 SUPPOSITORY RECTAL EVERY 4 HOURS PRN
Status: DISCONTINUED | OUTPATIENT
Start: 2025-01-03 | End: 2025-01-04 | Stop reason: HOSPADM

## 2025-01-03 RX ORDER — LISINOPRIL 20 MG/1
40 TABLET ORAL DAILY
Status: DISCONTINUED | OUTPATIENT
Start: 2025-01-03 | End: 2025-01-04 | Stop reason: HOSPADM

## 2025-01-03 RX ORDER — HYDRALAZINE HYDROCHLORIDE 25 MG/1
25 TABLET, FILM COATED ORAL 2 TIMES DAILY
Status: DISCONTINUED | OUTPATIENT
Start: 2025-01-03 | End: 2025-01-04 | Stop reason: HOSPADM

## 2025-01-03 RX ORDER — LABETALOL HYDROCHLORIDE 5 MG/ML
10 INJECTION, SOLUTION INTRAVENOUS ONCE
Status: DISCONTINUED | OUTPATIENT
Start: 2025-01-03 | End: 2025-01-03

## 2025-01-03 RX ORDER — SODIUM CHLORIDE 9 MG/ML
40 INJECTION, SOLUTION INTRAVENOUS AS NEEDED
Status: DISCONTINUED | OUTPATIENT
Start: 2025-01-03 | End: 2025-01-04 | Stop reason: HOSPADM

## 2025-01-03 RX ORDER — METOPROLOL SUCCINATE 100 MG/1
100 TABLET, EXTENDED RELEASE ORAL DAILY
Status: DISCONTINUED | OUTPATIENT
Start: 2025-01-03 | End: 2025-01-04 | Stop reason: HOSPADM

## 2025-01-03 RX ORDER — ENOXAPARIN SODIUM 100 MG/ML
40 INJECTION SUBCUTANEOUS DAILY
Status: DISCONTINUED | OUTPATIENT
Start: 2025-01-03 | End: 2025-01-04 | Stop reason: HOSPADM

## 2025-01-03 RX ORDER — BISACODYL 10 MG
10 SUPPOSITORY, RECTAL RECTAL DAILY PRN
Status: DISCONTINUED | OUTPATIENT
Start: 2025-01-03 | End: 2025-01-04 | Stop reason: HOSPADM

## 2025-01-03 RX ORDER — ONDANSETRON 2 MG/ML
4 INJECTION INTRAMUSCULAR; INTRAVENOUS ONCE
Status: COMPLETED | OUTPATIENT
Start: 2025-01-03 | End: 2025-01-03

## 2025-01-03 RX ORDER — ACETAMINOPHEN 325 MG/1
650 TABLET ORAL EVERY 4 HOURS PRN
Status: DISCONTINUED | OUTPATIENT
Start: 2025-01-03 | End: 2025-01-04 | Stop reason: HOSPADM

## 2025-01-03 RX ORDER — POLYETHYLENE GLYCOL 3350 17 G/17G
17 POWDER, FOR SOLUTION ORAL DAILY PRN
Status: DISCONTINUED | OUTPATIENT
Start: 2025-01-03 | End: 2025-01-04 | Stop reason: HOSPADM

## 2025-01-03 RX ORDER — NITROGLYCERIN 0.4 MG/1
0.4 TABLET SUBLINGUAL
Status: DISCONTINUED | OUTPATIENT
Start: 2025-01-03 | End: 2025-01-04 | Stop reason: HOSPADM

## 2025-01-03 RX ORDER — SODIUM CHLORIDE 9 MG/ML
75 INJECTION, SOLUTION INTRAVENOUS CONTINUOUS
Status: DISCONTINUED | OUTPATIENT
Start: 2025-01-03 | End: 2025-01-04 | Stop reason: HOSPADM

## 2025-01-03 RX ORDER — SODIUM CHLORIDE 0.9 % (FLUSH) 0.9 %
10 SYRINGE (ML) INJECTION EVERY 12 HOURS SCHEDULED
Status: DISCONTINUED | OUTPATIENT
Start: 2025-01-03 | End: 2025-01-04 | Stop reason: HOSPADM

## 2025-01-03 RX ORDER — AMOXICILLIN 250 MG
2 CAPSULE ORAL 2 TIMES DAILY PRN
Status: DISCONTINUED | OUTPATIENT
Start: 2025-01-03 | End: 2025-01-04 | Stop reason: HOSPADM

## 2025-01-03 RX ORDER — ONDANSETRON 2 MG/ML
4 INJECTION INTRAMUSCULAR; INTRAVENOUS EVERY 6 HOURS PRN
Status: DISCONTINUED | OUTPATIENT
Start: 2025-01-03 | End: 2025-01-04 | Stop reason: HOSPADM

## 2025-01-03 RX ORDER — SODIUM CHLORIDE 0.9 % (FLUSH) 0.9 %
10 SYRINGE (ML) INJECTION AS NEEDED
Status: DISCONTINUED | OUTPATIENT
Start: 2025-01-03 | End: 2025-01-04 | Stop reason: HOSPADM

## 2025-01-03 RX ORDER — BISACODYL 5 MG/1
5 TABLET, DELAYED RELEASE ORAL DAILY PRN
Status: DISCONTINUED | OUTPATIENT
Start: 2025-01-03 | End: 2025-01-04 | Stop reason: HOSPADM

## 2025-01-03 RX ADMIN — ENOXAPARIN SODIUM 40 MG: 100 INJECTION SUBCUTANEOUS at 20:23

## 2025-01-03 RX ADMIN — HYDRALAZINE HYDROCHLORIDE 25 MG: 25 TABLET ORAL at 20:23

## 2025-01-03 RX ADMIN — ATORVASTATIN CALCIUM 10 MG: 20 TABLET, FILM COATED ORAL at 20:23

## 2025-01-03 RX ADMIN — SODIUM CHLORIDE 500 ML: 9 INJECTION, SOLUTION INTRAVENOUS at 13:18

## 2025-01-03 RX ADMIN — METOPROLOL SUCCINATE 100 MG: 100 TABLET, EXTENDED RELEASE ORAL at 20:32

## 2025-01-03 RX ADMIN — Medication 10 ML: at 20:24

## 2025-01-03 RX ADMIN — ONDANSETRON 4 MG: 2 INJECTION, SOLUTION INTRAMUSCULAR; INTRAVENOUS at 13:47

## 2025-01-03 RX ADMIN — SODIUM CHLORIDE 75 ML/HR: 9 INJECTION, SOLUTION INTRAVENOUS at 20:33

## 2025-01-03 RX ADMIN — LISINOPRIL 40 MG: 20 TABLET ORAL at 20:32

## 2025-01-03 NOTE — ED PROVIDER NOTES
EMERGENCY DEPARTMENT ENCOUNTER  Room Number:  05/05  PCP: Carmen Chacon MD  Independent Historians: Patient      HPI:  Chief Complaint: had concerns including Cough and URI.     A complete HPI/ROS/PMH/PSH/SH/FH are unobtainable due to: None    Chronic or social conditions impacting patient care (Social Determinants of Health): None      Context: Dex Thakkar is a 89 y.o. female with a medical history of venous stasis, hypertension, paroxysmal SVT, hyperlipidemia, atrial fibrillation, and lymphedema who presents to the ED c/o acute cough and congestion.  Patient reports several days ago she developed cough and congestion.  States that approximately 1 week ago she was seen at urgent care for urinary tract infection.  Was started on Bactrim.  2 days ago she began to have significant nausea and dry heaving.  Unable to tolerate her medications at home.  Notices her blood pressure has become significantly elevated.  No reported fever.  No reported diarrhea.  No known sick contacts.  Patient has no other systemic complaints at this time.      Review of prior external notes (non-ED) -and- Review of prior external test results outside of this encounter:  Patient seen at urgent care on 12/28/2024 for cystitis.  Reviewed assessment and plan.  Patient prescribed Bactrim.  Reviewed labs collected on 3//24.  CMP with creatinine 1.06, lipid panel with total cholesterol 146.    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    Carotid  artery stenosis 02/12/2018    Diastolic dysfunction 08/31/2023    Edema 02/12/2018    History of PSVT (paroxysmal supraventricular tachycardia) 12/19/2013    Hyperlipidemia LDL goal <100 03/05/2014    Irritable bladder 03/05/2014    Systolic murmur 05/30/2017     Resolved Ambulatory Problems     Diagnosis Date Noted    Acute diverticulitis 04/09/2019    Diverticular stricture 04/09/2019    Diverticulitis of large intestine with abscess with bleeding 05/06/2019     Past Medical History:   Diagnosis Date    A-fib     Chronic venous hypertension (idiopathic) without complications of left lower extremity     Diverticulitis     Effusion, left ankle     Hyperlipidemia     Incontinence     Malignant melanoma of skin, unspecified     Melanoma     Murmur     Personal history of other venous thrombosis and embolism     Personal history of other venous thrombosis and embolism     Secondary lymphedema 10/27/2021    Spinal stenosis     Unilateral osteoarthritis of hip 04/28/2021    Varicose veins of leg with pain, bilateral 09/12/2018    Venous insufficiency (chronic) (peripheral) 09/12/2018         PAST SURGICAL HISTORY  Past Surgical History:   Procedure Laterality Date    CARDIAC ABLATION N/A 07/17/2017    Dr. Leigh    CHOLECYSTECTOMY N/A     COLON RESECTION N/A 05/15/2019    Procedure: laparoscopic low anterior colon resection with splenic flexure mobilization, left salpingo oophorectomy, drainage of peritoneal abscess;  Surgeon: Renata Ray MD;  Location: [facility] OR;  Service: General    COLONOSCOPY N/A 10/15/2004    Sigmoid diverticulosis, smal rectal polyp, internal hemorrhoids-Dr. Renata Ray    COLONOSCOPY N/A 12/14/2009    Sigmoid diverticulosis-Dr. Renata Ray    COLONOSCOPY N/A 05/09/2019    Procedure: COLONOSCOPY to cecum with cold biopsies;  Surgeon: Renata Ray MD;  Location: [facility];  Service: General    ENDOSCOPY N/A 10/07/2013    Gastric ulcerations x10, ulcerative esophagitis, small  hiatal hernia-Dr. Renata Ray    HYSTERECTOMY      KNEE ARTHROSCOPY W/ PARTIAL MEDIAL MENISCECTOMY Right 10/24/2014    Dr. Bud Muhammad    SIGMOIDOSCOPY N/A 2020    Procedure: FLEXIBLE SIGMOIDOSCOPY WITH BIOPSY, balloon dilation;  Surgeon: Renata Ray MD;  Location:  [facility];  Service: General    SIGMOIDOSCOPY N/A 2020    Procedure: FLEXIBLE SIGMOIDOSCOPY with bx;  Surgeon: Renata Ray MD;  Location:  [facility];  Service: General;  Laterality: N/A;  pre-history of a rectal ulcer and a resection for diverticulitis, with anastomotic stricture found 20  post-anastamotic nodule, mild stricture        SKIN CANCER EXCISION Left     Melanoma left leg         FAMILY HISTORY  Family History   Problem Relation Age of Onset    Brain cancer Mother 81    Cancer Father     Heart disease Father     Breast cancer Sister     Heart valve disorder Sister         Bicuspid AV    Cancer Daughter     Heart valve disorder Daughter         Bicuspid AV    Atrial fibrillation Daughter     Malig Hyperthermia Neg Hx          SOCIAL HISTORY  Social History     Socioeconomic History    Marital status:    Tobacco Use    Smoking status: Former     Current packs/day: 0.00     Types: Cigarettes     Quit date: 1984     Years since quittin.4     Passive exposure: Past    Smokeless tobacco: Never    Tobacco comments:     QUIT 30  YRS AGO   Vaping Use    Vaping status: Never Used   Substance and Sexual Activity    Alcohol use: Yes     Alcohol/week: 1.0 standard drink of alcohol     Types: 1 Glasses of wine per week     Comment: OCCASIONAL    Drug use: No    Sexual activity: Defer         ALLERGIES  Patient has no known allergies.      REVIEW OF SYSTEMS  Included in HPI  All systems reviewed and negative except for those discussed in HPI.      PHYSICAL EXAM    I have reviewed the triage vital signs and nursing notes.    ED Triage Vitals   Temp Heart Rate Resp BP SpO2   25 1110 25 1110  01/03/25 1110 01/03/25 1113 01/03/25 1110   98.7 °F (37.1 °C) 97 16 (!) 223/84 98 %      Temp src Heart Rate Source Patient Position BP Location FiO2 (%)   01/03/25 1110 -- -- -- --   Tympanic           Physical Exam  Constitutional:       General: She is not in acute distress.     Appearance: She is well-developed.   HENT:      Head: Normocephalic and atraumatic.   Eyes:      Extraocular Movements: Extraocular movements intact.   Cardiovascular:      Rate and Rhythm: Normal rate and regular rhythm.      Heart sounds: Normal heart sounds.      Comments: Distal pulses intact  Pulmonary:      Effort: Pulmonary effort is normal.      Breath sounds: Normal breath sounds.   Abdominal:      General: There is no distension.   Skin:     General: Skin is warm.   Neurological:      General: No focal deficit present.      Mental Status: She is alert and oriented to person, place, and time.   Psychiatric:         Mood and Affect: Mood normal.             LAB RESULTS  Recent Results (from the past 24 hours)   Comprehensive Metabolic Panel    Collection Time: 01/03/25 12:26 PM    Specimen: Blood   Result Value Ref Range    Glucose 108 (H) 65 - 99 mg/dL    BUN 22 8 - 23 mg/dL    Creatinine 1.23 (H) 0.57 - 1.00 mg/dL    Sodium 126 (L) 136 - 145 mmol/L    Potassium 4.6 3.5 - 5.2 mmol/L    Chloride 91 (L) 98 - 107 mmol/L    CO2 22.0 22.0 - 29.0 mmol/L    Calcium 9.7 (H) 8.2 - 9.6 mg/dL    Total Protein 7.7 6.0 - 8.5 g/dL    Albumin 4.1 3.5 - 5.2 g/dL    ALT (SGPT) 18 1 - 33 U/L    AST (SGOT) 22 1 - 32 U/L    Alkaline Phosphatase 115 39 - 117 U/L    Total Bilirubin 0.6 0.0 - 1.2 mg/dL    Globulin 3.6 gm/dL    A/G Ratio 1.1 g/dL    BUN/Creatinine Ratio 17.9 7.0 - 25.0    Anion Gap 13.0 5.0 - 15.0 mmol/L    eGFR 41.8 (L) >60.0 mL/min/1.73   Respiratory Panel PCR w/COVID-19(SARS-CoV-2) [facility] In-House, NP Swab in UTM/VTM, 2 HR TAT - Swab, Nasopharynx    Collection Time: 01/03/25 12:26 PM    Specimen: Nasopharynx; Swab    Result Value Ref Range    ADENOVIRUS, PCR Not Detected Not Detected    Coronavirus 229E Not Detected Not Detected    Coronavirus HKU1 Not Detected Not Detected    Coronavirus NL63 Detected (A) Not Detected    Coronavirus OC43 Not Detected Not Detected    COVID19 Not Detected Not Detected - Ref. Range    Human Metapneumovirus Not Detected Not Detected    Human Rhinovirus/Enterovirus Not Detected Not Detected    Influenza A PCR Not Detected Not Detected    Influenza B PCR Not Detected Not Detected    Parainfluenza Virus 1 Not Detected Not Detected    Parainfluenza Virus 2 Not Detected Not Detected    Parainfluenza Virus 3 Not Detected Not Detected    Parainfluenza Virus 4 Not Detected Not Detected    RSV, PCR Not Detected Not Detected    Bordetella pertussis pcr Not Detected Not Detected    Bordetella parapertussis PCR Not Detected Not Detected    Chlamydophila pneumoniae PCR Not Detected Not Detected    Mycoplasma pneumo by PCR Not Detected Not Detected   CBC Auto Differential    Collection Time: 01/03/25 12:26 PM    Specimen: Blood   Result Value Ref Range    WBC 5.88 3.40 - 10.80 10*3/mm3    RBC 4.30 3.77 - 5.28 10*6/mm3    Hemoglobin 13.2 12.0 - 15.9 g/dL    Hematocrit 37.8 34.0 - 46.6 %    MCV 87.9 79.0 - 97.0 fL    MCH 30.7 26.6 - 33.0 pg    MCHC 34.9 31.5 - 35.7 g/dL    RDW 12.4 12.3 - 15.4 %    RDW-SD 39.1 37.0 - 54.0 fl    MPV 11.1 6.0 - 12.0 fL    Platelets 215 140 - 450 10*3/mm3    Neutrophil % 77.5 (H) 42.7 - 76.0 %    Lymphocyte % 11.6 (L) 19.6 - 45.3 %    Monocyte % 9.9 5.0 - 12.0 %    Eosinophil % 0.2 (L) 0.3 - 6.2 %    Basophil % 0.3 0.0 - 1.5 %    Immature Grans % 0.5 0.0 - 0.5 %    Neutrophils, Absolute 4.56 1.70 - 7.00 10*3/mm3    Lymphocytes, Absolute 0.68 (L) 0.70 - 3.10 10*3/mm3    Monocytes, Absolute 0.58 0.10 - 0.90 10*3/mm3    Eosinophils, Absolute 0.01 0.00 - 0.40 10*3/mm3    Basophils, Absolute 0.02 0.00 - 0.20 10*3/mm3    Immature Grans, Absolute 0.03 0.00 - 0.05 10*3/mm3    nRBC 0.0 0.0  - 0.2 /100 WBC   Lipase    Collection Time: 01/03/25 12:26 PM    Specimen: Blood   Result Value Ref Range    Lipase 36 13 - 60 U/L   Gray Top    Collection Time: 01/03/25 12:26 PM   Result Value Ref Range    Extra Tube Hold for add-ons.    Urinalysis With Microscopic If Indicated (No Culture) - Urine, Clean Catch    Collection Time: 01/03/25 12:33 PM    Specimen: Urine, Clean Catch   Result Value Ref Range    Color, UA Yellow Yellow, Straw    Appearance, UA Clear Clear    pH, UA 5.5 5.0 - 8.0    Specific Gravity, UA 1.017 1.005 - 1.030    Glucose, UA Negative Negative    Ketones, UA 15 mg/dL (1+) (A) Negative    Bilirubin, UA Negative Negative    Blood, UA Negative Negative    Protein, UA Negative Negative    Leuk Esterase, UA Small (1+) (A) Negative    Nitrite, UA Negative Negative    Urobilinogen, UA 0.2 E.U./dL 0.2 - 1.0 E.U./dL   Urinalysis, Microscopic Only - Urine, Clean Catch    Collection Time: 01/03/25 12:33 PM    Specimen: Urine, Clean Catch   Result Value Ref Range    RBC, UA 0-2 None Seen, 0-2 /HPF    WBC, UA 6-10 (A) None Seen, 0-2 /HPF    Bacteria, UA None Seen None Seen /HPF    Squamous Epithelial Cells, UA 3-6 (A) None Seen, 0-2 /HPF    Hyaline Casts, UA 3-6 None Seen /LPF    Methodology Automated Microscopy          RADIOLOGY  XR Chest 2 View    Result Date: 1/3/2025  XR CHEST 2 VW-1/3/2025  HISTORY: Cough.  The heart size is within normal limits. Lungs appear free of acute infiltrates. Calcified plaque is seen in the right base probably along the right hemidiaphragm and also seen on the 4/19/2021 study. There is some aortic calcification.      1. No acute process.   This report was finalized on 1/3/2025 1:04 PM by Dr. Juno Roy M.D on Workstation: [id number]         MEDICATIONS GIVEN IN ER  Medications   labetalol (NORMODYNE,TRANDATE) injection 10 mg (0 mg Intravenous Hold 1/3/25 1346)   ondansetron (ZOFRAN) injection 4 mg (4 mg Intravenous Given 1/3/25 1347)   sodium chloride 0.9 %  bolus 500 mL (500 mL Intravenous New Bag 1/3/25 9276)         ORDERS PLACED DURING THIS VISIT:  Orders Placed This Encounter   Procedures    Respiratory Panel PCR w/COVID-19(SARS-CoV-2) [facility] In-House, NP Swab in UTM/VTM, 2 HR TAT - Swab, Nasopharynx    XR Chest 2 View    Comprehensive Metabolic Panel    CBC Auto Differential    Urinalysis With Microscopic If Indicated (No Culture) - Urine, Clean Catch    Lipase    Urinalysis, Microscopic Only - Urine, Clean Catch    [facility] (on-call MD unless specified) Details    Initiate Observation Status    CBC & Differential    Extra Tubes    Gray Top         OUTPATIENT MEDICATION MANAGEMENT:  Current Facility-Administered Medications Ordered in Epic   Medication Dose Route Frequency Provider Last Rate Last Admin    labetalol (NORMODYNE,TRANDATE) injection 10 mg  10 mg Intravenous Once Adele Sheppard PA-C         Current Outpatient Medications Ordered in Epic   Medication Sig Dispense Refill    atorvastatin (LIPITOR) 10 MG tablet Take 1 tablet by mouth Daily. 30 tablet 11    estradiol (ESTRACE VAGINAL) 0.1 MG/GM vaginal cream Insert 2 g into the vagina 3 (Three) Times a Week. Indications: Inflammation of Bladder due to Infection, Vulvovaginal Atrophy 42.5 g 1    hydrALAZINE (APRESOLINE) 25 MG tablet TAKE 1 TABLET BY MOUTH 2 TIMES A  tablet 0    lisinopril (PRINIVIL,ZESTRIL) 40 MG tablet Take 1 tablet by mouth Daily. 90 tablet 3    metoprolol succinate XL (TOPROL-XL) 100 MG 24 hr tablet Take 1 tablet by mouth Daily.      sulfamethoxazole-trimethoprim (Bactrim DS) 800-160 MG per tablet Take 1 tablet by mouth 2 (Two) Times a Day. 14 tablet 0           PROGRESS, DATA ANALYSIS, CONSULTS, AND MEDICAL DECISION MAKING  All labs have been independently interpreted by me.  All radiology studies have been reviewed by me. All EKG's have been independently viewed and interpreted by me.  Discussion below represents my analysis of pertinent findings related to  patient's condition, differential diagnosis, treatment plan and final disposition.    Differential diagnosis includes but is not limited to pneumonia, viral respiratory infection, dehydration.        ED Course as of 01/03/25 1403   Fri Jan 03, 2025   1311 WBC: 5.88 [MP]   1311 Hemoglobin: 13.2 [MP]   1311 BUN: 22 [MP]   1311 Creatinine(!): 1.23 [MP]   1311 Sodium(!): 126 [MP]   1311 RBC, UA: 0-2 [MP]   1311 WBC, UA(!): 6-10 [MP]   1311 Bacteria, UA: None Seen [MP]   1341 Coronavirus NL63(!): Detected [MP]   1401 I spoke with Dr. Cifuentes with [facility].  Discussed patient presentation and ED findings.  He agrees to admit the patient to a telemetry observation bed. [MP]      ED Course User Index  [MP] Adele Sheppard PA-C             AS OF 14:03 EST VITALS:    BP - 173/71  HR - 75  TEMP - 98.7 °F (37.1 °C) (Tympanic)  O2 SATS - 98%    COMPLEXITY OF CARE  The patient requires admission.      DIAGNOSIS  Final diagnoses:   Hyponatremia   Hypertension not at goal   Coronavirus infection         DISPOSITION  ED Disposition       ED Disposition   Decision to Admit    Condition   --    Comment   Level of Care: Telemetry [5]   Diagnosis: Hyponatremia [770216]   Admitting Physician: SAURABH CIFUENTES [[id number]]   Attending Physician: SAURABH CIFUENTES [[id number]]   Is patient appropriate for Inpatient Observation Unit?: No [0]                  Please note that portions of this document were completed with a voice recognition program.    Note Disclaimer: At [facility], we believe that sharing information builds trust and better relationships. You are receiving this note because you recently visited [facility]. It is possible you will see health information before a provider has talked with you about it. This kind of information can be easy to misunderstand. To help you fully understand what it means for your health, we urge you to discuss this note with your provider.     Adele Sheppard PA-C  01/03/25 1403

## 2025-01-03 NOTE — H&P
HISTORY AND PHYSICAL   [facility]        Patient Identification:  Name: Dex Thakkar  Age: 89 y.o.  Sex: female  :  1934  MRN: [id number]                     Primary Care Physician: Carmen Chacon MD    Chief Complaint: Cough, vomiting    History of Present Illness:   Pleasant 89-year-old female went to urgent care a couple days ago due to bladder spasms.  She has diagnoses UTI and given a prescription for Bactrim.  Persistent the patient is noted increasing fatigue, poor exercise tolerance, double global headache and then a dry cough and nausea and vomiting.  Has not been able to keep much with solid food down although some liquids.  No abdominal pain.  No diarrhea.  Also noted the sense of congestion and dyspnea on exertion.  At this point she is feeling comfortable again and has been sipping on Pedialyte without difficulty.  She did not note any fever sweats or chills.  Bladder spasms have resolved.  She notes history of periodic issues with bladder spasms.    Past Medical History:  Past Medical History:   Diagnosis Date    A-fib     Chronic venous hypertension (idiopathic) without complications of left lower extremity     Diverticulitis     Effusion, left ankle     H/O cardiac radiofrequency ablation     Hyperlipidemia     Hypertension     Incontinence     Malignant melanoma of skin, unspecified     Melanoma     Murmur     Personal history of other venous thrombosis and embolism     Personal history of other venous thrombosis and embolism     Secondary lymphedema 10/27/2021    Spinal stenosis     Unilateral osteoarthritis of hip 2021    Varicose veins of leg with pain, bilateral 2018    Venous insufficiency (chronic) (peripheral) 2018     Past Surgical History:  Past Surgical History:   Procedure Laterality Date    CARDIAC ABLATION N/A 2017    Dr. Leigh    CHOLECYSTECTOMY N/A     COLON RESECTION N/A 05/15/2019    Procedure: laparoscopic low anterior colon  resection with splenic flexure mobilization, left salpingo oophorectomy, drainage of peritoneal abscess;  Surgeon: Renata Ray MD;  [facility];  Service: General    COLONOSCOPY N/A 10/15/2004    Sigmoid diverticulosis, smal rectal polyp, internal hemorrhoids-Dr. Renata Ray    COLONOSCOPY N/A 2009    Sigmoid diverticulosis-Dr. Renata Ray    COLONOSCOPY N/A 2019    Procedure: COLONOSCOPY to cecum with cold biopsies;  Surgeon: Renata Ray MD;  [facility];  Service: General    ENDOSCOPY N/A 10/07/2013    Gastric ulcerations x10, ulcerative esophagitis, small hiatal hernia-Dr. Renata Ray    HYSTERECTOMY      KNEE ARTHROSCOPY W/ PARTIAL MEDIAL MENISCECTOMY Right 10/24/2014    Dr. Bud Muhammad    SIGMOIDOSCOPY N/A 2020    Procedure: FLEXIBLE SIGMOIDOSCOPY WITH BIOPSY, balloon dilation;  Surgeon: Renata Ray MD;  [facility];  Service: General    SIGMOIDOSCOPY N/A 2020    Procedure: FLEXIBLE SIGMOIDOSCOPY with bx;  Surgeon: Renata Ray MD;  [facility];  Service: General;  Laterality: N/A;  pre-history of a rectal ulcer and a resection for diverticulitis, with anastomotic stricture found 20  post-anastamotic nodule, mild stricture        SKIN CANCER EXCISION Left     Melanoma left leg      Home Meds:  (Not in a hospital admission)      Allergies:  No Known Allergies  Immunizations:  Immunization History   Administered Date(s) Administered    COVID-19 (PFIZER) 12YRS+ (COMIRNATY) 10/17/2023    COVID-19 (PFIZER) Purple Cap Monovalent 2021, 2021    Covid-19 (Pfizer) Gray Cap Monovalent 2022     Social History:   Social History     Social History Narrative    Not on file     Social History     Tobacco Use    Smoking status: Former     Current packs/day: 0.00     Types: Cigarettes     Quit date: 1984     Years since quittin.4     Passive exposure: Past    Smokeless tobacco: Never    Tobacco  comments:     QUIT 30  YRS AGO   Substance Use Topics    Alcohol use: Yes     Alcohol/week: 1.0 standard drink of alcohol     Types: 1 Glasses of wine per week     Comment: OCCASIONAL     Family History:  Family History   Problem Relation Age of Onset    Brain cancer Mother 81    Cancer Father     Heart disease Father     Breast cancer Sister     Heart valve disorder Sister         Bicuspid AV    Cancer Daughter     Heart valve disorder Daughter         Bicuspid AV    Atrial fibrillation Daughter     Malig Hyperthermia Neg Hx         Review of Systems  Review of Systems   Constitutional:         As per history of present illness   HENT:          As per history of present illness.   Eyes: Negative.    Respiratory:          As per history of present illness.   Cardiovascular: Negative.    Gastrointestinal:         As per history of present illness.   Endocrine: Negative.    Genitourinary:         As per history of present illness.   Musculoskeletal: Negative.    Skin: Negative.    Allergic/Immunologic: Negative.    Neurological: Negative.    Hematological: Negative.    Psychiatric/Behavioral: Negative.         Objective:  T Max 24 hrs: Temp (24hrs), Av.7 °F (37.1 °C), Min:98.7 °F (37.1 °C), Max:98.7 °F (37.1 °C)    Vitals Ranges:   Temp:  [98.7 °F (37.1 °C)] 98.7 °F (37.1 °C)  Heart Rate:  [75-97] 78  Resp:  [16] 16  BP: (173-223)/(71-84) 173/71      Exam:  Physical Exam  Constitutional:       General: She is not in acute distress.     Appearance: Normal appearance. She is obese. She is not ill-appearing, toxic-appearing or diaphoretic.   HENT:      Head: Normocephalic and atraumatic.      Right Ear: External ear normal.      Left Ear: External ear normal.      Nose: Nose normal.      Mouth/Throat:      Mouth: Mucous membranes are dry.   Eyes:      General: No scleral icterus.        Right eye: No discharge.         Left eye: No discharge.      Extraocular Movements: Extraocular movements intact.       Conjunctiva/sclera: Conjunctivae normal.   Cardiovascular:      Rate and Rhythm: Normal rate and regular rhythm.      Heart sounds: Murmur heard.      No friction rub. No gallop.      Comments: 2/6 systolic murmur loudest right upper sternal border.  Pulmonary:      Effort: Pulmonary effort is normal. No respiratory distress.      Breath sounds: No stridor. Rhonchi present. No wheezing or rales.      Comments: A rare rhonchi.  Good air movement.  Chest:      Chest wall: No tenderness.   Abdominal:      General: Abdomen is flat. Bowel sounds are normal. There is no distension.      Palpations: Abdomen is soft. There is no mass.      Tenderness: There is no abdominal tenderness. There is no guarding or rebound.   Musculoskeletal:      Cervical back: Neck supple.      Right lower leg: No edema.      Left lower leg: No edema.   Skin:     General: Skin is warm and dry.   Neurological:      General: No focal deficit present.      Mental Status: She is alert and oriented to person, place, and time.   Psychiatric:         Mood and Affect: Mood normal.         Behavior: Behavior normal.         Thought Content: Thought content normal.         Judgment: Judgment normal.         Data Review:  All labs and radiology reviewed.    Assessment:  Hyponatremia: Likely secondary to nausea and vomiting secondary dehydration.  Urine osmolality, urine sodium pending.  Continue treatment of dehydration with normal saline IV fluids and monitor closely.  Dehydration: Secondary to nausea and vomiting.  Creatinine elevated from baseline.  Continue hydration and monitor closely.  Symptomatic treatment of nausea vomiting.  Coronavirus: Symptomatic treatment.  Likely accounting for the dull headache, generalized malaise, dry cough.  Continue to monitor.  Nausea vomiting: Symptomatic treatment.  Appears to be improving at present.  Possible side effect of antibiotic.  Recent UTI treatment: Presently asymptomatic.  Repeat urinalysis unremarkable.   Hold on any further antibiotics.  Hypertension: Continue home meds.  Monitor.  Initial blood pressure markedly elevated.  Likely secondary to vomiting medications.  Now much improved.  History of PSVT/atrial fibrillation: Status post ablation.  Presently regular rate and rhythm.  Monitor.      Plan:  Please see above.  Discussed with patient and son at bedside.  Discussed with ER provider.    Gonzalo Cifuentes MD  1/3/2025  15:00 EST    EMR Dragon/Transcription disclaimer:   Much of this encounter note is an electronic transcription/translation of spoken language to printed text. The electronic translation of spoken language may permit erroneous, or at times, nonsensical words or phrases to be inadvertently transcribed; Although I have reviewed the note for such errors, some may still exist.

## 2025-01-03 NOTE — ED NOTES
15:23 RN attempted to call report to 6E RN unavailiable    Nursing report ED to floor  Dex Thakkar  89 y.o.  female    HPI :   Chief Complaint   Patient presents with    Cough    URI       Admitting doctor:   Gonzalo Cifuentes MD    Admitting diagnosis:   The primary encounter diagnosis was Hyponatremia. Diagnoses of Hypertension not at goal and Coronavirus infection were also pertinent to this visit.    Code status:   Current Code Status       Date Active Code Status Order ID Comments User Context       Prior            Allergies:   Patient has no known allergies.    Intake and Output  No intake or output data in the 24 hours ending 01/03/25 1451    Weight:   There were no vitals filed for this visit.    Most recent vitals:   Vitals:    01/03/25 1211 01/03/25 1346 01/03/25 1449 01/03/25 1450   BP: (!) 198/83 173/71     Pulse:  75 81 78   Resp:       Temp:       TempSrc:       SpO2:   99% 99%       Active LDAs/IV Access:   Lines, Drains & Airways       Active LDAs       Name Placement date Placement time Site Days    Peripheral IV 09/14/23 1659 Anterior;Proximal;Right Antecubital 09/14/23  1659  Antecubital  476    Peripheral IV 01/03/25 1342 Left Antecubital 01/03/25  1342  Antecubital  less than 1    Closed/Suction Drain 1 Left Abdomen Bulb 19 Fr. 05/15/19  1648  Abdomen  2059                    Labs (abnormal labs have a star):   Labs Reviewed   RESPIRATORY PANEL PCR W/ COVID-19 (SARS-COV-2), NP SWAB IN UTM/VTP, 2 HR TAT - Abnormal; Notable for the following components:       Result Value    Coronavirus NL63 Detected (*)     All other components within normal limits    Narrative:     In the setting of a positive respiratory panel with a viral infection PLUS a negative procalcitonin without other underlying concern for bacterial infection, consider observing off antibiotics or discontinuation of antibiotics and continue supportive care. If the respiratory panel is positive for atypical bacterial infection  (Bordetella pertussis, Chlamydophila pneumoniae, or Mycoplasma pneumoniae), consider antibiotic de-escalation to target atypical bacterial infection.   COMPREHENSIVE METABOLIC PANEL - Abnormal; Notable for the following components:    Glucose 108 (*)     Creatinine 1.23 (*)     Sodium 126 (*)     Chloride 91 (*)     Calcium 9.7 (*)     eGFR 41.8 (*)     All other components within normal limits    Narrative:     GFR Categories in Chronic Kidney Disease (CKD)      GFR Category          GFR (mL/min/1.73)    Interpretation  G1                     90 or greater         Normal or high (1)  G2                      60-89                Mild decrease (1)  G3a                   45-59                Mild to moderate decrease  G3b                   30-44                Moderate to severe decrease  G4                    15-29                Severe decrease  G5                    14 or less           Kidney failure          (1)In the absence of evidence of kidney disease, neither GFR category G1 or G2 fulfill the criteria for CKD.    eGFR calculation 2021 CKD-EPI creatinine equation, which does not include race as a factor   CBC WITH AUTO DIFFERENTIAL - Abnormal; Notable for the following components:    Neutrophil % 77.5 (*)     Lymphocyte % 11.6 (*)     Eosinophil % 0.2 (*)     Lymphocytes, Absolute 0.68 (*)     All other components within normal limits   URINALYSIS W/ MICROSCOPIC IF INDICATED (NO CULTURE) - Abnormal; Notable for the following components:    Ketones, UA 15 mg/dL (1+) (*)     Leuk Esterase, UA Small (1+) (*)     All other components within normal limits   URINALYSIS, MICROSCOPIC ONLY - Abnormal; Notable for the following components:    WBC, UA 6-10 (*)     Squamous Epithelial Cells, UA 3-6 (*)     All other components within normal limits   LIPASE - Normal   TSH RFX ON ABNORMAL TO FREE T4 - Normal   SODIUM, URINE, RANDOM   OSMOLALITY, URINE   CBC AND DIFFERENTIAL    Narrative:     The following orders were  created for panel order CBC & Differential.  Procedure                               Abnormality         Status                     ---------                               -----------         ------                     CBC Auto Differential[[id number]]        Abnormal            Final result                 Please view results for these tests on the individual orders.   EXTRA TUBES    Narrative:     The following orders were created for panel order Extra Tubes.  Procedure                               Abnormality         Status                     ---------                               -----------         ------                     Redmond Top[[id number]]                                         Final result                 Please view results for these tests on the individual orders.   GRAY TOP       EKG:   No orders to display       Meds given in ED:   Medications   labetalol (NORMODYNE,TRANDATE) injection 10 mg (0 mg Intravenous Hold 1/3/25 1346)   ondansetron (ZOFRAN) injection 4 mg (4 mg Intravenous Given 1/3/25 1347)   sodium chloride 0.9 % bolus 500 mL (500 mL Intravenous New Bag 1/3/25 1318)       Imaging results:  XR Chest 2 View    Result Date: 1/3/2025  1. No acute process.   This report was finalized on 1/3/2025 1:04 PM by Dr. Juno Roy M.D on Workstation: [id number]       Ambulatory status:   - BR    Social issues:   Social History     Socioeconomic History    Marital status:    Tobacco Use    Smoking status: Former     Current packs/day: 0.00     Types: Cigarettes     Quit date: 1984     Years since quittin.4     Passive exposure: Past    Smokeless tobacco: Never    Tobacco comments:     QUIT 30  YRS AGO   Vaping Use    Vaping status: Never Used   Substance and Sexual Activity    Alcohol use: Yes     Alcohol/week: 1.0 standard drink of alcohol     Types: 1 Glasses of wine per week     Comment: OCCASIONAL    Drug use: No    Sexual activity: Defer       NIH Stroke Scale:         Nursing report ED to floor:

## 2025-01-03 NOTE — PROGRESS NOTES
Clinical Pharmacy Services: Medication History    Dex Thakkar is a 89 y.o. female presenting to [facility] for   Chief Complaint   Patient presents with    Cough    URI       She  has a past medical history of A-fib, Chronic venous hypertension (idiopathic) without complications of left lower extremity, Diverticulitis, Effusion, left ankle, H/O cardiac radiofrequency ablation, Hyperlipidemia, Hypertension, Incontinence, Malignant melanoma of skin, unspecified, Melanoma, Murmur, Personal history of other venous thrombosis and embolism, Personal history of other venous thrombosis and embolism, Secondary lymphedema (10/27/2021), Spinal stenosis, Unilateral osteoarthritis of hip (04/28/2021), Varicose veins of leg with pain, 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    Allergies as of 01/03/2025    (No Known Allergies)       Medication information was obtained from: Patient  Pharmacy and Phone Number:     Prior to Admission Medications       Prescriptions Last Dose Informant Patient Reported? Taking?    atorvastatin (LIPITOR) 10 MG tablet   No Yes    Take 1 tablet by mouth Daily.    hydrALAZINE (APRESOLINE) 25 MG tablet  Self No Yes    TAKE 1 TABLET BY MOUTH 2 TIMES A DAY    lisinopril (PRINIVIL,ZESTRIL) 40 MG tablet  Self No Yes    Take 1 tablet by mouth Daily.    metoprolol succinate XL (TOPROL-XL) 100 MG 24 hr tablet  Self Yes Yes    Take 1 tablet by mouth Daily.              Medication notes:     This medication list is complete to the best of my knowledge as of 1/3/2025    Please call if questions.    Akash Jackson  Medication History Technician  [phone]    1/3/2025 14:54 EST

## 2025-01-04 VITALS
HEART RATE: 62 BPM | OXYGEN SATURATION: 96 % | RESPIRATION RATE: 18 BRPM | DIASTOLIC BLOOD PRESSURE: 61 MMHG | TEMPERATURE: 97.5 F | SYSTOLIC BLOOD PRESSURE: 153 MMHG

## 2025-01-04 PROBLEM — B34.2 CORONAVIRUS INFECTION: Status: ACTIVE | Noted: 2025-01-04

## 2025-01-04 LAB
ANION GAP SERPL CALCULATED.3IONS-SCNC: 7.6 MMOL/L (ref 5–15)
BUN SERPL-MCNC: 22 MG/DL (ref 8–23)
BUN/CREAT SERPL: 18.5 (ref 7–25)
CALCIUM SPEC-SCNC: 8.6 MG/DL (ref 8.2–9.6)
CHLORIDE SERPL-SCNC: 97 MMOL/L (ref 98–107)
CO2 SERPL-SCNC: 23.4 MMOL/L (ref 22–29)
CREAT SERPL-MCNC: 1.19 MG/DL (ref 0.57–1)
EGFRCR SERPLBLD CKD-EPI 2021: 43.5 ML/MIN/1.73
GLUCOSE SERPL-MCNC: 84 MG/DL (ref 65–99)
MAGNESIUM SERPL-MCNC: 2 MG/DL (ref 1.6–2.4)
PHOSPHATE SERPL-MCNC: 2.5 MG/DL (ref 2.5–4.5)
POTASSIUM SERPL-SCNC: 4.3 MMOL/L (ref 3.5–5.2)
SODIUM SERPL-SCNC: 128 MMOL/L (ref 136–145)

## 2025-01-04 PROCEDURE — 96372 THER/PROPH/DIAG INJ SC/IM: CPT

## 2025-01-04 PROCEDURE — 84100 ASSAY OF PHOSPHORUS: CPT | Performed by: HOSPITALIST

## 2025-01-04 PROCEDURE — 25010000002 ENOXAPARIN PER 10 MG: Performed by: HOSPITALIST

## 2025-01-04 PROCEDURE — G0378 HOSPITAL OBSERVATION PER HR: HCPCS

## 2025-01-04 PROCEDURE — 80048 BASIC METABOLIC PNL TOTAL CA: CPT | Performed by: HOSPITALIST

## 2025-01-04 PROCEDURE — 83735 ASSAY OF MAGNESIUM: CPT | Performed by: HOSPITALIST

## 2025-01-04 RX ADMIN — METOPROLOL SUCCINATE 100 MG: 100 TABLET, EXTENDED RELEASE ORAL at 09:46

## 2025-01-04 RX ADMIN — Medication 10 ML: at 09:46

## 2025-01-04 RX ADMIN — HYDRALAZINE HYDROCHLORIDE 25 MG: 25 TABLET ORAL at 09:45

## 2025-01-04 RX ADMIN — ATORVASTATIN CALCIUM 10 MG: 20 TABLET, FILM COATED ORAL at 09:44

## 2025-01-04 RX ADMIN — ENOXAPARIN SODIUM 40 MG: 100 INJECTION SUBCUTANEOUS at 09:44

## 2025-01-04 RX ADMIN — LISINOPRIL 40 MG: 20 TABLET ORAL at 09:46

## 2025-01-04 NOTE — CASE MANAGEMENT/SOCIAL WORK
Case Management Discharge Note      Final Note: dc home         Selected Continued Care - Discharged on 1/4/2025 Admission date: 1/3/2025 - Discharge disposition: Home or Self Care      Destination    No services have been selected for the patient.                Durable Medical Equipment    No services have been selected for the patient.                Dialysis/Infusion    No services have been selected for the patient.                Home Medical Care    No services have been selected for the patient.                Therapy    No services have been selected for the patient.                Community Resources    No services have been selected for the patient.                Community & DME    No services have been selected for the patient.                         Final Discharge Disposition Code: 01 - home or self-care

## 2025-01-04 NOTE — PROGRESS NOTES
[facility] Clinical Pharmacy Services: Enoxaparin Consult    Dex Thakkar has a pharmacy consult to dose enoxaparin for the indication of VTE prophylaxis.    Home Anticoagulation: None     Relevant clinical data and objective history reviewed:  89 y.o. female       There is no height or weight on file to calculate BMI.   Results from last 7 days   Lab Units 01/03/25  1226   PLATELETS 10*3/mm3 215     Estimated Creatinine Clearance: 32.8 mL/min (A) (by C-G formula based on SCr of 1.23 mg/dL (H)).    Assessment/Plan    Will start patient on enoxaparin 40mg subcutaneous every 24 hours based on weight + current renal function. Consult order will be discontinued but pharmacy will continue to follow.     Cheyenne Gowers, RPH  Clinical Pharmacist

## 2025-01-04 NOTE — DISCHARGE SUMMARY
Date of Admission: 1/3/2025  Date of Discharge:  1/4/2025  Primary Care Physician: Carmen Chacon MD     Discharge Diagnosis:  Active Hospital Problems    Diagnosis  POA    **Coronavirus infection [B34.2]  Unknown    Hyponatremia [E87.1]  Yes    Hypertension [I10]  Yes      Resolved Hospital Problems   No resolved problems to display.       DETAILS OF HOSPITAL STAY     Pertinent Test Results and Procedures Performed    Chest x-ray showed no acute infiltrate, consolidation, or effusion    Hospital Course  This is a very pleasant 89-year-old female who presented to the emergency room with complaints of coughing and vomiting.  Please see H&P for full details.  She was found to have hyponatremia with sodium of 126.  She also was found to be positive for coronavirus (non-COVID type).  She was admitted overnight last night provided supportive care with IV fluids and antiemetics.  Her sodium is improving.  She had a bit of a jump of her creatinine but this is improving as well.  And is now close to baseline at 1.19.  The patient is significantly improved and states that her appetite is fully returned, her headache has resolved.  She is no longer nauseous or vomiting.  She would like to be discharged home before the bad weather hits.  She appears medically stable this time and we will release her.  I have asked her to follow-up with her primary care physician in 1 week and she can have a repeat BMP at that time.    Physical Exam at Discharge:  General: No acute distress, AAOx3  HEENT: EOMI, PERRL  Cardiovascular: +s1 and s2, RRR  Lungs: No rhonchi or wheezing  Abdomen: soft, nontender    Consults:   Consults       Date and Time Order Name Status Description    1/3/2025  1:37 PM [facility] (on-call MD unless specified) Details                Condition on Discharge: Stable, improved    Discharge Disposition  Home or Self Care    Discharge Medications     Discharge Medications        Continue These Medications        Instructions  Start Date   atorvastatin 10 MG tablet  Commonly known as: LIPITOR   10 mg, Oral, Daily      hydrALAZINE 25 MG tablet  Commonly known as: APRESOLINE   25 mg, Oral, 2 Times Daily      lisinopril 40 MG tablet  Commonly known as: PRINIVIL,ZESTRIL   40 mg, Oral, Daily      metoprolol succinate  MG 24 hr tablet  Commonly known as: TOPROL-XL   1 tablet, Daily               Discharge Diet:   Diet Instructions       Diet: Regular/House Diet; Regular (IDDSI 7); Thin (IDDSI 0)      Discharge Diet: Regular/House Diet    Texture: Regular (IDDSI 7)    Fluid Consistency: Thin (IDDSI 0)            Activity at Discharge:   Activity Instructions       Activity as Tolerated              Follow-up Appointments  Future Appointments   Date Time Provider Department Center   10/20/2025  2:00 PM Genesis Knight APRN [facility]     Additional Instructions for the Follow-ups that You Need to Schedule       Discharge Follow-up with PCP   As directed       Currently Documented PCP:    Carmen Chacon MD    PCP Phone Number:    [phone]     Follow Up Details: 1 week                I have examined and discussed discharge planning with the patient today.    Sergio Thurman MD  01/04/25  10:37 EST    Time: Discharge greater than 30 min

## 2025-01-04 NOTE — PLAN OF CARE
Goal Outcome Evaluation:      Pt resting in bed , Aox4 room air , SR on tele .VSS, no sign of acute distress noted. Denies N/V. IVF continues to infuse . Plan of care is ongoing

## 2025-02-03 ENCOUNTER — TRANSCRIBE ORDERS (OUTPATIENT)
Dept: ADMINISTRATIVE | Facility: HOSPITAL | Age: OVER 89
End: 2025-02-03
Payer: MEDICARE

## 2025-02-03 ENCOUNTER — LAB (OUTPATIENT)
Dept: LAB | Facility: HOSPITAL | Age: OVER 89
End: 2025-02-03
Payer: MEDICARE

## 2025-02-03 DIAGNOSIS — N39.0 RECURRENT UTI: ICD-10-CM

## 2025-02-03 DIAGNOSIS — N39.0 RECURRENT UTI: Primary | ICD-10-CM

## 2025-02-03 LAB
ALBUMIN SERPL-MCNC: 3.3 G/DL (ref 3.5–5.2)
ALBUMIN/GLOB SERPL: 0.9 G/DL
ALP SERPL-CCNC: 92 U/L (ref 39–117)
ALT SERPL W P-5'-P-CCNC: 14 U/L (ref 1–33)
ANION GAP SERPL CALCULATED.3IONS-SCNC: 11.2 MMOL/L (ref 5–15)
AST SERPL-CCNC: 19 U/L (ref 1–32)
BASOPHILS # BLD AUTO: 0.07 10*3/MM3 (ref 0–0.2)
BASOPHILS NFR BLD AUTO: 0.9 % (ref 0–1.5)
BILIRUB SERPL-MCNC: 0.3 MG/DL (ref 0–1.2)
BILIRUB UR QL STRIP: NEGATIVE
BUN SERPL-MCNC: 28 MG/DL (ref 8–23)
BUN/CREAT SERPL: 26.4 (ref 7–25)
CALCIUM SPEC-SCNC: 8.8 MG/DL (ref 8.2–9.6)
CHLORIDE SERPL-SCNC: 101 MMOL/L (ref 98–107)
CLARITY UR: CLEAR
CO2 SERPL-SCNC: 24.8 MMOL/L (ref 22–29)
COLOR UR: YELLOW
CREAT SERPL-MCNC: 1.06 MG/DL (ref 0.57–1)
DEPRECATED RDW RBC AUTO: 41.4 FL (ref 37–54)
EGFRCR SERPLBLD CKD-EPI 2021: 50 ML/MIN/1.73
EOSINOPHIL # BLD AUTO: 0.09 10*3/MM3 (ref 0–0.4)
EOSINOPHIL NFR BLD AUTO: 1.1 % (ref 0.3–6.2)
ERYTHROCYTE [DISTWIDTH] IN BLOOD BY AUTOMATED COUNT: 12.6 % (ref 12.3–15.4)
GLOBULIN UR ELPH-MCNC: 3.5 GM/DL
GLUCOSE SERPL-MCNC: 97 MG/DL (ref 65–99)
GLUCOSE UR STRIP-MCNC: NEGATIVE MG/DL
HCT VFR BLD AUTO: 34.3 % (ref 34–46.6)
HGB BLD-MCNC: 11 G/DL (ref 12–15.9)
HGB UR QL STRIP.AUTO: NEGATIVE
IMM GRANULOCYTES # BLD AUTO: 0.03 10*3/MM3 (ref 0–0.05)
IMM GRANULOCYTES NFR BLD AUTO: 0.4 % (ref 0–0.5)
KETONES UR QL STRIP: NEGATIVE
LEUKOCYTE ESTERASE UR QL STRIP.AUTO: NEGATIVE
LYMPHOCYTES # BLD AUTO: 1.41 10*3/MM3 (ref 0.7–3.1)
LYMPHOCYTES NFR BLD AUTO: 17.6 % (ref 19.6–45.3)
MCH RBC QN AUTO: 29.3 PG (ref 26.6–33)
MCHC RBC AUTO-ENTMCNC: 32.1 G/DL (ref 31.5–35.7)
MCV RBC AUTO: 91.2 FL (ref 79–97)
MONOCYTES # BLD AUTO: 0.87 10*3/MM3 (ref 0.1–0.9)
MONOCYTES NFR BLD AUTO: 10.8 % (ref 5–12)
NEUTROPHILS NFR BLD AUTO: 5.55 10*3/MM3 (ref 1.7–7)
NEUTROPHILS NFR BLD AUTO: 69.2 % (ref 42.7–76)
NITRITE UR QL STRIP: NEGATIVE
NRBC BLD AUTO-RTO: 0 /100 WBC (ref 0–0.2)
PH UR STRIP.AUTO: 6 [PH] (ref 5–8)
PLATELET # BLD AUTO: 245 10*3/MM3 (ref 140–450)
PMV BLD AUTO: 11.6 FL (ref 6–12)
POTASSIUM SERPL-SCNC: 4.7 MMOL/L (ref 3.5–5.2)
PROT SERPL-MCNC: 6.8 G/DL (ref 6–8.5)
PROT UR QL STRIP: NEGATIVE
RBC # BLD AUTO: 3.76 10*6/MM3 (ref 3.77–5.28)
SODIUM SERPL-SCNC: 137 MMOL/L (ref 136–145)
SP GR UR STRIP: 1.02 (ref 1–1.03)
UROBILINOGEN UR QL STRIP: NORMAL
WBC NRBC COR # BLD AUTO: 8.02 10*3/MM3 (ref 3.4–10.8)

## 2025-02-03 PROCEDURE — 85025 COMPLETE CBC W/AUTO DIFF WBC: CPT

## 2025-02-03 PROCEDURE — 36415 COLL VENOUS BLD VENIPUNCTURE: CPT

## 2025-02-03 PROCEDURE — 81003 URINALYSIS AUTO W/O SCOPE: CPT

## 2025-02-03 PROCEDURE — 87086 URINE CULTURE/COLONY COUNT: CPT

## 2025-02-03 PROCEDURE — 80053 COMPREHEN METABOLIC PANEL: CPT

## 2025-02-05 ENCOUNTER — TRANSCRIBE ORDERS (OUTPATIENT)
Dept: ADMINISTRATIVE | Facility: HOSPITAL | Age: OVER 89
End: 2025-02-05
Payer: MEDICARE

## 2025-02-05 DIAGNOSIS — R10.30 LOWER ABDOMINAL PAIN: Primary | ICD-10-CM

## 2025-02-06 LAB — BACTERIA SPEC AEROBE CULT: NO GROWTH

## 2025-02-27 ENCOUNTER — HOSPITAL ENCOUNTER (OUTPATIENT)
Facility: HOSPITAL | Age: OVER 89
Discharge: HOME | End: 2025-02-27
Admitting: INTERNAL MEDICINE
Payer: MEDICARE

## 2025-02-27 DIAGNOSIS — R10.30 LOWER ABDOMINAL PAIN: ICD-10-CM

## 2025-02-27 PROCEDURE — 74177 CT ABD & PELVIS W/CONTRAST: CPT

## 2025-02-27 PROCEDURE — 25510000002 DIATRIZOATE MEGLUMINE & SODIUM PER 1 ML: Performed by: INTERNAL MEDICINE

## 2025-02-27 PROCEDURE — 25510000001 IOPAMIDOL 61 % SOLUTION: Performed by: INTERNAL MEDICINE

## 2025-02-27 RX ORDER — IOPAMIDOL 612 MG/ML
100 INJECTION, SOLUTION INTRAVASCULAR
Status: COMPLETED | OUTPATIENT
Start: 2025-02-27 | End: 2025-02-27

## 2025-02-27 RX ORDER — DIATRIZOATE MEGLUMINE AND DIATRIZOATE SODIUM 660; 100 MG/ML; MG/ML
30 SOLUTION ORAL; RECTAL
Status: COMPLETED | OUTPATIENT
Start: 2025-02-27 | End: 2025-02-27

## 2025-02-27 RX ADMIN — DIATRIZOATE MEGLUMINE AND DIATRIZOATE SODIUM 30 ML: 600; 100 SOLUTION ORAL; RECTAL at 16:25

## 2025-02-27 RX ADMIN — IOPAMIDOL 85 ML: 612 INJECTION, SOLUTION INTRAVENOUS at 16:25

## 2025-03-11 ENCOUNTER — LAB (OUTPATIENT)
Dept: LAB | Facility: HOSPITAL | Age: OVER 89
End: 2025-03-11
Payer: MEDICARE

## 2025-03-11 ENCOUNTER — TRANSCRIBE ORDERS (OUTPATIENT)
Dept: LAB | Facility: HOSPITAL | Age: OVER 89
End: 2025-03-11
Payer: MEDICARE

## 2025-03-11 DIAGNOSIS — D64.9 ANEMIA, UNSPECIFIED TYPE: Primary | ICD-10-CM

## 2025-03-11 DIAGNOSIS — D64.9 ANEMIA, UNSPECIFIED TYPE: ICD-10-CM

## 2025-03-11 LAB
ALBUMIN SERPL-MCNC: 3.2 G/DL (ref 3.5–5.2)
ALBUMIN/GLOB SERPL: 0.9 G/DL
ALP SERPL-CCNC: 100 U/L (ref 39–117)
ALT SERPL W P-5'-P-CCNC: 14 U/L (ref 1–33)
ANION GAP SERPL CALCULATED.3IONS-SCNC: 9.8 MMOL/L (ref 5–15)
AST SERPL-CCNC: 20 U/L (ref 1–32)
BASOPHILS # BLD AUTO: 0.06 10*3/MM3 (ref 0–0.2)
BASOPHILS NFR BLD AUTO: 1 % (ref 0–1.5)
BILIRUB SERPL-MCNC: 0.4 MG/DL (ref 0–1.2)
BUN SERPL-MCNC: 23 MG/DL (ref 8–23)
BUN/CREAT SERPL: 21.3 (ref 7–25)
CALCIUM SPEC-SCNC: 9.3 MG/DL (ref 8.2–9.6)
CHLORIDE SERPL-SCNC: 102 MMOL/L (ref 98–107)
CO2 SERPL-SCNC: 27.2 MMOL/L (ref 22–29)
CREAT SERPL-MCNC: 1.08 MG/DL (ref 0.57–1)
DEPRECATED RDW RBC AUTO: 44.3 FL (ref 37–54)
EGFRCR SERPLBLD CKD-EPI 2021: 48.6 ML/MIN/1.73
EOSINOPHIL # BLD AUTO: 0.09 10*3/MM3 (ref 0–0.4)
EOSINOPHIL NFR BLD AUTO: 1.5 % (ref 0.3–6.2)
ERYTHROCYTE [DISTWIDTH] IN BLOOD BY AUTOMATED COUNT: 13 % (ref 12.3–15.4)
FERRITIN SERPL-MCNC: 107 NG/ML (ref 13–150)
FOLATE SERPL-MCNC: 19 NG/ML (ref 4.78–24.2)
GLOBULIN UR ELPH-MCNC: 3.5 GM/DL
GLUCOSE SERPL-MCNC: 99 MG/DL (ref 65–99)
HCT VFR BLD AUTO: 33.8 % (ref 34–46.6)
HGB BLD-MCNC: 11.5 G/DL (ref 12–15.9)
IMM GRANULOCYTES # BLD AUTO: 0.01 10*3/MM3 (ref 0–0.05)
IMM GRANULOCYTES NFR BLD AUTO: 0.2 % (ref 0–0.5)
IRON 24H UR-MRATE: 78 MCG/DL (ref 37–145)
IRON SATN MFR SERPL: 20 % (ref 20–50)
LYMPHOCYTES # BLD AUTO: 1.53 10*3/MM3 (ref 0.7–3.1)
LYMPHOCYTES NFR BLD AUTO: 25.4 % (ref 19.6–45.3)
MCH RBC QN AUTO: 31.2 PG (ref 26.6–33)
MCHC RBC AUTO-ENTMCNC: 34 G/DL (ref 31.5–35.7)
MCV RBC AUTO: 91.6 FL (ref 79–97)
MONOCYTES # BLD AUTO: 0.72 10*3/MM3 (ref 0.1–0.9)
MONOCYTES NFR BLD AUTO: 12 % (ref 5–12)
NEUTROPHILS NFR BLD AUTO: 3.61 10*3/MM3 (ref 1.7–7)
NEUTROPHILS NFR BLD AUTO: 59.9 % (ref 42.7–76)
NRBC BLD AUTO-RTO: 0 /100 WBC (ref 0–0.2)
PLATELET # BLD AUTO: 235 10*3/MM3 (ref 140–450)
PMV BLD AUTO: 11.2 FL (ref 6–12)
POTASSIUM SERPL-SCNC: 5.6 MMOL/L (ref 3.5–5.2)
PROT SERPL-MCNC: 6.7 G/DL (ref 6–8.5)
RBC # BLD AUTO: 3.69 10*6/MM3 (ref 3.77–5.28)
SODIUM SERPL-SCNC: 139 MMOL/L (ref 136–145)
TIBC SERPL-MCNC: 390 MCG/DL (ref 298–536)
TRANSFERRIN SERPL-MCNC: 262 MG/DL (ref 200–360)
TSH SERPL DL<=0.05 MIU/L-ACNC: 3.3 UIU/ML (ref 0.27–4.2)
VIT B12 BLD-MCNC: 471 PG/ML (ref 211–946)
WBC NRBC COR # BLD AUTO: 6.02 10*3/MM3 (ref 3.4–10.8)

## 2025-03-11 PROCEDURE — 83540 ASSAY OF IRON: CPT

## 2025-03-11 PROCEDURE — 84443 ASSAY THYROID STIM HORMONE: CPT

## 2025-03-11 PROCEDURE — 82607 VITAMIN B-12: CPT

## 2025-03-11 PROCEDURE — 36415 COLL VENOUS BLD VENIPUNCTURE: CPT

## 2025-03-11 PROCEDURE — 85025 COMPLETE CBC W/AUTO DIFF WBC: CPT

## 2025-03-11 PROCEDURE — 82728 ASSAY OF FERRITIN: CPT

## 2025-03-11 PROCEDURE — 84466 ASSAY OF TRANSFERRIN: CPT

## 2025-03-11 PROCEDURE — 82746 ASSAY OF FOLIC ACID SERUM: CPT

## 2025-03-11 PROCEDURE — 80053 COMPREHEN METABOLIC PANEL: CPT

## 2025-05-02 RX ORDER — HYDRALAZINE HYDROCHLORIDE 25 MG/1
25 TABLET, FILM COATED ORAL 2 TIMES DAILY
Qty: 180 TABLET | Refills: 0 | Status: SHIPPED | OUTPATIENT
Start: 2025-05-02

## 2025-05-19 NOTE — PROGRESS NOTES
Chief Complaint  Varicose Veins and Leg Swelling  Follow-up on lymphedema.    Deana Thakkar presents to [facility] VASCULAR SURGERY  History of Present Illness  The patient is an 89 year-old female who has had venous insufficiency and leg swelling bilaterally for several years. Swelling on the left has been worse than the right from the onset. In addition, she has burning pain and aching which worsens as the day progresses. She still has significant swelling in the legs, left worse than right despite faithfully wearing compression stockings. We obtained lymphedema pumps for her and her swelling had improved significantly with this, until more recently when she has not been using her pumps nearly as much, because of taking care of her  with severe dementia.  He has since passed away and her legs have improved significantly since she is able to use her pumps more often.  In addition, she has significant degenerative lumbar disc disease.     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   Surgical History: has a past surgical history that includes Colonoscopy (N/A, 10/15/2004); Hysterectomy; Cholecystectomy (N/A); Knee arthroscopy w/ partial medial meniscectomy (Right, 10/24/2014); Esophagogastroduodenoscopy (N/A, 10/07/2013); Colonoscopy (N/A, 12/14/2009); Cardiac  "Ablation (N/A, 07/17/2017); Colonoscopy (N/A, 05/09/2019); Colectomy (N/A, 05/15/2019); Sigmoidoscopy (N/A, 01/20/2020); Skin cancer excision (Left); and Sigmoidoscopy (N/A, 07/20/2020).   Family History: family history includes Atrial fibrillation in her daughter; Brain cancer (age of onset: 81) in her mother; Breast cancer in her sister; Cancer in her daughter and father; Heart disease in her father; Heart valve disorder in her daughter and sister.   Social History: reports that she quit smoking about 40 years ago. Her smoking use included cigarettes. She has been exposed to tobacco smoke. She has never used smokeless tobacco. She reports current alcohol use of about 1.0 standard drink of alcohol per week. She reports that she does not use drugs.    (Not in a hospital admission)     Allergies: Patient has no known allergies.   Objective   Vital Signs:  /77 (BP Location: Right arm)   Ht 161.3 cm (63.5\")   Wt 90.7 kg (200 lb)   BMI 34.87 kg/m²   Estimated body mass index is 34.87 kg/m² as calculated from the following:    Height as of this encounter: 161.3 cm (63.5\").    Weight as of this encounter: 90.7 kg (200 lb).     BMI is >= 30 and <35. (Class 1 Obesity). The following options were offered after discussion;: She is 89 years old, nothing needed    Elfie L Thakkar  reports that she quit smoking about 40 years ago. Her smoking use included cigarettes. She has been exposed to tobacco smoke. She has never used smokeless tobacco.          Physical Exam her legs are small as have seen him in a few years.  Mild chronic venous stasis changes noted bilaterally.  Result Review :                     Assessment and Plan     Diagnoses and all orders for this visit:    1. Varicose veins of leg with pain, bilateral (Primary)    2. Secondary lymphedema    3. Venous (peripheral) insufficiency    Overall, she is doing satisfactorily now that she is able to spend more time addressing her medical issues after her 's " death.  She is faithfully using her pumps 1 hour every day occasionally 2 hours a day.  She is going to the massage therapy for lymphedema massages and this is helping as well.  She will continue the same.  I will see her in follow-up in 8 months.  She was measured for new pair stockings as well.         Follow Up     Return in about 8 months (around 1/27/2026).  Patient was given instructions and counseling regarding her condition or for health maintenance advice. Please see specific information pulled into the AVS if appropriate.

## 2025-05-27 ENCOUNTER — OFFICE VISIT (OUTPATIENT)
Age: OVER 89
End: 2025-05-27
Payer: MEDICARE

## 2025-05-27 VITALS
WEIGHT: 200 LBS | BODY MASS INDEX: 34.15 KG/M2 | SYSTOLIC BLOOD PRESSURE: 173 MMHG | DIASTOLIC BLOOD PRESSURE: 77 MMHG | HEIGHT: 64 IN

## 2025-05-27 DIAGNOSIS — I87.2 VENOUS (PERIPHERAL) INSUFFICIENCY: ICD-10-CM

## 2025-05-27 DIAGNOSIS — I83.813 VARICOSE VEINS OF LEG WITH PAIN, BILATERAL: Primary | ICD-10-CM

## 2025-05-27 DIAGNOSIS — I89.0 SECONDARY LYMPHEDEMA: ICD-10-CM

## 2025-06-03 ENCOUNTER — LAB (OUTPATIENT)
Dept: LAB | Facility: HOSPITAL | Age: OVER 89
End: 2025-06-03
Payer: MEDICARE

## 2025-06-03 ENCOUNTER — TRANSCRIBE ORDERS (OUTPATIENT)
Dept: ADMINISTRATIVE | Facility: HOSPITAL | Age: OVER 89
End: 2025-06-03
Payer: MEDICARE

## 2025-06-03 DIAGNOSIS — I10 ESSENTIAL HYPERTENSION, MALIGNANT: ICD-10-CM

## 2025-06-03 DIAGNOSIS — I10 ESSENTIAL HYPERTENSION, MALIGNANT: Primary | ICD-10-CM

## 2025-06-03 LAB
ALBUMIN SERPL-MCNC: 4 G/DL (ref 3.5–5.2)
ALBUMIN/GLOB SERPL: 1.3 G/DL
ALP SERPL-CCNC: 92 U/L (ref 39–117)
ALT SERPL W P-5'-P-CCNC: 12 U/L (ref 1–33)
ANION GAP SERPL CALCULATED.3IONS-SCNC: 8.8 MMOL/L (ref 5–15)
AST SERPL-CCNC: 16 U/L (ref 1–32)
BASOPHILS # BLD AUTO: 0.06 10*3/MM3 (ref 0–0.2)
BASOPHILS NFR BLD AUTO: 0.9 % (ref 0–1.5)
BILIRUB SERPL-MCNC: 0.5 MG/DL (ref 0–1.2)
BUN SERPL-MCNC: 19 MG/DL (ref 8–23)
BUN/CREAT SERPL: 19.2 (ref 7–25)
CALCIUM SPEC-SCNC: 9.2 MG/DL (ref 8.2–9.6)
CHLORIDE SERPL-SCNC: 102 MMOL/L (ref 98–107)
CO2 SERPL-SCNC: 25.2 MMOL/L (ref 22–29)
CREAT SERPL-MCNC: 0.99 MG/DL (ref 0.57–1)
DEPRECATED RDW RBC AUTO: 39.7 FL (ref 37–54)
EGFRCR SERPLBLD CKD-EPI 2021: 53.9 ML/MIN/1.73
EOSINOPHIL # BLD AUTO: 0.09 10*3/MM3 (ref 0–0.4)
EOSINOPHIL NFR BLD AUTO: 1.3 % (ref 0.3–6.2)
ERYTHROCYTE [DISTWIDTH] IN BLOOD BY AUTOMATED COUNT: 11.9 % (ref 12.3–15.4)
GLOBULIN UR ELPH-MCNC: 3 GM/DL
GLUCOSE SERPL-MCNC: 96 MG/DL (ref 65–99)
HCT VFR BLD AUTO: 35.2 % (ref 34–46.6)
HGB BLD-MCNC: 11.9 G/DL (ref 12–15.9)
IMM GRANULOCYTES # BLD AUTO: 0.01 10*3/MM3 (ref 0–0.05)
IMM GRANULOCYTES NFR BLD AUTO: 0.1 % (ref 0–0.5)
LYMPHOCYTES # BLD AUTO: 1.65 10*3/MM3 (ref 0.7–3.1)
LYMPHOCYTES NFR BLD AUTO: 24.4 % (ref 19.6–45.3)
MCH RBC QN AUTO: 31.1 PG (ref 26.6–33)
MCHC RBC AUTO-ENTMCNC: 33.8 G/DL (ref 31.5–35.7)
MCV RBC AUTO: 91.9 FL (ref 79–97)
MONOCYTES # BLD AUTO: 0.8 10*3/MM3 (ref 0.1–0.9)
MONOCYTES NFR BLD AUTO: 11.8 % (ref 5–12)
NEUTROPHILS NFR BLD AUTO: 4.16 10*3/MM3 (ref 1.7–7)
NEUTROPHILS NFR BLD AUTO: 61.5 % (ref 42.7–76)
NRBC BLD AUTO-RTO: 0 /100 WBC (ref 0–0.2)
PLATELET # BLD AUTO: 233 10*3/MM3 (ref 140–450)
PMV BLD AUTO: 11 FL (ref 6–12)
POTASSIUM SERPL-SCNC: 4.7 MMOL/L (ref 3.5–5.2)
PROT SERPL-MCNC: 7 G/DL (ref 6–8.5)
RBC # BLD AUTO: 3.83 10*6/MM3 (ref 3.77–5.28)
SODIUM SERPL-SCNC: 136 MMOL/L (ref 136–145)
WBC NRBC COR # BLD AUTO: 6.77 10*3/MM3 (ref 3.4–10.8)

## 2025-06-03 PROCEDURE — 80053 COMPREHEN METABOLIC PANEL: CPT

## 2025-06-03 PROCEDURE — 36415 COLL VENOUS BLD VENIPUNCTURE: CPT

## 2025-06-03 PROCEDURE — 85025 COMPLETE CBC W/AUTO DIFF WBC: CPT

## 2025-06-30 ENCOUNTER — APPOINTMENT (OUTPATIENT)
Dept: GENERAL RADIOLOGY | Facility: HOSPITAL | Age: OVER 89
End: 2025-06-30
Payer: MEDICARE

## 2025-06-30 ENCOUNTER — APPOINTMENT (OUTPATIENT)
Dept: CT IMAGING | Facility: HOSPITAL | Age: OVER 89
End: 2025-06-30
Payer: MEDICARE

## 2025-06-30 ENCOUNTER — HOSPITAL ENCOUNTER (INPATIENT)
Facility: HOSPITAL | Age: OVER 89
LOS: 3 days | Discharge: HOME | End: 2025-07-04
Attending: HOSPITALIST | Admitting: HOSPITALIST
Payer: MEDICARE

## 2025-06-30 DIAGNOSIS — N17.9 ACUTE KIDNEY INJURY: Primary | ICD-10-CM

## 2025-06-30 PROBLEM — R06.02 SHORT OF BREATH ON EXERTION: Status: ACTIVE | Noted: 2025-06-30

## 2025-06-30 LAB
ALBUMIN SERPL-MCNC: 3.8 G/DL (ref 3.5–5.2)
ALBUMIN/GLOB SERPL: 1.4 G/DL
ALP SERPL-CCNC: 88 U/L (ref 39–117)
ALT SERPL W P-5'-P-CCNC: 12 U/L (ref 1–33)
ANION GAP SERPL CALCULATED.3IONS-SCNC: 8.6 MMOL/L (ref 5–15)
AST SERPL-CCNC: 17 U/L (ref 1–32)
BASOPHILS # BLD AUTO: 0.06 10*3/MM3 (ref 0–0.2)
BASOPHILS NFR BLD AUTO: 0.9 % (ref 0–1.5)
BILIRUB SERPL-MCNC: 0.5 MG/DL (ref 0–1.2)
BUN SERPL-MCNC: 19 MG/DL (ref 8–23)
BUN/CREAT SERPL: 19.2 (ref 7–25)
CALCIUM SPEC-SCNC: 9.2 MG/DL (ref 8.2–9.6)
CHLORIDE SERPL-SCNC: 97 MMOL/L (ref 98–107)
CO2 SERPL-SCNC: 25.4 MMOL/L (ref 22–29)
CREAT SERPL-MCNC: 0.99 MG/DL (ref 0.57–1)
D DIMER PPP FEU-MCNC: 1.3 MCGFEU/ML (ref 0–0.91)
DEPRECATED RDW RBC AUTO: 39.1 FL (ref 37–54)
EGFRCR SERPLBLD CKD-EPI 2021: 53.9 ML/MIN/1.73
EOSINOPHIL # BLD AUTO: 0.03 10*3/MM3 (ref 0–0.4)
EOSINOPHIL NFR BLD AUTO: 0.4 % (ref 0.3–6.2)
ERYTHROCYTE [DISTWIDTH] IN BLOOD BY AUTOMATED COUNT: 12.1 % (ref 12.3–15.4)
GLOBULIN UR ELPH-MCNC: 2.7 GM/DL
GLUCOSE SERPL-MCNC: 104 MG/DL (ref 65–99)
HCT VFR BLD AUTO: 33.1 % (ref 34–46.6)
HGB BLD-MCNC: 11.2 G/DL (ref 12–15.9)
IMM GRANULOCYTES # BLD AUTO: 0.03 10*3/MM3 (ref 0–0.05)
IMM GRANULOCYTES NFR BLD AUTO: 0.4 % (ref 0–0.5)
LYMPHOCYTES # BLD AUTO: 0.92 10*3/MM3 (ref 0.7–3.1)
LYMPHOCYTES NFR BLD AUTO: 13.2 % (ref 19.6–45.3)
MCH RBC QN AUTO: 30.2 PG (ref 26.6–33)
MCHC RBC AUTO-ENTMCNC: 33.8 G/DL (ref 31.5–35.7)
MCV RBC AUTO: 89.2 FL (ref 79–97)
MONOCYTES # BLD AUTO: 0.71 10*3/MM3 (ref 0.1–0.9)
MONOCYTES NFR BLD AUTO: 10.2 % (ref 5–12)
NEUTROPHILS NFR BLD AUTO: 5.22 10*3/MM3 (ref 1.7–7)
NEUTROPHILS NFR BLD AUTO: 74.9 % (ref 42.7–76)
NRBC BLD AUTO-RTO: 0 /100 WBC (ref 0–0.2)
NT-PROBNP SERPL-MCNC: 1301 PG/ML (ref 0–1800)
PLATELET # BLD AUTO: 238 10*3/MM3 (ref 140–450)
PMV BLD AUTO: 11.3 FL (ref 6–12)
POTASSIUM SERPL-SCNC: 5 MMOL/L (ref 3.5–5.2)
PROT SERPL-MCNC: 6.5 G/DL (ref 6–8.5)
QT INTERVAL: 412 MS
QTC INTERVAL: 436 MS
RBC # BLD AUTO: 3.71 10*6/MM3 (ref 3.77–5.28)
SODIUM SERPL-SCNC: 131 MMOL/L (ref 136–145)
TSH SERPL DL<=0.05 MIU/L-ACNC: 1.85 UIU/ML (ref 0.27–4.2)
WBC NRBC COR # BLD AUTO: 6.97 10*3/MM3 (ref 3.4–10.8)

## 2025-06-30 PROCEDURE — 85379 FIBRIN DEGRADATION QUANT: CPT | Performed by: HOSPITALIST

## 2025-06-30 PROCEDURE — 93005 ELECTROCARDIOGRAM TRACING: CPT | Performed by: HOSPITALIST

## 2025-06-30 PROCEDURE — 93010 ELECTROCARDIOGRAM REPORT: CPT | Performed by: INTERNAL MEDICINE

## 2025-06-30 PROCEDURE — G0378 HOSPITAL OBSERVATION PER HR: HCPCS

## 2025-06-30 PROCEDURE — 84443 ASSAY THYROID STIM HORMONE: CPT | Performed by: HOSPITALIST

## 2025-06-30 PROCEDURE — 71046 X-RAY EXAM CHEST 2 VIEWS: CPT

## 2025-06-30 PROCEDURE — 25510000001 IOPAMIDOL PER 1 ML: Performed by: HOSPITALIST

## 2025-06-30 PROCEDURE — 80053 COMPREHEN METABOLIC PANEL: CPT | Performed by: HOSPITALIST

## 2025-06-30 PROCEDURE — 25010000002 ENOXAPARIN PER 10 MG: Performed by: HOSPITALIST

## 2025-06-30 PROCEDURE — 85025 COMPLETE CBC W/AUTO DIFF WBC: CPT | Performed by: HOSPITALIST

## 2025-06-30 PROCEDURE — 83880 ASSAY OF NATRIURETIC PEPTIDE: CPT | Performed by: HOSPITALIST

## 2025-06-30 PROCEDURE — 25010000002 FUROSEMIDE PER 20 MG: Performed by: HOSPITALIST

## 2025-06-30 PROCEDURE — 71275 CT ANGIOGRAPHY CHEST: CPT

## 2025-06-30 RX ORDER — NITROGLYCERIN 0.4 MG/1
0.4 TABLET SUBLINGUAL
Status: DISCONTINUED | OUTPATIENT
Start: 2025-06-30 | End: 2025-07-04 | Stop reason: HOSPADM

## 2025-06-30 RX ORDER — ACETAMINOPHEN 325 MG/1
650 TABLET ORAL EVERY 4 HOURS PRN
Status: DISCONTINUED | OUTPATIENT
Start: 2025-06-30 | End: 2025-07-04 | Stop reason: HOSPADM

## 2025-06-30 RX ORDER — IOPAMIDOL 755 MG/ML
100 INJECTION, SOLUTION INTRAVASCULAR
Status: COMPLETED | OUTPATIENT
Start: 2025-06-30 | End: 2025-06-30

## 2025-06-30 RX ORDER — SODIUM CHLORIDE 0.9 % (FLUSH) 0.9 %
10 SYRINGE (ML) INJECTION AS NEEDED
Status: DISCONTINUED | OUTPATIENT
Start: 2025-06-30 | End: 2025-07-03

## 2025-06-30 RX ORDER — POLYETHYLENE GLYCOL 3350 17 G/17G
17 POWDER, FOR SOLUTION ORAL DAILY PRN
Status: DISCONTINUED | OUTPATIENT
Start: 2025-06-30 | End: 2025-07-04 | Stop reason: HOSPADM

## 2025-06-30 RX ORDER — SODIUM CHLORIDE 9 MG/ML
40 INJECTION, SOLUTION INTRAVENOUS AS NEEDED
Status: DISCONTINUED | OUTPATIENT
Start: 2025-06-30 | End: 2025-07-03

## 2025-06-30 RX ORDER — FUROSEMIDE 10 MG/ML
40 INJECTION INTRAMUSCULAR; INTRAVENOUS EVERY 8 HOURS
Status: COMPLETED | OUTPATIENT
Start: 2025-06-30 | End: 2025-07-02

## 2025-06-30 RX ORDER — METOPROLOL SUCCINATE 100 MG/1
100 TABLET, EXTENDED RELEASE ORAL DAILY
Status: DISCONTINUED | OUTPATIENT
Start: 2025-07-01 | End: 2025-07-04 | Stop reason: HOSPADM

## 2025-06-30 RX ORDER — BISACODYL 10 MG
10 SUPPOSITORY, RECTAL RECTAL DAILY PRN
Status: DISCONTINUED | OUTPATIENT
Start: 2025-06-30 | End: 2025-07-04 | Stop reason: HOSPADM

## 2025-06-30 RX ORDER — ENOXAPARIN SODIUM 100 MG/ML
40 INJECTION SUBCUTANEOUS EVERY 24 HOURS
Status: DISCONTINUED | OUTPATIENT
Start: 2025-06-30 | End: 2025-07-02

## 2025-06-30 RX ORDER — ONDANSETRON 4 MG/1
4 TABLET, ORALLY DISINTEGRATING ORAL EVERY 6 HOURS PRN
Status: DISCONTINUED | OUTPATIENT
Start: 2025-06-30 | End: 2025-07-04 | Stop reason: HOSPADM

## 2025-06-30 RX ORDER — BISACODYL 5 MG/1
5 TABLET, DELAYED RELEASE ORAL DAILY PRN
Status: DISCONTINUED | OUTPATIENT
Start: 2025-06-30 | End: 2025-07-04 | Stop reason: HOSPADM

## 2025-06-30 RX ORDER — HYDRALAZINE HYDROCHLORIDE 25 MG/1
25 TABLET, FILM COATED ORAL 2 TIMES DAILY
Status: DISCONTINUED | OUTPATIENT
Start: 2025-06-30 | End: 2025-07-04 | Stop reason: HOSPADM

## 2025-06-30 RX ORDER — AMOXICILLIN 250 MG
2 CAPSULE ORAL 2 TIMES DAILY PRN
Status: DISCONTINUED | OUTPATIENT
Start: 2025-06-30 | End: 2025-07-04 | Stop reason: HOSPADM

## 2025-06-30 RX ORDER — ACETAMINOPHEN 160 MG/5ML
650 SOLUTION ORAL EVERY 4 HOURS PRN
Status: DISCONTINUED | OUTPATIENT
Start: 2025-06-30 | End: 2025-07-03

## 2025-06-30 RX ORDER — LISINOPRIL 20 MG/1
40 TABLET ORAL DAILY
Status: DISCONTINUED | OUTPATIENT
Start: 2025-07-01 | End: 2025-06-30

## 2025-06-30 RX ORDER — LISINOPRIL 20 MG/1
20 TABLET ORAL EVERY 12 HOURS SCHEDULED
Status: DISCONTINUED | OUTPATIENT
Start: 2025-07-01 | End: 2025-07-04 | Stop reason: HOSPADM

## 2025-06-30 RX ORDER — SODIUM CHLORIDE 0.9 % (FLUSH) 0.9 %
10 SYRINGE (ML) INJECTION EVERY 12 HOURS SCHEDULED
Status: DISCONTINUED | OUTPATIENT
Start: 2025-06-30 | End: 2025-07-03

## 2025-06-30 RX ORDER — ONDANSETRON 2 MG/ML
4 INJECTION INTRAMUSCULAR; INTRAVENOUS EVERY 6 HOURS PRN
Status: DISCONTINUED | OUTPATIENT
Start: 2025-06-30 | End: 2025-07-04 | Stop reason: HOSPADM

## 2025-06-30 RX ORDER — ACETAMINOPHEN 650 MG/1
650 SUPPOSITORY RECTAL EVERY 4 HOURS PRN
Status: DISCONTINUED | OUTPATIENT
Start: 2025-06-30 | End: 2025-07-03

## 2025-06-30 RX ORDER — ATORVASTATIN CALCIUM 20 MG/1
10 TABLET, FILM COATED ORAL DAILY
Status: DISCONTINUED | OUTPATIENT
Start: 2025-07-01 | End: 2025-07-04 | Stop reason: HOSPADM

## 2025-06-30 RX ADMIN — IOPAMIDOL 95 ML: 755 INJECTION, SOLUTION INTRAVENOUS at 21:44

## 2025-06-30 RX ADMIN — ACETAMINOPHEN 650 MG: 325 TABLET, FILM COATED ORAL at 23:44

## 2025-06-30 RX ADMIN — HYDRALAZINE HYDROCHLORIDE 25 MG: 25 TABLET ORAL at 23:44

## 2025-06-30 RX ADMIN — ENOXAPARIN SODIUM 40 MG: 100 INJECTION SUBCUTANEOUS at 23:45

## 2025-06-30 RX ADMIN — FUROSEMIDE 40 MG: 10 INJECTION, SOLUTION INTRAMUSCULAR; INTRAVENOUS at 23:45

## 2025-06-30 NOTE — H&P
HISTORY AND PHYSICAL   [facility]        Patient Identification:  Name: Dex Thakkar  Age: 89 y.o.  Sex: female  :  1934  MRN: [id number]                     Primary Care Physician: Carmen Chacon MD    Chief Complaint: Short of breath    History of Present Illness:   Pleasant 89-year-old female with a longstanding history of lower extremity lymphedema presents as a direct admission from her PCP.  She notes that over the last 2 weeks she has been experiencing increasing shortness of breath with exertion as well as fluid retention.  There are times when she thought she might be wheezing.  She never lies flat so does not know if there is orthopnea component to her symptoms.  No chest pain.  No palpitations.  No lightheadedness.  No fever sweats or chills.  No change in cough or sputum production.    Past Medical History:  Past Medical History:   Diagnosis Date    A-fib     Chronic venous hypertension (idiopathic) without complications of left lower extremity     Diverticulitis     Effusion, left ankle     H/O cardiac radiofrequency ablation     Hyperlipidemia     Hypertension     Incontinence     Malignant melanoma of skin, unspecified     Melanoma     Murmur     Personal history of other venous thrombosis and embolism     Personal history of other venous thrombosis and embolism     Secondary lymphedema 10/27/2021    Spinal stenosis     Unilateral osteoarthritis of hip 2021    Varicose veins of leg with pain, bilateral 2018    Venous insufficiency (chronic) (peripheral) 2018     Past Surgical History:  Past Surgical History:   Procedure Laterality Date    CARDIAC ABLATION N/A 2017    Dr. Leigh    CHOLECYSTECTOMY N/A     COLON RESECTION N/A 05/15/2019    Procedure: laparoscopic low anterior colon resection with splenic flexure mobilization, left salpingo oophorectomy, drainage of peritoneal abscess;  Surgeon: Renata Ray MD;  Location: [facility];  Service:  General    COLONOSCOPY N/A 10/15/2004    Sigmoid diverticulosis, smal rectal polyp, internal hemorrhoids-Dr. Renata Ray    COLONOSCOPY N/A 12/14/2009    Sigmoid diverticulosis-Dr. Renata Ray    COLONOSCOPY N/A 05/09/2019    Procedure: COLONOSCOPY to cecum with cold biopsies;  Surgeon: Renata Ray MD;  Location:  [facility];  Service: General    ENDOSCOPY N/A 10/07/2013    Gastric ulcerations x10, ulcerative esophagitis, small hiatal hernia-Dr. Renata Ray    HYSTERECTOMY      KNEE ARTHROSCOPY W/ PARTIAL MEDIAL MENISCECTOMY Right 10/24/2014    Dr. Bud Muhammad    SIGMOIDOSCOPY N/A 01/20/2020    Procedure: FLEXIBLE SIGMOIDOSCOPY WITH BIOPSY, balloon dilation;  Surgeon: Renata Ray MD;  Location:  [facility];  Service: General    SIGMOIDOSCOPY N/A 07/20/2020    Procedure: FLEXIBLE SIGMOIDOSCOPY with bx;  Surgeon: Renata Ray MD;  Location:  [facility];  Service: General;  Laterality: N/A;  pre-history of a rectal ulcer and a resection for diverticulitis, with anastomotic stricture found 1/20/20  post-anastamotic nodule, mild stricture        SKIN CANCER EXCISION Left     Melanoma left leg      Home Meds:  Medications Prior to Admission   Medication Sig Dispense Refill Last Dose/Taking    atorvastatin (LIPITOR) 10 MG tablet Take 1 tablet by mouth Daily. 30 tablet 11 6/30/2025    hydrALAZINE (APRESOLINE) 25 MG tablet TAKE 1 TABLET BY MOUTH 2 TIMES A  tablet 0 6/30/2025    lisinopril (PRINIVIL,ZESTRIL) 40 MG tablet Take 1 tablet by mouth Daily. 90 tablet 3 6/30/2025    metoprolol succinate XL (TOPROL-XL) 100 MG 24 hr tablet Take 1 tablet by mouth Daily.   6/30/2025       Allergies:  No Known Allergies  Immunizations:  Immunization History   Administered Date(s) Administered    COVID-19 (PFIZER) 12YRS+ (COMIRNATY) 10/17/2023    COVID-19 (PFIZER) Purple Cap Monovalent 01/30/2021, 02/19/2021    Covid-19 (Pfizer) Gray Cap Monovalent 02/11/2022     Social History:   Social History      Social History Narrative    Not on file     Social History     Tobacco Use    Smoking status: Former     Current packs/day: 0.00     Types: Cigarettes     Quit date: 1984     Years since quittin.9     Passive exposure: Past    Smokeless tobacco: Never    Tobacco comments:     QUIT 30  YRS AGO   Substance Use Topics    Alcohol use: Yes     Alcohol/week: 1.0 standard drink of alcohol     Types: 1 Glasses of wine per week     Comment: OCCASIONAL     Family History:  Family History   Problem Relation Age of Onset    Brain cancer Mother 81    Cancer Father     Heart disease Father     Breast cancer Sister     Heart valve disorder Sister         Bicuspid AV    Cancer Daughter     Heart valve disorder Daughter         Bicuspid AV    Atrial fibrillation Daughter     Malig Hyperthermia Neg Hx         Review of Systems  Review of Systems   Constitutional:         As per history of present illness.   HENT: Negative.     Eyes: Negative.    Respiratory:          As per history of present illness.   Cardiovascular:         As per history of present illness.   Gastrointestinal: Negative.    Endocrine: Negative.    Genitourinary: Negative.    Musculoskeletal: Negative.    Skin: Negative.    Allergic/Immunologic: Negative.    Neurological: Negative.    Hematological: Negative.    Psychiatric/Behavioral: Negative.         Objective:  T Max 24 hrs: Temp (24hrs), Av.9 °F (36.6 °C), Min:97.9 °F (36.6 °C), Max:97.9 °F (36.6 °C)    Vitals Ranges:   Temp:  [97.9 °F (36.6 °C)] 97.9 °F (36.6 °C)  Heart Rate:  [82] 82  Resp:  [16] 16  BP: (186)/(79) 186/79      Exam:  Physical Exam  Constitutional:       General: She is not in acute distress.     Appearance: Normal appearance. She is obese. She is not ill-appearing, toxic-appearing or diaphoretic.   HENT:      Head: Normocephalic and atraumatic.      Right Ear: External ear normal.      Left Ear: External ear normal.      Nose: Nose normal.      Mouth/Throat:      Mouth:  "Mucous membranes are moist.   Eyes:      General: No scleral icterus.        Right eye: No discharge.         Left eye: No discharge.      Extraocular Movements: Extraocular movements intact.      Conjunctiva/sclera: Conjunctivae normal.   Cardiovascular:      Rate and Rhythm: Normal rate and regular rhythm.      Heart sounds: Murmur heard.      No friction rub. No gallop.      Comments: Somewhat soft 2/6 systolic murmur left upper sternal border with fairly harsh nearly 3/6 systolic murmur right upper sternal border.  + JVD.    Pulmonary:      Effort: Pulmonary effort is normal.      Breath sounds: Normal breath sounds.   Abdominal:      General: Abdomen is flat. Bowel sounds are normal. There is no distension.      Palpations: Abdomen is soft. There is no mass.      Tenderness: There is no abdominal tenderness. There is no guarding or rebound.   Musculoskeletal:      Cervical back: Neck supple.      Right lower leg: Edema present.      Left lower leg: Edema present.      Comments: Nearly 3/6 nonpitting edema her lower extremities.  Support hose in place.   Skin:     General: Skin is warm and dry.   Neurological:      General: No focal deficit present.      Mental Status: She is alert and oriented to person, place, and time.   Psychiatric:         Mood and Affect: Mood normal.         Behavior: Behavior normal.         Thought Content: Thought content normal.         Judgment: Judgment normal.         Data Review:  All labs and radiology reviewed.    Assessment:  Short of breath on exertion: Suspect CHF.  See below.  CHF: BNP normal but there is moderate JVD.  On discussion with radiologist CTA looks like she may be a little \"wet\" also.  Repeat echocardiogram.  Initiate diuresis and monitor.   Chronic lymphedema: Edema is nonpitting on exam today.  Encourage leg elevation if possible.  Continue with compression hose.  Consider lymphedema clinic evaluation.  Recheck TSH.  Elevated dimer: CTA negative for PE.  Check " venous Dopplers.  Hyponatremia: Check urine osmolality and sodium levels keep in mind that IV dye may affect these.  Monitor.  Hypertension: Continue home meds.  Monitor.  Kyphosis: This looks significant enough that it could affect her pulmonary status.        Plan:  Please see above.  Discussed with patient and daughter at bedside.  Discussed with radiology.  Discussed with ER provider.  Discussed with MIGUEL.    Gonzalo Cifuentes MD  6/30/2025  16:00 EDT    EMR Dragon/Transcription disclaimer:   Much of this encounter note is an electronic transcription/translation of spoken language to printed text. The electronic translation of spoken language may permit erroneous, or at times, nonsensical words or phrases to be inadvertently transcribed; Although I have reviewed the note for such errors, some may still exist.

## 2025-06-30 NOTE — CASE MANAGEMENT/SOCIAL WORK
Discharge Planning Assessment  [facility]     Patient Name: Dex Thakkar  MRN: [id number]  Today's Date: 6/30/2025    Admit Date: 6/30/2025    Plan: Plan home with family.   HORTENCIA Ledbetter RN   Discharge Needs Assessment       Row Name 06/30/25 1534       Living Environment    People in Home child(kenny), adult    Name(s) of People in Home Son  ( Dave Thakkar [phone])    Current Living Arrangements home    Potentially Unsafe Housing Conditions none    In the past 12 months has the electric, gas, oil, or water company threatened to shut off services in your home? No    Primary Care Provided by self    Provides Primary Care For no one    Family Caregiver if Needed child(kenny), adult    Family Caregiver Names Son ( Dave Thakkar [phone]) , Son  ( Supa Thakkar [phone]) and Daughter  ( Beth English [phone])    Quality of Family Relationships helpful;involved;supportive    Able to Return to Prior Arrangements yes    Living Arrangement Comments Pt lives in a single story house with her son ( Dave Thakkar [phone]) .       Resource/Environmental Concerns    Resource/Environmental Concerns none    Transportation Concerns none       Transportation Needs    In the past 12 months, has lack of transportation kept you from medical appointments or from getting medications? no    In the past 12 months, has lack of transportation kept you from meetings, work, or from getting things needed for daily living? No       Food Insecurity    Within the past 12 months, you worried that your food would run out before you got the money to buy more. Never true    Within the past 12 months, the food you bought just didn't last and you didn't have money to get more. Never true       Transition Planning    Patient/Family Anticipates Transition to home with family    Patient/Family Anticipated Services at Transition none    Transportation Anticipated family or friend will provide       Discharge Needs Assessment    Readmission  Within the Last 30 Days no previous admission in last 30 days    Equipment Currently Used at Home grab bar;shower chair;walker, rolling;wheelchair    Concerns to be Addressed no discharge needs identified;denies needs/concerns at this time    Anticipated Changes Related to Illness none    Equipment Needed After Discharge grab bar, tub/shower;shower chair;walker, rolling;wheelchair, manual                   Discharge Plan       Row Name 06/30/25 1537       Plan    Plan Plan home with family.   HORTENCIA Ledbetter RN    Patient/Family in Agreement with Plan yes    Plan Comments FACE SHEET VERIFIED/ IM LETTER SIGNED.  Spoke with pt and pt's daughter (Beth)  at bedside.  Pt's PCP is Dr. Carmen Chacon.  Pt lives in a single story house with her son ( Dave).  Pt is independent with ADLs.  Pt has a grab bar,shower chair , rolling walker and W/C for home use if needed.  Equipment was her late 's.  Pt gets her prescriptions at [facility].   Pt denies any issues affording medications. Pt is not current with .  Pt has not been in SNF.  Pt denies any discharge needs.  Pt's son ( Dave Thakkar [phone]) , son ( Supa Thakkar [phone]) and daughter ( Beth English [phone]) will assist pt at home and transport pt home.  Plan home with family.  HORTENCIA Ledbetter RN                  Continued Care and Services - Admitted Since 6/30/2025    No active coordination exists.          Demographic Summary       Row Name 06/30/25 1537       General Information    Admission Type inpatient    Arrived From emergency department    Required Notices Provided Important Message from Medicare    Referral Source admission list    Reason for Consult discharge planning    Preferred Language English                   Functional Status       Row Name 06/30/25 1534       Functional Status    Usual Activity Tolerance good    Current Activity Tolerance good       Functional Status, IADL    Medications independent    Meal Preparation  independent    Housekeeping assistive person    Laundry assistive person       Mental Status    General Appearance WDL WDL                   Psychosocial    No documentation.                  Abuse/Neglect    No documentation.                  Legal    No documentation.                  Substance Abuse    No documentation.                  Patient Forms    No documentation.                     Makenna Ledbetter RN

## 2025-06-30 NOTE — CASE MANAGEMENT/SOCIAL WORK
Continued Stay Note  [facility]     Patient Name: Dex Thakkar  MRN: [id number]  Today's Date: 6/30/2025    Admit Date: 6/30/2025    Plan: Plan home with family.   HORTENCIA Ledbetter RN   Discharge Plan       Row Name 06/30/25 8337       Plan    Plan Plan home with family.   HORTENCIA Ledbetter RN    Patient/Family in Agreement with Plan yes    Plan Comments FACE SHEET VERIFIED/ IM LETTER SIGNED.  Spoke with pt and pt's daughter (Beth)  at bedside.  Pt's PCP is Dr. Carmen Chacon.  Pt lives in a single story house with her son ( Dave).  Pt is independent with ADLs.  Pt has a grab bar,shower chair , rolling walker and W/C for home use if needed.  Equipment was her late 's.  Pt gets her prescriptions at [facility].   Pt denies any issues affording medications. Pt is not current with HH.  Pt has not been in SNF.  Pt denies any discharge needs.  Pt's son ( Dave Thakkar [phone]) , son ( Supa Thakkar [phone]) and daughter ( Beth English [phone]) will assist pt at home and transport pt home.  Plan home with family.  HORTENCIA Ledbetter RN                   Discharge Codes    No documentation.                       Makenna Ledbetter RN

## 2025-06-30 NOTE — PLAN OF CARE
Goal Outcome Evaluation:  Plan of Care Reviewed With: patient, sibling           Outcome Evaluation: Admitted to room 656 from Dr Chacon's office for CHF. Denies SOA. Room air. Swelling of BLE. Telemetry SR w/ PVC.

## 2025-07-01 ENCOUNTER — APPOINTMENT (OUTPATIENT)
Dept: CARDIOLOGY | Facility: HOSPITAL | Age: OVER 89
End: 2025-07-01
Payer: MEDICARE

## 2025-07-01 LAB
ANION GAP SERPL CALCULATED.3IONS-SCNC: 9.9 MMOL/L (ref 5–15)
AORTIC DIMENSIONLESS INDEX: 0.45 (DI)
AV MEAN PRESS GRAD SYS DOP V1V2: 26.2 MMHG
AV VMAX SYS DOP: 326 CM/SEC
BASOPHILS # BLD AUTO: 0.05 10*3/MM3 (ref 0–0.2)
BASOPHILS NFR BLD AUTO: 0.8 % (ref 0–1.5)
BH CV ECHO MEAS - ACS: 0.94 CM
BH CV ECHO MEAS - AO MAX PG: 42.5 MMHG
BH CV ECHO MEAS - AO ROOT AREA (BSA CORRECTED): 1.4 CM2
BH CV ECHO MEAS - AO ROOT DIAM: 2.7 CM
BH CV ECHO MEAS - AO V2 VTI: 85.9 CM
BH CV ECHO MEAS - AVA(I,D): 1.21 CM2
BH CV ECHO MEAS - EDV(CUBED): 110.3 ML
BH CV ECHO MEAS - EDV(MOD-SP2): 54 ML
BH CV ECHO MEAS - EDV(MOD-SP4): 65 ML
BH CV ECHO MEAS - EF(MOD-SP2): 63 %
BH CV ECHO MEAS - EF(MOD-SP4): 61.5 %
BH CV ECHO MEAS - ESV(CUBED): 29.9 ML
BH CV ECHO MEAS - ESV(MOD-SP2): 20 ML
BH CV ECHO MEAS - ESV(MOD-SP4): 25 ML
BH CV ECHO MEAS - FS: 35.3 %
BH CV ECHO MEAS - IVS/LVPW: 0.96 CM
BH CV ECHO MEAS - IVSD: 1 CM
BH CV ECHO MEAS - LAT PEAK E' VEL: 9.5 CM/SEC
BH CV ECHO MEAS - LV DIASTOLIC VOL/BSA (35-75): 32.7 CM2
BH CV ECHO MEAS - LV MASS(C)D: 173.2 GRAMS
BH CV ECHO MEAS - LV MAX PG: 8.3 MMHG
BH CV ECHO MEAS - LV MEAN PG: 4.6 MMHG
BH CV ECHO MEAS - LV SYSTOLIC VOL/BSA (12-30): 12.6 CM2
BH CV ECHO MEAS - LV V1 MAX: 144.3 CM/SEC
BH CV ECHO MEAS - LV V1 VTI: 38.7 CM
BH CV ECHO MEAS - LVIDD: 4.8 CM
BH CV ECHO MEAS - LVIDS: 3.1 CM
BH CV ECHO MEAS - LVOT AREA: 2.7 CM2
BH CV ECHO MEAS - LVOT DIAM: 1.85 CM
BH CV ECHO MEAS - LVPWD: 1.03 CM
BH CV ECHO MEAS - MED PEAK E' VEL: 6.4 CM/SEC
BH CV ECHO MEAS - MV A DUR: 0.12 SEC
BH CV ECHO MEAS - MV A MAX VEL: 79.7 CM/SEC
BH CV ECHO MEAS - MV DEC SLOPE: 591.1 CM/SEC2
BH CV ECHO MEAS - MV DEC TIME: 0.13 SEC
BH CV ECHO MEAS - MV E MAX VEL: 138 CM/SEC
BH CV ECHO MEAS - MV E/A: 1.73
BH CV ECHO MEAS - MV MAX PG: 8.2 MMHG
BH CV ECHO MEAS - MV MEAN PG: 2.9 MMHG
BH CV ECHO MEAS - MV P1/2T: 72.3 MSEC
BH CV ECHO MEAS - MV V2 VTI: 32 CM
BH CV ECHO MEAS - MVA(P1/2T): 3 CM2
BH CV ECHO MEAS - MVA(VTI): 3.3 CM2
BH CV ECHO MEAS - PA ACC TIME: 0.16 SEC
BH CV ECHO MEAS - PA V2 MAX: 84.4 CM/SEC
BH CV ECHO MEAS - RAP SYSTOLE: 3 MMHG
BH CV ECHO MEAS - RV MAX PG: 3.2 MMHG
BH CV ECHO MEAS - RV V1 MAX: 89.1 CM/SEC
BH CV ECHO MEAS - RV V1 VTI: 21.6 CM
BH CV ECHO MEAS - RVSP: 45 MMHG
BH CV ECHO MEAS - SV(LVOT): 104.3 ML
BH CV ECHO MEAS - SV(MOD-SP2): 34 ML
BH CV ECHO MEAS - SV(MOD-SP4): 40 ML
BH CV ECHO MEAS - SVI(LVOT): 52.4 ML/M2
BH CV ECHO MEAS - SVI(MOD-SP2): 17.1 ML/M2
BH CV ECHO MEAS - SVI(MOD-SP4): 20.1 ML/M2
BH CV ECHO MEAS - TAPSE (>1.6): 2.39 CM
BH CV ECHO MEAS - TR MAX PG: 42 MMHG
BH CV ECHO MEAS - TR MAX VEL: 324 CM/SEC
BH CV ECHO MEASUREMENTS AVERAGE E/E' RATIO: 17.36
BH CV LOWER VASCULAR LEFT COMMON FEMORAL AUGMENT: NORMAL
BH CV LOWER VASCULAR LEFT COMMON FEMORAL COMPETENT: NORMAL
BH CV LOWER VASCULAR LEFT COMMON FEMORAL COMPRESS: NORMAL
BH CV LOWER VASCULAR LEFT COMMON FEMORAL PHASIC: NORMAL
BH CV LOWER VASCULAR LEFT COMMON FEMORAL SPONT: NORMAL
BH CV LOWER VASCULAR LEFT DISTAL FEMORAL COMPRESS: NORMAL
BH CV LOWER VASCULAR LEFT GASTRONEMIUS COMPRESS: NORMAL
BH CV LOWER VASCULAR LEFT GREATER SAPH AK COMPRESS: NORMAL
BH CV LOWER VASCULAR LEFT GREATER SAPH BK COMPRESS: NORMAL
BH CV LOWER VASCULAR LEFT LESSER SAPH COMPRESS: NORMAL
BH CV LOWER VASCULAR LEFT MID FEMORAL AUGMENT: NORMAL
BH CV LOWER VASCULAR LEFT MID FEMORAL COMPETENT: NORMAL
BH CV LOWER VASCULAR LEFT MID FEMORAL COMPRESS: NORMAL
BH CV LOWER VASCULAR LEFT MID FEMORAL PHASIC: NORMAL
BH CV LOWER VASCULAR LEFT MID FEMORAL SPONT: NORMAL
BH CV LOWER VASCULAR LEFT PERONEAL COMPRESS: NORMAL
BH CV LOWER VASCULAR LEFT POPLITEAL AUGMENT: NORMAL
BH CV LOWER VASCULAR LEFT POPLITEAL COMPETENT: NORMAL
BH CV LOWER VASCULAR LEFT POPLITEAL COMPRESS: NORMAL
BH CV LOWER VASCULAR LEFT POPLITEAL PHASIC: NORMAL
BH CV LOWER VASCULAR LEFT POPLITEAL SPONT: NORMAL
BH CV LOWER VASCULAR LEFT POSTERIOR TIBIAL COMPRESS: NORMAL
BH CV LOWER VASCULAR LEFT PROFUNDA FEMORAL COMPRESS: NORMAL
BH CV LOWER VASCULAR LEFT PROXIMAL FEMORAL COMPRESS: NORMAL
BH CV LOWER VASCULAR LEFT SAPHENOFEMORAL JUNCTION COMPRESS: NORMAL
BH CV LOWER VASCULAR RIGHT COMMON FEMORAL AUGMENT: NORMAL
BH CV LOWER VASCULAR RIGHT COMMON FEMORAL COMPETENT: NORMAL
BH CV LOWER VASCULAR RIGHT COMMON FEMORAL COMPRESS: NORMAL
BH CV LOWER VASCULAR RIGHT COMMON FEMORAL PHASIC: NORMAL
BH CV LOWER VASCULAR RIGHT COMMON FEMORAL SPONT: NORMAL
BH CV LOWER VASCULAR RIGHT DISTAL FEMORAL COMPETENT: NORMAL
BH CV LOWER VASCULAR RIGHT GASTRONEMIUS COMPRESS: NORMAL
BH CV LOWER VASCULAR RIGHT GREATER SAPH AK COMPRESS: NORMAL
BH CV LOWER VASCULAR RIGHT GREATER SAPH BK COMPRESS: NORMAL
BH CV LOWER VASCULAR RIGHT LESSER SAPH COMPRESS: NORMAL
BH CV LOWER VASCULAR RIGHT MID FEMORAL AUGMENT: NORMAL
BH CV LOWER VASCULAR RIGHT MID FEMORAL COMPETENT: NORMAL
BH CV LOWER VASCULAR RIGHT MID FEMORAL COMPRESS: NORMAL
BH CV LOWER VASCULAR RIGHT MID FEMORAL PHASIC: NORMAL
BH CV LOWER VASCULAR RIGHT MID FEMORAL SPONT: NORMAL
BH CV LOWER VASCULAR RIGHT PERONEAL COMPRESS: NORMAL
BH CV LOWER VASCULAR RIGHT POPLITEAL AUGMENT: NORMAL
BH CV LOWER VASCULAR RIGHT POPLITEAL COMPETENT: NORMAL
BH CV LOWER VASCULAR RIGHT POPLITEAL COMPRESS: NORMAL
BH CV LOWER VASCULAR RIGHT POPLITEAL PHASIC: NORMAL
BH CV LOWER VASCULAR RIGHT POPLITEAL SPONT: NORMAL
BH CV LOWER VASCULAR RIGHT POSTERIOR TIBIAL COMPRESS: NORMAL
BH CV LOWER VASCULAR RIGHT PROFUNDA FEMORAL COMPRESS: NORMAL
BH CV LOWER VASCULAR RIGHT PROXIMAL FEMORAL COMPRESS: NORMAL
BH CV LOWER VASCULAR RIGHT SAPHENOFEMORAL JUNCTION COMPRESS: NORMAL
BH CV XLRA - TDI S': 12.4 CM/SEC
BILIRUB UR QL STRIP: NEGATIVE
BUN SERPL-MCNC: 19 MG/DL (ref 8–23)
BUN/CREAT SERPL: 19.4 (ref 7–25)
CALCIUM SPEC-SCNC: 9.1 MG/DL (ref 8.2–9.6)
CHLORIDE SERPL-SCNC: 95 MMOL/L (ref 98–107)
CLARITY UR: CLEAR
CO2 SERPL-SCNC: 26.1 MMOL/L (ref 22–29)
COLOR UR: YELLOW
CREAT SERPL-MCNC: 0.98 MG/DL (ref 0.57–1)
DEPRECATED RDW RBC AUTO: 40.9 FL (ref 37–54)
EGFRCR SERPLBLD CKD-EPI 2021: 54.6 ML/MIN/1.73
EOSINOPHIL # BLD AUTO: 0.07 10*3/MM3 (ref 0–0.4)
EOSINOPHIL NFR BLD AUTO: 1.2 % (ref 0.3–6.2)
ERYTHROCYTE [DISTWIDTH] IN BLOOD BY AUTOMATED COUNT: 12.3 % (ref 12.3–15.4)
GLUCOSE SERPL-MCNC: 89 MG/DL (ref 65–99)
GLUCOSE UR STRIP-MCNC: NEGATIVE MG/DL
HCT VFR BLD AUTO: 32 % (ref 34–46.6)
HGB BLD-MCNC: 11 G/DL (ref 12–15.9)
HGB UR QL STRIP.AUTO: NEGATIVE
IMM GRANULOCYTES # BLD AUTO: 0.02 10*3/MM3 (ref 0–0.05)
IMM GRANULOCYTES NFR BLD AUTO: 0.3 % (ref 0–0.5)
KETONES UR QL STRIP: NEGATIVE
LEFT ATRIUM VOLUME INDEX: 24.5 ML/M2
LEUKOCYTE ESTERASE UR QL STRIP.AUTO: NEGATIVE
LV EF BIPLANE MOD: 63.9 %
LYMPHOCYTES # BLD AUTO: 0.99 10*3/MM3 (ref 0.7–3.1)
LYMPHOCYTES NFR BLD AUTO: 16.7 % (ref 19.6–45.3)
MCH RBC QN AUTO: 31 PG (ref 26.6–33)
MCHC RBC AUTO-ENTMCNC: 34.4 G/DL (ref 31.5–35.7)
MCV RBC AUTO: 90.1 FL (ref 79–97)
MONOCYTES # BLD AUTO: 0.8 10*3/MM3 (ref 0.1–0.9)
MONOCYTES NFR BLD AUTO: 13.5 % (ref 5–12)
NEUTROPHILS NFR BLD AUTO: 4.01 10*3/MM3 (ref 1.7–7)
NEUTROPHILS NFR BLD AUTO: 67.5 % (ref 42.7–76)
NITRITE UR QL STRIP: NEGATIVE
NRBC BLD AUTO-RTO: 0 /100 WBC (ref 0–0.2)
OSMOLALITY SERPL: 277 MOSM/KG (ref 280–301)
OSMOLALITY UR: 270 MOSM/KG
PH UR STRIP.AUTO: 6.5 [PH] (ref 5–8)
PLATELET # BLD AUTO: 225 10*3/MM3 (ref 140–450)
PMV BLD AUTO: 10.9 FL (ref 6–12)
POTASSIUM SERPL-SCNC: 4 MMOL/L (ref 3.5–5.2)
PROT UR QL STRIP: NEGATIVE
RBC # BLD AUTO: 3.55 10*6/MM3 (ref 3.77–5.28)
SINUS: 2.17 CM
SODIUM SERPL-SCNC: 131 MMOL/L (ref 136–145)
SODIUM UR-SCNC: 91 MMOL/L
SP GR UR STRIP: 1.01 (ref 1–1.03)
UROBILINOGEN UR QL STRIP: NORMAL
WBC NRBC COR # BLD AUTO: 5.94 10*3/MM3 (ref 3.4–10.8)

## 2025-07-01 PROCEDURE — 93306 TTE W/DOPPLER COMPLETE: CPT

## 2025-07-01 PROCEDURE — 85025 COMPLETE CBC W/AUTO DIFF WBC: CPT | Performed by: HOSPITALIST

## 2025-07-01 PROCEDURE — 83930 ASSAY OF BLOOD OSMOLALITY: CPT | Performed by: HOSPITALIST

## 2025-07-01 PROCEDURE — 93970 EXTREMITY STUDY: CPT

## 2025-07-01 PROCEDURE — 81003 URINALYSIS AUTO W/O SCOPE: CPT | Performed by: HOSPITALIST

## 2025-07-01 PROCEDURE — 93970 EXTREMITY STUDY: CPT | Performed by: SURGERY

## 2025-07-01 PROCEDURE — 99214 OFFICE O/P EST MOD 30 MIN: CPT | Performed by: STUDENT IN AN ORGANIZED HEALTH CARE EDUCATION/TRAINING PROGRAM

## 2025-07-01 PROCEDURE — 25010000002 ENOXAPARIN PER 10 MG: Performed by: HOSPITALIST

## 2025-07-01 PROCEDURE — 93306 TTE W/DOPPLER COMPLETE: CPT | Performed by: INTERNAL MEDICINE

## 2025-07-01 PROCEDURE — G0378 HOSPITAL OBSERVATION PER HR: HCPCS

## 2025-07-01 PROCEDURE — 83935 ASSAY OF URINE OSMOLALITY: CPT | Performed by: HOSPITALIST

## 2025-07-01 PROCEDURE — 84300 ASSAY OF URINE SODIUM: CPT | Performed by: HOSPITALIST

## 2025-07-01 PROCEDURE — 80048 BASIC METABOLIC PNL TOTAL CA: CPT | Performed by: HOSPITALIST

## 2025-07-01 PROCEDURE — 25010000002 FUROSEMIDE PER 20 MG: Performed by: HOSPITALIST

## 2025-07-01 RX ADMIN — FUROSEMIDE 40 MG: 10 INJECTION, SOLUTION INTRAMUSCULAR; INTRAVENOUS at 15:39

## 2025-07-01 RX ADMIN — ATORVASTATIN CALCIUM 10 MG: 20 TABLET, FILM COATED ORAL at 09:40

## 2025-07-01 RX ADMIN — HYDRALAZINE HYDROCHLORIDE 25 MG: 25 TABLET ORAL at 09:40

## 2025-07-01 RX ADMIN — Medication 10 ML: at 09:41

## 2025-07-01 RX ADMIN — METOPROLOL SUCCINATE 100 MG: 100 TABLET, EXTENDED RELEASE ORAL at 09:41

## 2025-07-01 RX ADMIN — ACETAMINOPHEN 650 MG: 325 TABLET, FILM COATED ORAL at 18:29

## 2025-07-01 RX ADMIN — FUROSEMIDE 40 MG: 10 INJECTION, SOLUTION INTRAMUSCULAR; INTRAVENOUS at 09:40

## 2025-07-01 RX ADMIN — ENOXAPARIN SODIUM 40 MG: 100 INJECTION SUBCUTANEOUS at 23:43

## 2025-07-01 RX ADMIN — Medication 10 ML: at 21:04

## 2025-07-01 RX ADMIN — LISINOPRIL 20 MG: 20 TABLET ORAL at 09:41

## 2025-07-01 RX ADMIN — FUROSEMIDE 40 MG: 10 INJECTION, SOLUTION INTRAMUSCULAR; INTRAVENOUS at 23:42

## 2025-07-01 NOTE — PLAN OF CARE
Goal Outcome Evaluation:      Pt up in chair. No c/o voiced. CTA completed. UA sent. VSS  no s/s of distress. Plan of care ongoing

## 2025-07-01 NOTE — PROGRESS NOTES
Name: Dex Thakkar ADMIT: 2025   : 1934  PCP: Carmen Chacon MD    MRN: [id number] LOS: 1 days   AGE/SEX: 89 y.o. female  ROOM: [facility]     Subjective   Subjective   Sitting up in a chair, eating breakfast.  Shortness of breath improved, reports has been urinating a lot with IV Lasix.  No chest pain or palpitations.  No fevers no chills.    Review of Systems   As above  Objective   Objective   Vital Signs  Temp:  [97.5 °F (36.4 °C)-98 °F (36.7 °C)] 98 °F (36.7 °C)  Heart Rate:  [62-82] 63  Resp:  [16-18] 18  BP: (148-186)/(52-79) 148/52  SpO2:  [93 %-99 %] 97 %  on   ;   Device (Oxygen Therapy): room air  Body mass index is 37.91 kg/m².  Physical Exam    General: Alert and oriented x3, no acute distress  HEENT: Normocephalic, atraumatic  CV: Regular rate and rhythm, + murmur  Lungs: Bibasilar crackles, no wheezing   Abdomen: Soft, nontender, nondistended  Extremities: 2+ bilateral peripheral edema , no cyanosis     Results Review     I reviewed the patient's new clinical results.  Results from last 7 days   Lab Units 25  0555 25  1603   WBC 10*3/mm3 5.94 6.97   HEMOGLOBIN g/dL 11.0* 11.2*   PLATELETS 10*3/mm3 225 238     Results from last 7 days   Lab Units 25  0555 25  1603   SODIUM mmol/L 131* 131*   POTASSIUM mmol/L 4.0 5.0   CHLORIDE mmol/L 95* 97*   CO2 mmol/L 26.1 25.4   BUN mg/dL 19.0 19.0   CREATININE mg/dL 0.98 0.99   GLUCOSE mg/dL 89 104*   Estimated Creatinine Clearance: 41.5 mL/min (by C-G formula based on SCr of 0.98 mg/dL).  Results from last 7 days   Lab Units 25  1603   ALBUMIN g/dL 3.8   BILIRUBIN mg/dL 0.5   ALK PHOS U/L 88   AST (SGOT) U/L 17   ALT (SGPT) U/L 12     Results from last 7 days   Lab Units 25  0555 25  1603   CALCIUM mg/dL 9.1 9.2   ALBUMIN g/dL  --  3.8       COVID19   Date Value Ref Range Status   2025 Not Detected Not Detected - Ref. Range Final   2020 Not Detected Not Detected - Ref. Range Final     No results  "found for: \"HGBA1C\", \"POCGLU\"        Adult Transthoracic Echo Complete W/ Cont if Necessary Per Protocol    Left ventricular systolic function is normal. Calculated left   ventricular EF = 63.9%    Left ventricular diastolic function was normal.    Moderate aortic valve stenosis is present. Aortic valve area is 1.2   cm2.    Peak velocity of the flow distal to the aortic valve is 326 cm/s.   Aortic valve maximum pressure gradient is 43 mmHg. Aortic valve mean   pressure gradient is 26 mmHg. Aortic valve dimensionless index is 0.45 .    Estimated right ventricular systolic pressure from tricuspid   regurgitation is moderately elevated (45-55 mmHg). Calculated right   ventricular systolic pressure from tricuspid regurgitation is 45 mmHg.  Duplex Venous Lower Extremity - Bilateral CAR    Normal bilateral lower extremity venous duplex scan.    Scheduled Medications  atorvastatin, 10 mg, Oral, Daily  enoxaparin sodium, 40 mg, Subcutaneous, Q24H  furosemide, 40 mg, Intravenous, Q8H  hydrALAZINE, 25 mg, Oral, BID  lisinopril, 20 mg, Oral, Q12H  metoprolol succinate XL, 100 mg, Oral, Daily  sodium chloride, 10 mL, Intravenous, Q12H    Infusions     Diet  Diet: Regular/House, Cardiac; Healthy Heart (2-3 Na+); Fluid Consistency: Thin (IDDSI 0)    I have personally reviewed     [x]  Laboratory   [x]  Microbiology   [x]  Radiology   [x]  EKG/Telemetry  [x]  Cardiology/Vascular   []  Pathology    []  Records       Assessment/Plan     Active Hospital Problems    Diagnosis  POA    **Short of breath on exertion [R06.02]  Yes      Resolved Hospital Problems   No resolved problems to display.       89 y.o. female admitted with Short of breath on exertion.    Acute diastolic heart failure exacerbation  Moderate aortic valve stenosis  History of AVNRT status post ablation  -CT with Moderate bilateral pleural effusions with mild interstitial prominence,  -ECHO with  EF = 63.9%  Moderate aortic valve stenosis is present.   - On IV 40 mg " every 8 hours,  - Cardiology following    Elevated dimer:   CTA negative for PE.  Duplex of lower extremities was negative    Hypervolemic hyponatremia:   -TSH normal  - Sodium remains stable around 131,  - On IV diuresis as above.  Monitor daily BMP.    Hypertension:   Continue hydralazine, lisinopril, BP acute stable    Anemia  -Hemoglobin stable around 11..  Anemia workup 3/11/2025 unremarkable  -Monitor daily CBC           DVT prophylaxis.  Lovenox  Full code.  Discussed with patient.  Expected Discharge Date: 7/3/2025; Expected Discharge Time:        Copied text in this note has been reviewed and is accurate as of 07/01/25.         Dictated utilizing Dragon dictation        Anusha Thakur MD  [facility]  07/01/25  14:07 EDT

## 2025-07-01 NOTE — PLAN OF CARE
Goal Outcome Evaluation:                               Patient alert and oriented, denies pain. Patient up to bathroom independently. Patient has been sitting in recliner most of the day. No acute distress noted, will continue to monitor.

## 2025-07-01 NOTE — PROGRESS NOTES
"[facility] Clinical Pharmacy Services: Enoxaparin Consult    Dex Thakkar has a pharmacy consult to dose prophylactic enoxaparin per [place]'s request.     Indication: VTE Prophylaxis  Home Anticoagulation: na     Relevant clinical data and objective history reviewed:  89 y.o. female 161 cm (63.39\") 97.1 kg (214 lb)   Body mass index is 37.45 kg/m².   Results from last 7 days   Lab Units 06/30/25  1603   PLATELETS 10*3/mm3 238     Estimated Creatinine Clearance: 41.4 mL/min (by C-G formula based on SCr of 0.99 mg/dL).    Assessment/Plan    Will start patient on 40mg subcutaneous every 24 hours, adjusted for renal function. Consult order will be discontinued but pharmacy will continue to follow.     Camila Andre, PharmD  Clinical Pharmacist    "

## 2025-07-01 NOTE — NURSING NOTE
"Spoke w CT re stat order. CT person states they have had \"a lot of stats, so it will be as soon as we can get to her\"  "

## 2025-07-01 NOTE — CONSULTS
Cardiology Hospital Consult    Patient Name: Dex Thakkar  Age/Sex: 89 y.o. female  : 1934  MRN: [id number]    Date of Admission: 2025  Date of Encounter Visit: 25  Encounter Provider: Jarad Howell MD  Referring Provider: Carmen Chacon MD  Place of Service: [facility] CARDIOLOGY  Patient Care Team:  Carmen Chacon MD as PCP - General  Carmen Chacon MD as PCP - Family Medicine    Subjective:     Consulted for: Shortness of breath    Chief Complaint: Of breath    History of Present Illness:  Dex Thakkar is a 89 y.o. female that follows with Dr. Greene.  She has a history of hypertension, AVNRT status post ablation, hypertension, chronic venous insufficiency and lymphedema for which she follows with the lymphedema clinic, mild aortic stenosis she presents with worsening lower extreme edema and shortness of breath.  She never lies flat so she is not sure if there is orthopnea.  No chest pain or palpitations.  No syncope or dizziness.  No infectious symptoms.  She does not take diuretics as an outpatient.    Vital signs notable for normal heart rate, hypertension with blood pressures ranging from 155/61 to 186/79.  Labs on arrival notable for sodium of 131, proBNP of 1300, D-dimer of 1.3, CBC with a hemoglobin 11.2 and normal platelets.  CTA chest did not show PE, though it did show moderate bilateral pleural effusions.  There is no DVT.  On EKG she is in sinus rhythm and there are no acute ischemic changes.  Echocardiogram shows intact EF and moderate aortic valve stenosis with a peak velocity of 326 cm/s and dimensionless index of 0.45 with an aortic valve area of 1.2.  There is moderately elevated RVSP      Past Medical History:  Past Medical History:   Diagnosis Date    A-fib     Chronic venous hypertension (idiopathic) without complications of left lower extremity     Diverticulitis     Effusion, left ankle     H/O cardiac radiofrequency ablation      Hyperlipidemia     Hypertension     Incontinence     Malignant melanoma of skin, unspecified     Melanoma     Murmur     Personal history of other venous thrombosis and embolism     Personal history of other venous thrombosis and embolism     Secondary lymphedema 10/27/2021    Spinal stenosis     Unilateral osteoarthritis of hip 04/28/2021    Varicose veins of leg with pain, bilateral 09/12/2018    Venous insufficiency (chronic) (peripheral) 09/12/2018       Past Surgical History:   Procedure Laterality Date    CARDIAC ABLATION N/A 07/17/2017    Dr. Leigh    CHOLECYSTECTOMY N/A     COLON RESECTION N/A 05/15/2019    Procedure: laparoscopic low anterior colon resection with splenic flexure mobilization, left salpingo oophorectomy, drainage of peritoneal abscess;  Surgeon: Renata Ray MD;  Location: [facility] MAIN OR;  Service: General    COLONOSCOPY N/A 10/15/2004    Sigmoid diverticulosis, smal rectal polyp, internal hemorrhoids-Dr. Renata Ray    COLONOSCOPY N/A 12/14/2009    Sigmoid diverticulosis-Dr. Renata Ray    COLONOSCOPY N/A 05/09/2019    Procedure: COLONOSCOPY to cecum with cold biopsies;  Surgeon: Renata Ray MD;  Location: [facility] ENDOSCOPY;  Service: General    ENDOSCOPY N/A 10/07/2013    Gastric ulcerations x10, ulcerative esophagitis, small hiatal hernia-Dr. Renata Ray    HYSTERECTOMY      KNEE ARTHROSCOPY W/ PARTIAL MEDIAL MENISCECTOMY Right 10/24/2014    Dr. Bud Muhammad    SIGMOIDOSCOPY N/A 01/20/2020    Procedure: FLEXIBLE SIGMOIDOSCOPY WITH BIOPSY, balloon dilation;  Surgeon: Renata Ray MD;  Location: [facility] ENDOSCOPY;  Service: General    SIGMOIDOSCOPY N/A 07/20/2020    Procedure: FLEXIBLE SIGMOIDOSCOPY with bx;  Surgeon: Renata Ray MD;  Location: [facility] ENDOSCOPY;  Service: General;  Laterality: N/A;  pre-history of a rectal ulcer and a resection for diverticulitis, with anastomotic stricture found 1/20/20  post-anastamotic nodule, mild stricture        SKIN CANCER EXCISION  Left     Melanoma left leg       Home Medications:   Medications Prior to Admission   Medication Sig Dispense Refill Last Dose/Taking    atorvastatin (LIPITOR) 10 MG tablet Take 1 tablet by mouth Daily. 30 tablet 11 2025    hydrALAZINE (APRESOLINE) 25 MG tablet TAKE 1 TABLET BY MOUTH 2 TIMES A  tablet 0 2025    lisinopril (PRINIVIL,ZESTRIL) 40 MG tablet Take 1 tablet by mouth Daily. 90 tablet 3 2025    metoprolol succinate XL (TOPROL-XL) 100 MG 24 hr tablet Take 1 tablet by mouth Daily.   2025       Allergies:  No Known Allergies    Past Social History:  Social History     Socioeconomic History    Marital status:    Tobacco Use    Smoking status: Former     Current packs/day: 0.00     Types: Cigarettes     Quit date: 1984     Years since quittin.9     Passive exposure: Past    Smokeless tobacco: Never    Tobacco comments:     QUIT 30  YRS AGO   Vaping Use    Vaping status: Never Used   Substance and Sexual Activity    Alcohol use: Yes     Alcohol/week: 1.0 standard drink of alcohol     Types: 1 Glasses of wine per week     Comment: OCCASIONAL    Drug use: No    Sexual activity: Defer       Past Family History:  Family History   Problem Relation Age of Onset    Brain cancer Mother 81    Cancer Father     Heart disease Father     Breast cancer Sister     Heart valve disorder Sister         Bicuspid AV    Cancer Daughter     Heart valve disorder Daughter         Bicuspid AV    Atrial fibrillation Daughter     Malig Hyperthermia Neg Hx        Review of Systems:   All systems reviewed. Pertinent positives identified in HPI. All other systems are negative.    Objective:   Temp:  [97.5 °F (36.4 °C)-97.9 °F (36.6 °C)] 97.9 °F (36.6 °C)  Heart Rate:  [62-82] 68  Resp:  [16-18] 18  BP: (152-186)/(61-79) 177/61     Intake/Output Summary (Last 24 hours) at 2025 1021  Last data filed at 2025 0230  Gross per 24 hour   Intake 360 ml   Output 300 ml   Net 60 ml     Body mass index  "is 37.91 kg/m².      06/30/25  1921 07/01/25  0833   Weight: 97.1 kg (214 lb) 97.1 kg (214 lb)     Weight change:     Physical Exam:               Constitutional: Awake, alert              Eyes: PERRLA, sclerae anicteric              HENT: NCAT, mucous membranes moist              Neck: Supple,, trachea midline              Respiratory: Nonlabored, on room air              Cardiovascular: Systolic ejection murmur 4/6, 2+ lower extremity edema              Gastrointestinal: soft, nt, nd               Psychiatric: Appropriate affect, cooperative              Neurologic: Oriented x 3, no focal deficits    Lab Review:   Results from last 7 days   Lab Units 07/01/25  0555 06/30/25  1603   SODIUM mmol/L 131* 131*   POTASSIUM mmol/L 4.0 5.0   CHLORIDE mmol/L 95* 97*   CO2 mmol/L 26.1 25.4   BUN mg/dL 19.0 19.0   CREATININE mg/dL 0.98 0.99   GLUCOSE mg/dL 89 104*   CALCIUM mg/dL 9.1 9.2   AST (SGOT) U/L  --  17   ALT (SGPT) U/L  --  12         Results from last 7 days   Lab Units 07/01/25  0555 06/30/25  1603   WBC 10*3/mm3 5.94 6.97   HEMOGLOBIN g/dL 11.0* 11.2*   HEMATOCRIT % 32.0* 33.1*   PLATELETS 10*3/mm3 225 238                   Invalid input(s): \"LDLCALC\"  Results from last 7 days   Lab Units 06/30/25  1603   PROBNP pg/mL 1,301.0     Results from last 7 days   Lab Units 06/30/25  1603   TSH uIU/mL 1.850       Echo EF Estimated  Lab Results   Component Value Date    ECHOEFEST 64 03/04/2022       EKG:     Imaging:  Imaging Results (Most Recent)       Procedure Component Value Units Date/Time    CT Angiogram Chest [[id number]] Collected: 06/30/25 2217     Updated: 06/30/25 2228    Narrative:      CTA CHEST WITH IV CONTRAST     HISTORY: Short of breath.  Elevated dimer.     COMPARISON: None     TECHNIQUE: CT angiography was performed of the chest with axial images  as well as coronal and sagittal reformatted MIP images provided  following administration of IV contrast. 3-D surface rendered reformats  were obtained of the " pulmonary arteries and aorta. Radiation dose  reduction techniques were utilized, including automated exposure  control, and exposure modulation based on body size.     FINDINGS:     There are moderate bilateral pleural effusions. There is no pulmonary  consolidation or central airway obstruction, but there is mild septal  prominence, suggesting a degree of mild interstitial edema. There is no  pneumothorax and there is no suspicious pulmonary nodule.     Thoracic aorta is normal in caliber. There are calcifications on the  aortic valve plane, associated with but not diagnostic of aortic  stenosis. There are coronary atherosclerotic vascular calcifications.   There is no suspicious mediastinal adenopathy or other mass.     Images of the upper abdomen show no acute abnormality. There is a 2.9 cm  left adrenal mass, unchanged since 12/14/2015.  There is no acute bony  abnormality.     Bolus timing is adequate, and there is no evidence of pulmonary  embolism.       Impression:         Bolus timing is adequate, and there is no convincing evidence of  pulmonary embolus.     Moderate bilateral pleural effusions with mild interstitial prominence,  no pulmonary consolidation or central airway obstruction.           This report was finalized on 6/30/2025 10:25 PM by Dr. Anders Perkins M.D on Workstation: [id number]       XR Chest PA & Lateral [[id number]] Collected: 06/30/25 1650     Updated: 06/30/25 1654    Narrative:      XR CHEST PA AND LATERAL-     Clinical: Shortness of breath     COMPARISON examination 1/3/2025     FINDINGS: Atherosclerotic calcification of the aorta. There is cardiac  enlargement with a small bilateral pleural effusions and vascular  congestion, consistent with CHF. There is biapical pleural thickening  and additionally on the right parenchymal scarring as before. The  remainder is unremarkable.     This report was finalized on 6/30/2025 4:51 PM by Dr. Pete Hercules M.D  on Workstation:  [id number]               I personally viewed and interpreted the patient's EKG    Assessment:     Acute diastolic heart failure  Shortness of breath  Moderate aortic stenosis  Mild to moderate tricuspid regurgitation  History of AVNRT status post ablation      Plan:     -14 pound weight gain over the last few months, pleural effusions, lower extremity edema consistent with diastolic heart failure.  RVSP elevated on echocardiogram.  She is on room air and clinically comfortable.  Says she does with the current Lasix that are ordered.  She is on her home metoprolol, lisinopril, hydralazine    -Lower extremity Dopplers for DVT and CT negative for PE.    -Aortic stenosis is moderate on echocardiogram.  Will follow as outpatient.    We will see how she does with diuretics and will likely need home dosing of Lasix.  Suspect she will need a few days.  Will follow response and adjust diuretic therapy accordingly.    Thank you for allowing me to participate in the care of Dex Thakkar. Feel free to contact me directly with any further questions or concerns.    Jarad Howell MD  [facility]  07/01/25  10:21 EDT    EMR Dragon/Transcription disclaimer:   Part of this note may be an electronic transcription/translation of spoken language to printed text using the Dragon dictation system.

## 2025-07-01 NOTE — PROGRESS NOTES
"[facility] Clinical Pharmacy Services: Enoxaparin Consult    Dex Thakkar has a pharmacy consult to dose prophylactic enoxaparin per [place]'s request.     Indication: VTE Prophylaxis  Home Anticoagulation: na     Relevant clinical data and objective history reviewed:  89 y.o. female 50.4 cm (19.84\") 97.1 kg (214 lb)   Body mass index is 382.12 kg/m².   Results from last 7 days   Lab Units 06/30/25  1603   PLATELETS 10*3/mm3 238     Estimated Creatinine Clearance: 24.7 mL/min (by C-G formula based on SCr of 0.99 mg/dL).    Assessment/Plan    Will start patient on 40mg subcutaneous every 24 hours, adjusted for renal function. Consult order will be discontinued but pharmacy will continue to follow.     Camila Andre, PharmD  Clinical Pharmacist    "

## 2025-07-02 PROBLEM — D64.9 ANEMIA: Status: ACTIVE | Noted: 2025-07-02

## 2025-07-02 PROBLEM — I50.33 ACUTE ON CHRONIC DIASTOLIC HEART FAILURE: Status: ACTIVE | Noted: 2025-07-02

## 2025-07-02 LAB
ANION GAP SERPL CALCULATED.3IONS-SCNC: 11.2 MMOL/L (ref 5–15)
BUN SERPL-MCNC: 27 MG/DL (ref 8–23)
BUN/CREAT SERPL: 22.3 (ref 7–25)
CALCIUM SPEC-SCNC: 9.3 MG/DL (ref 8.2–9.6)
CHLORIDE SERPL-SCNC: 91 MMOL/L (ref 98–107)
CO2 SERPL-SCNC: 29.8 MMOL/L (ref 22–29)
CREAT SERPL-MCNC: 1.21 MG/DL (ref 0.57–1)
DEPRECATED RDW RBC AUTO: 40.4 FL (ref 37–54)
EGFRCR SERPLBLD CKD-EPI 2021: 42.4 ML/MIN/1.73
ERYTHROCYTE [DISTWIDTH] IN BLOOD BY AUTOMATED COUNT: 12.4 % (ref 12.3–15.4)
GLUCOSE SERPL-MCNC: 109 MG/DL (ref 65–99)
HCT VFR BLD AUTO: 34.4 % (ref 34–46.6)
HGB BLD-MCNC: 11.7 G/DL (ref 12–15.9)
MAGNESIUM SERPL-MCNC: 1.7 MG/DL (ref 1.7–2.3)
MCH RBC QN AUTO: 30.7 PG (ref 26.6–33)
MCHC RBC AUTO-ENTMCNC: 34 G/DL (ref 31.5–35.7)
MCV RBC AUTO: 90.3 FL (ref 79–97)
PHOSPHATE SERPL-MCNC: 4.3 MG/DL (ref 2.5–4.5)
PLATELET # BLD AUTO: 249 10*3/MM3 (ref 140–450)
PMV BLD AUTO: 11.1 FL (ref 6–12)
POTASSIUM SERPL-SCNC: 4 MMOL/L (ref 3.5–5.2)
RBC # BLD AUTO: 3.81 10*6/MM3 (ref 3.77–5.28)
SODIUM SERPL-SCNC: 132 MMOL/L (ref 136–145)
WBC NRBC COR # BLD AUTO: 6.08 10*3/MM3 (ref 3.4–10.8)

## 2025-07-02 PROCEDURE — 25010000002 ENOXAPARIN PER 10 MG: Performed by: STUDENT IN AN ORGANIZED HEALTH CARE EDUCATION/TRAINING PROGRAM

## 2025-07-02 PROCEDURE — 99232 SBSQ HOSP IP/OBS MODERATE 35: CPT | Performed by: NURSE PRACTITIONER

## 2025-07-02 PROCEDURE — 80048 BASIC METABOLIC PNL TOTAL CA: CPT | Performed by: STUDENT IN AN ORGANIZED HEALTH CARE EDUCATION/TRAINING PROGRAM

## 2025-07-02 PROCEDURE — 25010000002 FUROSEMIDE PER 20 MG: Performed by: HOSPITALIST

## 2025-07-02 PROCEDURE — 84100 ASSAY OF PHOSPHORUS: CPT | Performed by: STUDENT IN AN ORGANIZED HEALTH CARE EDUCATION/TRAINING PROGRAM

## 2025-07-02 PROCEDURE — 85027 COMPLETE CBC AUTOMATED: CPT | Performed by: STUDENT IN AN ORGANIZED HEALTH CARE EDUCATION/TRAINING PROGRAM

## 2025-07-02 PROCEDURE — 83735 ASSAY OF MAGNESIUM: CPT | Performed by: STUDENT IN AN ORGANIZED HEALTH CARE EDUCATION/TRAINING PROGRAM

## 2025-07-02 PROCEDURE — 25010000002 FUROSEMIDE PER 20 MG: Performed by: NURSE PRACTITIONER

## 2025-07-02 RX ORDER — FUROSEMIDE 40 MG/1
40 TABLET ORAL DAILY
Status: DISCONTINUED | OUTPATIENT
Start: 2025-07-03 | End: 2025-07-03

## 2025-07-02 RX ORDER — ENOXAPARIN SODIUM 100 MG/ML
30 INJECTION SUBCUTANEOUS EVERY 24 HOURS
Status: DISCONTINUED | OUTPATIENT
Start: 2025-07-03 | End: 2025-07-02

## 2025-07-02 RX ORDER — ENOXAPARIN SODIUM 100 MG/ML
40 INJECTION SUBCUTANEOUS EVERY 24 HOURS
Status: DISCONTINUED | OUTPATIENT
Start: 2025-07-02 | End: 2025-07-03

## 2025-07-02 RX ADMIN — Medication 10 ML: at 08:31

## 2025-07-02 RX ADMIN — ATORVASTATIN CALCIUM 10 MG: 20 TABLET, FILM COATED ORAL at 08:31

## 2025-07-02 RX ADMIN — ACETAMINOPHEN 650 MG: 325 TABLET, FILM COATED ORAL at 08:31

## 2025-07-02 RX ADMIN — ENOXAPARIN SODIUM 40 MG: 100 INJECTION SUBCUTANEOUS at 21:21

## 2025-07-02 RX ADMIN — FUROSEMIDE 40 MG: 10 INJECTION, SOLUTION INTRAMUSCULAR; INTRAVENOUS at 15:11

## 2025-07-02 RX ADMIN — METOPROLOL SUCCINATE 100 MG: 100 TABLET, EXTENDED RELEASE ORAL at 08:31

## 2025-07-02 RX ADMIN — LISINOPRIL 20 MG: 20 TABLET ORAL at 08:31

## 2025-07-02 RX ADMIN — HYDRALAZINE HYDROCHLORIDE 25 MG: 25 TABLET ORAL at 21:20

## 2025-07-02 RX ADMIN — HYDRALAZINE HYDROCHLORIDE 25 MG: 25 TABLET ORAL at 08:31

## 2025-07-02 RX ADMIN — LISINOPRIL 20 MG: 20 TABLET ORAL at 21:20

## 2025-07-02 RX ADMIN — FUROSEMIDE 40 MG: 10 INJECTION, SOLUTION INTRAMUSCULAR; INTRAVENOUS at 06:58

## 2025-07-02 RX ADMIN — ACETAMINOPHEN 650 MG: 325 TABLET, FILM COATED ORAL at 21:30

## 2025-07-02 RX ADMIN — Medication 10 ML: at 21:21

## 2025-07-02 RX ADMIN — Medication 10 ML: at 15:11

## 2025-07-02 NOTE — CASE MANAGEMENT/SOCIAL WORK
Continued Stay Note  [facility]     Patient Name: Dex Thakkar  MRN: [id number]  Today's Date: 7/2/2025    Admit Date: 6/30/2025    Plan: Plan home with family.  HORTENCIA Ledbetter RN   Discharge Plan       Row Name 07/02/25 0941       Plan    Plan Plan home with family.  HORTENCIA Ledbetter RN    Plan Comments Spoke with pt at bedside. Pt denies any discharge needs. Pt's son ( Dave Thakkar [phone]) , son ( Supa Thakkar [phone]) and daughter ( Beth English [phone]) will assist pt at home and transport pt home. Plan home with family. HORTENCIA Ledbetter RN                   Discharge Codes    No documentation.                 Expected Discharge Date and Time       Expected Discharge Date Expected Discharge Time    Jul 3, 2025               Makenna Ledbetter RN

## 2025-07-02 NOTE — PROGRESS NOTES
Name: Dex Thakkar ADMIT: 2025   : 1934  PCP: Carmen Chacon MD    MRN: [id number] LOS: 1 days   AGE/SEX: 89 y.o. female  ROOM: [facility]     Subjective   Subjective   Sitting up in a chair, no acute events overnight.  Reports she is feeling better, shortness of breath and swelling improving.  No chest pain or palpitations.  No fevers no chills.    Review of Systems   As above  Objective   Objective   Vital Signs  Temp:  [97.3 °F (36.3 °C)-98.4 °F (36.9 °C)] 97.3 °F (36.3 °C)  Heart Rate:  [66-67] 66  Resp:  [18] 18  BP: (146-153)/(49-63) 153/58  SpO2:  [94 %-98 %] 94 %  on   ;   Device (Oxygen Therapy): room air  Body mass index is 35.68 kg/m².  Physical Exam    General: Alert, sitting up in the chair, not in distress,  HEENT: Normocephalic, atraumatic  CV: Regular rate and rhythm, + murmur  Lungs: Bibasilar crackles, no wheezing   Abdomen: Soft, nontender, nondistended  Extremities: 2+ bilateral peripheral edema , no cyanosis     Results Review     I reviewed the patient's new clinical results.  Results from last 7 days   Lab Units 25  0818 25  0555 25  1603   WBC 10*3/mm3 6.08 5.94 6.97   HEMOGLOBIN g/dL 11.7* 11.0* 11.2*   PLATELETS 10*3/mm3 249 225 238     Results from last 7 days   Lab Units 25  0818 25  0555 25  1603   SODIUM mmol/L 132* 131* 131*   POTASSIUM mmol/L 4.0 4.0 5.0   CHLORIDE mmol/L 91* 95* 97*   CO2 mmol/L 29.8* 26.1 25.4   BUN mg/dL 27.0* 19.0 19.0   CREATININE mg/dL 1.21* 0.98 0.99   GLUCOSE mg/dL 109* 89 104*   Estimated Creatinine Clearance: 32.5 mL/min (A) (by C-G formula based on SCr of 1.21 mg/dL (H)).  Results from last 7 days   Lab Units 25  1603   ALBUMIN g/dL 3.8   BILIRUBIN mg/dL 0.5   ALK PHOS U/L 88   AST (SGOT) U/L 17   ALT (SGPT) U/L 12     Results from last 7 days   Lab Units 25  0818 25  0555 25  1603   CALCIUM mg/dL 9.3 9.1 9.2   ALBUMIN g/dL  --   --  3.8   MAGNESIUM mg/dL 1.7  --   --    PHOSPHORUS  "mg/dL 4.3  --   --        COVID19   Date Value Ref Range Status   01/03/2025 Not Detected Not Detected - Ref. Range Final   07/17/2020 Not Detected Not Detected - Ref. Range Final     No results found for: \"HGBA1C\", \"POCGLU\"        Adult Transthoracic Echo Complete W/ Cont if Necessary Per Protocol    Left ventricular systolic function is normal. Calculated left   ventricular EF = 63.9%    Left ventricular diastolic function was normal.    Moderate aortic valve stenosis is present. Aortic valve area is 1.2   cm2.    Peak velocity of the flow distal to the aortic valve is 326 cm/s.   Aortic valve maximum pressure gradient is 43 mmHg. Aortic valve mean   pressure gradient is 26 mmHg. Aortic valve dimensionless index is 0.45 .    Estimated right ventricular systolic pressure from tricuspid   regurgitation is moderately elevated (45-55 mmHg). Calculated right   ventricular systolic pressure from tricuspid regurgitation is 45 mmHg.  Duplex Venous Lower Extremity - Bilateral CAR    Normal bilateral lower extremity venous duplex scan.    Scheduled Medications  atorvastatin, 10 mg, Oral, Daily  enoxaparin sodium, 40 mg, Subcutaneous, Q24H  furosemide, 40 mg, Intravenous, Q8H  hydrALAZINE, 25 mg, Oral, BID  lisinopril, 20 mg, Oral, Q12H  metoprolol succinate XL, 100 mg, Oral, Daily  sodium chloride, 10 mL, Intravenous, Q12H    Infusions     Diet  Diet: Regular/House, Cardiac; Healthy Heart (2-3 Na+); Fluid Consistency: Thin (IDDSI 0)    I have personally reviewed     [x]  Laboratory   []  Microbiology   []  Radiology   [x]  EKG/Telemetry  [x]  Cardiology/Vascular   []  Pathology    []  Records       Assessment/Plan     Active Hospital Problems    Diagnosis  POA    **Acute on chronic diastolic heart failure [I50.33]  Yes    Anemia [D64.9]  Unknown    Short of breath on exertion [R06.02]  Yes    H/O cardiac radiofrequency ablation [Z98.890]  Not Applicable    Hypertension [I10]  Yes    AVNRT (AV gatito re-entry tachycardia) " [I47.19]  Yes      Resolved Hospital Problems   No resolved problems to display.       89 y.o. female admitted with Acute on chronic diastolic heart failure.    Acute diastolic heart failure exacerbation  Moderate aortic valve stenosis  History of AVNRT status post ablation  -CT with Moderate bilateral pleural effusions with mild interstitial prominence,  -ECHO with  EF = 63.9%  Moderate aortic valve stenosis is present.   - On IV 40 mg every 8 hours,  - Cardiology following    Elevated dimer:   CTA negative for PE.  Duplex of lower extremities was negative    Hypervolemic hyponatremia:   -TSH normal  - Sodium remains stable around 131,  - On IV diuresis as above.  Monitor daily BMP.  - Sodium improved    Hypertension:   -Continue hydralazine, lisinopril, BP acute stable  - BP acutely stable    Anemia  -Hemoglobin stable around 11..  Anemia workup 3/11/2025 unremarkable  -Monitor daily CBC  -Hemoglobin improved     CKD?  -Patient's creatinine 1.21, up from 0.98.  Creatinine on 03/2025 was 1.08, and on 01/2025 was 1.3 today,      DVT prophylaxis.  Lovenox  Full code.  Discussed with patient.  Expected Discharge Date: 7/3/2025; Expected Discharge Time:        Copied text in this note has been reviewed and is accurate as of 07/02/25.         Dictated utilizing Dragon dictation        Anusha Thakur MD  [facility]  07/02/25  14:11 EDT

## 2025-07-02 NOTE — PROGRESS NOTES
"    Patient Name: Dex Thakkar  :1934  89 y.o.      Patient Care Team:  Carmen Chacon MD as PCP - General  Carmen Chacon MD as PCP - Family Medicine    Chief Complaint: follow up heart failure    Interval History: weight down significantly. She still has some LE edema but is also treated for lymphedema as an outpatient. Creatine , BUN and CO2 all trending upward.         Objective   Vital Signs  Temp:  [97.3 °F (36.3 °C)-98.4 °F (36.9 °C)] 97.3 °F (36.3 °C)  Heart Rate:  [66-67] 66  Resp:  [18] 18  BP: (146-153)/(49-63) 153/58    Intake/Output Summary (Last 24 hours) at 2025 1406  Last data filed at 2025 0831  Gross per 24 hour   Intake 480 ml   Output 900 ml   Net -420 ml     Flowsheet Rows      Flowsheet Row First Filed Value   Admission Height 50.4 cm (19.84\") Documented at 2025   Admission Weight 97.1 kg (214 lb) Documented at 2025            Physical Exam:   General Appearance:    Alert, cooperative, in no acute distress   Lungs:     Clear to auscultation.  Normal respiratory effort and rate.      Heart:    Regular rhythm and normal rate, normal S1 and S2, + murmur, no gallops or rubs.     Chest Wall:    No abnormalities observed   Abdomen:     Soft, nontender, positive bowel sounds.     Extremities:   no cyanosis, clubbing. + edema.  No marked joint deformities.  Adequate musculoskeletal strength.       Results Review:    Results from last 7 days   Lab Units 25  0818   SODIUM mmol/L 132*   POTASSIUM mmol/L 4.0   CHLORIDE mmol/L 91*   CO2 mmol/L 29.8*   BUN mg/dL 27.0*   CREATININE mg/dL 1.21*   GLUCOSE mg/dL 109*   CALCIUM mg/dL 9.3         Results from last 7 days   Lab Units 25  0818   WBC 10*3/mm3 6.08   HEMOGLOBIN g/dL 11.7*   HEMATOCRIT % 34.4   PLATELETS 10*3/mm3 249         Results from last 7 days   Lab Units 25  0818   MAGNESIUM mg/dL 1.7                   Medication Review:   atorvastatin, 10 mg, Oral, Daily  enoxaparin sodium, 40 mg, " Subcutaneous, Q24H  furosemide, 40 mg, Intravenous, Q8H  hydrALAZINE, 25 mg, Oral, BID  lisinopril, 20 mg, Oral, Q12H  metoprolol succinate XL, 100 mg, Oral, Daily  sodium chloride, 10 mL, Intravenous, Q12H              Assessment & Plan   Acute on chronic diastolic heart failure  Aortic valve stenosis, moderate  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   Chronic lymphedema   Hypertension, above goal however meds were held last night for DBP 49. Systolic was in the 140s. parameters adjusted.     Labs showing signs of contraction. Weight down significantly but not at baseline her patient report. Transition to oral diuretics after next dose of IV lasix. Will likely need home dosing of Lasix vs considering SGLT2 though I think her age and body habitus put her at greater risk for  side effects.     SHAZIA Caceres  [facility]  07/02/25  14:06 EDT

## 2025-07-02 NOTE — PLAN OF CARE
Goal Outcome Evaluation:               Patient alert and oriented with c/o hip pain this am- medicated with tylenol and ice applied- improvement verbalized per patient. Patient up in chair all day. Daughter at bedside now. Patient with eyes closed off and on all shift- patient states she is tired and not getting much rest due to needing to go to the bathroom frequently. CONI Andrade suggested purewick- patient said she would like to use it tonight. Nurse will pass this information along in report. No acute distress noted, will continue to monitor.

## 2025-07-03 PROBLEM — N17.9 ACUTE KIDNEY INJURY: Status: ACTIVE | Noted: 2025-07-03

## 2025-07-03 LAB
ANION GAP SERPL CALCULATED.3IONS-SCNC: 11.2 MMOL/L (ref 5–15)
BUN SERPL-MCNC: 37 MG/DL (ref 8–23)
BUN/CREAT SERPL: 22.2 (ref 7–25)
CALCIUM SPEC-SCNC: 8.7 MG/DL (ref 8.2–9.6)
CHLORIDE SERPL-SCNC: 91 MMOL/L (ref 98–107)
CO2 SERPL-SCNC: 29.8 MMOL/L (ref 22–29)
CREAT SERPL-MCNC: 1.67 MG/DL (ref 0.57–1)
DEPRECATED RDW RBC AUTO: 40.6 FL (ref 37–54)
EGFRCR SERPLBLD CKD-EPI 2021: 28.8 ML/MIN/1.73
ERYTHROCYTE [DISTWIDTH] IN BLOOD BY AUTOMATED COUNT: 12.1 % (ref 12.3–15.4)
GLUCOSE SERPL-MCNC: 90 MG/DL (ref 65–99)
HCT VFR BLD AUTO: 32.8 % (ref 34–46.6)
HGB BLD-MCNC: 11.2 G/DL (ref 12–15.9)
MAGNESIUM SERPL-MCNC: 1.8 MG/DL (ref 1.7–2.3)
MCH RBC QN AUTO: 31 PG (ref 26.6–33)
MCHC RBC AUTO-ENTMCNC: 34.1 G/DL (ref 31.5–35.7)
MCV RBC AUTO: 90.9 FL (ref 79–97)
PHOSPHATE SERPL-MCNC: 4.3 MG/DL (ref 2.5–4.5)
PLATELET # BLD AUTO: 228 10*3/MM3 (ref 140–450)
PMV BLD AUTO: 10.7 FL (ref 6–12)
POTASSIUM SERPL-SCNC: 3.3 MMOL/L (ref 3.5–5.2)
RBC # BLD AUTO: 3.61 10*6/MM3 (ref 3.77–5.28)
SODIUM SERPL-SCNC: 132 MMOL/L (ref 136–145)
WBC NRBC COR # BLD AUTO: 4.73 10*3/MM3 (ref 3.4–10.8)

## 2025-07-03 PROCEDURE — 83735 ASSAY OF MAGNESIUM: CPT | Performed by: STUDENT IN AN ORGANIZED HEALTH CARE EDUCATION/TRAINING PROGRAM

## 2025-07-03 PROCEDURE — 99232 SBSQ HOSP IP/OBS MODERATE 35: CPT | Performed by: NURSE PRACTITIONER

## 2025-07-03 PROCEDURE — 85027 COMPLETE CBC AUTOMATED: CPT | Performed by: STUDENT IN AN ORGANIZED HEALTH CARE EDUCATION/TRAINING PROGRAM

## 2025-07-03 PROCEDURE — 97530 THERAPEUTIC ACTIVITIES: CPT | Performed by: PHYSICAL THERAPIST

## 2025-07-03 PROCEDURE — 25010000002 ENOXAPARIN PER 10 MG: Performed by: HOSPITALIST

## 2025-07-03 PROCEDURE — 97162 PT EVAL MOD COMPLEX 30 MIN: CPT | Performed by: PHYSICAL THERAPIST

## 2025-07-03 PROCEDURE — 80048 BASIC METABOLIC PNL TOTAL CA: CPT | Performed by: STUDENT IN AN ORGANIZED HEALTH CARE EDUCATION/TRAINING PROGRAM

## 2025-07-03 PROCEDURE — 84100 ASSAY OF PHOSPHORUS: CPT | Performed by: STUDENT IN AN ORGANIZED HEALTH CARE EDUCATION/TRAINING PROGRAM

## 2025-07-03 RX ORDER — ENOXAPARIN SODIUM 100 MG/ML
30 INJECTION SUBCUTANEOUS EVERY 24 HOURS
Status: DISCONTINUED | OUTPATIENT
Start: 2025-07-03 | End: 2025-07-04 | Stop reason: HOSPADM

## 2025-07-03 RX ADMIN — HYDRALAZINE HYDROCHLORIDE 25 MG: 25 TABLET ORAL at 20:43

## 2025-07-03 RX ADMIN — Medication 10 ML: at 08:08

## 2025-07-03 RX ADMIN — METOPROLOL SUCCINATE 100 MG: 100 TABLET, EXTENDED RELEASE ORAL at 08:08

## 2025-07-03 RX ADMIN — HYDRALAZINE HYDROCHLORIDE 25 MG: 25 TABLET ORAL at 08:07

## 2025-07-03 RX ADMIN — ENOXAPARIN SODIUM 30 MG: 100 INJECTION SUBCUTANEOUS at 20:43

## 2025-07-03 RX ADMIN — ACETAMINOPHEN 650 MG: 325 TABLET, FILM COATED ORAL at 08:07

## 2025-07-03 RX ADMIN — ATORVASTATIN CALCIUM 10 MG: 20 TABLET, FILM COATED ORAL at 08:08

## 2025-07-03 NOTE — PROGRESS NOTES
Name: Dex hTakkar ADMIT: 2025   : 1934  PCP: Carmen Chacon MD    MRN: [id number] LOS: 2 days   AGE/SEX: 89 y.o. female  ROOM: [facility]     Subjective   Subjective   Sitting at bedside chair.  Feels okay.  Poor energy in general dyspneic on exertion before admission.    Review of Systems   As above  Objective   Objective   Vital Signs  Temp:  [97.7 °F (36.5 °C)-98.4 °F (36.9 °C)] 98.1 °F (36.7 °C)  Heart Rate:  [63-71] 66  Resp:  [18] 18  BP: (115-138)/(47-65) 129/47  SpO2:  [88 %-98 %] 96 %  on   ;   Device (Oxygen Therapy): room air  Body mass index is 36.05 kg/m².  Physical Exam    General: Alert, sitting up in the chair, not in distress,  HEENT: Normocephalic, atraumatic  CV: Regular rate and rhythm, + murmur  Lungs: Bibasilar crackles, no wheezing   Abdomen: Soft, nontender, nondistended  Extremities: 2+ bilateral peripheral edema , no cyanosis     Results Review     I reviewed the patient's new clinical results.  Results from last 7 days   Lab Units 25  0725  1603   WBC 10*3/mm3 4.73 6.08 5.94 6.97   HEMOGLOBIN g/dL 11.2* 11.7* 11.0* 11.2*   PLATELETS 10*3/mm3 228 249 225 238     Results from last 7 days   Lab Units 25  0702 25  0825  0555 25  1603   SODIUM mmol/L 132* 132* 131* 131*   POTASSIUM mmol/L 3.3* 4.0 4.0 5.0   CHLORIDE mmol/L 91* 91* 95* 97*   CO2 mmol/L 29.8* 29.8* 26.1 25.4   BUN mg/dL 37.0* 27.0* 19.0 19.0   CREATININE mg/dL 1.67* 1.21* 0.98 0.99   GLUCOSE mg/dL 90 109* 89 104*   Estimated Creatinine Clearance: 23.7 mL/min (A) (by C-G formula based on SCr of 1.67 mg/dL (H)).  Results from last 7 days   Lab Units 25  1603   ALBUMIN g/dL 3.8   BILIRUBIN mg/dL 0.5   ALK PHOS U/L 88   AST (SGOT) U/L 17   ALT (SGPT) U/L 12     Results from last 7 days   Lab Units 25  0702 25  0818 25  0555 25  1603   CALCIUM mg/dL 8.7 9.3 9.1 9.2   ALBUMIN g/dL  --   --   --  3.8   MAGNESIUM mg/dL 1.8  "1.7  --   --    PHOSPHORUS mg/dL 4.3 4.3  --   --        COVID19   Date Value Ref Range Status   01/03/2025 Not Detected Not Detected - Ref. Range Final   07/17/2020 Not Detected Not Detected - Ref. Range Final     No results found for: \"HGBA1C\", \"POCGLU\"        Adult Transthoracic Echo Complete W/ Cont if Necessary Per Protocol    Left ventricular systolic function is normal. Calculated left   ventricular EF = 63.9%    Left ventricular diastolic function was normal.    Moderate aortic valve stenosis is present. Aortic valve area is 1.2   cm2.    Peak velocity of the flow distal to the aortic valve is 326 cm/s.   Aortic valve maximum pressure gradient is 43 mmHg. Aortic valve mean   pressure gradient is 26 mmHg. Aortic valve dimensionless index is 0.45 .    Estimated right ventricular systolic pressure from tricuspid   regurgitation is moderately elevated (45-55 mmHg). Calculated right   ventricular systolic pressure from tricuspid regurgitation is 45 mmHg.  Duplex Venous Lower Extremity - Bilateral CAR    Normal bilateral lower extremity venous duplex scan.    Scheduled Medications  atorvastatin, 10 mg, Oral, Daily  enoxaparin sodium, 40 mg, Subcutaneous, Q24H  hydrALAZINE, 25 mg, Oral, BID  [Held by provider] lisinopril, 20 mg, Oral, Q12H  metoprolol succinate XL, 100 mg, Oral, Daily  sodium chloride, 10 mL, Intravenous, Q12H    Infusions     Diet  Diet: Regular/House, Cardiac; Healthy Heart (2-3 Na+); Fluid Consistency: Thin (IDDSI 0)    I have personally reviewed     [x]  Laboratory   []  Microbiology   []  Radiology   [x]  EKG/Telemetry  [x]  Cardiology/Vascular   []  Pathology    []  Records       Assessment/Plan     Active Hospital Problems    Diagnosis  POA    **Acute on chronic diastolic heart failure [I50.33]  Yes    Acute kidney injury [N17.9]  No    Anemia [D64.9]  Yes    Short of breath on exertion [R06.02]  Yes    H/O cardiac radiofrequency ablation [Z98.890]  Not Applicable    Hypertension [I10]  Yes "    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       89 y.o. female admitted with Acute on chronic diastolic heart failure.    Per cardiology plan to hold diuresis and follow-up kidney function.  Possible discharge tomorrow if renal function improved.       Acute diastolic heart failure exacerbation  Moderate aortic valve stenosis  History of AVNRT status post ablation  -CT with Moderate bilateral pleural effusions with mild interstitial prominence,  -ECHO with  EF = 63.9%  Moderate aortic valve stenosis is present.   - Lasix discontinued given mild AUSTYN  - Cardiology following    Elevated dimer:   CTA negative for PE.  Duplex of lower extremities was negative    Hypervolemic hyponatremia:   -TSH normal  - Sodium remains stable around 131,  - Off Lasix.  Monitor daily BMP.  - Sodium improved    Hypertension:   -Continue hydralazine, Toprol  -Lisinopril held due to AUSTYN  - BP acutely stable    Anemia  -Hemoglobin stable around 11..  Anemia workup 3/11/2025 unremarkable  - No need to monitor daily       Lovenox 40 mg SC daily for DVT prophylaxis.  Full code.  Discussed with patient.  Anticipate discharge home with family in 1-2 days.  Expected Discharge Date: 7/4/2025; Expected Discharge Time:       Perez Helms MD  [facility]  07/03/25  13:47 EDT

## 2025-07-03 NOTE — PLAN OF CARE
Goal Outcome Evaluation:                            Patient alert and oriented with c/o right hip pain- medicated per MAR with warm blanket to hip- patient reports improvement. Patient up in chair all day- pillow under legs. No lasix tomorrow per cardiology- labs and weight to be reevaluated then. Patient report large BM this am. No acute distress noted, will continue to monitor.

## 2025-07-03 NOTE — THERAPY EVALUATION
Patient Name: Dex Thakkar  : 1934    MRN: [id number]                              Today's Date: 7/3/2025       Admit Date: 2025    Visit Dx: No diagnosis found.  Patient Active Problem List   Diagnosis    Stasis edema of left lower extremity    Pain and swelling of ankle, left    Hypertension    H/O cardiac radiofrequency ablation    Left lower quadrant pain    Urinary retention    Rectal ulcer    Anastomotic stricture of colorectal region    Aortic valve sclerosis    AVNRT (AV gatito re-entry tachycardia)    Carotid artery stenosis    Diastolic dysfunction    Edema    History of PSVT (paroxysmal supraventricular tachycardia)    Hyperlipidemia LDL goal <100    Irritable bladder    Systolic murmur    Hyponatremia    Coronavirus infection    Short of breath on exertion    Acute on chronic diastolic heart failure    Anemia     Past Medical History:   Diagnosis Date    A-fib     Chronic venous hypertension (idiopathic) without complications of left lower extremity     Diverticulitis     Effusion, left ankle     H/O cardiac radiofrequency ablation     Hyperlipidemia     Hypertension     Incontinence     Malignant melanoma of skin, unspecified     Melanoma     Murmur     Personal history of other venous thrombosis and embolism     Personal history of other venous thrombosis and embolism     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 Leigh    CHOLECYSTECTOMY N/A     COLON RESECTION N/A 05/15/2019    Procedure: laparoscopic low anterior colon resection with splenic flexure mobilization, left salpingo oophorectomy, drainage of peritoneal abscess;  Surgeon: Renata Ray MD;  Location: [facility];  Service: General    COLONOSCOPY N/A  10/15/2004    Sigmoid diverticulosis, smal rectal polyp, internal hemorrhoids-Dr. Renata Ray    COLONOSCOPY N/A 12/14/2009    Sigmoid diverticulosis-Dr. Renata Ray    COLONOSCOPY N/A 05/09/2019    Procedure: COLONOSCOPY to cecum with cold biopsies;  Surgeon: Renata Ray MD;  Location:  [facility];  Service: General    ENDOSCOPY N/A 10/07/2013    Gastric ulcerations x10, ulcerative esophagitis, small hiatal hernia-Dr. Renata Ray    HYSTERECTOMY      KNEE ARTHROSCOPY W/ PARTIAL MEDIAL MENISCECTOMY Right 10/24/2014    Dr. Bud Muhammad    SIGMOIDOSCOPY N/A 01/20/2020    Procedure: FLEXIBLE SIGMOIDOSCOPY WITH BIOPSY, balloon dilation;  Surgeon: Renata Ray MD;  Location:  [facility];  Service: General    SIGMOIDOSCOPY N/A 07/20/2020    Procedure: FLEXIBLE SIGMOIDOSCOPY with bx;  Surgeon: Renata Ray MD;  Location: [facility];  Service: General;  Laterality: N/A;  pre-history of a rectal ulcer and a resection for diverticulitis, with anastomotic stricture found 1/20/20  post-anastamotic nodule, mild stricture        SKIN CANCER EXCISION Left     Melanoma left leg      General Information       Row Name 07/03/25 2910          Physical Therapy Time and Intention    Document Type evaluation  -     Mode of Treatment individual therapy;physical therapy  -       Row Name 07/03/25 0292          General Information    Patient Profile Reviewed yes  -     Prior Level of Function independent:;all household mobility;community mobility  No AD at baseline, family encouraging use of cane or walker recently  -     Existing Precautions/Restrictions fall  -     Barriers to Rehab none identified  -       Row Name 07/03/25 6338          Living Environment    Current Living Arrangements home  -     People in Home child(kenny), adult  -       Row Name 07/03/25 1339          Home Main Entrance    Number of Stairs, Main Entrance --  Has a ramp  -       Row Name 07/03/25 2988          Cognition     Orientation Status (Cognition) oriented x 4  -               User Key  (r) = Recorded By, (t) = Taken By, (c) = Cosigned By      Initials Name Provider Type    Irene Bush, BERT Physical Therapist                   Mobility       Row Name 07/03/25 1339          Bed Mobility    Comment, (Bed Mobility) Up in care  -       Row Name 07/03/25 1339          Sit-Stand Transfer    Sit-Stand [place] (Transfers) standby assist  -       Row Name 07/03/25 1339          Gait/Stairs (Locomotion)    [place] Level (Gait) standby assist;contact guard  -     Distance in Feet (Gait) 150  -     Deviations/Abnormal Patterns (Gait) antalgic;gait speed decreased  -     Comment, (Gait/Stairs) CGA initially, progressed to SBA with use of Rwx.  Improved gait quality, speed and step length with use of rolling walker  -               User Key  (r) = Recorded By, (t) = Taken By, (c) = Cosigned By      Initials Name Provider Type    Irene Bush PT Physical Therapist                   Obj/Interventions       Row Name 07/03/25 1340          Range of Motion Comprehensive    Comment, General Range of Motion WFL  -[facility] Name 07/03/25 1340          Strength Comprehensive (MMT)    Comment, General Manual Muscle Testing (MMT) Assessment WFL  -[facility] Name 07/03/25 1340          Balance    Comment, Balance Independent sitting balance, SBA-CGA for standing and walking  -       Row Name 07/03/25 1340          Sensory Assessment (Somatosensory)    Sensory Assessment (Somatosensory) sensation intact  -               User Key  (r) = Recorded By, (t) = Taken By, (c) = Cosigned By      Initials Name Provider Type    Irene Bush PT Physical Therapist                   Goals/Plan    No documentation.                  Clinical Impression       Row Name 07/03/25 1340          Pain    Pre/Posttreatment Pain Comment Patient reports pain in right hip area, no number given  -        Row Name 07/03/25 0151          Plan of Care Review    Plan of Care Reviewed With patient  -     Outcome Evaluation Patient was admitted with CHF.  She reports she is independently mobile at home and does not use an assistive device at baseline.  Patient reports her children have encouraged her to use an assistive device at times.  Patient was sitting up in the chair and was agreeable to therapy.  She reports pain in her right hip which she assumes is related to positioning in the chair and decreased mobility while admitted to the hospital.  She denies any recent injury and does report history of arthritis affecting her hips and knees.  Patient was able to stand with SBA.  She ambulated with contact-guard assist initially with slow, antalgic gait.  Gait quality speed and step length improved significantly with use of rolling walker and patient was able to ambulate with standby assist.  Mobility appears to be near baseline.  Encourage patient to continue ambulating with nursing staff multiple times per day while admitted.  Recommend use of rolling walker to improve gait quality and activity tolerance.  Patient is safe to DC home when medically stable.  -       Row Name 07/03/25 0920          Therapy Assessment/Plan (PT)    Patient/Family Therapy Goals Statement (PT) Return home to prior level of function  -     Criteria for Skilled Interventions Met (PT) no problems identified which require skilled intervention  -     Therapy Frequency (PT) evaluation only  -       Row Name 07/03/25 0867          Positioning and Restraints    Pre-Treatment Position sitting in chair/recliner  -     Post Treatment Position chair  -     In Chair reclined;call light within reach;encouraged to call for assist;exit alarm on;notified [facility]  -               User Key  (r) = Recorded By, (t) = Taken By, (c) = Cosigned By      Initials Name Provider Type    Irene Bush, PT Physical Therapist                    Outcome Measures       Row Name 07/03/25 1343 07/03/25 0805       How much help from another person do you currently need...    Turning from your back to your side while in flat bed without using bedrails? 4  -KH 4  -VJ    Moving from lying on back to sitting on the side of a flat bed without bedrails? 4  -KH 4  -VJ    Moving to and from a bed to a chair (including a wheelchair)? 4  -KH 4  -VJ    Standing up from a chair using your arms (e.g., wheelchair, bedside chair)? 4  -KH 4  -VJ    Climbing 3-5 steps with a railing? 3  -KH 3  -VJ    To walk in hospital room? 3  -KH 4  -VJ    AM-PAC 6 Clicks Score (PT) 22  -KH 23  -VJ              User Key  (r) = Recorded By, (t) = Taken By, (c) = Cosigned By      Initials Name Provider Type    Irene Bush, PT Physical Therapist    Shanika Dumont RN Registered Nurse                                   PT Recommendation and Plan     Outcome Evaluation: Patient was admitted with CHF.  She reports she is independently mobile at home and does not use an assistive device at baseline.  Patient reports her children have encouraged her to use an assistive device at times.  Patient was sitting up in the chair and was agreeable to therapy.  She reports pain in her right hip which she assumes is related to positioning in the chair and decreased mobility while admitted to the hospital.  She denies any recent injury and does report history of arthritis affecting her hips and knees.  Patient was able to stand with SBA.  She ambulated with contact-guard assist initially with slow, antalgic gait.  Gait quality speed and step length improved significantly with use of rolling walker and patient was able to ambulate with standby assist.  Mobility appears to be near baseline.  Encourage patient to continue ambulating with nursing staff multiple times per day while admitted.  Recommend use of rolling walker to improve gait quality and activity tolerance.  Patient is safe to  DC home when medically stable.     Time Calculation:         PT Charges       Row Name 07/03/25 1343             Time Calculation    Start Time 1115  -KH      Stop Time 1133  -KH      Time Calculation (min) 18 min  -KH      PT Received On 07/03/25  -KH         Time Calculation- PT    Total Timed Code Minutes- PT 10 minute(s)  -KH         Timed Charges    15082 - PT Therapeutic Activity Minutes 10  -KH         Total Minutes    Timed Charges Total Minutes 10  -KH       Total Minutes 10  -KH                User Key  (r) = Recorded By, (t) = Taken By, (c) = Cosigned By      Initials Name Provider Type    Irene Bush, PT Physical Therapist                  Therapy Charges for Today       Code Description Service Date Service Provider Modifiers Qty    47204512110 HC PT THERAPEUTIC ACT EA 15 MIN 7/3/2025 Irene Sam, PT GP 1    27107056016 HC PT EVAL MOD COMPLEXITY 1 7/3/2025 Irene Sam, PT GP 1            PT G-Codes  AM-PAC 6 Clicks Score (PT): 22  PT Discharge Summary  Anticipated Discharge Disposition (PT): home with assist    Irene Sam, PT  7/3/2025

## 2025-07-03 NOTE — PLAN OF CARE
Goal Outcome Evaluation:  Plan of Care Reviewed With: patient           Outcome Evaluation: Patient was admitted with CHF.  She reports she is independently mobile at home and does not use an assistive device at baseline.  Patient reports her children have encouraged her to use an assistive device at times.  Patient was sitting up in the chair and was agreeable to therapy.  She reports pain in her right hip which she assumes is related to positioning in the chair and decreased mobility while admitted to the hospital.  She denies any recent injury and does report history of arthritis affecting her hips and knees.  Patient was able to stand with SBA.  She ambulated with contact-guard assist initially with slow, antalgic gait.  Gait quality speed and step length improved significantly with use of rolling walker and patient was able to ambulate with standby assist.  Mobility appears to be near baseline.  Encourage patient to continue ambulating with nursing staff multiple times per day while admitted.  Recommend use of rolling walker to improve gait quality and activity tolerance.  Patient is safe to DC home when medically stable.    Anticipated Discharge Disposition (PT): home with assist

## 2025-07-03 NOTE — PLAN OF CARE
Goal Outcome Evaluation:  Plan of Care Reviewed With: patient        Progress: improving  Outcome Evaluation: No complaints voiced, VSS, NSR on the monitor, sleeping up in chair wth legs elevated, resting quieltly in her room

## 2025-07-03 NOTE — PROGRESS NOTES
Enter Query Response Below      Query Response: Acute diastolic heart failure, multifactoral             If applicable, please update the problem list.

## 2025-07-03 NOTE — PROGRESS NOTES
"    Patient Name: Dex Thakkar  :1934  89 y.o.      Patient Care Team:  Carmen Chacon MD as PCP - General  Carmen Chacon MD as PCP - Family Medicine    Chief Complaint: follow up heart failure    Interval History: creatinine up further. Weight is also up but no BM since .        Objective   Vital Signs  Temp:  [97.7 °F (36.5 °C)-98.4 °F (36.9 °C)] 98.4 °F (36.9 °C)  Heart Rate:  [63-68] 68  Resp:  [18] 18  BP: (115-138)/(49-65) 125/51    Intake/Output Summary (Last 24 hours) at 7/3/2025 075  Last data filed at 2025 1700  Gross per 24 hour   Intake 840 ml   Output 700 ml   Net 140 ml     Flowsheet Rows      Flowsheet Row First Filed Value   Admission Height 50.4 cm (19.84\") Documented at 2025   Admission Weight 97.1 kg (214 lb) Documented at 2025            Physical Exam:   General Appearance:    Alert, cooperative, in no acute distress   Lungs:     Clear to auscultation.  Normal respiratory effort and rate.      Heart:    Regular rhythm and normal rate, normal S1 and S2, + murmur, no gallops or rubs.     Chest Wall:    No abnormalities observed   Abdomen:     Soft, nontender, positive bowel sounds.     Extremities:   no cyanosis, clubbing. + edema.  No marked joint deformities.  Adequate musculoskeletal strength.       Results Review:    Results from last 7 days   Lab Units 25  0702   SODIUM mmol/L 132*   POTASSIUM mmol/L 3.3*   CHLORIDE mmol/L 91*   CO2 mmol/L 29.8*   BUN mg/dL 37.0*   CREATININE mg/dL 1.67*   GLUCOSE mg/dL 90   CALCIUM mg/dL 8.7         Results from last 7 days   Lab Units 25  0702   WBC 10*3/mm3 4.73   HEMOGLOBIN g/dL 11.2*   HEMATOCRIT % 32.8*   PLATELETS 10*3/mm3 228         Results from last 7 days   Lab Units 25  0702   MAGNESIUM mg/dL 1.8                   Medication Review:   atorvastatin, 10 mg, Oral, Daily  enoxaparin sodium, 40 mg, Subcutaneous, Q24H  furosemide, 40 mg, Oral, Daily  hydrALAZINE, 25 mg, Oral, BID  lisinopril, 20 " mg, Oral, Q12H  metoprolol succinate XL, 100 mg, Oral, Daily  sodium chloride, 10 mL, Intravenous, Q12H              Assessment & Plan   Acute on chronic diastolic heart failure  Aortic valve stenosis, moderate  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   Chronic lymphedema   Hypertension, above goal however meds were held last night for DBP 49. Systolic was in the 140s. parameters adjusted.   AUSTYN - over diuresis.     Hold lasix and lisinopril. Repeat labs in AM.     Will likely need home dosing of Lasix vs considering SGLT2 though I think her age and body habitus put her at greater risk for  side effects.     SHAZIA Caceres  [facility]  07/03/25  07:51 EDT

## 2025-07-04 VITALS
TEMPERATURE: 97.9 F | SYSTOLIC BLOOD PRESSURE: 136 MMHG | HEART RATE: 67 BPM | HEIGHT: 63 IN | WEIGHT: 205.8 LBS | RESPIRATION RATE: 18 BRPM | DIASTOLIC BLOOD PRESSURE: 80 MMHG | OXYGEN SATURATION: 94 % | BODY MASS INDEX: 36.46 KG/M2

## 2025-07-04 LAB
ANION GAP SERPL CALCULATED.3IONS-SCNC: 8.8 MMOL/L (ref 5–15)
BUN SERPL-MCNC: 33 MG/DL (ref 8–23)
BUN/CREAT SERPL: 27.5 (ref 7–25)
CALCIUM SPEC-SCNC: 8.6 MG/DL (ref 8.2–9.6)
CHLORIDE SERPL-SCNC: 95 MMOL/L (ref 98–107)
CO2 SERPL-SCNC: 28.2 MMOL/L (ref 22–29)
CREAT SERPL-MCNC: 1.2 MG/DL (ref 0.57–1)
EGFRCR SERPLBLD CKD-EPI 2021: 42.8 ML/MIN/1.73
GLUCOSE SERPL-MCNC: 87 MG/DL (ref 65–99)
POTASSIUM SERPL-SCNC: 3.6 MMOL/L (ref 3.5–5.2)
SODIUM SERPL-SCNC: 132 MMOL/L (ref 136–145)

## 2025-07-04 PROCEDURE — 99232 SBSQ HOSP IP/OBS MODERATE 35: CPT | Performed by: STUDENT IN AN ORGANIZED HEALTH CARE EDUCATION/TRAINING PROGRAM

## 2025-07-04 PROCEDURE — 80048 BASIC METABOLIC PNL TOTAL CA: CPT | Performed by: STUDENT IN AN ORGANIZED HEALTH CARE EDUCATION/TRAINING PROGRAM

## 2025-07-04 RX ORDER — FUROSEMIDE 20 MG/1
20 TABLET ORAL DAILY
Qty: 30 TABLET | Refills: 3 | Status: SHIPPED | OUTPATIENT
Start: 2025-07-05

## 2025-07-04 RX ADMIN — ACETAMINOPHEN 650 MG: 325 TABLET, FILM COATED ORAL at 06:09

## 2025-07-04 RX ADMIN — ATORVASTATIN CALCIUM 10 MG: 20 TABLET, FILM COATED ORAL at 09:39

## 2025-07-04 RX ADMIN — HYDRALAZINE HYDROCHLORIDE 25 MG: 25 TABLET ORAL at 09:38

## 2025-07-04 NOTE — PROGRESS NOTES
Name: Dex Thakkar ADMIT: 2025   : 1934  PCP: Carmen Chacon MD    MRN: [id number] LOS: 3 days   AGE/SEX: 89 y.o. female  ROOM: [facility]     Subjective   Subjective   Doing well with no new complaints.    Review of Systems   As above  Objective   Objective   Vital Signs  Temp:  [97.5 °F (36.4 °C)-98.1 °F (36.7 °C)] 97.9 °F (36.6 °C)  Heart Rate:  [65-71] 67  Resp:  [18] 18  BP: (129-144)/(45-80) 136/80  SpO2:  [90 %-96 %] 94 %  on   ;   Device (Oxygen Therapy): room air  Body mass index is 36.4 kg/m².  Physical Exam    General: Alert, sitting up in the chair, not in distress,  HEENT: Normocephalic, atraumatic  CV: Regular rate and rhythm, + murmur  Lungs: Bibasilar crackles, no wheezing   Abdomen: Soft, nontender, nondistended  Extremities: 2+ bilateral peripheral edema , no cyanosis     Results Review     I reviewed the patient's new clinical results.  Results from last 7 days   Lab Units 25  0702 25  0818 25  0555 25  1603   WBC 10*3/mm3 4.73 6.08 5.94 6.97   HEMOGLOBIN g/dL 11.2* 11.7* 11.0* 11.2*   PLATELETS 10*3/mm3 228 249 225 238     Results from last 7 days   Lab Units 25  0643 25  0702 25  0818 25  0555   SODIUM mmol/L 132* 132* 132* 131*   POTASSIUM mmol/L 3.6 3.3* 4.0 4.0   CHLORIDE mmol/L 95* 91* 91* 95*   CO2 mmol/L 28.2 29.8* 29.8* 26.1   BUN mg/dL 33.0* 37.0* 27.0* 19.0   CREATININE mg/dL 1.20* 1.67* 1.21* 0.98   GLUCOSE mg/dL 87 90 109* 89   Estimated Creatinine Clearance: 33.1 mL/min (A) (by C-G formula based on SCr of 1.2 mg/dL (H)).  Results from last 7 days   Lab Units 25  1603   ALBUMIN g/dL 3.8   BILIRUBIN mg/dL 0.5   ALK PHOS U/L 88   AST (SGOT) U/L 17   ALT (SGPT) U/L 12     Results from last 7 days   Lab Units 25  0643 25  0702 25  0818 25  0555 25  1603   CALCIUM mg/dL 8.6 8.7 9.3 9.1 9.2   ALBUMIN g/dL  --   --   --   --  3.8   MAGNESIUM mg/dL  --  1.8 1.7  --   --    PHOSPHORUS mg/dL  --   "4.3 4.3  --   --        COVID19   Date Value Ref Range Status   01/03/2025 Not Detected Not Detected - Ref. Range Final   07/17/2020 Not Detected Not Detected - Ref. Range Final     No results found for: \"HGBA1C\", \"POCGLU\"        Adult Transthoracic Echo Complete W/ Cont if Necessary Per Protocol    Left ventricular systolic function is normal. Calculated left   ventricular EF = 63.9%    Left ventricular diastolic function was normal.    Moderate aortic valve stenosis is present. Aortic valve area is 1.2   cm2.    Peak velocity of the flow distal to the aortic valve is 326 cm/s.   Aortic valve maximum pressure gradient is 43 mmHg. Aortic valve mean   pressure gradient is 26 mmHg. Aortic valve dimensionless index is 0.45 .    Estimated right ventricular systolic pressure from tricuspid   regurgitation is moderately elevated (45-55 mmHg). Calculated right   ventricular systolic pressure from tricuspid regurgitation is 45 mmHg.  Duplex Venous Lower Extremity - Bilateral CAR    Normal bilateral lower extremity venous duplex scan.    Scheduled Medications  atorvastatin, 10 mg, Oral, Daily  enoxaparin sodium, 30 mg, Subcutaneous, Q24H  hydrALAZINE, 25 mg, Oral, BID  [Held by provider] lisinopril, 20 mg, Oral, Q12H  metoprolol succinate XL, 100 mg, Oral, Daily    Infusions     Diet  Diet: Regular/House, Cardiac; Healthy Heart (2-3 Na+); Fluid Consistency: Thin (IDDSI 0)    I have personally reviewed     [x]  Laboratory   []  Microbiology   []  Radiology   [x]  EKG/Telemetry  [x]  Cardiology/Vascular   []  Pathology    []  Records       Assessment/Plan     Active Hospital Problems    Diagnosis  POA    **Acute on chronic diastolic heart failure [I50.33]  Yes    Acute kidney injury [N17.9]  No    Anemia [D64.9]  Yes    Short of breath on exertion [R06.02]  Yes    H/O cardiac radiofrequency ablation [Z98.890]  Not Applicable    Hypertension [I10]  Yes    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       89 y.o. female admitted with Acute on chronic diastolic heart failure.    Acute diastolic heart failure exacerbation  Moderate aortic valve stenosis  History of AVNRT status post ablation  -CT with Moderate bilateral pleural effusions with mild interstitial prominence,  -ECHO with  EF = 63.9%  Moderate aortic valve stenosis is present.   - Lasix held yesterday given mild AUSTYN plan to restart tomorrow  - Cardiology following and has cleared for discharge    Elevated dimer:   CTA negative for PE.  Duplex of lower extremities was negative    Hypervolemic hyponatremia:   -TSH normal  - Sodium remains stable   - Off Lasix.  Plan to reinitiate tomorrow.  - Sodium improved    Hypertension:   -Continue hydralazine, Toprol  -Lisinopril held due to AUSTYN.  Now that creatinine improved likely resume this tomorrow and monitor renal function as outpatient.  -BP acutely stable    Anemia  -Hemoglobin stable..  Anemia workup 3/11/2025 unremarkable       Lovenox 40 mg SC daily for DVT prophylaxis.  Full code.  Discussed with patient.  Anticipate discharge home with family today.  Expected Discharge Date: 7/4/2025; Expected Discharge Time:       Perez Helms MD  [facility]  07/04/25  13:00 EDT

## 2025-07-04 NOTE — PLAN OF CARE
Goal Outcome Evaluation:  Plan of Care Reviewed With: patient        Progress: improving  Outcome Evaluation: No complaints voiced, ambulated inhallway with standby assist, tolerated well, VSS, NSR on the monitor, eyes closed at long interval, resting quielty in her room

## 2025-07-04 NOTE — DISCHARGE SUMMARY
Patient Name: Dex Thakkar  : 1934  MRN: [id number]    Date of Admission: 2025  Date of Discharge:  2025  Primary Care Physician: Carmen Chacon MD      Chief Complaint:   No chief complaint on file.      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        Hospital Course     Ms. Thakkar is a 89-year-old female was admitted with acute on chronic diastolic heart failure exacerbation presenting with worsening lower extremity edema and shortness of breath. Initial evaluation revealed moderate bilateral pleural effusions on imaging and elevated proBNP. She had a history of AVNRT post-ablation, hypertension, chronic venous insufficiency, lymphedema, and known mild to moderate aortic stenosis. CTA ruled out PE and duplex was negative for DVT. EKG showed sinus rhythm and echocardiogram demonstrated preserved EF of 63.9% with moderate aortic stenosis and moderately elevated RVSP.    Lasix was initially held due to rising creatinine and concern for AUSTYN, but she responded well to diuresis once restarted. Sodium remained stable in the low 130s throughout admission. Cardiology followed during hospitalization and cleared for discharge with outpatient follow-up. Hydralazine and Toprol were continued for hypertension; Lisinopril was held during AUSTYN and will be restarted outpatient as renal function continues to improve. Her creatinine improved from 1.67 to 1.20 by discharge. Anemia was stable and prior outpatient workup had been unremarkable. She remained hemodynamically stable, oxygenating well on room air, and was  discharged with a plan for close outpatient lab monitoring and medication adjustments.       Day of Discharge     Subjective:  See today's progress note    Physical Exam:  Temp:  [97.5 °F (36.4 °C)-97.9 °F (36.6 °C)] 97.9 °F (36.6 °C)  Heart Rate:  [65-71] 67  Resp:  [18] 18  BP: (135-144)/(45-80) 136/80  Body mass index is 36.4 kg/m².  Physical Exam    Consultants     Consult Orders (all) (From admission, onward)       Start     Ordered    07/01/25 0658  Inpatient Cardiology Consult  Once        Specialty:  Cardiology  Provider:  Renetta Galicia MD    07/01/25 0658            Procedures     Imaging Results (All)       Procedure Component Value Units Date/Time    CT Angiogram Chest [[id number]] Collected: 06/30/25 2217     Updated: 06/30/25 2228    Narrative:      CTA CHEST WITH IV CONTRAST     HISTORY: Short of breath.  Elevated dimer.     COMPARISON: None     TECHNIQUE: CT angiography was performed of the chest with axial images  as well as coronal and sagittal reformatted MIP images provided  following administration of IV contrast. 3-D surface rendered reformats  were obtained of the pulmonary arteries and aorta. Radiation dose  reduction techniques were utilized, including automated exposure  control, and exposure modulation based on body size.     FINDINGS:     There are moderate bilateral pleural effusions. There is no pulmonary  consolidation or central airway obstruction, but there is mild septal  prominence, suggesting a degree of mild interstitial edema. There is no  pneumothorax and there is no suspicious pulmonary nodule.     Thoracic aorta is normal in caliber. There are calcifications on the  aortic valve plane, associated with but not diagnostic of aortic  stenosis. There are coronary atherosclerotic vascular calcifications.   There is no suspicious mediastinal adenopathy or other mass.     Images of the upper abdomen show no acute abnormality. 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  There is no acute bony  abnormality.     Bolus timing is adequate, and there is no evidence of pulmonary  embolism.       Impression:         Bolus timing is adequate, and there is no convincing evidence of  pulmonary embolus.     Moderate bilateral pleural effusions with mild interstitial prominence,  no pulmonary consolidation or central airway obstruction.           This report was finalized on 6/30/2025 10:25 PM by Dr. Anders Perkins M.D on Workstation: [id number]       XR Chest PA & Lateral [[id number]] Collected: 06/30/25 1650     Updated: 06/30/25 1654    Narrative:      XR CHEST PA AND LATERAL-     Clinical: Shortness of breath     COMPARISON examination 1/3/2025     FINDINGS: Atherosclerotic calcification of the aorta. There is cardiac  enlargement with a small bilateral pleural effusions and vascular  congestion, consistent with CHF. There is biapical pleural thickening  and additionally on the right parenchymal scarring as before. The  remainder is unremarkable.     This report was finalized on 6/30/2025 4:51 PM by Dr. Pete Hercules M.D  on Workstation: [id number]             Results for orders placed during the hospital encounter of 06/30/25    Duplex Venous Lower Extremity - Bilateral CAR 07/01/2025  8:44 AM    Interpretation Summary    Normal bilateral lower extremity venous duplex scan.    Results for orders placed during the hospital encounter of 06/30/25    Adult Transthoracic Echo Complete W/ Cont if Necessary Per Protocol 07/01/2025  9:52 AM    Interpretation Summary    Left ventricular systolic function is normal. Calculated left ventricular EF = 63.9%    Left ventricular diastolic function was normal.    Moderate aortic valve stenosis is present. Aortic valve area is 1.2 cm2.    Peak velocity of the flow distal to the aortic valve is 326 cm/s. Aortic valve maximum pressure gradient is 43 mmHg. Aortic valve mean pressure gradient is 26 mmHg. Aortic valve dimensionless index is  "0.45 .    Estimated right ventricular systolic pressure from tricuspid regurgitation is moderately elevated (45-55 mmHg). Calculated right ventricular systolic pressure from tricuspid regurgitation is 45 mmHg.    Pertinent Labs     Results from last 7 days   Lab Units 07/03/25  0702 07/02/25  0818 07/01/25  0555 06/30/25  1603   WBC 10*3/mm3 4.73 6.08 5.94 6.97   HEMOGLOBIN g/dL 11.2* 11.7* 11.0* 11.2*   PLATELETS 10*3/mm3 228 249 225 238     Results from last 7 days   Lab Units 07/04/25  0643 07/03/25  0702 07/02/25  0818 07/01/25  0555   SODIUM mmol/L 132* 132* 132* 131*   POTASSIUM mmol/L 3.6 3.3* 4.0 4.0   CHLORIDE mmol/L 95* 91* 91* 95*   CO2 mmol/L 28.2 29.8* 29.8* 26.1   BUN mg/dL 33.0* 37.0* 27.0* 19.0   CREATININE mg/dL 1.20* 1.67* 1.21* 0.98   GLUCOSE mg/dL 87 90 109* 89   EGFR mL/min/1.73 42.8* 28.8* 42.4* 54.6*     Results from last 7 days   Lab Units 06/30/25  1603   ALBUMIN g/dL 3.8   BILIRUBIN mg/dL 0.5   ALK PHOS U/L 88   AST (SGOT) U/L 17   ALT (SGPT) U/L 12     Results from last 7 days   Lab Units 07/04/25  0643 07/03/25  0702 07/02/25  0818 07/01/25  0555 06/30/25  1603   CALCIUM mg/dL 8.6 8.7 9.3 9.1 9.2   ALBUMIN g/dL  --   --   --   --  3.8   MAGNESIUM mg/dL  --  1.8 1.7  --   --    PHOSPHORUS mg/dL  --  4.3 4.3  --   --        Results from last 7 days   Lab Units 06/30/25  1603   PROBNP pg/mL 1,301.0   D DIMER QUANT MCGFEU/mL 1.30*     Results from last 7 days   Lab Units 07/01/25  0155   SODIUM UR mmol/L 91   OSMOLALITY UR mOsm/kg 270         Invalid input(s): \"LDLCALC\"          Test Results Pending at Discharge     Pending Results       None              Discharge Details        Discharge Medications        New Medications        Instructions Start Date   furosemide 20 MG tablet  Commonly known as: LASIX   20 mg, Oral, Daily   Start Date: July 5, 2025            Continue These Medications        Instructions Start Date   atorvastatin 10 MG tablet  Commonly known as: LIPITOR   10 mg, Oral, " Daily      hydrALAZINE 25 MG tablet  Commonly known as: APRESOLINE   25 mg, Oral, 2 Times Daily      lisinopril 40 MG tablet  Commonly known as: PRINIVIL,ZESTRIL   40 mg, Oral, Daily      metoprolol succinate  MG 24 hr tablet  Commonly known as: TOPROL-XL   1 tablet, Daily               No Known Allergies    Discharge Disposition:  Home or Self Care      Discharge Diet:  Diet Order   Procedures    Diet: Regular/House, Cardiac; Healthy Heart (2-3 Na+); Fluid Consistency: Thin (IDDSI 0)       Discharge Activity: Ad nano.      CODE STATUS:    Code Status and Medical Interventions: CPR (Attempt to Resuscitate); Full Support   Ordered at: 06/30/25 2056     Code Status (Patient has no pulse and is not breathing):    CPR (Attempt to Resuscitate)     Medical Interventions (Patient has pulse or is breathing):    Full Support       Future Appointments   Date Time Provider Department Center   10/20/2025  2:00 PM Maddi Lorenzana APRN [id number]   1/6/2026  2:00 PM Manas Goldsmith Jr., MD [facility]     Additional Instructions for the Follow-ups that You Need to Schedule       Basic Metabolic Panel    Jul 09, 2025 (Approximate)      Release to patient: Routine Release               Follow-up Information       Carmen Chacon MD .    Specialty: Internal Medicine  Contact information:  [address] [place], [place] [id number] [phone]                             Additional Instructions for the Follow-ups that You Need to Schedule       Basic Metabolic Panel    Jul 09, 2025 (Approximate)      Release to patient: Routine Release            Time Spent on Discharge:  Greater than 30 minutes      Perez Helms MD  [facility]  07/04/25  13:55 EDT

## 2025-07-04 NOTE — PROGRESS NOTES
"    Patient Name: Dex Thakkar  :1934  89 y.o.      Patient Care Team:  Carmen Chacon MD as PCP - General  Carmen Chacon MD as PCP - Family Medicine    Chief Complaint: follow up heart failure    Interval History: creatinine up further. Weight is also up but no BM since .        Objective   Vital Signs  Temp:  [97.5 °F (36.4 °C)-98.1 °F (36.7 °C)] 97.5 °F (36.4 °C)  Heart Rate:  [65-71] 65  Resp:  [18] 18  BP: (129-144)/(45-59) 135/59    Intake/Output Summary (Last 24 hours) at 2025 1035  Last data filed at 7/3/2025 1817  Gross per 24 hour   Intake 720 ml   Output --   Net 720 ml     Flowsheet Rows      Flowsheet Row First Filed Value   Admission Height 50.4 cm (19.84\") Documented at 2025   Admission Weight 97.1 kg (214 lb) Documented at 2025            Physical Exam:   General Appearance:    Alert, cooperative, in no acute distress   Lungs:     Clear to auscultation.  Normal respiratory effort and rate.      Heart:    Regular rhythm and normal rate, normal S1 and S2, 3/6sem murmur, no gallops or rubs.     Chest Wall:    No abnormalities observed   Abdomen:     Soft, nontender, positive bowel sounds.     Extremities:   no cyanosis, clubbing. + edema.  No marked joint deformities.  Adequate musculoskeletal strength.       Results Review:    Results from last 7 days   Lab Units 25  0643   SODIUM mmol/L 132*   POTASSIUM mmol/L 3.6   CHLORIDE mmol/L 95*   CO2 mmol/L 28.2   BUN mg/dL 33.0*   CREATININE mg/dL 1.20*   GLUCOSE mg/dL 87   CALCIUM mg/dL 8.6         Results from last 7 days   Lab Units 25  0702   WBC 10*3/mm3 4.73   HEMOGLOBIN g/dL 11.2*   HEMATOCRIT % 32.8*   PLATELETS 10*3/mm3 228         Results from last 7 days   Lab Units 25  0702   MAGNESIUM mg/dL 1.8                   Medication Review:   atorvastatin, 10 mg, Oral, Daily  enoxaparin sodium, 30 mg, Subcutaneous, Q24H  hydrALAZINE, 25 mg, Oral, BID  [Held by provider] lisinopril, 20 mg, Oral, " Q12H  metoprolol succinate XL, 100 mg, Oral, Daily              Assessment & Plan   Acute on chronic diastolic heart failure, multifactorial  Aortic valve stenosis, moderate  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   Chronic lymphedema   Hypertension, above goal however meds were held last night for DBP 49. Systolic was in the 140s. parameters adjusted.   AUSTYN - over diuresis.     Kidney function is being but not back to baseline. I think she is ok to restart lisinopril and lasix 20mg daily  (she had robust response to diuretics) tomorrow. I have ordered f/u labs for next wed/Thursday. Will arrange for o/p f.u.     Jarad Howell MD  [facility]  07/04/25  10:35 EDT

## 2025-07-07 ENCOUNTER — READMISSION MANAGEMENT (OUTPATIENT)
Dept: CALL CENTER | Facility: HOSPITAL | Age: OVER 89
End: 2025-07-07
Payer: MEDICARE

## 2025-07-07 NOTE — OUTREACH NOTE
Prep Survey      Flowsheet Row Responses   [facility] patient discharged from? [place]   Is LACE score < 7 ? No   Eligibility Readm Mgmt   Discharge diagnosis a/c CHF   Does the patient have one of the following disease processes/diagnoses(primary or secondary)? CHF   Does the patient have Home health ordered? No   Is there a DME ordered? No   Prep survey completed? Yes            CARMELO MORALEZ - Registered Nurse

## 2025-07-08 ENCOUNTER — READMISSION MANAGEMENT (OUTPATIENT)
Dept: CALL CENTER | Facility: HOSPITAL | Age: OVER 89
End: 2025-07-08
Payer: MEDICARE

## 2025-07-08 NOTE — OUTREACH NOTE
CHF Week 1 Survey      Flowsheet Row Responses   [facility] patient discharged from? [facility]   Does the patient have one of the following disease processes/diagnoses(primary or secondary)? CHF   CHF Week 1 attempt successful? Yes   Call start time 1010   Call end time 1022   Discharge diagnosis a/c CHF   Person spoke with today (if not patient) and relationship patient   Meds reviewed with patient/caregiver? Yes   Is the patient having any side effects they believe may be caused by any medication additions or changes? No   Does the patient have all medications ordered at discharge? Yes   Is the patient taking all medications as directed (includes completed medication regime)? Yes   Comments regarding appointments Pt was unsure where to go to have BMP done.   Does the patient have a primary care provider?  Yes   Does the patient have an appointment with their PCP within 7 days of discharge? Yes   Comments regarding PCP Pt will see PCP tomorrow   Has the patient kept scheduled appointments due by today? Yes   Comments CHF clinic information provided to patient at this time.  She is planning to call.   Pulse Ox monitoring None   Psychosocial issues? No   Did the patient receive a copy of their discharge instructions? Yes   Nursing interventions Reviewed instructions with patient   What is the patient's perception of their health status since discharge? Improving   If the patient is a current smoker, are they able to teach back resources for cessation? Not a smoker   CHF Zone this Call Green Zone   Green Zone Patient reports doing well, No new or worsening shortness of breath, No new swelling -  feet, ankles and legs look normal for you   Green Zone Interventions Daily weight check    CHF Week 1 call completed? Yes   Is the patient interested in additional calls from an ambulatory ? No   Would this patient benefit from a Referral to Amb Social Work? No   Wrap up additional comments Pt is doing very  well.  She denies needs at this time.   Call end time 1022            CARMELO MORALEZ - Registered Nurse

## 2025-07-09 ENCOUNTER — LAB (OUTPATIENT)
Dept: LAB | Facility: HOSPITAL | Age: OVER 89
End: 2025-07-09
Payer: MEDICARE

## 2025-07-09 DIAGNOSIS — N17.9 ACUTE KIDNEY INJURY: ICD-10-CM

## 2025-07-09 LAB
ANION GAP SERPL CALCULATED.3IONS-SCNC: 15 MMOL/L (ref 5–15)
BUN SERPL-MCNC: 24 MG/DL (ref 8–23)
BUN/CREAT SERPL: 21.4 (ref 7–25)
CALCIUM SPEC-SCNC: 9.4 MG/DL (ref 8.2–9.6)
CHLORIDE SERPL-SCNC: 85 MMOL/L (ref 98–107)
CO2 SERPL-SCNC: 26 MMOL/L (ref 22–29)
CREAT SERPL-MCNC: 1.12 MG/DL (ref 0.57–1)
EGFRCR SERPLBLD CKD-EPI 2021: 46.5 ML/MIN/1.73
GLUCOSE SERPL-MCNC: 89 MG/DL (ref 65–99)
POTASSIUM SERPL-SCNC: 4.5 MMOL/L (ref 3.5–5.2)
SODIUM SERPL-SCNC: 126 MMOL/L (ref 136–145)

## 2025-07-09 PROCEDURE — 80048 BASIC METABOLIC PNL TOTAL CA: CPT

## 2025-07-09 PROCEDURE — 36415 COLL VENOUS BLD VENIPUNCTURE: CPT

## 2025-07-09 NOTE — CASE MANAGEMENT/SOCIAL WORK
Case Management Discharge Note      Final Note: Home via private vehicle         Selected Continued Care - Discharged on 7/4/2025 Admission date: 6/30/2025 - Discharge disposition: Home or Self Care      Destination    No services have been selected for the patient.                Durable Medical Equipment    No services have been selected for the patient.                Dialysis/Infusion    No services have been selected for the patient.                Home Medical Care    No services have been selected for the patient.                Therapy    No services have been selected for the patient.                Community Resources    No services have been selected for the patient.                Community & DME    No services have been selected for the patient.                    Transportation Services  Transportation: Private Transportation  Private: Car    Final Discharge Disposition Code: 01 - home or self-care

## 2025-07-18 ENCOUNTER — OFFICE VISIT (OUTPATIENT)
Age: OVER 89
End: 2025-07-18
Payer: MEDICARE

## 2025-07-18 VITALS
DIASTOLIC BLOOD PRESSURE: 62 MMHG | WEIGHT: 201 LBS | SYSTOLIC BLOOD PRESSURE: 134 MMHG | HEART RATE: 65 BPM | HEIGHT: 63 IN | BODY MASS INDEX: 35.61 KG/M2

## 2025-07-18 DIAGNOSIS — I47.19 AVNRT (AV NODAL RE-ENTRY TACHYCARDIA): ICD-10-CM

## 2025-07-18 DIAGNOSIS — M79.89 SWELLING OF LOWER EXTREMITY: ICD-10-CM

## 2025-07-18 DIAGNOSIS — R60.9 EDEMA, UNSPECIFIED TYPE: ICD-10-CM

## 2025-07-18 DIAGNOSIS — I10 PRIMARY HYPERTENSION: Primary | ICD-10-CM

## 2025-07-18 NOTE — PROGRESS NOTES
[facility] Follow Up Office Note     Encounter Date:25  Patient:Dex Thakkar  :1934  MRN:[id number]      Chief Complaint:   Chief Complaint   Patient presents with    Hospital Follow Up Visit     2 week f/u         History of Presenting Illness:      Dex Thakkar is a 89 y.o. female  that follows with  and is new to me today. They have a medical history of hypertension, AVNRT status post ablation, hypertension, chronic venous insufficiency and lymphedema for which she follows with the lymphedema clinic, mild aortic stenosis. They are here today for hospital follow up.      She presented to the ED with chief complaint of worsening lower extreme edema and shortness of breath. No chest pain or palpitations. No syncope or dizziness. No infectious symptoms. She does not take diuretics as an outpatient. Vital signs notable for normal heart rate, hypertension with blood pressures ranging from 155/61 to 186/79.  Labs on arrival notable for sodium of 131, proBNP of 1300, D-dimer of 1.3, CBC with a hemoglobin 11.2 and normal platelets.  CTA chest did not show PE, though it did show moderate bilateral pleural effusions.  There is no DVT.  On EKG she is in sinus rhythm and there are no acute ischemic changes.  Echocardiogram shows intact EF and moderate aortic valve stenosis with a peak velocity of 326 cm/s and dimensionless index of 0.45 with an aortic valve area of 1.2.  There is moderately elevated RVSP. She did have some elevated creatine during her stay this resolved prior to discharge. She was sent home on lisinopril and lasix 20mg.    Patient reports today for follow-up.  Overall she is lost a total of 17 pounds since she presented to the emergency department.  4 pounds since discharge.  Blood pressure better controlled today.  She denies chest pain, palpitations, peripheral edema, and dizziness.  She does report some dyspnea on exertion as well as fatigue.  She is going to [facility]  for lymphedema massages which she says helps with her swelling.  She is also made the dietary changes of decreasing her sodium intake as well as eating more fresh fruits and vegetables.  She reports that she was instructed to take her furosemide every other day by Dr. Howell after discharge.  So she has been compliant with that.    Review of Systems:  Review of Systems   Constitutional: Positive for malaise/fatigue.   HENT: Negative.     Eyes: Negative.    Cardiovascular:  Positive for dyspnea on exertion. Negative for chest pain, irregular heartbeat, leg swelling, orthopnea, palpitations and syncope.   Respiratory:  Negative for cough, shortness of breath and snoring.    Hematologic/Lymphatic: Negative.    Skin: Negative.    Musculoskeletal: Negative.    Gastrointestinal: Negative.    Genitourinary: Negative.    Neurological:  Negative for dizziness, headaches and light-headedness.   Psychiatric/Behavioral: Negative.         Current Outpatient Medications on File Prior to Visit   Medication Sig Dispense Refill    atorvastatin (LIPITOR) 10 MG tablet Take 1 tablet by mouth Daily. 30 tablet 11    furosemide (LASIX) 20 MG tablet Take 1 tablet by mouth Daily. (Patient taking differently: Take 1 tablet by mouth Every Other Day.) 30 tablet 3    hydrALAZINE (APRESOLINE) 25 MG tablet TAKE 1 TABLET BY MOUTH 2 TIMES A  tablet 0    lisinopril (PRINIVIL,ZESTRIL) 40 MG tablet Take 1 tablet by mouth Daily. 90 tablet 3    metoprolol succinate XL (TOPROL-XL) 100 MG 24 hr tablet Take 1 tablet by mouth Daily.       No current facility-administered medications on file prior to visit.       No Known Allergies    Past Medical History:   Diagnosis Date    A-fib     Chronic venous hypertension (idiopathic) without complications of left lower extremity     Diverticulitis     Effusion, left ankle     H/O cardiac radiofrequency ablation     Hyperlipidemia     Hypertension     Incontinence     Malignant melanoma of skin, unspecified      Melanoma     Murmur     Personal history of other venous thrombosis and embolism     Personal history of other venous thrombosis and embolism     Secondary lymphedema 10/27/2021    Spinal stenosis     Unilateral osteoarthritis of hip 04/28/2021    Varicose veins of leg with pain, bilateral 09/12/2018    Venous insufficiency (chronic) (peripheral) 09/12/2018       Past Surgical History:   Procedure Laterality Date    CARDIAC ABLATION N/A 07/17/2017    Dr. Leigh    CHOLECYSTECTOMY N/A     COLON RESECTION N/A 05/15/2019    Procedure: laparoscopic low anterior colon resection with splenic flexure mobilization, left salpingo oophorectomy, drainage of peritoneal abscess;  Surgeon: Renata Ray MD;  Location: [facility] MAIN OR;  Service: General    COLONOSCOPY N/A 10/15/2004    Sigmoid diverticulosis, smal rectal polyp, internal hemorrhoids-Dr. Renata Ray    COLONOSCOPY N/A 12/14/2009    Sigmoid diverticulosis-Dr. Renata Ray    COLONOSCOPY N/A 05/09/2019    Procedure: COLONOSCOPY to cecum with cold biopsies;  Surgeon: Renata Ray MD;  Location: [facility] ENDOSCOPY;  Service: General    ENDOSCOPY N/A 10/07/2013    Gastric ulcerations x10, ulcerative esophagitis, small hiatal hernia-Dr. Renata Ray    HYSTERECTOMY      KNEE ARTHROSCOPY W/ PARTIAL MEDIAL MENISCECTOMY Right 10/24/2014    Dr. Bud Muhammad    SIGMOIDOSCOPY N/A 01/20/2020    Procedure: FLEXIBLE SIGMOIDOSCOPY WITH BIOPSY, balloon dilation;  Surgeon: Renata Ray MD;  Location: [facility] ENDOSCOPY;  Service: General    SIGMOIDOSCOPY N/A 07/20/2020    Procedure: FLEXIBLE SIGMOIDOSCOPY with bx;  Surgeon: Renata Ray MD;  Location: [facility] ENDOSCOPY;  Service: General;  Laterality: N/A;  pre-history of a rectal ulcer and a resection for diverticulitis, with anastomotic stricture found 1/20/20  post-anastamotic nodule, mild stricture        SKIN CANCER EXCISION Left     Melanoma left leg       Social History     Socioeconomic History    Marital status:  "   Tobacco Use    Smoking status: Former     Current packs/day: 0.00     Types: Cigarettes     Quit date: 1984     Years since quittin.0     Passive exposure: Past    Smokeless tobacco: Never    Tobacco comments:     QUIT 30  YRS AGO   Vaping Use    Vaping status: Never Used   Substance and Sexual Activity    Alcohol use: Yes     Alcohol/week: 1.0 standard drink of alcohol     Types: 1 Glasses of wine per week     Comment: OCCASIONAL    Drug use: No    Sexual activity: Defer       Family History   Problem Relation Age of Onset    Brain cancer Mother 81    Cancer Father     Heart disease Father     Breast cancer Sister     Heart valve disorder Sister         Bicuspid AV    Cancer Daughter     Heart valve disorder Daughter         Bicuspid AV    Atrial fibrillation Daughter     Malig Hyperthermia Neg Hx        The following portions of the patient's history were reviewed and updated as appropriate: allergies, current medications, past family history, past medical history, past social history, past surgical history and problem list.       Objective:       Vitals:    25 1309   BP: 134/62   BP Location: Left arm   Patient Position: Sitting   Pulse: 65   Weight: 91.2 kg (201 lb)   Height: 160 cm (63\")         Physical Exam  Vitals and nursing note reviewed.   Constitutional:       Appearance: Normal appearance. She is normal weight.      Comments: Frail   Eyes:      Extraocular Movements: Extraocular movements intact.      Pupils: Pupils are equal, round, and reactive to light.   Cardiovascular:      Rate and Rhythm: Normal rate and regular rhythm.      Chest Wall: PMI is not displaced. No thrill.      Pulses: Normal pulses.      Heart sounds: Normal heart sounds.   Pulmonary:      Effort: Pulmonary effort is normal.      Breath sounds: Normal breath sounds.   Musculoskeletal:      Cervical back: Normal range of motion.      Right lower leg: Edema present.      Left lower leg: Edema present. "   Skin:     General: Skin is warm and dry.   Neurological:      General: No focal deficit present.      Mental Status: She is alert and oriented to person, place, and time. Mental status is at baseline.   Psychiatric:         Mood and Affect: Mood normal.         Behavior: Behavior normal.         Thought Content: Thought content normal.         Judgment: Judgment normal.               Lab Results   Component Value Date     (L) 07/09/2025     (L) 07/04/2025    K 4.5 07/09/2025    K 3.6 07/04/2025    CL 85 (L) 07/09/2025    CL 95 (L) 07/04/2025    CO2 26.0 07/09/2025    CO2 28.2 07/04/2025    BUN 24.0 (H) 07/09/2025    BUN 33.0 (H) 07/04/2025    CREATININE 1.12 (H) 07/09/2025    CREATININE 1.20 (H) 07/04/2025    EGFRIFNONA 67 04/19/2021    EGFRIFNONA 72 05/17/2019    GLUCOSE 89 07/09/2025    GLUCOSE 87 07/04/2025    CALCIUM 9.4 07/09/2025    CALCIUM 8.6 07/04/2025    ALBUMIN 3.8 06/30/2025    ALBUMIN 4.0 06/03/2025    BILITOT 0.5 06/30/2025    BILITOT 0.5 06/03/2025    AST 17 06/30/2025    AST 16 06/03/2025    ALT 12 06/30/2025    ALT 12 06/03/2025     Lab Results   Component Value Date    WBC 4.73 07/03/2025    WBC 6.08 07/02/2025    HGB 11.2 (L) 07/03/2025    HGB 11.7 (L) 07/02/2025    HCT 32.8 (L) 07/03/2025    HCT 34.4 07/02/2025    MCV 90.9 07/03/2025    MCV 90.3 07/02/2025     07/03/2025     07/02/2025     Lab Results   Component Value Date    CHOL 146 03/04/2024    TRIG 87 03/04/2024    HDL 61 (H) 03/04/2024    LDL 69 03/04/2024     Lab Results   Component Value Date    PROBNP 1,301.0 06/30/2025    PROBNP 716.0 08/31/2023     Lab Results   Component Value Date    TROPONINT <0.010 04/19/2021     Lab Results   Component Value Date    TSH 1.850 06/30/2025    TSH 3.300 03/11/2025           ECG 12 Lead    Date/Time: 7/18/2025 1:48 PM  Performed by: Rodolfo Rodriguez APRN    Authorized by: Rodolfo Rodriguez APRN  Comparison: compared with previous ECG from 6/30/2025  Rhythm: sinus  rhythm  Rate: normal  BPM: 65  Conduction: conduction normal  ST Segments: ST segments normal  T Waves: T waves normal  Other: no other findings             Assessment:         Diagnoses and all orders for this visit:    1. Primary hypertension (Primary)  -     Basic Metabolic Panel    2. Swelling of lower extremity  -     Basic Metabolic Panel    3. Edema, unspecified type    4. AVNRT (AV gatito re-entry tachycardia)    Other orders  -     ECG 12 Lead            Plan:       Dex Thakkar is a 89 y.o. female with medical history of hypertension, AVNRT status post ablation, hypertension, chronic venous insufficiency and lymphedema for which she follows with the lymphedema clinic, mild aortic stenosis. They are here today for hospital follow up.  Patient reports today for follow-up.  Overall she been doing well.  She is lost an additional 4 pounds after discharge.  She notes that her peripheral edema has significantly improved.  Will repeat BMP today.  Will continue patient on current medication regiment.  Blood pressure better controlled after starting lisinopril.  Will also use BMP to check potassium.  Encourage patient continue to make lifestyle modifications with her diet to manage edema.  Currently undergoing lymphedema massages    AVNRT  Status post ablation.   Continue metoprolol.   Maintaining sinus rhythm.   No complaints of tachycardia or palpitations     Hypertension.    Continues on hydralazine, lisinopril and Toprol therapy.    Will repeat BMP    Peripheral edema  Continue furosemide 20 mg every other day  Will repeat BMP today.  History of lymphedema.  Undergoing [facility] at [facility]  Encourage continued lifestyle modification of wearing compression stockings and reducing sodium intake.    Follow-up with Dr. Greene in 6 weeks      SHAZIA Ramires  [facility]  07/18/25  13:51 EDT

## 2025-07-19 LAB
BUN SERPL-MCNC: 27 MG/DL (ref 8–23)
BUN/CREAT SERPL: 27.8 (ref 7–25)
CALCIUM SERPL-MCNC: 9.3 MG/DL (ref 8.2–9.6)
CHLORIDE SERPL-SCNC: 91 MMOL/L (ref 98–107)
CO2 SERPL-SCNC: 25.6 MMOL/L (ref 22–29)
CREAT SERPL-MCNC: 0.97 MG/DL (ref 0.57–1)
EGFRCR SERPLBLD CKD-EPI 2021: 55.3 ML/MIN/1.73
GLUCOSE SERPL-MCNC: 91 MG/DL (ref 65–99)
POTASSIUM SERPL-SCNC: 5 MMOL/L (ref 3.5–5.2)
SODIUM SERPL-SCNC: 126 MMOL/L (ref 136–145)

## 2025-07-21 DIAGNOSIS — M79.89 SWELLING OF LOWER EXTREMITY: ICD-10-CM

## 2025-07-21 DIAGNOSIS — I10 PRIMARY HYPERTENSION: Primary | ICD-10-CM

## 2025-07-21 DIAGNOSIS — I47.19 AVNRT (AV NODAL RE-ENTRY TACHYCARDIA): ICD-10-CM

## 2025-07-21 RX ORDER — FUROSEMIDE 20 MG/1
20 TABLET ORAL AS NEEDED
Qty: 30 TABLET | Refills: 3 | Status: SHIPPED | OUTPATIENT
Start: 2025-07-21

## 2025-07-22 ENCOUNTER — READMISSION MANAGEMENT (OUTPATIENT)
Dept: CALL CENTER | Facility: HOSPITAL | Age: OVER 89
End: 2025-07-22
Payer: MEDICARE

## 2025-07-22 NOTE — OUTREACH NOTE
CHF Week 2 Survey      Flowsheet Row Responses   [facility] patient discharged from? [place]   Does the patient have one of the following disease processes/diagnoses(primary or secondary)? CHF   Week 2 attempt successful? No   Unsuccessful attempts Attempt 1   oke Sky Green Registered Nurse

## 2025-08-05 ENCOUNTER — TELEPHONE (OUTPATIENT)
Age: OVER 89
End: 2025-08-05
Payer: MEDICARE

## 2025-08-05 DIAGNOSIS — M79.89 SWELLING OF LOWER EXTREMITY: Primary | ICD-10-CM

## 2025-08-07 ENCOUNTER — LAB (OUTPATIENT)
Dept: LAB | Facility: HOSPITAL | Age: OVER 89
End: 2025-08-07
Payer: MEDICARE

## 2025-08-07 DIAGNOSIS — M79.89 SWELLING OF LOWER EXTREMITY: ICD-10-CM

## 2025-08-07 LAB
ANION GAP SERPL CALCULATED.3IONS-SCNC: 9 MMOL/L (ref 5–15)
BUN SERPL-MCNC: 19 MG/DL (ref 8–23)
BUN/CREAT SERPL: 18.8 (ref 7–25)
CALCIUM SPEC-SCNC: 9.2 MG/DL (ref 8.2–9.6)
CHLORIDE SERPL-SCNC: 93 MMOL/L (ref 98–107)
CO2 SERPL-SCNC: 26 MMOL/L (ref 22–29)
CREAT SERPL-MCNC: 1.01 MG/DL (ref 0.57–1)
EGFRCR SERPLBLD CKD-EPI 2021: 52.7 ML/MIN/1.73
GLUCOSE SERPL-MCNC: 96 MG/DL (ref 65–99)
POTASSIUM SERPL-SCNC: 4.4 MMOL/L (ref 3.5–5.2)
SODIUM SERPL-SCNC: 128 MMOL/L (ref 136–145)

## 2025-08-07 PROCEDURE — 36415 COLL VENOUS BLD VENIPUNCTURE: CPT

## 2025-08-07 PROCEDURE — 80048 BASIC METABOLIC PNL TOTAL CA: CPT

## 2025-08-08 ENCOUNTER — RESULTS FOLLOW-UP (OUTPATIENT)
Age: OVER 89
End: 2025-08-08
Payer: MEDICARE

## 2025-08-21 ENCOUNTER — TRANSCRIBE ORDERS (OUTPATIENT)
Dept: LAB | Facility: HOSPITAL | Age: OVER 89
End: 2025-08-21
Payer: MEDICARE

## 2025-08-21 ENCOUNTER — LAB (OUTPATIENT)
Dept: LAB | Facility: HOSPITAL | Age: OVER 89
End: 2025-08-21
Payer: MEDICARE

## 2025-08-21 ENCOUNTER — HOSPITAL ENCOUNTER (OUTPATIENT)
Dept: GENERAL RADIOLOGY | Facility: HOSPITAL | Age: OVER 89
Discharge: HOME | End: 2025-08-21
Payer: MEDICARE

## 2025-08-21 DIAGNOSIS — M25.551 RIGHT HIP PAIN: ICD-10-CM

## 2025-08-21 DIAGNOSIS — R11.0 NAUSEA: Primary | ICD-10-CM

## 2025-08-21 PROCEDURE — 73502 X-RAY EXAM HIP UNI 2-3 VIEWS: CPT

## 2025-08-21 PROCEDURE — 72100 X-RAY EXAM L-S SPINE 2/3 VWS: CPT

## 2025-08-22 ENCOUNTER — APPOINTMENT (OUTPATIENT)
Dept: GENERAL RADIOLOGY | Facility: HOSPITAL | Age: OVER 89
End: 2025-08-22
Payer: MEDICARE

## 2025-08-22 ENCOUNTER — HOSPITAL ENCOUNTER (INPATIENT)
Facility: HOSPITAL | Age: OVER 89
LOS: 5 days | Discharge: NURSING, REHAB OR LONG-TERM CARE | End: 2025-08-29
Attending: EMERGENCY MEDICINE | Admitting: INTERNAL MEDICINE
Payer: MEDICARE

## 2025-08-22 ENCOUNTER — APPOINTMENT (OUTPATIENT)
Dept: CT IMAGING | Facility: HOSPITAL | Age: OVER 89
End: 2025-08-22
Payer: MEDICARE

## 2025-08-22 ENCOUNTER — APPOINTMENT (OUTPATIENT)
Dept: CARDIOLOGY | Facility: HOSPITAL | Age: OVER 89
End: 2025-08-22
Payer: MEDICARE

## 2025-08-22 DIAGNOSIS — R26.2 UNABLE TO AMBULATE: ICD-10-CM

## 2025-08-22 DIAGNOSIS — R29.898 RIGHT LEG WEAKNESS: Primary | ICD-10-CM

## 2025-08-22 LAB
ALBUMIN SERPL-MCNC: 4 G/DL (ref 3.5–5.2)
ALBUMIN/GLOB SERPL: 1.1 G/DL
ALP SERPL-CCNC: 88 U/L (ref 39–117)
ALT SERPL W P-5'-P-CCNC: 13 U/L (ref 1–33)
ANION GAP SERPL CALCULATED.3IONS-SCNC: 12.5 MMOL/L (ref 5–15)
AST SERPL-CCNC: 22 U/L (ref 1–32)
BASOPHILS # BLD AUTO: 0.03 10*3/MM3 (ref 0–0.2)
BASOPHILS NFR BLD AUTO: 0.3 % (ref 0–1.5)
BH CV LOWER VASCULAR LEFT COMMON FEMORAL AUGMENT: NORMAL
BH CV LOWER VASCULAR LEFT COMMON FEMORAL COMPETENT: NORMAL
BH CV LOWER VASCULAR LEFT COMMON FEMORAL COMPRESS: NORMAL
BH CV LOWER VASCULAR LEFT COMMON FEMORAL PHASIC: NORMAL
BH CV LOWER VASCULAR LEFT COMMON FEMORAL SPONT: NORMAL
BH CV LOWER VASCULAR RIGHT COMMON FEMORAL AUGMENT: NORMAL
BH CV LOWER VASCULAR RIGHT COMMON FEMORAL COMPETENT: NORMAL
BH CV LOWER VASCULAR RIGHT COMMON FEMORAL COMPRESS: NORMAL
BH CV LOWER VASCULAR RIGHT COMMON FEMORAL PHASIC: NORMAL
BH CV LOWER VASCULAR RIGHT COMMON FEMORAL SPONT: NORMAL
BH CV LOWER VASCULAR RIGHT DISTAL FEMORAL COMPRESS: NORMAL
BH CV LOWER VASCULAR RIGHT GASTRONEMIUS COMPRESS: NORMAL
BH CV LOWER VASCULAR RIGHT GREATER SAPH AK COMPRESS: NORMAL
BH CV LOWER VASCULAR RIGHT GREATER SAPH BK COMPRESS: NORMAL
BH CV LOWER VASCULAR RIGHT LESSER SAPH COMPRESS: NORMAL
BH CV LOWER VASCULAR RIGHT MID FEMORAL AUGMENT: NORMAL
BH CV LOWER VASCULAR RIGHT MID FEMORAL COMPETENT: NORMAL
BH CV LOWER VASCULAR RIGHT MID FEMORAL COMPRESS: NORMAL
BH CV LOWER VASCULAR RIGHT MID FEMORAL PHASIC: NORMAL
BH CV LOWER VASCULAR RIGHT MID FEMORAL SPONT: NORMAL
BH CV LOWER VASCULAR RIGHT PERONEAL COMPRESS: NORMAL
BH CV LOWER VASCULAR RIGHT POPLITEAL AUGMENT: NORMAL
BH CV LOWER VASCULAR RIGHT POPLITEAL COMPETENT: NORMAL
BH CV LOWER VASCULAR RIGHT POPLITEAL COMPRESS: NORMAL
BH CV LOWER VASCULAR RIGHT POPLITEAL PHASIC: NORMAL
BH CV LOWER VASCULAR RIGHT POPLITEAL SPONT: NORMAL
BH CV LOWER VASCULAR RIGHT POSTERIOR TIBIAL COMPRESS: NORMAL
BH CV LOWER VASCULAR RIGHT PROFUNDA FEMORAL COMPRESS: NORMAL
BH CV LOWER VASCULAR RIGHT PROXIMAL FEMORAL COMPRESS: NORMAL
BH CV LOWER VASCULAR RIGHT SAPHENOFEMORAL JUNCTION COMPRESS: NORMAL
BH CV VAS PRELIMINARY FINDINGS SCRIPTING: 1
BILIRUB SERPL-MCNC: 0.8 MG/DL (ref 0–1.2)
BUN SERPL-MCNC: 20 MG/DL (ref 8–23)
BUN/CREAT SERPL: 19.8 (ref 7–25)
CALCIUM SPEC-SCNC: 9.5 MG/DL (ref 8.2–9.6)
CHLORIDE SERPL-SCNC: 90 MMOL/L (ref 98–107)
CK SERPL-CCNC: 177 U/L (ref 20–180)
CO2 SERPL-SCNC: 23.5 MMOL/L (ref 22–29)
CREAT SERPL-MCNC: 1.01 MG/DL (ref 0.57–1)
DEPRECATED RDW RBC AUTO: 39.8 FL (ref 37–54)
EGFRCR SERPLBLD CKD-EPI 2021: 52.7 ML/MIN/1.73
EOSINOPHIL # BLD AUTO: 0.02 10*3/MM3 (ref 0–0.4)
EOSINOPHIL NFR BLD AUTO: 0.2 % (ref 0.3–6.2)
ERYTHROCYTE [DISTWIDTH] IN BLOOD BY AUTOMATED COUNT: 12.5 % (ref 12.3–15.4)
GLOBULIN UR ELPH-MCNC: 3.6 GM/DL
GLUCOSE SERPL-MCNC: 108 MG/DL (ref 65–99)
HCT VFR BLD AUTO: 33.2 % (ref 34–46.6)
HGB BLD-MCNC: 11.4 G/DL (ref 12–15.9)
IMM GRANULOCYTES # BLD AUTO: 0.03 10*3/MM3 (ref 0–0.05)
IMM GRANULOCYTES NFR BLD AUTO: 0.3 % (ref 0–0.5)
LYMPHOCYTES # BLD AUTO: 0.85 10*3/MM3 (ref 0.7–3.1)
LYMPHOCYTES NFR BLD AUTO: 9.2 % (ref 19.6–45.3)
MCH RBC QN AUTO: 30.2 PG (ref 26.6–33)
MCHC RBC AUTO-ENTMCNC: 34.3 G/DL (ref 31.5–35.7)
MCV RBC AUTO: 87.8 FL (ref 79–97)
MONOCYTES # BLD AUTO: 1.02 10*3/MM3 (ref 0.1–0.9)
MONOCYTES NFR BLD AUTO: 11.1 % (ref 5–12)
NEUTROPHILS NFR BLD AUTO: 7.26 10*3/MM3 (ref 1.7–7)
NEUTROPHILS NFR BLD AUTO: 78.9 % (ref 42.7–76)
NRBC BLD AUTO-RTO: 0 /100 WBC (ref 0–0.2)
PLATELET # BLD AUTO: 245 10*3/MM3 (ref 140–450)
PMV BLD AUTO: 10.7 FL (ref 6–12)
POTASSIUM SERPL-SCNC: 3.8 MMOL/L (ref 3.5–5.2)
PROT SERPL-MCNC: 7.6 G/DL (ref 6–8.5)
RBC # BLD AUTO: 3.78 10*6/MM3 (ref 3.77–5.28)
SODIUM SERPL-SCNC: 126 MMOL/L (ref 136–145)
TSH SERPL DL<=0.05 MIU/L-ACNC: 1.56 UIU/ML (ref 0.27–4.2)
WBC NRBC COR # BLD AUTO: 9.21 10*3/MM3 (ref 3.4–10.8)

## 2025-08-22 PROCEDURE — 93971 EXTREMITY STUDY: CPT

## 2025-08-22 PROCEDURE — 82550 ASSAY OF CK (CPK): CPT | Performed by: EMERGENCY MEDICINE

## 2025-08-22 PROCEDURE — 25010000002 ONDANSETRON PER 1 MG: Performed by: EMERGENCY MEDICINE

## 2025-08-22 PROCEDURE — 80053 COMPREHEN METABOLIC PANEL: CPT | Performed by: EMERGENCY MEDICINE

## 2025-08-22 PROCEDURE — 85025 COMPLETE CBC W/AUTO DIFF WBC: CPT | Performed by: EMERGENCY MEDICINE

## 2025-08-22 PROCEDURE — G0378 HOSPITAL OBSERVATION PER HR: HCPCS

## 2025-08-22 PROCEDURE — 84443 ASSAY THYROID STIM HORMONE: CPT | Performed by: EMERGENCY MEDICINE

## 2025-08-22 PROCEDURE — 73502 X-RAY EXAM HIP UNI 2-3 VIEWS: CPT

## 2025-08-22 PROCEDURE — 93971 EXTREMITY STUDY: CPT | Performed by: SURGERY

## 2025-08-22 RX ORDER — BISACODYL 5 MG/1
5 TABLET, DELAYED RELEASE ORAL DAILY PRN
Status: DISCONTINUED | OUTPATIENT
Start: 2025-08-22 | End: 2025-08-29 | Stop reason: HOSPADM

## 2025-08-22 RX ORDER — ACETAMINOPHEN 650 MG/1
650 SUPPOSITORY RECTAL EVERY 4 HOURS PRN
Status: DISCONTINUED | OUTPATIENT
Start: 2025-08-22 | End: 2025-08-29 | Stop reason: HOSPADM

## 2025-08-22 RX ORDER — ATORVASTATIN CALCIUM 10 MG/1
10 TABLET, FILM COATED ORAL DAILY
Status: DISCONTINUED | OUTPATIENT
Start: 2025-08-23 | End: 2025-08-29 | Stop reason: HOSPADM

## 2025-08-22 RX ORDER — HYDROCODONE BITARTRATE AND ACETAMINOPHEN 5; 325 MG/1; MG/1
1 TABLET ORAL EVERY 6 HOURS PRN
Refills: 0 | Status: DISCONTINUED | OUTPATIENT
Start: 2025-08-22 | End: 2025-08-29 | Stop reason: HOSPADM

## 2025-08-22 RX ORDER — ONDANSETRON 2 MG/ML
4 INJECTION INTRAMUSCULAR; INTRAVENOUS ONCE
Status: COMPLETED | OUTPATIENT
Start: 2025-08-22 | End: 2025-08-22

## 2025-08-22 RX ORDER — BISACODYL 10 MG
10 SUPPOSITORY, RECTAL RECTAL DAILY PRN
Status: DISCONTINUED | OUTPATIENT
Start: 2025-08-22 | End: 2025-08-29 | Stop reason: HOSPADM

## 2025-08-22 RX ORDER — ACETAMINOPHEN 325 MG/1
650 TABLET ORAL EVERY 4 HOURS PRN
Status: DISCONTINUED | OUTPATIENT
Start: 2025-08-22 | End: 2025-08-29 | Stop reason: HOSPADM

## 2025-08-22 RX ORDER — HYDRALAZINE HYDROCHLORIDE 25 MG/1
25 TABLET, FILM COATED ORAL 2 TIMES DAILY
Status: DISCONTINUED | OUTPATIENT
Start: 2025-08-22 | End: 2025-08-28

## 2025-08-22 RX ORDER — LISINOPRIL 40 MG/1
40 TABLET ORAL DAILY
Status: DISCONTINUED | OUTPATIENT
Start: 2025-08-23 | End: 2025-08-28

## 2025-08-22 RX ORDER — METOPROLOL SUCCINATE 50 MG/1
100 TABLET, EXTENDED RELEASE ORAL DAILY
Status: DISCONTINUED | OUTPATIENT
Start: 2025-08-23 | End: 2025-08-29 | Stop reason: HOSPADM

## 2025-08-22 RX ORDER — POLYETHYLENE GLYCOL 3350 17 G/17G
17 POWDER, FOR SOLUTION ORAL DAILY PRN
Status: DISCONTINUED | OUTPATIENT
Start: 2025-08-22 | End: 2025-08-29 | Stop reason: HOSPADM

## 2025-08-22 RX ORDER — FUROSEMIDE 20 MG/1
20 TABLET ORAL AS NEEDED
Status: DISCONTINUED | OUTPATIENT
Start: 2025-08-22 | End: 2025-08-26

## 2025-08-22 RX ORDER — ACETAMINOPHEN 500 MG
1000 TABLET ORAL ONCE
Status: COMPLETED | OUTPATIENT
Start: 2025-08-22 | End: 2025-08-22

## 2025-08-22 RX ORDER — ONDANSETRON 2 MG/ML
4 INJECTION INTRAMUSCULAR; INTRAVENOUS EVERY 6 HOURS PRN
Status: DISCONTINUED | OUTPATIENT
Start: 2025-08-22 | End: 2025-08-29 | Stop reason: HOSPADM

## 2025-08-22 RX ORDER — AMOXICILLIN 250 MG
2 CAPSULE ORAL 2 TIMES DAILY PRN
Status: DISCONTINUED | OUTPATIENT
Start: 2025-08-22 | End: 2025-08-29 | Stop reason: HOSPADM

## 2025-08-22 RX ORDER — ACETAMINOPHEN 160 MG/5ML
650 SOLUTION ORAL EVERY 4 HOURS PRN
Status: DISCONTINUED | OUTPATIENT
Start: 2025-08-22 | End: 2025-08-29 | Stop reason: HOSPADM

## 2025-08-22 RX ORDER — SODIUM CHLORIDE 0.9 % (FLUSH) 0.9 %
10 SYRINGE (ML) INJECTION AS NEEDED
Status: DISCONTINUED | OUTPATIENT
Start: 2025-08-22 | End: 2025-08-29 | Stop reason: HOSPADM

## 2025-08-22 RX ADMIN — HYDRALAZINE HYDROCHLORIDE 25 MG: 25 TABLET ORAL at 21:14

## 2025-08-22 RX ADMIN — HYDROCODONE BITARTRATE AND ACETAMINOPHEN 1 TABLET: 5; 325 TABLET ORAL at 23:37

## 2025-08-22 RX ADMIN — ACETAMINOPHEN 1000 MG: 500 TABLET, FILM COATED ORAL at 16:15

## 2025-08-22 RX ADMIN — ONDANSETRON 4 MG: 2 INJECTION, SOLUTION INTRAMUSCULAR; INTRAVENOUS at 14:06

## 2025-08-22 RX ADMIN — HYDROCODONE BITARTRATE AND ACETAMINOPHEN 1 TABLET: 5; 325 TABLET ORAL at 17:39

## 2025-08-23 ENCOUNTER — APPOINTMENT (OUTPATIENT)
Dept: CT IMAGING | Facility: HOSPITAL | Age: OVER 89
End: 2025-08-23
Payer: MEDICARE

## 2025-08-23 ENCOUNTER — APPOINTMENT (OUTPATIENT)
Dept: MRI IMAGING | Facility: HOSPITAL | Age: OVER 89
End: 2025-08-23
Payer: MEDICARE

## 2025-08-23 LAB
ANION GAP SERPL CALCULATED.3IONS-SCNC: 9.5 MMOL/L (ref 5–15)
BASOPHILS # BLD AUTO: 0.02 10*3/MM3 (ref 0–0.2)
BASOPHILS NFR BLD AUTO: 0.2 % (ref 0–1.5)
BUN SERPL-MCNC: 17 MG/DL (ref 8–23)
BUN/CREAT SERPL: 18.1 (ref 7–25)
CALCIUM SPEC-SCNC: 9 MG/DL (ref 8.2–9.6)
CHLORIDE SERPL-SCNC: 90 MMOL/L (ref 98–107)
CO2 SERPL-SCNC: 23.5 MMOL/L (ref 22–29)
CREAT SERPL-MCNC: 0.94 MG/DL (ref 0.57–1)
DEPRECATED RDW RBC AUTO: 41.5 FL (ref 37–54)
EGFRCR SERPLBLD CKD-EPI 2021: 57.4 ML/MIN/1.73
EOSINOPHIL # BLD AUTO: 0.02 10*3/MM3 (ref 0–0.4)
EOSINOPHIL NFR BLD AUTO: 0.2 % (ref 0.3–6.2)
ERYTHROCYTE [DISTWIDTH] IN BLOOD BY AUTOMATED COUNT: 12.5 % (ref 12.3–15.4)
GLUCOSE SERPL-MCNC: 113 MG/DL (ref 65–99)
HCT VFR BLD AUTO: 30.5 % (ref 34–46.6)
HGB BLD-MCNC: 10.6 G/DL (ref 12–15.9)
IMM GRANULOCYTES # BLD AUTO: 0.05 10*3/MM3 (ref 0–0.05)
IMM GRANULOCYTES NFR BLD AUTO: 0.5 % (ref 0–0.5)
LYMPHOCYTES # BLD AUTO: 0.91 10*3/MM3 (ref 0.7–3.1)
LYMPHOCYTES NFR BLD AUTO: 8.5 % (ref 19.6–45.3)
MCH RBC QN AUTO: 31.2 PG (ref 26.6–33)
MCHC RBC AUTO-ENTMCNC: 34.8 G/DL (ref 31.5–35.7)
MCV RBC AUTO: 89.7 FL (ref 79–97)
MONOCYTES # BLD AUTO: 1.49 10*3/MM3 (ref 0.1–0.9)
MONOCYTES NFR BLD AUTO: 13.9 % (ref 5–12)
NEUTROPHILS NFR BLD AUTO: 76.7 % (ref 42.7–76)
NEUTROPHILS NFR BLD AUTO: 8.24 10*3/MM3 (ref 1.7–7)
NRBC BLD AUTO-RTO: 0 /100 WBC (ref 0–0.2)
OSMOLALITY UR: 421 MOSM/KG
PLATELET # BLD AUTO: 219 10*3/MM3 (ref 140–450)
PMV BLD AUTO: 11.1 FL (ref 6–12)
POTASSIUM SERPL-SCNC: 4.2 MMOL/L (ref 3.5–5.2)
RBC # BLD AUTO: 3.4 10*6/MM3 (ref 3.77–5.28)
SODIUM SERPL-SCNC: 123 MMOL/L (ref 136–145)
SODIUM UR-SCNC: 23 MMOL/L
URATE SERPL-MCNC: 6.7 MG/DL (ref 2.4–5.7)
WBC NRBC COR # BLD AUTO: 10.73 10*3/MM3 (ref 3.4–10.8)

## 2025-08-23 PROCEDURE — 99215 OFFICE O/P EST HI 40 MIN: CPT | Performed by: NURSE PRACTITIONER

## 2025-08-23 PROCEDURE — 85025 COMPLETE CBC W/AUTO DIFF WBC: CPT | Performed by: INTERNAL MEDICINE

## 2025-08-23 PROCEDURE — 80048 BASIC METABOLIC PNL TOTAL CA: CPT | Performed by: INTERNAL MEDICINE

## 2025-08-23 PROCEDURE — 72131 CT LUMBAR SPINE W/O DYE: CPT

## 2025-08-23 PROCEDURE — 36415 COLL VENOUS BLD VENIPUNCTURE: CPT | Performed by: INTERNAL MEDICINE

## 2025-08-23 PROCEDURE — 84550 ASSAY OF BLOOD/URIC ACID: CPT | Performed by: INTERNAL MEDICINE

## 2025-08-23 PROCEDURE — 72148 MRI LUMBAR SPINE W/O DYE: CPT

## 2025-08-23 PROCEDURE — 83935 ASSAY OF URINE OSMOLALITY: CPT | Performed by: INTERNAL MEDICINE

## 2025-08-23 PROCEDURE — 84300 ASSAY OF URINE SODIUM: CPT | Performed by: INTERNAL MEDICINE

## 2025-08-23 PROCEDURE — G0378 HOSPITAL OBSERVATION PER HR: HCPCS

## 2025-08-23 RX ADMIN — LISINOPRIL 40 MG: 40 TABLET ORAL at 08:10

## 2025-08-23 RX ADMIN — HYDROCODONE BITARTRATE AND ACETAMINOPHEN 1 TABLET: 5; 325 TABLET ORAL at 08:10

## 2025-08-23 RX ADMIN — HYDRALAZINE HYDROCHLORIDE 25 MG: 25 TABLET ORAL at 08:10

## 2025-08-23 RX ADMIN — HYDRALAZINE HYDROCHLORIDE 25 MG: 25 TABLET ORAL at 20:09

## 2025-08-23 RX ADMIN — METOPROLOL SUCCINATE 100 MG: 50 TABLET, EXTENDED RELEASE ORAL at 08:10

## 2025-08-23 RX ADMIN — ATORVASTATIN CALCIUM 10 MG: 10 TABLET ORAL at 08:10

## 2025-08-23 RX ADMIN — HYDROCODONE BITARTRATE AND ACETAMINOPHEN 1 TABLET: 5; 325 TABLET ORAL at 17:30

## 2025-08-24 LAB
ALBUMIN SERPL-MCNC: 3.3 G/DL (ref 3.5–5.2)
ANION GAP SERPL CALCULATED.3IONS-SCNC: 11.6 MMOL/L (ref 5–15)
BUN SERPL-MCNC: 21 MG/DL (ref 8–23)
BUN/CREAT SERPL: 23.3 (ref 7–25)
CALCIUM SPEC-SCNC: 8.9 MG/DL (ref 8.2–9.6)
CHLORIDE SERPL-SCNC: 90 MMOL/L (ref 98–107)
CO2 SERPL-SCNC: 23.4 MMOL/L (ref 22–29)
CREAT SERPL-MCNC: 0.9 MG/DL (ref 0.57–1)
DEPRECATED RDW RBC AUTO: 40.8 FL (ref 37–54)
EGFRCR SERPLBLD CKD-EPI 2021: 60.5 ML/MIN/1.73
ERYTHROCYTE [DISTWIDTH] IN BLOOD BY AUTOMATED COUNT: 12.5 % (ref 12.3–15.4)
GLUCOSE SERPL-MCNC: 105 MG/DL (ref 65–99)
HCT VFR BLD AUTO: 28.7 % (ref 34–46.6)
HGB BLD-MCNC: 10 G/DL (ref 12–15.9)
MAGNESIUM SERPL-MCNC: 1.8 MG/DL (ref 1.7–2.3)
MCH RBC QN AUTO: 31 PG (ref 26.6–33)
MCHC RBC AUTO-ENTMCNC: 34.8 G/DL (ref 31.5–35.7)
MCV RBC AUTO: 88.9 FL (ref 79–97)
PHOSPHATE SERPL-MCNC: 3 MG/DL (ref 2.5–4.5)
PLATELET # BLD AUTO: 211 10*3/MM3 (ref 140–450)
PMV BLD AUTO: 10.8 FL (ref 6–12)
POTASSIUM SERPL-SCNC: 3.7 MMOL/L (ref 3.5–5.2)
RBC # BLD AUTO: 3.23 10*6/MM3 (ref 3.77–5.28)
SODIUM SERPL-SCNC: 125 MMOL/L (ref 136–145)
WBC NRBC COR # BLD AUTO: 8.96 10*3/MM3 (ref 3.4–10.8)

## 2025-08-24 PROCEDURE — 97530 THERAPEUTIC ACTIVITIES: CPT

## 2025-08-24 PROCEDURE — 25010000002 ONDANSETRON PER 1 MG: Performed by: INTERNAL MEDICINE

## 2025-08-24 PROCEDURE — 97166 OT EVAL MOD COMPLEX 45 MIN: CPT

## 2025-08-24 PROCEDURE — 85027 COMPLETE CBC AUTOMATED: CPT | Performed by: INTERNAL MEDICINE

## 2025-08-24 PROCEDURE — 83735 ASSAY OF MAGNESIUM: CPT | Performed by: INTERNAL MEDICINE

## 2025-08-24 PROCEDURE — 97162 PT EVAL MOD COMPLEX 30 MIN: CPT

## 2025-08-24 PROCEDURE — 80069 RENAL FUNCTION PANEL: CPT | Performed by: INTERNAL MEDICINE

## 2025-08-24 PROCEDURE — 99232 SBSQ HOSP IP/OBS MODERATE 35: CPT | Performed by: NURSE PRACTITIONER

## 2025-08-24 PROCEDURE — 25810000003 SODIUM CHLORIDE 0.9 % SOLUTION: Performed by: INTERNAL MEDICINE

## 2025-08-24 RX ORDER — SODIUM CHLORIDE 9 MG/ML
100 INJECTION, SOLUTION INTRAVENOUS CONTINUOUS
Status: DISCONTINUED | OUTPATIENT
Start: 2025-08-24 | End: 2025-08-25

## 2025-08-24 RX ADMIN — SODIUM CHLORIDE 100 ML/HR: 9 INJECTION, SOLUTION INTRAVENOUS at 13:30

## 2025-08-24 RX ADMIN — HYDROCODONE BITARTRATE AND ACETAMINOPHEN 1 TABLET: 5; 325 TABLET ORAL at 07:52

## 2025-08-24 RX ADMIN — ONDANSETRON 4 MG: 2 INJECTION, SOLUTION INTRAMUSCULAR; INTRAVENOUS at 10:37

## 2025-08-24 RX ADMIN — SENNOSIDES, DOCUSATE SODIUM 2 TABLET: 50; 8.6 TABLET, FILM COATED ORAL at 07:52

## 2025-08-24 RX ADMIN — ACETAMINOPHEN 650 MG: 325 TABLET, FILM COATED ORAL at 13:31

## 2025-08-24 RX ADMIN — ATORVASTATIN CALCIUM 10 MG: 10 TABLET ORAL at 07:52

## 2025-08-24 RX ADMIN — HYDROCODONE BITARTRATE AND ACETAMINOPHEN 1 TABLET: 5; 325 TABLET ORAL at 01:06

## 2025-08-24 RX ADMIN — ACETAMINOPHEN 650 MG: 325 TABLET, FILM COATED ORAL at 18:24

## 2025-08-25 LAB
ALBUMIN SERPL-MCNC: 3 G/DL (ref 3.5–5.2)
ANION GAP SERPL CALCULATED.3IONS-SCNC: 9 MMOL/L (ref 5–15)
BUN SERPL-MCNC: 24 MG/DL (ref 8–23)
BUN/CREAT SERPL: 22.4 (ref 7–25)
CALCIUM SPEC-SCNC: 8.7 MG/DL (ref 8.2–9.6)
CHLORIDE SERPL-SCNC: 93 MMOL/L (ref 98–107)
CO2 SERPL-SCNC: 25 MMOL/L (ref 22–29)
CREAT SERPL-MCNC: 1.07 MG/DL (ref 0.57–1)
DEPRECATED RDW RBC AUTO: 39.5 FL (ref 37–54)
EGFRCR SERPLBLD CKD-EPI 2021: 49.1 ML/MIN/1.73
ERYTHROCYTE [DISTWIDTH] IN BLOOD BY AUTOMATED COUNT: 12.4 % (ref 12.3–15.4)
GLUCOSE SERPL-MCNC: 105 MG/DL (ref 65–99)
HCT VFR BLD AUTO: 26.3 % (ref 34–46.6)
HGB BLD-MCNC: 9 G/DL (ref 12–15.9)
MAGNESIUM SERPL-MCNC: 1.9 MG/DL (ref 1.7–2.3)
MCH RBC QN AUTO: 30.3 PG (ref 26.6–33)
MCHC RBC AUTO-ENTMCNC: 34.2 G/DL (ref 31.5–35.7)
MCV RBC AUTO: 88.6 FL (ref 79–97)
PHOSPHATE SERPL-MCNC: 2.8 MG/DL (ref 2.5–4.5)
PLATELET # BLD AUTO: 202 10*3/MM3 (ref 140–450)
PMV BLD AUTO: 10.7 FL (ref 6–12)
POTASSIUM SERPL-SCNC: 3.6 MMOL/L (ref 3.5–5.2)
RBC # BLD AUTO: 2.97 10*6/MM3 (ref 3.77–5.28)
SODIUM SERPL-SCNC: 127 MMOL/L (ref 136–145)
WBC NRBC COR # BLD AUTO: 6.77 10*3/MM3 (ref 3.4–10.8)

## 2025-08-25 PROCEDURE — 25810000003 SODIUM CHLORIDE 0.9 % SOLUTION: Performed by: INTERNAL MEDICINE

## 2025-08-25 PROCEDURE — 80069 RENAL FUNCTION PANEL: CPT | Performed by: INTERNAL MEDICINE

## 2025-08-25 PROCEDURE — 83735 ASSAY OF MAGNESIUM: CPT | Performed by: INTERNAL MEDICINE

## 2025-08-25 PROCEDURE — 85027 COMPLETE CBC AUTOMATED: CPT | Performed by: INTERNAL MEDICINE

## 2025-08-25 RX ADMIN — HYDROCODONE BITARTRATE AND ACETAMINOPHEN 1 TABLET: 5; 325 TABLET ORAL at 02:36

## 2025-08-25 RX ADMIN — HYDROCODONE BITARTRATE AND ACETAMINOPHEN 1 TABLET: 5; 325 TABLET ORAL at 18:15

## 2025-08-25 RX ADMIN — HYDROCODONE BITARTRATE AND ACETAMINOPHEN 1 TABLET: 5; 325 TABLET ORAL at 10:49

## 2025-08-25 RX ADMIN — SODIUM CHLORIDE 100 ML/HR: 9 INJECTION, SOLUTION INTRAVENOUS at 00:08

## 2025-08-25 RX ADMIN — ATORVASTATIN CALCIUM 10 MG: 10 TABLET ORAL at 08:45

## 2025-08-25 RX ADMIN — HYDRALAZINE HYDROCHLORIDE 25 MG: 25 TABLET ORAL at 20:47

## 2025-08-25 RX ADMIN — ACETAMINOPHEN 650 MG: 325 TABLET, FILM COATED ORAL at 00:36

## 2025-08-26 LAB
ALBUMIN SERPL-MCNC: 3.1 G/DL (ref 3.5–5.2)
ANION GAP SERPL CALCULATED.3IONS-SCNC: 7 MMOL/L (ref 5–15)
BUN SERPL-MCNC: 28 MG/DL (ref 8–23)
BUN/CREAT SERPL: 29.2 (ref 7–25)
CALCIUM SPEC-SCNC: 9.2 MG/DL (ref 8.2–9.6)
CHLORIDE SERPL-SCNC: 99 MMOL/L (ref 98–107)
CO2 SERPL-SCNC: 25 MMOL/L (ref 22–29)
CREAT SERPL-MCNC: 0.96 MG/DL (ref 0.57–1)
DEPRECATED RDW RBC AUTO: 41.1 FL (ref 37–54)
EGFRCR SERPLBLD CKD-EPI 2021: 56 ML/MIN/1.73
ERYTHROCYTE [DISTWIDTH] IN BLOOD BY AUTOMATED COUNT: 12.4 % (ref 12.3–15.4)
GLUCOSE SERPL-MCNC: 88 MG/DL (ref 65–99)
HCT VFR BLD AUTO: 29.5 % (ref 34–46.6)
HGB BLD-MCNC: 10 G/DL (ref 12–15.9)
MAGNESIUM SERPL-MCNC: 2 MG/DL (ref 1.7–2.3)
MCH RBC QN AUTO: 30.6 PG (ref 26.6–33)
MCHC RBC AUTO-ENTMCNC: 33.9 G/DL (ref 31.5–35.7)
MCV RBC AUTO: 90.2 FL (ref 79–97)
PHOSPHATE SERPL-MCNC: 3.4 MG/DL (ref 2.5–4.5)
PLATELET # BLD AUTO: 224 10*3/MM3 (ref 140–450)
PMV BLD AUTO: 11.3 FL (ref 6–12)
POTASSIUM SERPL-SCNC: 4.1 MMOL/L (ref 3.5–5.2)
RBC # BLD AUTO: 3.27 10*6/MM3 (ref 3.77–5.28)
SODIUM SERPL-SCNC: 131 MMOL/L (ref 136–145)
URATE SERPL-MCNC: 7 MG/DL (ref 2.4–5.7)
WBC NRBC COR # BLD AUTO: 6.61 10*3/MM3 (ref 3.4–10.8)

## 2025-08-26 PROCEDURE — 84550 ASSAY OF BLOOD/URIC ACID: CPT | Performed by: INTERNAL MEDICINE

## 2025-08-26 PROCEDURE — 85027 COMPLETE CBC AUTOMATED: CPT | Performed by: INTERNAL MEDICINE

## 2025-08-26 PROCEDURE — 97530 THERAPEUTIC ACTIVITIES: CPT

## 2025-08-26 PROCEDURE — 83735 ASSAY OF MAGNESIUM: CPT | Performed by: INTERNAL MEDICINE

## 2025-08-26 PROCEDURE — 80069 RENAL FUNCTION PANEL: CPT | Performed by: INTERNAL MEDICINE

## 2025-08-26 RX ORDER — FUROSEMIDE 20 MG/1
20 TABLET ORAL DAILY
Status: DISCONTINUED | OUTPATIENT
Start: 2025-08-26 | End: 2025-08-29 | Stop reason: HOSPADM

## 2025-08-26 RX ADMIN — LISINOPRIL 40 MG: 40 TABLET ORAL at 08:21

## 2025-08-26 RX ADMIN — HYDRALAZINE HYDROCHLORIDE 25 MG: 25 TABLET ORAL at 08:21

## 2025-08-26 RX ADMIN — HYDROCODONE BITARTRATE AND ACETAMINOPHEN 1 TABLET: 5; 325 TABLET ORAL at 06:26

## 2025-08-26 RX ADMIN — HYDROCODONE BITARTRATE AND ACETAMINOPHEN 1 TABLET: 5; 325 TABLET ORAL at 19:57

## 2025-08-26 RX ADMIN — FUROSEMIDE 20 MG: 20 TABLET ORAL at 11:57

## 2025-08-26 RX ADMIN — METOPROLOL SUCCINATE 100 MG: 50 TABLET, EXTENDED RELEASE ORAL at 08:21

## 2025-08-26 RX ADMIN — HYDRALAZINE HYDROCHLORIDE 25 MG: 25 TABLET ORAL at 19:57

## 2025-08-26 RX ADMIN — HYDROCODONE BITARTRATE AND ACETAMINOPHEN 1 TABLET: 5; 325 TABLET ORAL at 00:42

## 2025-08-26 RX ADMIN — ATORVASTATIN CALCIUM 10 MG: 10 TABLET ORAL at 08:21

## 2025-08-27 LAB
ALBUMIN SERPL-MCNC: 3.2 G/DL (ref 3.5–5.2)
ANION GAP SERPL CALCULATED.3IONS-SCNC: 10.1 MMOL/L (ref 5–15)
BUN SERPL-MCNC: 24 MG/DL (ref 8–23)
BUN/CREAT SERPL: 27.9 (ref 7–25)
CALCIUM SPEC-SCNC: 9.1 MG/DL (ref 8.2–9.6)
CHLORIDE SERPL-SCNC: 99 MMOL/L (ref 98–107)
CO2 SERPL-SCNC: 26.9 MMOL/L (ref 22–29)
CREAT SERPL-MCNC: 0.86 MG/DL (ref 0.57–1)
EGFRCR SERPLBLD CKD-EPI 2021: 63.9 ML/MIN/1.73
GLUCOSE SERPL-MCNC: 84 MG/DL (ref 65–99)
PHOSPHATE SERPL-MCNC: 3.4 MG/DL (ref 2.5–4.5)
POTASSIUM SERPL-SCNC: 4.1 MMOL/L (ref 3.5–5.2)
SODIUM SERPL-SCNC: 136 MMOL/L (ref 136–145)

## 2025-08-27 PROCEDURE — 80069 RENAL FUNCTION PANEL: CPT | Performed by: INTERNAL MEDICINE

## 2025-08-27 PROCEDURE — 97110 THERAPEUTIC EXERCISES: CPT

## 2025-08-27 RX ADMIN — HYDROCODONE BITARTRATE AND ACETAMINOPHEN 1 TABLET: 5; 325 TABLET ORAL at 16:05

## 2025-08-27 RX ADMIN — ATORVASTATIN CALCIUM 10 MG: 10 TABLET ORAL at 08:07

## 2025-08-27 RX ADMIN — METOPROLOL SUCCINATE 100 MG: 50 TABLET, EXTENDED RELEASE ORAL at 08:06

## 2025-08-27 RX ADMIN — FUROSEMIDE 20 MG: 20 TABLET ORAL at 08:06

## 2025-08-27 RX ADMIN — HYDRALAZINE HYDROCHLORIDE 25 MG: 25 TABLET ORAL at 22:06

## 2025-08-27 RX ADMIN — HYDROCODONE BITARTRATE AND ACETAMINOPHEN 1 TABLET: 5; 325 TABLET ORAL at 22:06

## 2025-08-27 RX ADMIN — HYDRALAZINE HYDROCHLORIDE 25 MG: 25 TABLET ORAL at 08:06

## 2025-08-27 RX ADMIN — LISINOPRIL 40 MG: 40 TABLET ORAL at 08:07

## 2025-08-27 RX ADMIN — HYDROCODONE BITARTRATE AND ACETAMINOPHEN 1 TABLET: 5; 325 TABLET ORAL at 04:53

## 2025-08-27 RX ADMIN — POLYETHYLENE GLYCOL 3350 17 G: 17 POWDER, FOR SOLUTION ORAL at 08:07

## 2025-08-27 RX ADMIN — BISACODYL 5 MG: 5 TABLET, COATED ORAL at 08:06

## 2025-08-28 PROBLEM — M48.062 SPINAL STENOSIS OF LUMBAR REGION WITH NEUROGENIC CLAUDICATION: Status: ACTIVE | Noted: 2025-08-28

## 2025-08-28 PROBLEM — I35.0 AORTIC VALVE STENOSIS: Status: ACTIVE | Noted: 2025-08-28

## 2025-08-28 LAB
ALBUMIN SERPL-MCNC: 3.1 G/DL (ref 3.5–5.2)
ANION GAP SERPL CALCULATED.3IONS-SCNC: 10.1 MMOL/L (ref 5–15)
BUN SERPL-MCNC: 34 MG/DL (ref 8–23)
BUN/CREAT SERPL: 30.6 (ref 7–25)
CALCIUM SPEC-SCNC: 9.1 MG/DL (ref 8.2–9.6)
CHLORIDE SERPL-SCNC: 100 MMOL/L (ref 98–107)
CO2 SERPL-SCNC: 27.9 MMOL/L (ref 22–29)
CREAT SERPL-MCNC: 1.11 MG/DL (ref 0.57–1)
EGFRCR SERPLBLD CKD-EPI 2021: 47 ML/MIN/1.73
GLUCOSE SERPL-MCNC: 113 MG/DL (ref 65–99)
PHOSPHATE SERPL-MCNC: 4 MG/DL (ref 2.5–4.5)
POTASSIUM SERPL-SCNC: 5 MMOL/L (ref 3.5–5.2)
SODIUM SERPL-SCNC: 138 MMOL/L (ref 136–145)

## 2025-08-28 PROCEDURE — 97535 SELF CARE MNGMENT TRAINING: CPT

## 2025-08-28 PROCEDURE — 80069 RENAL FUNCTION PANEL: CPT | Performed by: INTERNAL MEDICINE

## 2025-08-28 PROCEDURE — 97110 THERAPEUTIC EXERCISES: CPT

## 2025-08-28 RX ORDER — BISACODYL 10 MG
10 SUPPOSITORY, RECTAL RECTAL ONCE
Status: COMPLETED | OUTPATIENT
Start: 2025-08-28 | End: 2025-08-28

## 2025-08-28 RX ORDER — HYDRALAZINE HYDROCHLORIDE 50 MG/1
50 TABLET, FILM COATED ORAL 2 TIMES DAILY
Status: DISCONTINUED | OUTPATIENT
Start: 2025-08-28 | End: 2025-08-29 | Stop reason: HOSPADM

## 2025-08-28 RX ORDER — POLYETHYLENE GLYCOL 3350 17 G/17G
17 POWDER, FOR SOLUTION ORAL DAILY
Status: DISCONTINUED | OUTPATIENT
Start: 2025-08-28 | End: 2025-08-28

## 2025-08-28 RX ORDER — POLYETHYLENE GLYCOL 3350 17 G/17G
17 POWDER, FOR SOLUTION ORAL DAILY
Status: DISCONTINUED | OUTPATIENT
Start: 2025-08-29 | End: 2025-08-29 | Stop reason: HOSPADM

## 2025-08-28 RX ADMIN — POLYETHYLENE GLYCOL 3350 17 G: 17 POWDER, FOR SOLUTION ORAL at 08:06

## 2025-08-28 RX ADMIN — ATORVASTATIN CALCIUM 10 MG: 10 TABLET ORAL at 08:07

## 2025-08-28 RX ADMIN — HYDRALAZINE HYDROCHLORIDE 50 MG: 50 TABLET ORAL at 20:28

## 2025-08-28 RX ADMIN — LISINOPRIL 40 MG: 40 TABLET ORAL at 08:07

## 2025-08-28 RX ADMIN — FUROSEMIDE 20 MG: 20 TABLET ORAL at 08:07

## 2025-08-28 RX ADMIN — SENNOSIDES, DOCUSATE SODIUM 2 TABLET: 50; 8.6 TABLET, FILM COATED ORAL at 08:07

## 2025-08-28 RX ADMIN — METOPROLOL SUCCINATE 100 MG: 50 TABLET, EXTENDED RELEASE ORAL at 08:07

## 2025-08-28 RX ADMIN — HYDROCODONE BITARTRATE AND ACETAMINOPHEN 1 TABLET: 5; 325 TABLET ORAL at 06:02

## 2025-08-28 RX ADMIN — HYDRALAZINE HYDROCHLORIDE 25 MG: 25 TABLET ORAL at 08:07

## 2025-08-28 RX ADMIN — BISACODYL 10 MG: 10 SUPPOSITORY RECTAL at 14:40

## 2025-08-29 VITALS
SYSTOLIC BLOOD PRESSURE: 133 MMHG | HEIGHT: 64 IN | TEMPERATURE: 98.2 F | WEIGHT: 196.21 LBS | HEART RATE: 83 BPM | BODY MASS INDEX: 33.5 KG/M2 | RESPIRATION RATE: 16 BRPM | DIASTOLIC BLOOD PRESSURE: 45 MMHG | OXYGEN SATURATION: 98 %

## 2025-08-29 PROBLEM — R60.9 EDEMA: Status: RESOLVED | Noted: 2018-02-12 | Resolved: 2025-08-29

## 2025-08-29 LAB
ALBUMIN SERPL-MCNC: 3.3 G/DL (ref 3.5–5.2)
ANION GAP SERPL CALCULATED.3IONS-SCNC: 11 MMOL/L (ref 5–15)
BUN SERPL-MCNC: 28 MG/DL (ref 8–23)
BUN/CREAT SERPL: 31.8 (ref 7–25)
CALCIUM SPEC-SCNC: 9.2 MG/DL (ref 8.2–9.6)
CHLORIDE SERPL-SCNC: 98 MMOL/L (ref 98–107)
CO2 SERPL-SCNC: 26 MMOL/L (ref 22–29)
CREAT SERPL-MCNC: 0.88 MG/DL (ref 0.57–1)
DEPRECATED RDW RBC AUTO: 42.3 FL (ref 37–54)
EGFRCR SERPLBLD CKD-EPI 2021: 62.1 ML/MIN/1.73
ERYTHROCYTE [DISTWIDTH] IN BLOOD BY AUTOMATED COUNT: 13 % (ref 12.3–15.4)
GLUCOSE SERPL-MCNC: 107 MG/DL (ref 65–99)
HCT VFR BLD AUTO: 30.2 % (ref 34–46.6)
HGB BLD-MCNC: 10.1 G/DL (ref 12–15.9)
MCH RBC QN AUTO: 30.1 PG (ref 26.6–33)
MCHC RBC AUTO-ENTMCNC: 33.4 G/DL (ref 31.5–35.7)
MCV RBC AUTO: 89.9 FL (ref 79–97)
PHOSPHATE SERPL-MCNC: 3.7 MG/DL (ref 2.5–4.5)
PLATELET # BLD AUTO: 265 10*3/MM3 (ref 140–450)
PMV BLD AUTO: 10.5 FL (ref 6–12)
POTASSIUM SERPL-SCNC: 4.7 MMOL/L (ref 3.5–5.2)
RBC # BLD AUTO: 3.36 10*6/MM3 (ref 3.77–5.28)
SODIUM SERPL-SCNC: 135 MMOL/L (ref 136–145)
WBC NRBC COR # BLD AUTO: 10.32 10*3/MM3 (ref 3.4–10.8)

## 2025-08-29 PROCEDURE — 80069 RENAL FUNCTION PANEL: CPT | Performed by: INTERNAL MEDICINE

## 2025-08-29 PROCEDURE — 85027 COMPLETE CBC AUTOMATED: CPT | Performed by: INTERNAL MEDICINE

## 2025-08-29 RX ORDER — BISACODYL 10 MG
10 SUPPOSITORY, RECTAL RECTAL DAILY PRN
Start: 2025-08-29

## 2025-08-29 RX ORDER — ACETAMINOPHEN 325 MG/1
650 TABLET ORAL EVERY 6 HOURS PRN
Start: 2025-08-29

## 2025-08-29 RX ORDER — FUROSEMIDE 20 MG/1
20 TABLET ORAL DAILY
Start: 2025-08-30

## 2025-08-29 RX ORDER — HYDRALAZINE HYDROCHLORIDE 50 MG/1
50 TABLET, FILM COATED ORAL 2 TIMES DAILY
Start: 2025-08-29

## 2025-08-29 RX ORDER — POLYETHYLENE GLYCOL 3350 17 G/17G
17 POWDER, FOR SOLUTION ORAL DAILY
Start: 2025-08-30

## 2025-08-29 RX ORDER — AMOXICILLIN 250 MG
2 CAPSULE ORAL 2 TIMES DAILY PRN
Start: 2025-08-29

## 2025-08-29 RX ADMIN — FUROSEMIDE 20 MG: 20 TABLET ORAL at 08:52

## 2025-08-29 RX ADMIN — ATORVASTATIN CALCIUM 10 MG: 10 TABLET ORAL at 08:52

## 2025-08-29 RX ADMIN — ACETAMINOPHEN 650 MG: 325 TABLET, FILM COATED ORAL at 13:00

## 2025-08-29 RX ADMIN — POLYETHYLENE GLYCOL 3350 17 G: 17 POWDER, FOR SOLUTION ORAL at 08:52

## 2025-08-29 RX ADMIN — HYDRALAZINE HYDROCHLORIDE 50 MG: 50 TABLET ORAL at 08:52

## 2025-08-29 RX ADMIN — METOPROLOL SUCCINATE 100 MG: 50 TABLET, EXTENDED RELEASE ORAL at 08:52

## (undated) DEVICE — SOL NACL 0.9PCT 1000ML

## (undated) DEVICE — THE TORRENT IRRIGATION SCOPE CONNECTOR IS USED WITH THE TORRENT IRRIGATION TUBING TO PROVIDE IRRIGATION FLUIDS SUCH AS STERILE WATER DURING GASTROINTESTINAL ENDOSCOPIC PROCEDURES WHEN USED IN CONJUNCTION WITH AN IRRIGATION PUMP (OR ELECTROSURGICAL UNIT).: Brand: TORRENT

## (undated) DEVICE — LN SMPL CO2 SHTRM SD STREAM W/M LUER

## (undated) DEVICE — SUT VIC 2/0 TIES 18IN J111T

## (undated) DEVICE — ECHELON FLEX POWERED PLUS ARTICULATING ENDOSCOPIC LINEAR CUTTER , 60MM: Brand: ECHELON FLEX

## (undated) DEVICE — COVER,MAYO STAND,STERILE: Brand: MEDLINE

## (undated) DEVICE — DISPOSABLE GRASPER CARTRIDGE: Brand: DIRECT DRIVE REPOSABLE GRASPERS

## (undated) DEVICE — DEV INFL ALLIANCE2 SYS

## (undated) DEVICE — ENDOPATH XCEL UNIVERSAL TROCAR STABLILITY SLEEVES: Brand: ENDOPATH XCEL

## (undated) DEVICE — GLV SURG BIOGEL LTX PF 6 1/2

## (undated) DEVICE — BIOPATCH™ ANTIMICROBIAL DRESSING WITH CHLORHEXIDINE GLUCONATE IS A HYDROPHILLIC POLYURETHANE ABSORPTIVE FOAM WITH CHLORHEXIDINE GLUCONATE (CHG) WHICH INHIBITS BACTERIAL GROWTH UNDER THE DRESSING. THE DRESSING IS INTENDED TO BE USED TO ABSORB EXUDATE, COVER A WOUND CAUSED BY VASCULAR AND NONVASCULAR PERCUTANEOUS MEDICAL DEVICES DURING SURGERY, AS WELL AS REDUCE LOCAL INFECTION AND COLONIZATION OF MICROORGANISMS.: Brand: BIOPATCH

## (undated) DEVICE — SUT VIC 0 TN 27IN DYED JTN0G

## (undated) DEVICE — SENSR O2 OXIMAX FNGR A/ 18IN NONSTR

## (undated) DEVICE — TUBING, SUCTION, 1/4" X 10', STRAIGHT: Brand: MEDLINE

## (undated) DEVICE — CANN NASL CO2 TRULINK W/O2 A/

## (undated) DEVICE — ENDOPATH XCEL BLADELESS TROCARS WITH STABILITY SLEEVES: Brand: ENDOPATH XCEL

## (undated) DEVICE — SUT PDS 0 CT1 36IN Z346H

## (undated) DEVICE — 2S 2-0 BK MONO NYL 30"/75CM: Brand: 2S 2-0 BK MONO NYL 30"/75CM

## (undated) DEVICE — PENCL E/S HNDSWCH ROCKR CB

## (undated) DEVICE — TOTAL TRAY, 16FR 10ML SIL FOLEY, URN: Brand: MEDLINE

## (undated) DEVICE — ADAPT CLN BIOGUARD AIR/H2O DISP

## (undated) DEVICE — SUT VIC 0 TIES 18IN J912G

## (undated) DEVICE — APPL CHLORAPREP W/TINT 26ML ORNG

## (undated) DEVICE — ADHS SKIN DERMABOND TOP ADVANCED

## (undated) DEVICE — ESOPHAGEAL/PYLORIC/COLONIC WIREGUIDED BALLOON DILATATION CATHETER: Brand: CRE WIREGUIDED

## (undated) DEVICE — STPLR SKIN VISISTAT WD 35CT

## (undated) DEVICE — VISUALIZATION SYSTEM: Brand: CLEARIFY

## (undated) DEVICE — CANN O2 ETCO2 FITS ALL CONN CO2 SMPL A/ 7IN DISP LF

## (undated) DEVICE — ENSEAL TRIO TEMPERATURE CONTOLLED TISSUE SEALING TECHNOLOGY DISPOSABLE TISSUE SEALING DEVICE TAPTRONIC TRIGGER ACTIVATED POWER 3MM CURVED JAW: Brand: ENSEAL

## (undated) DEVICE — ELECTRD BLD EDGE/INSUL1P 2.4X5.1MM STRL

## (undated) DEVICE — PREP SOL POVIDONE/IODINE BT 4OZ

## (undated) DEVICE — LAPAROSCOPIC SMOKE ELIMINATION DEVICE: Brand: PNEUVIEW XE

## (undated) DEVICE — KT ORCA ORCAPOD DISP STRL

## (undated) DEVICE — Device: Brand: DEFENDO AIR/WATER/SUCTION AND BIOPSY VALVE

## (undated) DEVICE — WOUND RETRACTOR AND PROTECTOR: Brand: ALEXIS WOUND PROTECTOR-RETRACTOR

## (undated) DEVICE — SINGLE-USE BIOPSY FORCEPS: Brand: RADIAL JAW 4

## (undated) DEVICE — GOWN,NON-REINFORCED,SIRUS,SET IN SLV,XL: Brand: MEDLINE

## (undated) DEVICE — ENDOCUT SCISSOR TIP, DISPOSABLE: Brand: RENEW

## (undated) DEVICE — ST. SORBAVIEW ULTIMATE IJ SYSTEM A,C: Brand: CENTURION

## (undated) DEVICE — ENDOPATH PNEUMONEEDLE INSUFFLATION NEEDLES WITH LUER LOCK CONNECTORS 120MM: Brand: ENDOPATH

## (undated) DEVICE — ANTIBACTERIAL UNDYED BRAIDED (POLYGLACTIN 910), SYNTHETIC ABSORBABLE SUTURE: Brand: COATED VICRYL

## (undated) DEVICE — SUT VIC 3/0 CT2 27IN J232H

## (undated) DEVICE — LOU LAP SIGMOID COLON: Brand: MEDLINE INDUSTRIES, INC.

## (undated) DEVICE — SUT VIC 5/0 PS2 18IN J495H